# Patient Record
Sex: MALE | Race: WHITE | NOT HISPANIC OR LATINO | Employment: OTHER | URBAN - METROPOLITAN AREA
[De-identification: names, ages, dates, MRNs, and addresses within clinical notes are randomized per-mention and may not be internally consistent; named-entity substitution may affect disease eponyms.]

---

## 2017-01-13 DIAGNOSIS — E78.5 HYPERLIPIDEMIA LDL GOAL <100: Primary | ICD-10-CM

## 2017-01-13 RX ORDER — PRAVASTATIN SODIUM 20 MG
20 TABLET ORAL DAILY
Qty: 90 TABLET | Refills: 0 | Status: SHIPPED | OUTPATIENT
Start: 2017-01-13 | End: 2017-03-24

## 2017-01-13 NOTE — TELEPHONE ENCOUNTER
pravastatin     Last Written Prescription Date: 11/03/16  Last Fill Quantity: 90, # refills: 0  Last Office Visit with G, P or [facility] prescribing provider: 03/07/16   Next 5 appointments (look out 90 days)     Mar 13, 2017  9:30 AM   PHYSICAL with Martin Odonnell MD   [facility] ([facility])    [address] [place] [id number] [phone]                   CHOL      141   3/2/2016  HDL       44   3/2/2016  LDL       80   3/2/2016  TRIG       86   3/2/2016  CHOLHDLRATIO      3.2   1/20/2015

## 2017-02-06 ENCOUNTER — ANTICOAGULATION THERAPY VISIT (OUTPATIENT)
Dept: ANTICOAGULATION | Facility: CLINIC | Age: 72
End: 2017-02-06
Payer: MEDICARE

## 2017-02-06 DIAGNOSIS — I48.20 CHRONIC ATRIAL FIBRILLATION (H): ICD-10-CM

## 2017-02-06 DIAGNOSIS — Z79.01 LONG-TERM (CURRENT) USE OF ANTICOAGULANTS: Primary | ICD-10-CM

## 2017-02-06 LAB — INR POINT OF CARE: 2.6 (ref 0.86–1.14)

## 2017-02-06 PROCEDURE — 85610 PROTHROMBIN TIME: CPT | Mod: QW

## 2017-02-06 PROCEDURE — 99207 ZZC NO CHARGE NURSE ONLY: CPT

## 2017-02-06 PROCEDURE — 36416 COLLJ CAPILLARY BLOOD SPEC: CPT

## 2017-02-06 NOTE — PROGRESS NOTES
ANTICOAGULATION FOLLOW-UP CLINIC VISIT    Patient Name:  Alex Stanley  Date:  2/6/2017  Contact Type:  Face to Face    SUBJECTIVE:     Patient Findings     Positives No Problem Findings           OBJECTIVE    INR PROTIME   Date Value Ref Range Status   02/06/2017 2.6* 0.86 - 1.14 Final       ASSESSMENT / PLAN  INR assessment THER    Recheck INR In: 6 WEEKS    INR Location Clinic      Anticoagulation Summary as of 2/6/2017     INR goal 2.0-3.0   Selected INR 2.6 (2/6/2017)   Maintenance plan 2.5 mg (5 mg x 0.5) on Fri; 5 mg (5 mg x 1) all other days   Full instructions 2.5 mg on Fri; 5 mg all other days   Weekly total 32.5 mg   No change documented Mabel Garza, RN   Plan last modified Mabel Garza RN (12/21/2015)   Next INR check 3/20/2017   Target end date Indefinite    Indications   Long-term (current) use of anticoagulants [Z79.01] [Z79.01]  Chronic atrial fibrillation/Flutter [I48.2]         Anticoagulation Episode Summary     INR check location     Preferred lab     Send INR reminders to [facility]    Comments             See the Encounter Report to view Anticoagulation Flowsheet and Dosing Calendar (Go to Encounters tab in chart review, and find the Anticoagulation Therapy Visit)        Mabel Garza RN

## 2017-02-15 DIAGNOSIS — I48.20 CHRONIC ATRIAL FIBRILLATION (H): ICD-10-CM

## 2017-02-15 RX ORDER — WARFARIN SODIUM 5 MG/1
TABLET ORAL
Qty: 90 TABLET | Refills: 0 | Status: SHIPPED | OUTPATIENT
Start: 2017-02-15 | End: 2017-05-01

## 2017-02-15 NOTE — TELEPHONE ENCOUNTER
warfarin (COUMADIN) 5 MG tablet    Last Written Prescription Date: 11/15/2016  Last Fill Qty: 90, # refills: 0  Last Office Visit with [facility], [facility] or [facility] prescribing provider: 03/22/2016  Next 5 appointments (look out 90 days)     Mar 13, 2017  9:30 AM CDT   PHYSICAL with Martin Odonnell MD   [facility] ([facility])    [address] [place] [id number] [phone]                   Date and Result of Last PT/INR:   Lab Results   Component Value Date    INR 2.6 02/06/2017    INR 2.5 12/27/2016    INR 1.18 06/02/2011    INR 1.13 06/02/2011

## 2017-03-13 ENCOUNTER — ANTICOAGULATION THERAPY VISIT (OUTPATIENT)
Dept: ANTICOAGULATION | Facility: CLINIC | Age: 72
End: 2017-03-13
Payer: MEDICARE

## 2017-03-13 DIAGNOSIS — Z79.01 LONG-TERM (CURRENT) USE OF ANTICOAGULANTS: ICD-10-CM

## 2017-03-13 DIAGNOSIS — I48.20 CHRONIC ATRIAL FIBRILLATION (H): ICD-10-CM

## 2017-03-13 LAB — INR POINT OF CARE: 3 (ref 0.86–1.14)

## 2017-03-13 PROCEDURE — 36416 COLLJ CAPILLARY BLOOD SPEC: CPT

## 2017-03-13 PROCEDURE — 99207 ZZC NO CHARGE NURSE ONLY: CPT

## 2017-03-13 PROCEDURE — 85610 PROTHROMBIN TIME: CPT | Mod: QW

## 2017-03-13 NOTE — MR AVS SNAPSHOT
Alex Stanley   3/13/2017 9:00 AM   Anticoagulation Therapy Visit    Description:  71 year old male   Provider:   ANTICOAGULATION CLINIC   Department:   Anti Coagulation           INR as of 3/13/2017     Today's INR 3.0      Anticoagulation Summary as of 3/13/2017     INR goal 2.0-3.0   Today's INR 3.0   Full instructions 2.5 mg on Fri; 5 mg all other days   Next INR check 4/24/2017    Indications   Long-term (current) use of anticoagulants [Z79.01] [Z79.01]  Chronic atrial fibrillation/Flutter [I48.2]         Your next Anticoagulation Clinic appointment(s)     May 01, 2017  9:00 AM CDT   Anticoagulation Visit with  ANTICOAGULATION CLINIC   [facility]    [address] [place] [id number] [phone]              Contact Numbers     [facility]  Please call  [phone] to cancel and/or reschedule your appointment   Please call  [phone] with any problems or questions regarding your therapy.        March 2017 Details    Sun Mon Tue Wed Thu Fri Sat        1               2               3               4                 5               6               7               8               9               10               11                 12               13      5 mg   See details      14      5 mg         15      5 mg         16      5 mg         17      2.5 mg         18      5 mg           19      5 mg         20      5 mg         21      5 mg         22      5 mg         23      5 mg         24      2.5 mg         25      5 mg           26      5 mg         27      5 mg         28      5 mg         29      5 mg         30      5 mg         31      2.5 mg           Date Details   03/13 This INR check               How to take your warfarin dose     To take:  2.5 mg Take 0.5 of a 5 mg tablet.    To take:  5 mg Take 1 of the 5 mg tablets.           April 2017 Details    Sun Mon Tue Wed Thu Fri Sat           1      5 mg            2      5 mg         3      5 mg         4      5 mg         5      5 mg         6      5 mg         7      2.5 mg         8      5 mg           9      5 mg         10      5 mg         11      5 mg         12      5 mg         13      5 mg         14      2.5 mg         15      5 mg           16      5 mg         17      5 mg         18      5 mg         19      5 mg         20      5 mg         21      2.5 mg         22      5 mg           23      5 mg         24            25               26               27               28               29                 30                      Date Details   No additional details    Date of next INR:  4/24/2017         How to take your warfarin dose     To take:  2.5 mg Take 0.5 of a 5 mg tablet.    To take:  5 mg Take 1 of the 5 mg tablets.

## 2017-03-13 NOTE — PROGRESS NOTES
ANTICOAGULATION FOLLOW-UP CLINIC VISIT    Patient Name:  Alex Stanley  Date:  3/13/2017  Contact Type:  Face to Face    SUBJECTIVE:     Patient Findings     Positives No Problem Findings           OBJECTIVE    INR Protime   Date Value Ref Range Status   03/13/2017 3.0 (A) 0.86 - 1.14 Final       ASSESSMENT / PLAN  INR assessment THER    Recheck INR In: 6 WEEKS    INR Location Clinic      Anticoagulation Summary as of 3/13/2017     INR goal 2.0-3.0   Today's INR 3.0   Maintenance plan 2.5 mg (5 mg x 0.5) on Fri; 5 mg (5 mg x 1) all other days   Full instructions 2.5 mg on Fri; 5 mg all other days   Weekly total 32.5 mg   No change documented Mabel Garza RN   Plan last modified Mabel Garza RN (12/21/2015)   Next INR check 4/24/2017   Target end date Indefinite    Indications   Long-term (current) use of anticoagulants [Z79.01] [Z79.01]  Chronic atrial fibrillation/Flutter [I48.2]         Anticoagulation Episode Summary     INR check location     Preferred lab     Send INR reminders to [facility]    Comments             See the Encounter Report to view Anticoagulation Flowsheet and Dosing Calendar (Go to Encounters tab in chart review, and find the Anticoagulation Therapy Visit)    Dosage adjustment made based on physician directed care plan.    Mabel Garza RN

## 2017-03-24 DIAGNOSIS — E78.5 HYPERLIPIDEMIA LDL GOAL <100: ICD-10-CM

## 2017-03-27 RX ORDER — PRAVASTATIN SODIUM 20 MG
TABLET ORAL
Qty: 90 TABLET | Refills: 0 | Status: SHIPPED | OUTPATIENT
Start: 2017-03-27 | End: 2017-05-01

## 2017-03-27 NOTE — TELEPHONE ENCOUNTER
pravastatin (PRAVACHOL) 20 MG tablet     Last Written Prescription Date: 1/13/2017   Last Fill Quantity: 90, # refills: 0  Last Office Visit with [facility], [facility] or [facility] prescribing provider: 11/23/2016  Next 5 appointments (look out 90 days)     May 01, 2017  9:30 AM CDT   PHYSICAL with Martin Odonnell MD   [facility]    [address] [place] [id number] [phone]                   Lab Results   Component Value Date    CHOL 141 03/02/2016     Lab Results   Component Value Date    HDL 44 03/02/2016     Lab Results   Component Value Date    LDL 80 03/02/2016     Lab Results   Component Value Date    TRIG 86 03/02/2016     Lab Results   Component Value Date    CHOLHDLRATIO 3.2 01/20/2015

## 2017-04-26 DIAGNOSIS — N18.30 CKD (CHRONIC KIDNEY DISEASE) STAGE 3, GFR 30-59 ML/MIN (H): ICD-10-CM

## 2017-04-26 DIAGNOSIS — I10 ESSENTIAL HYPERTENSION: ICD-10-CM

## 2017-04-26 DIAGNOSIS — E78.5 HYPERLIPIDEMIA LDL GOAL <100: ICD-10-CM

## 2017-04-26 LAB
ANION GAP SERPL CALCULATED.3IONS-SCNC: 9 MMOL/L (ref 3–14)
BUN SERPL-MCNC: 12 MG/DL (ref 7–30)
CALCIUM SERPL-MCNC: 8.9 MG/DL (ref 8.5–10.1)
CHLORIDE SERPL-SCNC: 107 MMOL/L (ref 94–109)
CHOLEST SERPL-MCNC: 128 MG/DL
CO2 SERPL-SCNC: 26 MMOL/L (ref 20–32)
CREAT SERPL-MCNC: 1.25 MG/DL (ref 0.66–1.25)
CREAT UR-MCNC: 52 MG/DL
GFR SERPL CREATININE-BSD FRML MDRD: 57 ML/MIN/1.7M2
GLUCOSE SERPL-MCNC: 91 MG/DL (ref 70–99)
HDLC SERPL-MCNC: 39 MG/DL
HGB BLD-MCNC: 14.8 G/DL (ref 13.3–17.7)
LDLC SERPL CALC-MCNC: 73 MG/DL
MICROALBUMIN UR-MCNC: 11 MG/L
MICROALBUMIN/CREAT UR: 21.19 MG/G CR (ref 0–17)
NONHDLC SERPL-MCNC: 89 MG/DL
POTASSIUM SERPL-SCNC: 4.1 MMOL/L (ref 3.4–5.3)
SODIUM SERPL-SCNC: 142 MMOL/L (ref 133–144)
TRIGL SERPL-MCNC: 80 MG/DL

## 2017-04-26 PROCEDURE — 80061 LIPID PANEL: CPT | Performed by: INTERNAL MEDICINE

## 2017-04-26 PROCEDURE — 85018 HEMOGLOBIN: CPT | Performed by: INTERNAL MEDICINE

## 2017-04-26 PROCEDURE — 80048 BASIC METABOLIC PNL TOTAL CA: CPT | Performed by: INTERNAL MEDICINE

## 2017-04-26 PROCEDURE — 82043 UR ALBUMIN QUANTITATIVE: CPT | Performed by: INTERNAL MEDICINE

## 2017-04-26 PROCEDURE — 36415 COLL VENOUS BLD VENIPUNCTURE: CPT | Performed by: INTERNAL MEDICINE

## 2017-05-01 ENCOUNTER — OFFICE VISIT (OUTPATIENT)
Dept: INTERNAL MEDICINE | Facility: CLINIC | Age: 72
End: 2017-05-01
Payer: MEDICARE

## 2017-05-01 ENCOUNTER — ANTICOAGULATION THERAPY VISIT (OUTPATIENT)
Dept: ANTICOAGULATION | Facility: CLINIC | Age: 72
End: 2017-05-01
Payer: MEDICARE

## 2017-05-01 VITALS
SYSTOLIC BLOOD PRESSURE: 104 MMHG | HEIGHT: 68 IN | OXYGEN SATURATION: 97 % | TEMPERATURE: 98.4 F | HEART RATE: 75 BPM | WEIGHT: 174.9 LBS | DIASTOLIC BLOOD PRESSURE: 70 MMHG | BODY MASS INDEX: 26.51 KG/M2

## 2017-05-01 DIAGNOSIS — F17.200 CURRENT SMOKER: ICD-10-CM

## 2017-05-01 DIAGNOSIS — I48.20 CHRONIC ATRIAL FIBRILLATION (H): ICD-10-CM

## 2017-05-01 DIAGNOSIS — E78.5 HYPERLIPIDEMIA LDL GOAL <100: ICD-10-CM

## 2017-05-01 DIAGNOSIS — I10 ESSENTIAL HYPERTENSION: ICD-10-CM

## 2017-05-01 DIAGNOSIS — Z87.891 HISTORY OF TOBACCO USE: ICD-10-CM

## 2017-05-01 DIAGNOSIS — Z11.59 NEED FOR HEPATITIS C SCREENING TEST: ICD-10-CM

## 2017-05-01 DIAGNOSIS — F10.21 ALCOHOL DEPENDENCE IN REMISSION (H): ICD-10-CM

## 2017-05-01 DIAGNOSIS — Z72.0 TOBACCO ABUSE: ICD-10-CM

## 2017-05-01 DIAGNOSIS — Z00.00 MEDICARE ANNUAL WELLNESS VISIT, SUBSEQUENT: Primary | ICD-10-CM

## 2017-05-01 DIAGNOSIS — I87.2 VENOUS INSUFFICIENCY: ICD-10-CM

## 2017-05-01 DIAGNOSIS — N18.30 CKD (CHRONIC KIDNEY DISEASE) STAGE 3, GFR 30-59 ML/MIN (H): ICD-10-CM

## 2017-05-01 LAB — INR POINT OF CARE: 3.4 (ref 0.86–1.14)

## 2017-05-01 PROCEDURE — G0439 PPPS, SUBSEQ VISIT: HCPCS | Performed by: INTERNAL MEDICINE

## 2017-05-01 PROCEDURE — 85610 PROTHROMBIN TIME: CPT | Mod: QW

## 2017-05-01 PROCEDURE — G0296 VISIT TO DETERM LDCT ELIG: HCPCS | Performed by: INTERNAL MEDICINE

## 2017-05-01 PROCEDURE — 36416 COLLJ CAPILLARY BLOOD SPEC: CPT

## 2017-05-01 PROCEDURE — 99213 OFFICE O/P EST LOW 20 MIN: CPT | Mod: 25 | Performed by: INTERNAL MEDICINE

## 2017-05-01 RX ORDER — PRAVASTATIN SODIUM 20 MG
20 TABLET ORAL DAILY
Qty: 90 TABLET | Status: SHIPPED | OUTPATIENT
Start: 2017-05-01 | End: 2018-05-07

## 2017-05-01 RX ORDER — AMLODIPINE BESYLATE 10 MG/1
10 TABLET ORAL DAILY
Qty: 90 TABLET | Status: SHIPPED | OUTPATIENT
Start: 2017-05-01 | End: 2017-07-07

## 2017-05-01 RX ORDER — HYDROCHLOROTHIAZIDE 25 MG/1
12.5 TABLET ORAL EVERY OTHER DAY
Qty: 45 TABLET | Refills: 2 | Status: CANCELLED | OUTPATIENT
Start: 2017-05-01

## 2017-05-01 RX ORDER — WARFARIN SODIUM 5 MG/1
TABLET ORAL
Qty: 90 TABLET | Status: SHIPPED | OUTPATIENT
Start: 2017-05-01 | End: 2017-08-01

## 2017-05-01 RX ORDER — VARENICLINE TARTRATE 1 MG/1
1 TABLET, FILM COATED ORAL 2 TIMES DAILY
Qty: 60 TABLET | Refills: 2 | Status: SHIPPED | OUTPATIENT
Start: 2017-05-08 | End: 2017-06-01

## 2017-05-01 NOTE — PROGRESS NOTES
ANTICOAGULATION FOLLOW-UP CLINIC VISIT    Patient Name:  Alex Stanley  Date:  5/1/2017  Contact Type:  Face to Face    SUBJECTIVE:     Patient Findings     Positives Extra doses (pt has already taken his 5mg today, dose will be decreased Tues due to elevated INR today.), No Problem Findings           OBJECTIVE    INR Protime   Date Value Ref Range Status   05/01/2017 3.4 (A) 0.86 - 1.14 Final       ASSESSMENT / PLAN  INR assessment SUPRA    Recheck INR In: 6 WEEKS    INR Location Clinic      Anticoagulation Summary as of 5/1/2017     INR goal 2.0-3.0   Today's INR 3.4!   Maintenance plan 2.5 mg (5 mg x 0.5) on Mon, Fri; 5 mg (5 mg x 1) all other days   Full instructions 5/1: 5 mg; 5/2: 2.5 mg; Otherwise 2.5 mg on Mon, Fri; 5 mg all other days   Weekly total 30 mg   Plan last modified Vangie Agarwal RN (5/1/2017)   Next INR check 6/12/2017   Target end date Indefinite    Indications   Long-term (current) use of anticoagulants [Z79.01] [Z79.01]  Chronic atrial fibrillation/Flutter [I48.2]         Anticoagulation Episode Summary     INR check location     Preferred lab     Send INR reminders to  [facility]    Comments             See the Encounter Report to view Anticoagulation Flowsheet and Dosing Calendar (Go to Encounters tab in chart review, and find the Anticoagulation Therapy Visit)        Vangie Agarwal RN

## 2017-05-01 NOTE — MR AVS SNAPSHOT
Alex Stanley   5/1/2017 9:00 AM   Anticoagulation Therapy Visit    Description:  71 year old male   Provider:   ANTICOAGULATION CLINIC   Department:   Anti Coagulation           INR as of 5/1/2017     Today's INR 3.4!      Anticoagulation Summary as of 5/1/2017     INR goal 2.0-3.0   Today's INR 3.4!   Full instructions 5/1: 5 mg; 5/2: 2.5 mg; Otherwise 2.5 mg on Mon, Fri; 5 mg all other days   Next INR check 6/12/2017    Indications   Long-term (current) use of anticoagulants [Z79.01] [Z79.01]  Chronic atrial fibrillation/Flutter [I48.2]         Your next Anticoagulation Clinic appointment(s)     Jun 12, 2017  9:00 AM CDT   Anticoagulation Visit with  ANTICOAGULATION CLINIC   [facility] ([facility])    [address] [address] [phone]              Contact Numbers     [facility]  Please call  [phone] to cancel and/or reschedule your appointment   Please call  [phone] with any problems or questions regarding your therapy.        May 2017 Details    Sun Mon Tue Wed Thu Fri Sat      1      5 mg   See details      2      2.5 mg         3      5 mg         4      5 mg         5      2.5 mg         6      5 mg           7      5 mg         8      2.5 mg         9      5 mg         10      5 mg         11      5 mg         12      2.5 mg         13      5 mg           14      5 mg         15      2.5 mg         16      5 mg         17      5 mg         18      5 mg         19      2.5 mg         20      5 mg           21      5 mg         22      2.5 mg         23      5 mg         24      5 mg         25      5 mg         26      2.5 mg         27      5 mg           28      5 mg         29      2.5 mg         30      5 mg         31      5 mg             Date Details   05/01 This INR check               How to take your warfarin dose     To take:  2.5 mg Take 0.5 of a 5 mg tablet.    To take:  5 mg Take 1 of the 5  mg tablets.           June 2017 Details    Sun Mon Tue Wed Thu Fri Sat         1      5 mg         2      2.5 mg         3      5 mg           4      5 mg         5      2.5 mg         6      5 mg         7      5 mg         8      5 mg         9      2.5 mg         10      5 mg           11      5 mg         12            13               14               15               16               17                 18               19               20               21               22               23               24                 25               26               27               28               29               30                 Date Details   No additional details    Date of next INR:  6/12/2017         How to take your warfarin dose     To take:  2.5 mg Take 0.5 of a 5 mg tablet.    To take:  5 mg Take 1 of the 5 mg tablets.

## 2017-05-01 NOTE — MR AVS SNAPSHOT
After Visit Summary   5/1/2017    Alex Stanley    MRN: [id number]           Patient Information     Date Of Birth          1945        Visit Information        Provider Department      5/1/2017 9:30 AM Martin Odonnell MD [facility]        Today's Diagnoses     Medicare annual wellness visit, subsequent    -  1    Hyperlipidemia LDL goal <100        Chronic atrial fibrillation/Flutter        Alcohol dependence in remission (H)        Essential hypertension        Tobacco abuse        CKD (chronic kidney disease) stage 3, GFR 30-59 ml/min        Venous insufficiency        Need for hepatitis C screening test        Current smoker        History of tobacco use          Care Instructions      Preventive Health Recommendations:       Male Ages 65 and over    Yearly exam:             See your health care provider every year in order to  o   Review health changes.   o   Discuss preventive care.    o   Review your medicines if your doctor has prescribed any.    Talk with your health care provider about whether you should have a test to screen for prostate cancer (PSA).    Every 3 years, have a diabetes test (fasting glucose). If you are at risk for diabetes, you should have this test more often.    Every 5 years, have a cholesterol test. Have this test more often if you are at risk for high cholesterol or heart disease.     Every 10 years, have a colonoscopy. Or, have a yearly FIT test (stool test). These exams will check for colon cancer.    Talk to with your health care provider about screening for Abdominal Aortic Aneurysm if you have a family history of AAA or have a history of smoking.  Shots:     Get a flu shot each year.     Get a tetanus shot every 10 years.     Talk to your doctor about your pneumonia vaccines. There are now two you should receive - Pneumovax (PPSV 23) and Prevnar (PCV 13).    Talk to your doctor about a shingles vaccine.     Talk to your doctor  "about the hepatitis B vaccine.  Nutrition:     Eat at least 5 servings of fruits and vegetables each day.     Eat whole-grain bread, whole-wheat pasta and brown rice instead of white grains and rice.     Talk to your doctor about Calcium and Vitamin D.   Lifestyle    Exercise for at least 150 minutes a week (30 minutes a day, 5 days a week). This will help you control your weight and prevent disease.     Limit alcohol to one drink per day.     No smoking.     Wear sunscreen to prevent skin cancer.     See your dentist every six months for an exam and cleaning.     See your eye doctor every 1 to 2 years to screen for conditions such as glaucoma, macular degeneration and cataracts.    PLAN:  1.  Hold hydrochlorothiazide, for now.   Watch for increase of ankle swelling, increase of blood pressure   2.  Increase frequency of blood pressure checks, report to MD in a few weeks  3.  Prescription for Chantix    4.  Consider completing advance directive  5.  Low dose lung CT        Varenicline Tartrate (Chantix):  Chantix tablets Days                Dosage  0.5 mg                     1 through 3 0.5 mg once daily  0.5 mg                      4 through 7 0.5 mg twice daily  1.0 mg                     Day 8 to end  1 mg twice daily    Renal dosing:  For severe renal impairment, start at a dose of 0.5 mg daily and then titrate as needed to a maximum dose of 0.5 mg twice daily.  For patients with end-stage renal disease (ESRD) undergoing hemodialysis, a maximum dose of 0.5 mg daily may be administered if tolerated well.    How to take:  You will start taking Chantix one week before your quit date while you are still smoking. At first you will use the Chantix starter pack. Once you are finished with that, you will be on a \"maintenance dose\" that will probably stay the same for the rest of your treatment.  After a week of slowly increasing your dose, you will take Chantix twice a day. Take each dose after eating and with a full " glass of water. Taking Chantix with food and water decreases nausea. That is also why you begin on a lower dose and slowly increase it. Once you begin the twice a day dosage, your 2 doses should be at least 8-10 hours apart and at least 4-5 hours before bedtime.      Duration of therapy: 12 weeks.  An additional course of 12 weeks of treatment is recommended if the first 12 weeks were successful to improve long term abstinence.    Adverse reactions:  The most common adverse reaction associated with varenicline treatment is nausea. Other common adverse effects include sleep disturbance, constipation, flatulence and vomiting.    There have been warnings from the FDA to be on the lookout for erratic behavior, suicidal ideation, or suicidal behavior.   There is one case report of a death, though it appears alcohol consumption may have played a part in that case.    Please report any behavior or mood changes as well as being aware of drowsiness.   Be cautious when operating motor vehicles or dangerous machinery until the medication's effect on you is clear.      Lung Cancer Screening   Frequently Asked Questions  If you are at high-risk for lung cancer, getting screened with low-dose computed tomography (LDCT) every year can help save your life. This handout offers answers to some of the most common questions about lung cancer screening. If you have other questions, please call [phone] ([phone]).     What is it?  Lung cancer screening uses special X-ray technology to create an image of your lung tissue. The exam is quick and easy and takes less than 10 seconds. We don t give you any medicine or use any needles. You can eat before and after the exam. You don t need to change your clothes as long as the clothing on your chest doesn t contain metal. But, you do need to be able to hold your breath for at least 6 seconds during the exam.    What is the goal of lung cancer screening?  The goal of lung cancer  screening is to save lives. Many times, lung cancer is not found until a person starts having physical symptoms. Lung cancer screening can help detect lung cancer in the earliest stages when it may be easier to treat.    Who should be screened for lung cancer?  We suggest lung cancer screening for anyone who is at high-risk for lung cancer. You are in the high-risk group if you:      are between the ages of 55 and 79, and    have smoked at least 1 pack of cigarettes a day for 30 or more years, and    still smoke or have quit within the past 15 years.    However, if you have a new cough or shortness of breath, you should talk to your doctor before being screened.    Some national lung health advocacy groups also recommend screening for people ages 50 to 79 who have smoked an average of 1 pack of cigarettes a day for 20 years. They must also have at least 1 other risk factor for lung cancer, not including exposure to secondhand smoke. Other risk factors are having had cancer in the past, emphysema, pulmonary fibrosis, COPD, a family history of lung cancer, or exposure to certain materials such as arsenic, asbestos, beryllium, cadmium, chromium, diesel fumes, nickel, radon or silica. Your care team can help you know if you have one of these risk factors.     Why does it matter if I have symptoms?  Certain symptoms can be a sign that you have a condition in your lungs that should be checked and treated by your doctor. These symptoms include fever, chest pain, a new or changing cough, shortness of breath that you have never felt before, coughing up blood or unexplained weight loss. Having any of these symptoms can greatly affect the results of lung cancer screening.       Should all smokers get an LDCT lung cancer screening exam?  It depends. Lung cancer screening is for a very specific group of men and women who have a history of heavy smoking over a long period of time (see  Who should be screened for lung cancer   above).  I am in the high-risk group, but have been diagnosed with cancer in the past. Is LDCT lung cancer screening right for me?  In some cases, you should not have LDCT lung screening, such as when your doctor is already following your cancer with CT scan studies. Your doctor will help you decide if LDCT lung screening is right for you.  Do I need to have a screening exam every year?  Yes. If you are in the high-risk group described earlier, you should get an LDCT lung cancer screening exam every year until you are 79, or are no longer willing or able to undergo screening and possible procedures to diagnose and treat lung cancer.  How effective is LDCT at preventing death from lung cancer?  Studies have shown that LDCT lung cancer screening can lower the risk of death from lung cancer by 20 percent in people who are at high-risk.  What are the risks?  There are some risks and limitations of LDCT lung cancer screening. We want to make sure you understand the risks and benefits, so please let us know if you have any questions. Your doctor may want to talk with you more about these risks.    Radiation exposure: As with any exam that uses radiation, there is a very small increased risk of cancer. The amount of radiation in LDCT is small--about the same amount a person would get from a mammogram. Your doctor orders the exam when he or she feels the potential benefits outweigh the risks.    False negatives: No test is perfect, including LDCT. It is possible that you may have a medical condition, including lung cancer, that is not found during your exam. This is called a false negative result.    False positives and more testing: LDCT very often finds something in the lung that could be cancer, but in fact is not. This is called a false positive result. False positive tests often cause anxiety. To make sure these findings are not cancer, you may need to have more tests. These tests will be done only if you give us  permission. Sometimes patients need a treatment that can have side effects, such as a biopsy. For more information on false positives, see  What can I expect from the results?     Findings not related to lung cancer: Your LDCT exam also takes pictures of areas of your body next to your lungs. In a very small number of cases, the CT scan will show an abnormal finding in one of these areas, such as your kidneys, adrenal glands, liver or thyroid. This finding may not be serious, but you may need more tests. Your doctor can help you decide what other tests you may need, if any.  What can I expect from the results?  About 1 out of 4 LDCT exams will find something that may need more tests. Most of the time, these findings are lung nodules. Lung nodules are very small collections of tissue in the lung. These nodules are very common, and the vast majority--more than 97 percent--are not cancer (benign). Most are normal lymph nodes or small areas of scarring from past infections.  But, if a small lung nodule is found to be cancer, the cancer can be cured more than 90 percent of the time. To know if the nodule is cancer, we may need to get more images before your next yearly screening exam. If the nodule has suspicious features (for example, it is large, has an odd shape or grows over time), we will refer you to a specialist for further testing.  Will my doctor also get the results?  Yes. Your doctor will get a copy of your results.  Is it okay to keep smoking now that there s a cancer screening exam?  No. Tobacco is one of the strongest cancer-causing agents. It causes not only lung cancer, but other cancers and cardiovascular (heart) diseases as well. The damage caused by smoking builds over time. This means that the longer you smoke, the higher your risk of disease. While it is never too late to quit, the sooner you quit, the better.  Where can I find help to quit smoking?  The best way to prevent lung cancer is to stop  smoking. If you have already quit smoking, congratulations and keep it up! For help on quitting smoking, please call [employer] at 7-558-929-TVNG ([phone]) or the American [employer] Society at 1-684.598.1241 to find local resources near you.  One-on-one health coaching:  If you d prefer to work individually with a health care provider on tobacco cessation, we offer:      Medication Therapy Management:  Our specially trained pharmacists work closely with you and your doctor to help you quit smoking.  Call [phone] or [phone] (toll free).     Can Do: Health coaching offered by [facility].  [email or web address]            Follow-ups after your visit        Your next 10 appointments already scheduled     Jun 12, 2017  9:00 AM CDT   Anticoagulation Visit with [facility]   [facility] ([facility]    [address] [place] [id number] [phone]              Future tests that were ordered for you today     Open Future Orders        Priority Expected Expires Ordered    Hepatitis C Screen Reflex to HCV RNA Quant and Genotype Routine 3/1/2018 5/1/2018 5/1/2017    EKG 12-lead complete w/read - Clinics Routine 3/1/2018 5/1/2018 5/1/2017    Lipid panel reflex to direct LDL Routine 3/1/2018 5/1/2018 5/1/2017    Basic metabolic panel Routine 3/1/2018 5/1/2018 5/1/2017    CT Chest Lung Cancer Scrn Low Dose wo Routine  5/1/2018 5/1/2017            Who to contact     If you have questions or need follow up information about today's clinic visit or your schedule please contact [facility] directly at [phone].  Normal or non-critical lab and imaging results will be communicated to you by MyChart, letter or phone within 4 business days after the clinic has received the results. If you do not hear from us within 7 days, please contact the clinic through MyChart or phone. If you have a critical or abnormal  "lab result, we will notify you by phone as soon as possible.  Submit refill requests through [employer] or call your pharmacy and they will forward the refill request to us. Please allow 3 business days for your refill to be completed.          Additional Information About Your Visit        General Specifichart Information     [employer] lets you send messages to your doctor, view your test results, renew your prescriptions, schedule appointments and more. To sign up, go to [email or web address] . Click on \"Log in\" on the left side of the screen, which will take you to the Welcome page. Then click on \"Sign up Now\" on the right side of the page.     You will be asked to enter the access code listed below, as well as some personal information. Please follow the directions to create your username and password.     Your access code is: [email or web address]  Expires: 2017 10:53 AM     Your access code will  in 90 days. If you need help or a new code, please call your [facility] clinic or [phone].        Care EveryWhere ID     This is your Care EveryWhere ID. This could be used by other organizations to access your [facility] medical records  [id number]        Your Vitals Were     Pulse Temperature Height Pulse Oximetry BMI (Body Mass Index)       75 98.4  F (36.9  C) (Oral) 5' 8\" (1.727 m) 97% 26.59 kg/m2        Blood Pressure from Last 3 Encounters:   17 104/70   16 115/82   16 126/81    Weight from Last 3 Encounters:   17 174 lb 14.4 oz (79.3 kg)   16 175 lb 8 oz (79.6 kg)   01/26/15 169 lb 1.6 oz (76.7 kg)              We Performed the Following     Okay for Smoking Cessation Study (PLUTO) to Contact Patient     Prof fee: Shared Decisionmaking for Lung Cancer Screening          Today's Medication Changes          These changes are accurate as of: 17 10:53 AM.  If you have any questions, ask your nurse or doctor.               Start taking these medicines.        Dose/Directions    * varenicline " 0.5 MG X 11 & 1 MG X 42 tablet   Commonly known as:  CHANTIX STARTING MONTH WENDY   Used for:  Tobacco abuse   Started by:  Martin Odonnell MD        Take 0.5 mg tab daily for 3 days, then 0.5 mg tab twice daily for 4 days, then 1 mg twice daily.   Quantity:  53 tablet   Refills:  0       * varenicline 1 MG tablet   Commonly known as:  CHANTIX CONTINUING MONTH WENDY   Used for:  Tobacco abuse   Started by:  Martin Odonnell MD        Dose:  1 mg   Start taking on:  5/8/2017   Take 1 tablet (1 mg) by mouth 2 times daily   Quantity:  60 tablet   Refills:  2       * Notice:  This list has 2 medication(s) that are the same as other medications prescribed for you. Read the directions carefully, and ask your doctor or other care provider to review them with you.      These medicines have changed or have updated prescriptions.        Dose/Directions    pravastatin 20 MG tablet   Commonly known as:  PRAVACHOL   This may have changed:  See the new instructions.   Used for:  Hyperlipidemia LDL goal <100   Changed by:  Martin Odonnell MD        Dose:  20 mg   Take 1 tablet (20 mg) by mouth daily   Quantity:  90 tablet   Refills:  prn            Where to get your medicines      These medications were sent to [facility] - [address] [address]     Phone:  [phone]     amLODIPine 10 MG tablet    pravastatin 20 MG tablet    varenicline 1 MG tablet    warfarin 5 MG tablet         These medications were sent to [address] [address]     Phone:  [phone]     varenicline 0.5 MG X 11 & 1 MG X 42 tablet                Primary Care Provider Office Phone # Fax #    Martin Odonnell [phone]       [address] [id number]        Thank you!     Thank you for choosing [facility]  for your  care. Our goal is always to provide you with excellent care. Hearing back from our patients is one way we can continue to improve our services. Please take a few minutes to complete the written survey that you may receive in the mail after your visit with us. Thank you!             Your Updated Medication List - Protect others around you: Learn how to safely use, store and throw away your medicines at www.disposemymeds.org.          This list is accurate as of: 5/1/17 10:53 AM.  Always use your most recent med list.                   Brand Name Dispense Instructions for use    amLODIPine 10 MG tablet    NORVASC    90 tablet    Take 1 tablet (10 mg) by mouth daily       ASPIRIN NOT PRESCRIBED    INTENTIONAL    0 each    Antiplatelet medication not prescribed intentionally due to history  of gastrointestinal bleeding, and patient on warfarin.       hydrochlorothiazide 25 MG tablet    HYDRODIURIL    45 tablet    Take 0.5 tablets (12.5 mg) by mouth every other day       pravastatin 20 MG tablet    PRAVACHOL    90 tablet    Take 1 tablet (20 mg) by mouth daily       * varenicline 0.5 MG X 11 & 1 MG X 42 tablet    CHANTIX STARTING MONTH WENDY    53 tablet    Take 0.5 mg tab daily for 3 days, then 0.5 mg tab twice daily for 4 days, then 1 mg twice daily.       * varenicline 1 MG tablet   Start taking on:  5/8/2017    CHANTIX CONTINUING MONTH WENDY    60 tablet    Take 1 tablet (1 mg) by mouth 2 times daily       warfarin 5 MG tablet    COUMADIN    90 tablet    Take 2.5mg fri and 5mg all other days as advised by the anticoagulation clinic       * Notice:  This list has 2 medication(s) that are the same as other medications prescribed for you. Read the directions carefully, and ask your doctor or other care provider to review them with you.

## 2017-05-01 NOTE — PROGRESS NOTES
SUBJECTIVE:                                                            Alex Stanley is a 71 year old male who presents for Preventive Visit.    Are you in the first 12 months of your Medicare Part B coverage?  No    Healthy Habits:    Do you get at least three servings of calcium containing foods daily (dairy, green leafy vegetables, etc.)? No    Amount of exercise or daily activities, outside of work: 3-4 day(s) per week    Problems taking medications regularly No    Medication side effects: No    Have you had an eye exam in the past two years? yes    Do you see a dentist twice per year? yes    Do you have sleep apnea, excessive snoring or daytime drowsiness?no    COGNITIVE SCREEN  1) Repeat 3 items (Banana, Sunrise, Chair)    2) Clock draw: NORMAL  3) 3 item recall: Recalls 2 objects   Results: NORMAL clock, 1-2 items recalled: COGNITIVE IMPAIRMENT LESS LIKELY    Mini-CogTM Copyright S Ni. Licensed by the author for use in [facility]; reprinted with permission (isac@[facility]). All rights reserved.      Additional issues to address:  1.  Ready to try stopping smoking.   Tried gum in past.   Interested in trying Chantix.   Breathing is good.  2.  Follow-up permanent atrial fib/flutter.   No symptoms, on anticoagulation.  3.  Follow-up HTN.  Patient is checking outpatient blood pressures occasionally, at home.  Results are 120/80 range.  He reports no side effects from BP medications.   4.  Follow-up of chronic hyperlipidemia, on Pravastatin 20 mg.  Diet rating: good.  He reports no side effects of medications, including no significant myalgias or other side effects.     Reviewed and updated as needed this visit by clinical staff         Reviewed and updated as needed this visit by Provider        Social History   Substance Use Topics     Smoking status: Current Every Day Smoker     Packs/day: 1.00     Years: 35.00     Types: Cigarettes     Smokeless tobacco: Never Used      Comment: ppd since  age 16 - now 1/2-1 ppd     Alcohol use No      Comment: last drank at [facility] 8/2010.       The patient does not drink >3 drinks per day nor >7 drinks per week.    Today's PHQ-2 Score:   PHQ-2 ( 1999 [employer]) 3/7/2016 1/26/2015   Q1: Little interest or pleasure in doing things 0 0   Q2: Feeling down, depressed or hopeless 0 0   PHQ-2 Score 0 0       Do you feel safe in your environment - Yes    Do you have a Health Care Directive?: No: Advance care planning was reviewed with patient; patient declined at this time.    Current providers sharing in care for this patient include:   Patient Care Team:  Martin Odonnell MD as PCP - General      Hearing impairment: Yes, some loss but not enough to need aid, has tinnitus    Ability to successfully perform activities of daily living: Yes, no assistance needed     Fall risk:  Fallen 2 or more times in the past year?: No  Any fall with injury in the past year?: No    Home safety:  none identified      The following health maintenance items are reviewed in Epic and correct as of today:  Health Maintenance   Topic Date Due     HEPATITIS C SCREENING  08/28/1963     ADVANCE DIRECTIVE PLANNING Q5 YRS (NO INBASKET)  06/22/2016     MEDICARE ANNUAL WELLNESS VISIT  03/07/2017     FALL RISK ASSESSMENT  03/07/2017     OP ANNUAL INR REFERRAL  08/29/2017     BMP Q1 YR (NO INBASKET)  04/26/2018     HEMOGLOBIN Q1 YR (NO INBASKET)  04/26/2018     LIPID MONITORING Q1 YEAR( NO INBASKET)  04/26/2018     MICROALBUMIN Q1 YEAR( NO INBASKET)  04/26/2018     COLONOSCOPY Q10 YR INBASKET MESSAGE  12/05/2019     TETANUS IMMUNIZATION (SYSTEM ASSIGNED)  12/14/2019     PNEUMOCOCCAL  Completed     AORTIC ANEURYSM SCREENING (SYSTEM ASSIGNED)  Completed          ROS:  Rare ankle edema, less than previous  no added salt diet, has been trying to eat better, lose some weight  Constitutional, HEENT, cardiovascular, pulmonary, gi and gu systems are negative, except as otherwise noted.  Nocturia x  "0-1    OBJECTIVE:                                                            /70  Pulse 75  Temp 98.4  F (36.9  C) (Oral)  Ht 5' 8\" (1.727 m)  Wt 174 lb 14.4 oz (79.3 kg)  SpO2 97%  BMI 26.59 kg/m2 Estimated body mass index is 26.68 kg/(m^2) as calculated from the following:    Height as of 3/7/16: 5' 8\" (1.727 m).    Weight as of 3/7/16: 175 lb 8 oz (79.6 kg).  EXAM:   GENERAL: healthy, alert and no distress  NECK: no adenopathy, no asymmetry, masses, or scars and thyroid normal to palpation  RESP: lungs clear to auscultation - no rales, rhonchi or wheezes  CV:  normal heart tones, no murmur, click or rub, no peripheral edema and peripheral pulses strong.   No carotid bruits.     ABDOMEN: soft, nontender, no hepatosplenomegaly, no masses and bowel sounds normal  MS: no gross musculoskeletal defects noted, no edema    EKG: missed, will check in the future     ASSESSMENT / PLAN:                                                              ASSESSMENT:   1.  Annual Wellness Visit  2.  HTN, controlled, blood pressure lower today than needed  3.  Hyperlipidemia, controlled.  History of stroke.  4.  Permanent atrial fib, stable  5.  History of alcohol, remains off entirely  6.  Tobacco, ready to stop smoking   - appropriate for CT screening  7. Borderline renal insufficiency    PLAN:  1.  Hold hydrochlorothiazide, for now.   Watch for increase of ankle swelling, increase of blood pressure   2.  Increase frequency of blood pressure checks, report to MD in a few weeks  3.  Prescription for Chantix    4.  Consider completing advance directive  5.  Low dose screening lung CT    End of Life Planning:  Patient currently has an advanced directive: No.  I have verified the patient's ablity to prepare an advanced directive/make health care decisions.  Literature was provided to assist patient in preparing an advanced directive.    COUNSELING:  Reviewed preventive health counseling, as reflected in patient " "instructions       Regular exercise      Estimated body mass index is 26.68 kg/(m^2) as calculated from the following:    Height as of 3/7/16: 5' 8\" (1.727 m).    Weight as of 3/7/16: 175 lb 8 oz (79.6 kg).     reports that he has been smoking Cigarettes.  He has a 35.00 pack-year smoking history. He has never used smokeless tobacco.  Tobacco Cessation Action Plan: Pharmacotherapies : Chantix    Appropriate preventive services were discussed with this patient, including applicable screening as appropriate for cardiovascular disease, diabetes, osteopenia/osteoporosis, and glaucoma.  As appropriate for age/gender, discussed screening for colorectal cancer, prostate cancer, breast cancer, and cervical cancer. Checklist reviewing preventive services available has been given to the patient.    Reviewed patients plan of care and provided an AVS. The Basic Care Plan (routine screening as documented in Health Maintenance) for Alex meets the Care Plan requirement. This Care Plan has been established and reviewed with the Patient.    Counseling Resources:  ATP IV Guidelines  Pooled Cohorts Equation Calculator  Breast Cancer Risk Calculator  FRAX Risk Assessment  ICSI Preventive Guidelines  Dietary Guidelines for Americans, 2010  [employer]'s MyPlate  ASA Prophylaxis  Lung CA Screening    Martin Odonnell MD  [facility]  Lung Cancer Screening Shared Decision Making Visit     Alex Stanley is eligible for lung cancer screening on the basis of the information provided in my signed lung cancer screening order.     I have discussed with patient the risks and benefits of screening for lung cancer with low-dose CT.     The risks include:   radiation exposure    false positives     over-diagnosis    The benefit of early detection of lung cancer is contingent upon adherence to annual screening or more frequent follow up if indicated.     Furthermore, reaping the benefits of screening requires Alex Stanley to be " willing and physically able to undergo diagnostic procedures, if indicated. Although no specific guide is available for determining severity of comorbidities, it is reasonable to withhold screening in patients who have greater mortality risk from other diseases.     We did discuss that the only way to prevent lung cancer is to not smoke. Smoking cessation assistance was offered.    I did offer risk estimation using a calculator such as this one:    ShouldIScreen

## 2017-05-01 NOTE — PATIENT INSTRUCTIONS
Preventive Health Recommendations:       Male Ages 65 and over    Yearly exam:             See your health care provider every year in order to  o   Review health changes.   o   Discuss preventive care.    o   Review your medicines if your doctor has prescribed any.    Talk with your health care provider about whether you should have a test to screen for prostate cancer (PSA).    Every 3 years, have a diabetes test (fasting glucose). If you are at risk for diabetes, you should have this test more often.    Every 5 years, have a cholesterol test. Have this test more often if you are at risk for high cholesterol or heart disease.     Every 10 years, have a colonoscopy. Or, have a yearly FIT test (stool test). These exams will check for colon cancer.    Talk to with your health care provider about screening for Abdominal Aortic Aneurysm if you have a family history of AAA or have a history of smoking.  Shots:     Get a flu shot each year.     Get a tetanus shot every 10 years.     Talk to your doctor about your pneumonia vaccines. There are now two you should receive - Pneumovax (PPSV 23) and Prevnar (PCV 13).    Talk to your doctor about a shingles vaccine.     Talk to your doctor about the hepatitis B vaccine.  Nutrition:     Eat at least 5 servings of fruits and vegetables each day.     Eat whole-grain bread, whole-wheat pasta and brown rice instead of white grains and rice.     Talk to your doctor about Calcium and Vitamin D.   Lifestyle    Exercise for at least 150 minutes a week (30 minutes a day, 5 days a week). This will help you control your weight and prevent disease.     Limit alcohol to one drink per day.     No smoking.     Wear sunscreen to prevent skin cancer.     See your dentist every six months for an exam and cleaning.     See your eye doctor every 1 to 2 years to screen for conditions such as glaucoma, macular degeneration and cataracts.    PLAN:  1.  Hold hydrochlorothiazide, for now.   Watch for  "increase of ankle swelling, increase of blood pressure   2.  Increase frequency of blood pressure checks, report to MD in a few weeks  3.  Prescription for Chantix    4.  Consider completing advance directive  5.  Low dose lung CT        Varenicline Tartrate (Chantix):  Chantix tablets Days                Dosage  0.5 mg                     1 through 3 0.5 mg once daily  0.5 mg                      4 through 7 0.5 mg twice daily  1.0 mg                     Day 8 to end  1 mg twice daily    Renal dosing:  For severe renal impairment, start at a dose of 0.5 mg daily and then titrate as needed to a maximum dose of 0.5 mg twice daily.  For patients with end-stage renal disease (ESRD) undergoing hemodialysis, a maximum dose of 0.5 mg daily may be administered if tolerated well.    How to take:  You will start taking Chantix one week before your quit date while you are still smoking. At first you will use the Chantix starter pack. Once you are finished with that, you will be on a \"maintenance dose\" that will probably stay the same for the rest of your treatment.  After a week of slowly increasing your dose, you will take Chantix twice a day. Take each dose after eating and with a full glass of water. Taking Chantix with food and water decreases nausea. That is also why you begin on a lower dose and slowly increase it. Once you begin the twice a day dosage, your 2 doses should be at least 8-10 hours apart and at least 4-5 hours before bedtime.      Duration of therapy: 12 weeks.  An additional course of 12 weeks of treatment is recommended if the first 12 weeks were successful to improve long term abstinence.    Adverse reactions:  The most common adverse reaction associated with varenicline treatment is nausea. Other common adverse effects include sleep disturbance, constipation, flatulence and vomiting.    There have been warnings from the FDA to be on the lookout for erratic behavior, suicidal ideation, or suicidal " behavior.   There is one case report of a death, though it appears alcohol consumption may have played a part in that case.    Please report any behavior or mood changes as well as being aware of drowsiness.   Be cautious when operating motor vehicles or dangerous machinery until the medication's effect on you is clear.      Lung Cancer Screening   Frequently Asked Questions  If you are at high-risk for lung cancer, getting screened with low-dose computed tomography (LDCT) every year can help save your life. This handout offers answers to some of the most common questions about lung cancer screening. If you have other questions, please call [phone] ([phone]).     What is it?  Lung cancer screening uses special X-ray technology to create an image of your lung tissue. The exam is quick and easy and takes less than 10 seconds. We don t give you any medicine or use any needles. You can eat before and after the exam. You don t need to change your clothes as long as the clothing on your chest doesn t contain metal. But, you do need to be able to hold your breath for at least 6 seconds during the exam.    What is the goal of lung cancer screening?  The goal of lung cancer screening is to save lives. Many times, lung cancer is not found until a person starts having physical symptoms. Lung cancer screening can help detect lung cancer in the earliest stages when it may be easier to treat.    Who should be screened for lung cancer?  We suggest lung cancer screening for anyone who is at high-risk for lung cancer. You are in the high-risk group if you:      are between the ages of 55 and 79, and    have smoked at least 1 pack of cigarettes a day for 30 or more years, and    still smoke or have quit within the past 15 years.    However, if you have a new cough or shortness of breath, you should talk to your doctor before being screened.    Some national lung health advocacy groups also recommend screening for  people ages 50 to 79 who have smoked an average of 1 pack of cigarettes a day for 20 years. They must also have at least 1 other risk factor for lung cancer, not including exposure to secondhand smoke. Other risk factors are having had cancer in the past, emphysema, pulmonary fibrosis, COPD, a family history of lung cancer, or exposure to certain materials such as arsenic, asbestos, beryllium, cadmium, chromium, diesel fumes, nickel, radon or silica. Your care team can help you know if you have one of these risk factors.     Why does it matter if I have symptoms?  Certain symptoms can be a sign that you have a condition in your lungs that should be checked and treated by your doctor. These symptoms include fever, chest pain, a new or changing cough, shortness of breath that you have never felt before, coughing up blood or unexplained weight loss. Having any of these symptoms can greatly affect the results of lung cancer screening.       Should all smokers get an LDCT lung cancer screening exam?  It depends. Lung cancer screening is for a very specific group of men and women who have a history of heavy smoking over a long period of time (see  Who should be screened for lung cancer  above).  I am in the high-risk group, but have been diagnosed with cancer in the past. Is LDCT lung cancer screening right for me?  In some cases, you should not have LDCT lung screening, such as when your doctor is already following your cancer with CT scan studies. Your doctor will help you decide if LDCT lung screening is right for you.  Do I need to have a screening exam every year?  Yes. If you are in the high-risk group described earlier, you should get an LDCT lung cancer screening exam every year until you are 79, or are no longer willing or able to undergo screening and possible procedures to diagnose and treat lung cancer.  How effective is LDCT at preventing death from lung cancer?  Studies have shown that LDCT lung cancer  screening can lower the risk of death from lung cancer by 20 percent in people who are at high-risk.  What are the risks?  There are some risks and limitations of LDCT lung cancer screening. We want to make sure you understand the risks and benefits, so please let us know if you have any questions. Your doctor may want to talk with you more about these risks.    Radiation exposure: As with any exam that uses radiation, there is a very small increased risk of cancer. The amount of radiation in LDCT is small about the same amount a person would get from a mammogram. Your doctor orders the exam when he or she feels the potential benefits outweigh the risks.    False negatives: No test is perfect, including LDCT. It is possible that you may have a medical condition, including lung cancer, that is not found during your exam. This is called a false negative result.    False positives and more testing: LDCT very often finds something in the lung that could be cancer, but in fact is not. This is called a false positive result. False positive tests often cause anxiety. To make sure these findings are not cancer, you may need to have more tests. These tests will be done only if you give us permission. Sometimes patients need a treatment that can have side effects, such as a biopsy. For more information on false positives, see  What can I expect from the results?     Findings not related to lung cancer: Your LDCT exam also takes pictures of areas of your body next to your lungs. In a very small number of cases, the CT scan will show an abnormal finding in one of these areas, such as your kidneys, adrenal glands, liver or thyroid. This finding may not be serious, but you may need more tests. Your doctor can help you decide what other tests you may need, if any.  What can I expect from the results?  About 1 out of 4 LDCT exams will find something that may need more tests. Most of the time, these findings are lung nodules. Lung  nodules are very small collections of tissue in the lung. These nodules are very common, and the vast majority more than 97 percent are not cancer (benign). Most are normal lymph nodes or small areas of scarring from past infections.  But, if a small lung nodule is found to be cancer, the cancer can be cured more than 90 percent of the time. To know if the nodule is cancer, we may need to get more images before your next yearly screening exam. If the nodule has suspicious features (for example, it is large, has an odd shape or grows over time), we will refer you to a specialist for further testing.  Will my doctor also get the results?  Yes. Your doctor will get a copy of your results.  Is it okay to keep smoking now that there s a cancer screening exam?  No. Tobacco is one of the strongest cancer-causing agents. It causes not only lung cancer, but other cancers and cardiovascular (heart) diseases as well. The damage caused by smoking builds over time. This means that the longer you smoke, the higher your risk of disease. While it is never too late to quit, the sooner you quit, the better.  Where can I find help to quit smoking?  The best way to prevent lung cancer is to stop smoking. If you have already quit smoking, congratulations and keep it up! For help on quitting smoking, please call [employer] at [phone] ([phone]) or the American Cancer Society at 1-938.528.6864 to find local resources near you.  One-on-one health coaching:  If you d prefer to work individually with a health care provider on tobacco cessation, we offer:      Medication Therapy Management:  Our specially trained pharmacists work closely with you and your doctor to help you quit smoking.  Call [phone] or [phone] (toll free).     Can Do: Health coaching offered by [facility].  [email or web address]

## 2017-05-01 NOTE — NURSING NOTE
"Chief Complaint   Patient presents with     Physical       Initial /70  Pulse 75  Temp 98.4  F (36.9  C) (Oral)  Ht 5' 8\" (1.727 m)  Wt 174 lb 14.4 oz (79.3 kg)  SpO2 97%  BMI 26.59 kg/m2 Estimated body mass index is 26.59 kg/(m^2) as calculated from the following:    Height as of this encounter: 5' 8\" (1.727 m).    Weight as of this encounter: 174 lb 14.4 oz (79.3 kg).  Medication Reconciliation: complete   Violetta March MA   "

## 2017-05-31 ENCOUNTER — TELEPHONE (OUTPATIENT)
Dept: INTERNAL MEDICINE | Facility: CLINIC | Age: 72
End: 2017-05-31

## 2017-05-31 DIAGNOSIS — Z72.0 TOBACCO ABUSE: ICD-10-CM

## 2017-05-31 NOTE — TELEPHONE ENCOUNTER
Please complete form, then [facility] to [facility].   And call pt when finished. (pt# [phone]).

## 2017-05-31 NOTE — TELEPHONE ENCOUNTER
Patient brought in paper prescription for dr cho to [facility] for dr[facility] cho to fax to [facility] please call pt with any questions phone [phone]

## 2017-06-01 RX ORDER — VARENICLINE TARTRATE 1 MG/1
1 TABLET, FILM COATED ORAL 2 TIMES DAILY
Qty: 60 TABLET | Refills: 2 | Status: SHIPPED | OUTPATIENT
Start: 2017-06-01 | End: 2018-06-14

## 2017-06-01 NOTE — TELEPHONE ENCOUNTER
"SPOKE WITH [facility] THEY STATED THAT THEY VOIDED OUT THE LAST RX THAT WAS SENT IN ON 05/08/2017 BECAUSE THEY DID NOT \"GET DOCTORS APPROVAL\" WILL RESEND ORDER AND THEN VERIFY WITH [facility] TO CONFIRM THEY RECEIVED IT.      varenicline (CHANTIX CONTINUING MONTH PAK) 1 MG tablet      Last Written Prescription Date: 05/08/2017  Last Fill Quantity: 60,  # refills: 2   Last Office Visit with [facility], [facility] or [facility] prescribing provider: 05/01/2017                                               "

## 2017-06-01 NOTE — TELEPHONE ENCOUNTER
We don't typically fax any presciptions, but rather send them electronically.   Medication request needs to be entered into Epic.

## 2017-06-12 ENCOUNTER — ANTICOAGULATION THERAPY VISIT (OUTPATIENT)
Dept: ANTICOAGULATION | Facility: CLINIC | Age: 72
End: 2017-06-12
Payer: MEDICARE

## 2017-06-12 DIAGNOSIS — Z79.01 LONG-TERM (CURRENT) USE OF ANTICOAGULANTS: ICD-10-CM

## 2017-06-12 DIAGNOSIS — I48.20 CHRONIC ATRIAL FIBRILLATION (H): ICD-10-CM

## 2017-06-12 LAB — INR POINT OF CARE: 3 (ref 0.86–1.14)

## 2017-06-12 PROCEDURE — 99207 ZZC NO CHARGE NURSE ONLY: CPT

## 2017-06-12 PROCEDURE — 85610 PROTHROMBIN TIME: CPT | Mod: QW

## 2017-06-12 PROCEDURE — 36416 COLLJ CAPILLARY BLOOD SPEC: CPT

## 2017-06-12 NOTE — PROGRESS NOTES
ANTICOAGULATION FOLLOW-UP CLINIC VISIT    Patient Name:  Alxe Stanley  Date:  6/12/2017  Contact Type:  Face to Face    SUBJECTIVE:     Patient Findings     Positives No Problem Findings           OBJECTIVE    INR Protime   Date Value Ref Range Status   06/12/2017 3.0 (A) 0.86 - 1.14 Final       ASSESSMENT / PLAN  No question data found.  Anticoagulation Summary as of 6/12/2017     INR goal 2.0-3.0   Today's INR 3.0   Maintenance plan 2.5 mg (5 mg x 0.5) on Mon, Fri; 5 mg (5 mg x 1) all other days   Full instructions 2.5 mg on Mon, Fri; 5 mg all other days   Weekly total 30 mg   No change documented Mabel Garza RN   Plan last modified Vangie Agarwal RN (5/1/2017)   Next INR check 7/24/2017   Target end date Indefinite    Indications   Long-term (current) use of anticoagulants [Z79.01] [Z79.01]  Chronic atrial fibrillation/Flutter [I48.2]         Anticoagulation Episode Summary     INR check location     Preferred lab     Send INR reminders to [facility]    Comments             See the Encounter Report to view Anticoagulation Flowsheet and Dosing Calendar (Go to Encounters tab in chart review, and find the Anticoagulation Therapy Visit)    Dosage adjustment made based on physician directed care plan.    Mabel Garza RN

## 2017-06-12 NOTE — MR AVS SNAPSHOT
Alex Stanley   6/12/2017 9:00 AM   Anticoagulation Therapy Visit    Description:  71 year old male   Provider:   ANTICOAGULATION CLINIC   Department:   Anti Coagulation           INR as of 6/12/2017     Today's INR 3.0      Anticoagulation Summary as of 6/12/2017     INR goal 2.0-3.0   Today's INR 3.0   Full instructions 2.5 mg on Mon, Fri; 5 mg all other days   Next INR check 7/24/2017    Indications   Long-term (current) use of anticoagulants [Z79.01] [Z79.01]  Chronic atrial fibrillation/Flutter [I48.2]         Your next Anticoagulation Clinic appointment(s)     Jul 17, 2017  8:30 AM CDT   Anticoagulation Visit with  ANTICOAGULATION CLINIC   [facility]    [address] [place] [id number] [phone]              Contact Numbers     [facility]  Please call  [phone] to cancel and/or reschedule your appointment   Please call  [phone] with any problems or questions regarding your therapy.        June 2017 Details    Sun Mon Tue Wed Thu Fri Sat         1               2               3                 4               5               6               7               8               9               10                 11               12      2.5 mg   See details      13      5 mg         14      5 mg         15      5 mg         16      2.5 mg         17      5 mg           18      5 mg         19      2.5 mg         20      5 mg         21      5 mg         22      5 mg         23      2.5 mg         24      5 mg           25      5 mg         26      2.5 mg         27      5 mg         28      5 mg         29      5 mg         30      2.5 mg           Date Details   06/12 This INR check               How to take your warfarin dose     To take:  2.5 mg Take 0.5 of a 5 mg tablet.    To take:  5 mg Take 1 of the 5 mg tablets.           July 2017 Details    Sun Mon Tue Wed Thu Fri Sat           1      5 mg            2      5 mg         3      2.5 mg         4      5 mg         5      5 mg         6      5 mg         7      2.5 mg         8      5 mg           9      5 mg         10      2.5 mg         11      5 mg         12      5 mg         13      5 mg         14      2.5 mg         15      5 mg           16      5 mg         17      2.5 mg         18      5 mg         19      5 mg         20      5 mg         21      2.5 mg         22      5 mg           23      5 mg         24            25               26               27               28               29                 30               31                     Date Details   No additional details    Date of next INR:  7/24/2017         How to take your warfarin dose     To take:  2.5 mg Take 0.5 of a 5 mg tablet.    To take:  5 mg Take 1 of the 5 mg tablets.

## 2017-06-15 DIAGNOSIS — I87.2 VENOUS INSUFFICIENCY: ICD-10-CM

## 2017-06-15 RX ORDER — HYDROCHLOROTHIAZIDE 25 MG/1
TABLET ORAL
Qty: 45 TABLET | Refills: 3 | Status: SHIPPED | OUTPATIENT
Start: 2017-06-15 | End: 2018-05-07

## 2017-06-15 NOTE — TELEPHONE ENCOUNTER
Routing refill request to provider for review/approval because:  Last office visit note on 5/1/17 stated:  Hold hydrochlorothiazide, for now.   Watch for increase of ankle swelling, increase of blood pressure           HCTZ      Last Written Prescription Date: 8/24/16  Last Fill Quantity: 45, # refills: 2  Last Office Visit with [facility], [facility] or [facility] prescribing provider: 5/1/17       Potassium   Date Value Ref Range Status   04/26/2017 4.1 3.4 - 5.3 mmol/L Final     Creatinine   Date Value Ref Range Status   04/26/2017 1.25 0.66 - 1.25 mg/dL Final     BP Readings from Last 3 Encounters:   05/01/17 104/70   11/23/16 115/82   03/22/16 126/81

## 2017-06-16 ENCOUNTER — OFFICE VISIT (OUTPATIENT)
Dept: DERMATOLOGY | Facility: CLINIC | Age: 72
End: 2017-06-16
Payer: MEDICARE

## 2017-06-16 VITALS — SYSTOLIC BLOOD PRESSURE: 128 MMHG | HEART RATE: 76 BPM | OXYGEN SATURATION: 99 % | DIASTOLIC BLOOD PRESSURE: 84 MMHG

## 2017-06-16 DIAGNOSIS — D22.9 NEVUS: ICD-10-CM

## 2017-06-16 DIAGNOSIS — D48.5 NEOPLASM OF UNCERTAIN BEHAVIOR OF SKIN: Primary | ICD-10-CM

## 2017-06-16 DIAGNOSIS — L82.1 SEBORRHEIC KERATOSIS: ICD-10-CM

## 2017-06-16 DIAGNOSIS — D18.00 ANGIOMA: ICD-10-CM

## 2017-06-16 DIAGNOSIS — L57.0 AK (ACTINIC KERATOSIS): ICD-10-CM

## 2017-06-16 DIAGNOSIS — L81.4 LENTIGO: ICD-10-CM

## 2017-06-16 DIAGNOSIS — L82.0 INFLAMED SEBORRHEIC KERATOSIS: ICD-10-CM

## 2017-06-16 PROCEDURE — 99214 OFFICE O/P EST MOD 30 MIN: CPT | Mod: 25 | Performed by: PHYSICIAN ASSISTANT

## 2017-06-16 PROCEDURE — 17110 DESTRUCTION B9 LES UP TO 14: CPT | Performed by: PHYSICIAN ASSISTANT

## 2017-06-16 PROCEDURE — 17000 DESTRUCT PREMALG LESION: CPT | Mod: 59 | Performed by: PHYSICIAN ASSISTANT

## 2017-06-16 PROCEDURE — 00000159 ZZHCL STATISTIC H-SEND OUTS PREP: Performed by: PHYSICIAN ASSISTANT

## 2017-06-16 PROCEDURE — 88305 TISSUE EXAM BY PATHOLOGIST: CPT | Performed by: PHYSICIAN ASSISTANT

## 2017-06-16 PROCEDURE — 11100 HC DESTRUCT PREMALIGNANT LESION, FIRST: CPT | Mod: 59 | Performed by: PHYSICIAN ASSISTANT

## 2017-06-16 PROCEDURE — 88305 TISSUE EXAM BY PATHOLOGIST: CPT | Mod: 26 | Performed by: PHYSICIAN ASSISTANT

## 2017-06-16 NOTE — PROGRESS NOTES
HPI:   Alex Stanley is a 71 year old male who presents for Full skin cancer screening.  chief complaint  Last Skin Exam: 03/22/16      1st Baseline: no  Personal HX of Skin Cancer: no   Personal HX of Malignant Melanoma: no   Family HX of Skin Cancer / Malignant Melanoma: no  Personal HX of Atypical Moles:   no  Risk factors: sun damage - does not wear sunscreen   New / Changing lesions:  Social History: is going to [place] and [place] to visit his wife's family. Daughter is moving to [place] [facility] with his 2 grandchildren.   On review of systems, there are no further skin complaints, patient is feeling otherwise well.  See patient intake sheet.  ROS of the following were done and are negative: Constitutional, Eyes, Ears, Nose,   Mouth, Throat, Cardiovascular, Respiratory, GI, Genitourinary, Musculoskeletal,   Psychiatric, Endocrine, Allergic/Immunologic.    PHYSICAL EXAM:   Weight:  BP: 128 /84  Skin exam performed as follows: Type 2 skin. Mood appropriate  Alert and Oriented X 3. Well developed, well nourished in no distress.  General appearance: Normal  Head including face: Normal  Eyes: conjunctiva and lids: Normal  Mouth: Lips, teeth, gums: Normal  Neck: Normal  Chest-breast/axillae: Normal  Back: Normal  Spleen and liver: Normal  Cardiovascular: Exam of peripheral vascular system by observation for swelling, varicosities, edema: Normal  Genitalia: groin, buttocks: Normal  Extremities: digits/nails (clubbing): Normal  Eccrine and Apocrine glands: Normal  Right upper extremity: Normal  Left upper extremity: Normal  Right lower extremity: Normal  Left lower extremity: Normal  Skin: Scalp and body hair: See below    Pt deferred exam of breasts, groin, buttocks: No    Other physical findings:  1. Multiple pigmented macules on extremities and trunk  2. Multiple pigmented macules on face, trunk and extremities  3. Multiple vascular papules on trunk, arms and legs  4. Multiple scattered keratotic plaques  5. 16  mm irregular brown patch on left frontal scalp  6. Gritty papule on left upper brow x 1  7. Inflamed keratotic papule on right helix x 1, left tragus x 1       Except as noted above, no other signs of skin cancer or melanoma.     ASSESSMENT/PLAN:   Benign Full skin cancer screening today. . Patient with history of none  Advised on monthly self exams and 1 year  Patient Education: Appropriate brochures given.    Multiple benign appearing nevi on arms, legs and trunk. Discussed ABCDEs of melanoma and sunscreen.   Multiple lentigos on arms, legs and trunk. Advised benign, no treatment needed.  Multiple scattered angiomas. Advised benign, no treatment needed.   Seborrheic keratosis on arms, legs and trunk. Advised benign, no treatment needed.  Lentigo r/o lentigo maligna on left frontal scalp. Shave bx in typical fashion .  Area cleaned with betadyne and anesthetized with 1% lidocaine with epi .  Dermablade used to remove the lesion and sent to pathology. Bleeding was cauterized. Pt tolerated procedure well.  Actinic keratosis on left upper brow x 1. As precancerous, cryosurgery performed. Advised on blistering and post-op care. Advised if not resolved in 1-2 months to return for evaluation  Inflamed seborrheic keratosis on right helix x 1, left tragus x 1. As physically tender cryosurgery performed. Advised on post op care.   Diffuse photodamage of skin - advised. Discussed sun protection at length.         Follow-up: yearly FSE/PRN sooner    1.) Patient was asked about new and changing moles. YES  2.) Patient received a complete physical skin examination: YES  3.) Patient was counseled to perform a monthly self skin examination: YES  Scribed By: Micaela Cowart, MS, PA-C

## 2017-06-16 NOTE — PATIENT INSTRUCTIONS
Wound Care Instructions     FOR SUPERFICIAL WOUNDS     [facility] [phone]                 AFTER 24 HOURS YOU SHOULD REMOVE THE BANDAGE AND BEGIN DAILY DRESSING CHANGES AS FOLLOWS:     1) Remove Dressing.     2) Clean and dry the area with tap water using a Q-tip or sterile gauze pad.     3) Apply Vaseline, Aquaphor, Polysporin ointment or Bacitracin ointment over entire wound.  Do NOT use Neosporin ointment.     4) Cover the wound with a band-aid, or a sterile non-stick gauze pad and micropore paper tape      REPEAT THESE INSTRUCTIONS AT LEAST ONCE A DAY UNTIL THE WOUND HAS COMPLETELY HEALED.    It is an old wives tale that a wound heals better when it is exposed to air and allowed to dry out. The wound will heal faster with a better cosmetic result if it is kept moist with ointment and covered with a bandage.    **Do not let the wound dry out.**      Supplies Needed:      *Cotton tipped applicators (Q-tips)    *Polysporin Ointment or Bacitracin Ointment (NOT NEOSPORIN)    *Band-aids or non-stick gauze pads and micropore paper tape.      PATIENT INFORMATION:    During the healing process you will notice a number of changes. All wounds develop a small halo of redness surrounding the wound.  This means healing is occurring. Severe itching with extensive redness usually indicates sensitivity to the ointment or bandage tape used to dress the wound.  You should call our office if this develops.      Swelling  and/or discoloration around your surgical site is common, particularly when performed around the eye.    All wounds normally drain.  The larger the wound the more drainage there will be.  After 7-10 days, you will notice the wound beginning to shrink and new skin will begin to grow.  The wound is healed when you can see skin has formed over the entire area.  A healed wound has a healthy, shiny look to the surface and is red to dark pink in color to normalize.  Wounds may take approximately 4-6  weeks to heal.  Larger wounds may take 6-8 weeks.  After the wound is healed you may discontinue dressing changes.    You may experience a sensation of tightness as your wound heals. This is normal and will gradually subside.    Your healed wound may be sensitive to temperature changes. This sensitivity improves with time, but if you re having a lot of discomfort, try to avoid temperature extremes.    Patients frequently experience itching after their wound appears to have healed because of the continue healing under the skin.  Plain Vaseline will help relieve the itching.        POSSIBLE COMPLICATIONS    BLEEDIN. Leave the bandage in place.  2. Use tightly rolled up gauze or a cloth to apply direct pressure over the bandage for 30  minutes.  3. Reapply pressure for an additional 30 minutes if necessary  4. Use additional gauze and tape to maintain pressure once the bleeding has stopped.

## 2017-06-16 NOTE — NURSING NOTE
"Chief Complaint   Patient presents with     Derm Problem       Initial /84  Pulse 76  SpO2 99% Estimated body mass index is 26.59 kg/(m^2) as calculated from the following:    Height as of 5/1/17: 1.727 m (5' 8\").    Weight as of 5/1/17: 79.3 kg (174 lb 14.4 oz).  Medication Reconciliation: complete    "

## 2017-06-16 NOTE — MR AVS SNAPSHOT
After Visit Summary   6/16/2017    Alex Stanley    MRN: [id number]           Patient Information     Date Of Birth          1945        Visit Information        Provider Department      6/16/2017 11:15 AM Micaela Cowart PA-C [facility]        Today's Diagnoses     Neoplasm of uncertain behavior of skin    -  1      Care Instructions      Wound Care Instructions     FOR SUPERFICIAL WOUNDS     [facility] [phone]                 AFTER 24 HOURS YOU SHOULD REMOVE THE BANDAGE AND BEGIN DAILY DRESSING CHANGES AS FOLLOWS:     1) Remove Dressing.     2) Clean and dry the area with tap water using a Q-tip or sterile gauze pad.     3) Apply Vaseline, Aquaphor, Polysporin ointment or Bacitracin ointment over entire wound.  Do NOT use Neosporin ointment.     4) Cover the wound with a band-aid, or a sterile non-stick gauze pad and micropore paper tape      REPEAT THESE INSTRUCTIONS AT LEAST ONCE A DAY UNTIL THE WOUND HAS COMPLETELY HEALED.    It is an old wives tale that a wound heals better when it is exposed to air and allowed to dry out. The wound will heal faster with a better cosmetic result if it is kept moist with ointment and covered with a bandage.    **Do not let the wound dry out.**      Supplies Needed:      *Cotton tipped applicators (Q-tips)    *Polysporin Ointment or Bacitracin Ointment (NOT NEOSPORIN)    *Band-aids or non-stick gauze pads and micropore paper tape.      PATIENT INFORMATION:    During the healing process you will notice a number of changes. All wounds develop a small halo of redness surrounding the wound.  This means healing is occurring. Severe itching with extensive redness usually indicates sensitivity to the ointment or bandage tape used to dress the wound.  You should call our office if this develops.      Swelling  and/or discoloration around your surgical site is common, particularly when performed around the eye.    All  wounds normally drain.  The larger the wound the more drainage there will be.  After 7-10 days, you will notice the wound beginning to shrink and new skin will begin to grow.  The wound is healed when you can see skin has formed over the entire area.  A healed wound has a healthy, shiny look to the surface and is red to dark pink in color to normalize.  Wounds may take approximately 4-6 weeks to heal.  Larger wounds may take 6-8 weeks.  After the wound is healed you may discontinue dressing changes.    You may experience a sensation of tightness as your wound heals. This is normal and will gradually subside.    Your healed wound may be sensitive to temperature changes. This sensitivity improves with time, but if you re having a lot of discomfort, try to avoid temperature extremes.    Patients frequently experience itching after their wound appears to have healed because of the continue healing under the skin.  Plain Vaseline will help relieve the itching.        POSSIBLE COMPLICATIONS    BLEEDIN. Leave the bandage in place.  2. Use tightly rolled up gauze or a cloth to apply direct pressure over the bandage for 30  minutes.  3. Reapply pressure for an additional 30 minutes if necessary  4. Use additional gauze and tape to maintain pressure once the bleeding has stopped.            Follow-ups after your visit        Your next 10 appointments already scheduled     2017  8:30 AM CDT   Anticoagulation Visit with [facility] ANT[facility]INIC   [facility] ([facility])    [address] [place] [id number] [phone]              Who to contact     If you have questions or need follow up information about today's clinic visit or your schedule please contact [facility] directly at [phone].  Normal or non-critical lab and imaging results will be communicated to you by MyChart, letter or phone within 4 business days  "after the clinic has received the results. If you do not hear from us within 7 days, please contact the clinic through [employer] or phone. If you have a critical or abnormal lab result, we will notify you by phone as soon as possible.  Submit refill requests through [employer] or call your pharmacy and they will forward the refill request to us. Please allow 3 business days for your refill to be completed.          Additional Information About Your Visit        [employer] Information     [employer] lets you send messages to your doctor, view your test results, renew your prescriptions, schedule appointments and more. To sign up, go to [email or web address] . Click on \"Log in\" on the left side of the screen, which will take you to the Welcome page. Then click on \"Sign up Now\" on the right side of the page.     You will be asked to enter the access code listed below, as well as some personal information. Please follow the directions to create your username and password.     Your access code is: [email or web address]  Expires: 2017 10:53 AM     Your access code will  in 90 days. If you need help or a new code, please call your [facility] clinic or [phone].        Care EveryWhere ID     This is your Care EveryWhere ID. This could be used by other organizations to access your [facility] medical records  [id number]        Your Vitals Were     Pulse Pulse Oximetry                76 99%           Blood Pressure from Last 3 Encounters:   17 128/84   17 104/70   16 115/82    Weight from Last 3 Encounters:   17 79.3 kg (174 lb 14.4 oz)   16 79.6 kg (175 lb 8 oz)   01/26/15 76.7 kg (169 lb 1.6 oz)              We Performed the Following     BIOPSY SKIN/SUBQ/MUC MEM, SINGLE LESION     Surgical pathology exam        Primary Care Provider Office Phone # Fax #    Martin Odonnell [phone]       [facility] [address] [facility] [address]        Thank you!     Thank " you for choosing [facility]  for your care. Our goal is always to provide you with excellent care. Hearing back from our patients is one way we can continue to improve our services. Please take a few minutes to complete the written survey that you may receive in the mail after your visit with us. Thank you!             Your Updated Medication List - Protect others around you: Learn how to safely use, store and throw away your medicines at www.disposemymeds.org.          This list is accurate as of: 6/16/17 11:48 AM.  Always use your most recent med list.                   Brand Name Dispense Instructions for use    amLODIPine 10 MG tablet    NORVASC    90 tablet    Take 1 tablet (10 mg) by mouth daily       ASPIRIN NOT PRESCRIBED    INTENTIONAL    0 each    Antiplatelet medication not prescribed intentionally due to history  of gastrointestinal bleeding, and patient on warfarin.       hydrochlorothiazide 25 MG tablet    HYDRODIURIL    45 tablet    TAKE ONE-HALF (1/2) TABLET EVERY OTHER DAY       pravastatin 20 MG tablet    PRAVACHOL    90 tablet    Take 1 tablet (20 mg) by mouth daily       * varenicline 0.5 MG X 11 & 1 MG X 42 tablet    CHANTIX STARTING MONTH WENDY    53 tablet    Take 0.5 mg tab daily for 3 days, then 0.5 mg tab twice daily for 4 days, then 1 mg twice daily.       * varenicline 1 MG tablet    CHANTIX CONTINUING MONTH WENDY    60 tablet    Take 1 tablet (1 mg) by mouth 2 times daily       warfarin 5 MG tablet    COUMADIN    90 tablet    Take 2.5mg fri and 5mg all other days as advised by the anticoagulation clinic       * Notice:  This list has 2 medication(s) that are the same as other medications prescribed for you. Read the directions carefully, and ask your doctor or other care provider to review them with you.

## 2017-06-19 LAB — COPATH REPORT: NORMAL

## 2017-06-20 ENCOUNTER — HOSPITAL PATHOLOGY (OUTPATIENT)
Dept: OTHER | Facility: CLINIC | Age: 72
End: 2017-06-20

## 2017-06-25 LAB — COPATH REPORT: NORMAL

## 2017-07-07 DIAGNOSIS — I10 ESSENTIAL HYPERTENSION: ICD-10-CM

## 2017-07-07 DIAGNOSIS — E78.5 HYPERLIPIDEMIA LDL GOAL <100: ICD-10-CM

## 2017-07-07 RX ORDER — PRAVASTATIN SODIUM 20 MG
TABLET ORAL
Qty: 90 TABLET | Refills: 2 | Status: SHIPPED | OUTPATIENT
Start: 2017-07-07 | End: 2018-05-07

## 2017-07-07 RX ORDER — AMLODIPINE BESYLATE 10 MG/1
TABLET ORAL
Qty: 90 TABLET | Refills: 2 | Status: SHIPPED | OUTPATIENT
Start: 2017-07-07 | End: 2018-04-27

## 2017-07-07 NOTE — TELEPHONE ENCOUNTER
pravastatin (PRAVACHOL) 20 MG tablet   Last Written Prescription Date: 05/01/2017  Last Fill Quantity: 90, # refills: prn  Last Office Visit with [facility] or [facility] prescribing provider: 05/01/2017       Lab Results   Component Value Date    CHOL 128 04/26/2017     Lab Results   Component Value Date    HDL 39 04/26/2017     Lab Results   Component Value Date    LDL 73 04/26/2017     Lab Results   Component Value Date    TRIG 80 04/26/2017     Lab Results   Component Value Date    CHOLHDLRATIO 3.2 01/20/2015      amLODIPine (NORVASC) 10 MG tablet        Last Written Prescription Date: 05/01/2017  Last Fill Quantity: 90, # refills: prn  Last Office Visit with [facility] or [facility] prescribing provider: 05/01/2017       Potassium   Date Value Ref Range Status   04/26/2017 4.1 3.4 - 5.3 mmol/L Final     Creatinine   Date Value Ref Range Status   04/26/2017 1.25 0.66 - 1.25 mg/dL Final     BP Readings from Last 3 Encounters:   06/16/17 128/84   05/01/17 104/70   11/23/16 115/82

## 2017-07-17 ENCOUNTER — ANTICOAGULATION THERAPY VISIT (OUTPATIENT)
Dept: ANTICOAGULATION | Facility: CLINIC | Age: 72
End: 2017-07-17
Payer: MEDICARE

## 2017-07-17 DIAGNOSIS — I48.20 CHRONIC ATRIAL FIBRILLATION (H): ICD-10-CM

## 2017-07-17 DIAGNOSIS — Z79.01 LONG-TERM (CURRENT) USE OF ANTICOAGULANTS: Primary | ICD-10-CM

## 2017-07-17 LAB — INR PPP: 2.56 (ref 0.86–1.14)

## 2017-07-17 PROCEDURE — 99207 ZZC NO CHARGE NURSE ONLY: CPT

## 2017-07-17 PROCEDURE — 85610 PROTHROMBIN TIME: CPT | Performed by: INTERNAL MEDICINE

## 2017-07-17 PROCEDURE — 36415 COLL VENOUS BLD VENIPUNCTURE: CPT | Performed by: INTERNAL MEDICINE

## 2017-07-17 NOTE — MR AVS SNAPSHOT
Alex Stanley   7/17/2017 8:30 AM   Anticoagulation Therapy Visit    Description:  71 year old male   Provider:   ANTICOAGULATION CLINIC   Department:   Anti Coagulation           INR as of 7/17/2017     Today's INR No new INR was available at the time of this encounter.      Anticoagulation Summary as of 7/17/2017     INR goal 2.0-3.0   Today's INR No new INR was available at the time of this encounter.   Full instructions 2.5 mg on Mon, Fri; 5 mg all other days   Next INR check 8/28/2017    Indications   Long-term (current) use of anticoagulants [Z79.01] [Z79.01]  Chronic atrial fibrillation/Flutter [I48.2]         Your next Anticoagulation Clinic appointment(s)     Aug 28, 2017  8:30 AM CDT   Anticoagulation Visit with  ANTICOAGULATION CLINIC   [facility]    [address] [address] [phone]              Contact Numbers     [facility]  Please call  [phone] to cancel and/or reschedule your appointment   Please call  [phone] with any problems or questions regarding your therapy.        July 2017 Details    Sun Mon Tue Wed Thu Fri Sat           1                 2               3               4               5               6               7               8                 9               10               11               12               13               14               15                 16               17      2.5 mg   See details      18      5 mg         19      5 mg         20      5 mg         21      2.5 mg         22      5 mg           23      5 mg         24      2.5 mg         25      5 mg         26      5 mg         27      5 mg         28      2.5 mg         29      5 mg           30      5 mg         31      2.5 mg               Date Details   07/17 This INR check               How to take your warfarin dose     To take:  2.5 mg Take 0.5 of a 5 mg tablet.    To take:  5 mg Take 1  of the 5 mg tablets.           August 2017 Details    Sun Mon Tue Wed Thu Fri Sat       1      5 mg         2      5 mg         3      5 mg         4      2.5 mg         5      5 mg           6      5 mg         7      2.5 mg         8      5 mg         9      5 mg         10      5 mg         11      2.5 mg         12      5 mg           13      5 mg         14      2.5 mg         15      5 mg         16      5 mg         17      5 mg         18      2.5 mg         19      5 mg           20      5 mg         21      2.5 mg         22      5 mg         23      5 mg         24      5 mg         25      2.5 mg         26      5 mg           27      5 mg         28            29               30               31                  Date Details   No additional details    Date of next INR:  8/28/2017         How to take your warfarin dose     To take:  2.5 mg Take 0.5 of a 5 mg tablet.    To take:  5 mg Take 1 of the 5 mg tablets.

## 2017-07-18 NOTE — PROGRESS NOTES
ANTICOAGULATION FOLLOW-UP CLINIC VISIT    Patient Name:  Alex Stanley  Date:  7/17/2017  Contact Type:  Telephone/ dosing called to pt home     SUBJECTIVE:     Patient Findings     Positives No Problem Findings           OBJECTIVE    INR   Date Value Ref Range Status   07/17/2017 2.56 (H) 0.86 - 1.14 Final       ASSESSMENT / PLAN  No question data found.  Anticoagulation Summary as of 7/17/2017     INR goal 2.0-3.0   Today's INR No new INR was available at the time of this encounter.   Maintenance plan 2.5 mg (5 mg x 0.5) on Mon, Fri; 5 mg (5 mg x 1) all other days   Full instructions 2.5 mg on Mon, Fri; 5 mg all other days   Weekly total 30 mg   No change documented Mabel Garza RN   Plan last modified Vangie Agarwal RN (5/1/2017)   Next INR check 8/29/2017   Target end date Indefinite    Indications   Long-term (current) use of anticoagulants [Z79.01] [Z79.01]  Chronic atrial fibrillation/Flutter [I48.2]         Anticoagulation Episode Summary     INR check location     Preferred lab     Send INR reminders to [facility]    Comments             See the Encounter Report to view Anticoagulation Flowsheet and Dosing Calendar (Go to Encounters tab in chart review, and find the Anticoagulation Therapy Visit)    Dosage adjustment made based on physician directed care plan.    Mabel Garza RN

## 2017-08-28 ENCOUNTER — ANTICOAGULATION THERAPY VISIT (OUTPATIENT)
Dept: ANTICOAGULATION | Facility: CLINIC | Age: 72
End: 2017-08-28
Payer: MEDICARE

## 2017-08-28 DIAGNOSIS — Z79.01 LONG-TERM (CURRENT) USE OF ANTICOAGULANTS: ICD-10-CM

## 2017-08-28 DIAGNOSIS — I48.20 CHRONIC ATRIAL FIBRILLATION (H): ICD-10-CM

## 2017-08-28 LAB — INR POINT OF CARE: 2.6 (ref 0.86–1.14)

## 2017-08-28 PROCEDURE — 99207 ZZC NO CHARGE NURSE ONLY: CPT

## 2017-08-28 PROCEDURE — 85610 PROTHROMBIN TIME: CPT | Mod: QW

## 2017-08-28 PROCEDURE — 36416 COLLJ CAPILLARY BLOOD SPEC: CPT

## 2017-08-28 NOTE — MR AVS SNAPSHOT
Alex Stanley   8/28/2017 8:30 AM   Anticoagulation Therapy Visit    Description:  72 year old male   Provider:   ANTICOAGULATION CLINIC   Department:   Anti Coagulation           INR as of 8/28/2017     Today's INR 2.6      Anticoagulation Summary as of 8/28/2017     INR goal 2.0-3.0   Today's INR 2.6   Full instructions 2.5 mg on Mon, Fri; 5 mg all other days   Next INR check 10/9/2017    Indications   Long-term (current) use of anticoagulants [Z79.01] [Z79.01]  Chronic atrial fibrillation/Flutter [I48.2]         Your next Anticoagulation Clinic appointment(s)     Oct 09, 2017  9:15 AM CDT   Anticoagulation Visit with  ANTICOAGULATION CLINIC   [facility]    [address] [place] [id number] [phone]              Contact Numbers     [facility]  Please call  [phone] to cancel and/or reschedule your appointment   Please call  [phone] with any problems or questions regarding your therapy.        August 2017 Details    Sun Mon Tue Wed Thu Fri Sat       1               2               3               4               5                 6               7               8               9               10               11               12                 13               14               15               16               17               18               19                 20               21               22               23               24               25               26                 27               28      2.5 mg   See details      29      5 mg         30      5 mg         31      5 mg            Date Details   08/28 This INR check               How to take your warfarin dose     To take:  2.5 mg Take 0.5 of a 5 mg tablet.    To take:  5 mg Take 1 of the 5 mg tablets.           September 2017 Details    Sun Mon Tue Wed Thu Fri Sat          1      2.5 mg         2      5 mg           3      5 mg         4       2.5 mg         5      5 mg         6      5 mg         7      5 mg         8      2.5 mg         9      5 mg           10      5 mg         11      2.5 mg         12      5 mg         13      5 mg         14      5 mg         15      2.5 mg         16      5 mg           17      5 mg         18      2.5 mg         19      5 mg         20      5 mg         21      5 mg         22      2.5 mg         23      5 mg           24      5 mg         25      2.5 mg         26      5 mg         27      5 mg         28      5 mg         29      2.5 mg         30      5 mg          Date Details   No additional details            How to take your warfarin dose     To take:  2.5 mg Take 0.5 of a 5 mg tablet.    To take:  5 mg Take 1 of the 5 mg tablets.           October 2017 Details    Sun Mon Tue Wed Thu Fri Sat     1      5 mg         2      2.5 mg         3      5 mg         4      5 mg         5      5 mg         6      2.5 mg         7      5 mg           8      5 mg         9            10               11               12               13               14                 15               16               17               18               19               20               21                 22               23               24               25               26               27               28                 29               30               31                    Date Details   No additional details    Date of next INR:  10/9/2017         How to take your warfarin dose     To take:  2.5 mg Take 0.5 of a 5 mg tablet.    To take:  5 mg Take 1 of the 5 mg tablets.

## 2017-08-28 NOTE — PROGRESS NOTES
ANTICOAGULATION FOLLOW-UP CLINIC VISIT    Patient Name:  Alex Stanley  Date:  8/28/2017  Contact Type:  Face to Face    SUBJECTIVE:     Patient Findings     Positives No Problem Findings           OBJECTIVE    INR Protime   Date Value Ref Range Status   08/28/2017 2.6 (A) 0.86 - 1.14 Final       ASSESSMENT / PLAN  No question data found.  Anticoagulation Summary as of 8/28/2017     INR goal 2.0-3.0   Today's INR 2.6   Maintenance plan 2.5 mg (5 mg x 0.5) on Mon, Fri; 5 mg (5 mg x 1) all other days   Full instructions 2.5 mg on Mon, Fri; 5 mg all other days   Weekly total 30 mg   Plan last modified Vangie Agarwal RN (5/1/2017)   Next INR check 10/9/2017   Target end date Indefinite    Indications   Long-term (current) use of anticoagulants [Z79.01] [Z79.01]  Chronic atrial fibrillation/Flutter [I48.2]         Anticoagulation Episode Summary     INR check location     Preferred lab     Send INR reminders to [facility]    Comments             See the Encounter Report to view Anticoagulation Flowsheet and Dosing Calendar (Go to Encounters tab in chart review, and find the Anticoagulation Therapy Visit)    Dosage adjustment made based on physician directed care plan.    Mabel Garza RN

## 2017-10-04 ENCOUNTER — ALLIED HEALTH/NURSE VISIT (OUTPATIENT)
Dept: NURSING | Facility: CLINIC | Age: 72
End: 2017-10-04
Payer: MEDICARE

## 2017-10-04 DIAGNOSIS — Z23 NEED FOR PROPHYLACTIC VACCINATION AND INOCULATION AGAINST INFLUENZA: Primary | ICD-10-CM

## 2017-10-04 PROCEDURE — G0008 ADMIN INFLUENZA VIRUS VAC: HCPCS

## 2017-10-04 PROCEDURE — 90662 IIV NO PRSV INCREASED AG IM: CPT

## 2017-10-04 NOTE — MR AVS SNAPSHOT
"              After Visit Summary   10/4/2017    Alex Stanley    MRN: [id number]           Patient Information     Date Of Birth          1945        Visit Information        Provider Department      10/4/2017 11:30 AM [facility]        Today's Diagnoses     Need for prophylactic vaccination and inoculation against influenza    -  1       Follow-ups after your visit        Your next 10 appointments already scheduled     Oct 04, 2017 11:30 AM CDT   Nurse Only with [facility] 3   [facility] ([facility]    [address] [place] [id number] [phone]            Oct 09, 2017  9:15 AM CDT   Anticoagulation Visit with [facility] ANT[facility]AGULATION CLINIC   [facility] ([facility])    [address] [place] [id number] [phone]              Who to contact     If you have questions or need follow up information about today's clinic visit or your schedule please contact [facility] directly at [phone].  Normal or non-critical lab and imaging results will be communicated to you by [employer], letter or phone within 4 business days after the clinic has received the results. If you do not hear from us within 7 days, please contact the clinic through [employer] or phone. If you have a critical or abnormal lab result, we will notify you by phone as soon as possible.  Submit refill requests through [employer] or call your pharmacy and they will forward the refill request to us. Please allow 3 business days for your refill to be completed.          Additional Information About Your Visit        MyChart Information     [employer] lets you send messages to your doctor, view your test results, renew your prescriptions, schedule appointments and more. To sign up, go to [email or web address] . Click on \"Log in\" on the left side of " "the screen, which will take you to the Welcome page. Then click on \"Sign up Now\" on the right side of the page.     You will be asked to enter the access code listed below, as well as some personal information. Please follow the directions to create your username and password.     Your access code is: [email or web address]  Expires: 2018 11:00 AM     Your access code will  in 90 days. If you need help or a new code, please call your [place] clinic or [phone].        Care EveryWhere ID     This is your Care EveryWhere ID. This could be used by other organizations to access your [place] medical records  [id number]         Blood Pressure from Last 3 Encounters:   17 128/84   17 104/70   16 115/82    Weight from Last 3 Encounters:   17 174 lb 14.4 oz (79.3 kg)   16 175 lb 8 oz (79.6 kg)   01/26/15 169 lb 1.6 oz (76.7 kg)              We Performed the Following     ADMIN INFLUENZA (For MEDICARE Patients ONLY) []     FLU VACCINE, INCREASED ANTIGEN, PRESV FREE, AGE 65+ [26011]        Primary Care Provider Office Phone # Fax #    Martin Lucien Odonnell [phone]       [address] [place] [phone]        Equal Access to Services     SARAH CORONADO : Hadii aad ku hadasho Soomaali, waaxda luqadaha, qaybta kaalmada adeegyada, bam agrawal . So [facility] [phone].    ATENCIÓN: Si habla español, tiene a fry disposición servicios gratuitos de asistencia lingüística. Llame al [phone].    We comply with applicable federal civil rights laws and [place] laws. We do not discriminate on the basis of race, color, national origin, age, disability, sex, sexual orientation, or gender identity.            Thank you!     Thank you for choosing [facility]  for your care. Our goal is always to provide you with excellent care. Hearing back from our patients is one way we can continue to improve our services. Please take a " few minutes to complete the written survey that you may receive in the mail after your visit with us. Thank you!             Your Updated Medication List - Protect others around you: Learn how to safely use, store and throw away your medicines at www.disposemymeds.org.          This list is accurate as of: 10/4/17 11:00 AM.  Always use your most recent med list.                   Brand Name Dispense Instructions for use Diagnosis    amLODIPine 10 MG tablet    NORVASC    90 tablet    TAKE 1 TABLET DAILY    Essential hypertension       ASPIRIN NOT PRESCRIBED    INTENTIONAL    0 each    Antiplatelet medication not prescribed intentionally due to history  of gastrointestinal bleeding, and patient on warfarin.    Cerebrovascular accident (stroke) (H)       hydrochlorothiazide 25 MG tablet    HYDRODIURIL    45 tablet    TAKE ONE-HALF (1/2) TABLET EVERY OTHER DAY    Venous insufficiency       * pravastatin 20 MG tablet    PRAVACHOL    90 tablet    Take 1 tablet (20 mg) by mouth daily    Hyperlipidemia LDL goal <100       * pravastatin 20 MG tablet    PRAVACHOL    90 tablet    TAKE 1 TABLET DAILY (NEED FASTING LABS FOR CHOLESTEROL)    Hyperlipidemia LDL goal <100       * varenicline 0.5 MG X 11 & 1 MG X 42 tablet    CHANTIX STARTING MONTH WENDY    53 tablet    Take 0.5 mg tab daily for 3 days, then 0.5 mg tab twice daily for 4 days, then 1 mg twice daily.    Tobacco abuse       * varenicline 1 MG tablet    CHANTIX CONTINUING MONTH WENDY    60 tablet    Take 1 tablet (1 mg) by mouth 2 times daily    Tobacco abuse       warfarin 5 MG tablet    COUMADIN    90 tablet    TAKE ONE-HALF (1/2) TABLET (2.5 MG) ON FRIDAY AND 1 TABLET (5 MG) ALL OTHER DAYS AS ADVISED BY THE ANTICOAGULATION CLINIC    Chronic atrial fibrillation (H)       * Notice:  This list has 4 medication(s) that are the same as other medications prescribed for you. Read the directions carefully, and ask your doctor or other care provider to review them with you.

## 2017-10-04 NOTE — PROGRESS NOTES
Injectable Influenza Immunization Documentation    1.  Is the person to be vaccinated sick today?   No    2. Does the person to be vaccinated have an allergy to a component   of the vaccine?   No    3. Has the person to be vaccinated ever had a serious reaction   to influenza vaccine in the past?   No    4. Has the person to be vaccinated ever had Guillain-Barré syndrome?   No    Form completed by Jerry RUFFIN

## 2017-10-09 ENCOUNTER — ANTICOAGULATION THERAPY VISIT (OUTPATIENT)
Dept: ANTICOAGULATION | Facility: CLINIC | Age: 72
End: 2017-10-09
Payer: MEDICARE

## 2017-10-09 DIAGNOSIS — I48.20 CHRONIC ATRIAL FIBRILLATION (H): ICD-10-CM

## 2017-10-09 DIAGNOSIS — Z79.01 LONG-TERM (CURRENT) USE OF ANTICOAGULANTS: ICD-10-CM

## 2017-10-09 LAB — INR POINT OF CARE: 2.4 (ref 0.86–1.14)

## 2017-10-09 PROCEDURE — 85610 PROTHROMBIN TIME: CPT | Mod: QW

## 2017-10-09 PROCEDURE — 36416 COLLJ CAPILLARY BLOOD SPEC: CPT

## 2017-10-09 PROCEDURE — 99207 ZZC NO CHARGE NURSE ONLY: CPT

## 2017-10-09 NOTE — MR AVS SNAPSHOT
Alex Satnley   10/9/2017 9:15 AM   Anticoagulation Therapy Visit    Description:  72 year old male   Provider:   ANTICOAGULATION CLINIC   Department:   Anti Coagulation           INR as of 10/9/2017     Today's INR 2.4      Anticoagulation Summary as of 10/9/2017     INR goal 2.0-3.0   Today's INR 2.4   Full instructions 2.5 mg on Mon, Fri; 5 mg all other days   Next INR check 11/20/2017    Indications   Long-term (current) use of anticoagulants [Z79.01] [Z79.01]  Chronic atrial fibrillation/Flutter [I48.2]         Your next Anticoagulation Clinic appointment(s)     Nov 20, 2017  9:15 AM CST   Anticoagulation Visit with  ANTICOAGULATION CLINIC   [facility] ([facility])    [address] [place] [id number] [phone]              Contact Numbers     [facility]  Please call  [phone] to cancel and/or reschedule your appointment   Please call  [phone] with any problems or questions regarding your therapy.        October 2017 Details    Sun Mon Tue Wed Thu Fri Sat     1               2               3               4               5               6               7                 8               9      2.5 mg   See details      10      5 mg         11      5 mg         12      5 mg         13      2.5 mg         14      5 mg           15      5 mg         16      2.5 mg         17      5 mg         18      5 mg         19      5 mg         20      2.5 mg         21      5 mg           22      5 mg         23      2.5 mg         24      5 mg         25      5 mg         26      5 mg         27      2.5 mg         28      5 mg           29      5 mg         30      2.5 mg         31      5 mg              Date Details   10/09 This INR check               How to take your warfarin dose     To take:  2.5 mg Take 0.5 of a 5 mg tablet.    To take:  5 mg Take 1 of the 5 mg tablets.           November 2017 Details    Sun Mon Tue Wed  Thu Fri Sat        1      5 mg         2      5 mg         3      2.5 mg         4      5 mg           5      5 mg         6      2.5 mg         7      5 mg         8      5 mg         9      5 mg         10      2.5 mg         11      5 mg           12      5 mg         13      2.5 mg         14      5 mg         15      5 mg         16      5 mg         17      2.5 mg         18      5 mg           19      5 mg         20            21               22               23               24               25                 26               27               28               29               30                  Date Details   No additional details    Date of next INR:  11/20/2017         How to take your warfarin dose     To take:  2.5 mg Take 0.5 of a 5 mg tablet.    To take:  5 mg Take 1 of the 5 mg tablets.

## 2017-10-09 NOTE — PROGRESS NOTES
ANTICOAGULATION FOLLOW-UP CLINIC VISIT    Patient Name:  Alex Stanley  Date:  10/9/2017  Contact Type:  Face to Face    SUBJECTIVE:     Patient Findings     Positives No Problem Findings           OBJECTIVE    INR Protime   Date Value Ref Range Status   10/09/2017 2.4 (A) 0.86 - 1.14 Final       ASSESSMENT / PLAN  No question data found.  Anticoagulation Summary as of 10/9/2017     INR goal 2.0-3.0   Today's INR 2.4   Maintenance plan 2.5 mg (5 mg x 0.5) on Mon, Fri; 5 mg (5 mg x 1) all other days   Full instructions 2.5 mg on Mon, Fri; 5 mg all other days   Weekly total 30 mg   No change documented Mabel Garza RN   Plan last modified Vangie Agarwal RN (5/1/2017)   Next INR check 11/20/2017   Target end date Indefinite    Indications   Long-term (current) use of anticoagulants [Z79.01] [Z79.01]  Chronic atrial fibrillation/Flutter [I48.2]         Anticoagulation Episode Summary     INR check location     Preferred lab     Send INR reminders to [facility]    Comments             See the Encounter Report to view Anticoagulation Flowsheet and Dosing Calendar (Go to Encounters tab in chart review, and find the Anticoagulation Therapy Visit)    Dosage adjustment made based on physician directed care plan.    Mabel Garza RN

## 2017-10-30 DIAGNOSIS — I48.20 CHRONIC ATRIAL FIBRILLATION (H): ICD-10-CM

## 2017-10-31 RX ORDER — WARFARIN SODIUM 5 MG/1
TABLET ORAL
Qty: 90 TABLET | Refills: 0 | Status: SHIPPED | OUTPATIENT
Start: 2017-10-31 | End: 2018-01-30

## 2017-11-20 ENCOUNTER — ANTICOAGULATION THERAPY VISIT (OUTPATIENT)
Dept: ANTICOAGULATION | Facility: CLINIC | Age: 72
End: 2017-11-20
Payer: MEDICARE

## 2017-11-20 DIAGNOSIS — I48.20 CHRONIC ATRIAL FIBRILLATION (H): ICD-10-CM

## 2017-11-20 DIAGNOSIS — Z79.01 LONG-TERM (CURRENT) USE OF ANTICOAGULANTS: ICD-10-CM

## 2017-11-20 LAB — INR POINT OF CARE: 2.1 (ref 0.86–1.14)

## 2017-11-20 PROCEDURE — 36416 COLLJ CAPILLARY BLOOD SPEC: CPT

## 2017-11-20 PROCEDURE — 85610 PROTHROMBIN TIME: CPT | Mod: QW

## 2017-11-20 PROCEDURE — 99207 ZZC NO CHARGE NURSE ONLY: CPT

## 2017-11-20 NOTE — MR AVS SNAPSHOT
Alex Stanley   11/20/2017 9:15 AM   Anticoagulation Therapy Visit    Description:  72 year old male   Provider:   ANTICOAGULATION CLINIC   Department:   Anti Coagulation           INR as of 11/20/2017     Today's INR 2.1      Anticoagulation Summary as of 11/20/2017     INR goal 2.0-3.0   Today's INR 2.1   Full instructions 2.5 mg on Mon, Fri; 5 mg all other days   Next INR check 1/1/2018    Indications   Long-term (current) use of anticoagulants [Z79.01] [Z79.01]  Chronic atrial fibrillation/Flutter [I48.2]         Your next Anticoagulation Clinic appointment(s)     Jan 08, 2018  9:15 AM CST   Anticoagulation Visit with  ANTICOAGULATION CLINIC   [facility]    [address] [place] [phone]              Contact Numbers     [facility]  Please call  [phone] to cancel and/or reschedule your appointment   Please call  [phone] with any problems or questions regarding your therapy.        November 2017 Details    Sun Mon Tue Wed Thu Fri Sat        1               2               3               4                 5               6               7               8               9               10               11                 12               13               14               15               16               17               18                 19               20      2.5 mg   See details      21      5 mg         22      5 mg         23      5 mg         24      2.5 mg         25      5 mg           26      5 mg         27      2.5 mg         28      5 mg         29      5 mg         30      5 mg            Date Details   11/20 This INR check               How to take your warfarin dose     To take:  2.5 mg Take 0.5 of a 5 mg tablet.    To take:  5 mg Take 1 of the 5 mg tablets.           December 2017 Details    Sun Mon Tue Wed Thu Fri Sat          1      2.5 mg         2      5 mg           3       5 mg         4      2.5 mg         5      5 mg         6      5 mg         7      5 mg         8      2.5 mg         9      5 mg           10      5 mg         11      2.5 mg         12      5 mg         13      5 mg         14      5 mg         15      2.5 mg         16      5 mg           17      5 mg         18      2.5 mg         19      5 mg         20      5 mg         21      5 mg         22      2.5 mg         23      5 mg           24      5 mg         25      2.5 mg         26      5 mg         27      5 mg         28      5 mg         29      2.5 mg         30      5 mg           31      5 mg                Date Details   No additional details            How to take your warfarin dose     To take:  2.5 mg Take 0.5 of a 5 mg tablet.    To take:  5 mg Take 1 of the 5 mg tablets.           January 2018 Details    Sun Mon Tue Wed Thu Fri Sat      1            2               3               4               5               6                 7               8               9               10               11               12               13                 14               15               16               17               18               19               20                 21               22               23               24               25               26               27                 28               29               30               31                   Date Details   No additional details    Date of next INR:  1/1/2018         How to take your warfarin dose     To take:  2.5 mg Take 0.5 of a 5 mg tablet.

## 2017-11-20 NOTE — PROGRESS NOTES
ANTICOAGULATION FOLLOW-UP CLINIC VISIT    Patient Name:  Alex Stanley  Date:  11/20/2017  Contact Type:  Face to Face    SUBJECTIVE:     Patient Findings     Positives No Problem Findings           OBJECTIVE    INR Protime   Date Value Ref Range Status   11/20/2017 2.1 (A) 0.86 - 1.14 Final       ASSESSMENT / PLAN  No question data found.  Anticoagulation Summary as of 11/20/2017     INR goal 2.0-3.0   Today's INR 2.1   Maintenance plan 2.5 mg (5 mg x 0.5) on Mon, Fri; 5 mg (5 mg x 1) all other days   Full instructions 2.5 mg on Mon, Fri; 5 mg all other days   Weekly total 30 mg   Plan last modified Vangie Agarwal RN (5/1/2017)   Next INR check 1/1/2018   Target end date Indefinite    Indications   Long-term (current) use of anticoagulants [Z79.01] [Z79.01]  Chronic atrial fibrillation/Flutter [I48.2]         Anticoagulation Episode Summary     INR check location     Preferred lab     Send INR reminders to [facility]    Comments             See the Encounter Report to view Anticoagulation Flowsheet and Dosing Calendar (Go to Encounters tab in chart review, and find the Anticoagulation Therapy Visit)        Mabel Garza, RN

## 2018-01-08 ENCOUNTER — ANTICOAGULATION THERAPY VISIT (OUTPATIENT)
Dept: ANTICOAGULATION | Facility: CLINIC | Age: 73
End: 2018-01-08
Payer: MEDICARE

## 2018-01-08 DIAGNOSIS — Z79.01 LONG-TERM (CURRENT) USE OF ANTICOAGULANTS: ICD-10-CM

## 2018-01-08 DIAGNOSIS — I48.20 CHRONIC ATRIAL FIBRILLATION (H): ICD-10-CM

## 2018-01-08 LAB — INR POINT OF CARE: 2.7 (ref 0.86–1.14)

## 2018-01-08 PROCEDURE — 36416 COLLJ CAPILLARY BLOOD SPEC: CPT

## 2018-01-08 PROCEDURE — 85610 PROTHROMBIN TIME: CPT | Mod: QW

## 2018-01-08 PROCEDURE — 99207 ZZC NO CHARGE NURSE ONLY: CPT

## 2018-01-08 NOTE — PROGRESS NOTES
ANTICOAGULATION FOLLOW-UP CLINIC VISIT    Patient Name:  Alex Stanley  Date:  1/8/2018  Contact Type:  Face to Face    SUBJECTIVE:        OBJECTIVE    INR Protime   Date Value Ref Range Status   01/08/2018 2.7 (A) 0.86 - 1.14 Final       ASSESSMENT / PLAN  No question data found.  Anticoagulation Summary as of 1/8/2018     INR goal 2.0-3.0   Today's INR 2.7   Maintenance plan 2.5 mg (5 mg x 0.5) on Mon, Fri; 5 mg (5 mg x 1) all other days   Full instructions 2.5 mg on Mon, Fri; 5 mg all other days   Weekly total 30 mg   No change documented Mabel Garza RN   Plan last modified Vangie Agarwal RN (5/1/2017)   Next INR check 2/19/2018   Target end date Indefinite    Indications   Long-term (current) use of anticoagulants [Z79.01] [Z79.01]  Chronic atrial fibrillation/Flutter [I48.2]         Anticoagulation Episode Summary     INR check location     Preferred lab     Send INR reminders to [facility]    Comments             See the Encounter Report to view Anticoagulation Flowsheet and Dosing Calendar (Go to Encounters tab in chart review, and find the Anticoagulation Therapy Visit)        Mabel Garza, RAHUL

## 2018-01-08 NOTE — MR AVS SNAPSHOT
Alex Stanley   1/8/2018 9:15 AM   Anticoagulation Therapy Visit    Description:  72 year old male   Provider:   ANTICOAGULATION CLINIC   Department:   Anti Coagulation           INR as of 1/8/2018     Today's INR 2.7      Anticoagulation Summary as of 1/8/2018     INR goal 2.0-3.0   Today's INR 2.7   Full instructions 2.5 mg on Mon, Fri; 5 mg all other days   Next INR check 2/19/2018    Indications   Long-term (current) use of anticoagulants [Z79.01] [Z79.01]  Chronic atrial fibrillation/Flutter [I48.2]         Your next Anticoagulation Clinic appointment(s)     Feb 19, 2018  9:30 AM CST   Anticoagulation Visit with  ANTICOAGULATION CLINIC   [facility]    [address] [place] [phone]              Contact Numbers     [facility]  Please call  [phone] to cancel and/or reschedule your appointment   Please call  [phone] with any problems or questions regarding your therapy.        January 2018 Details    Sun Mon Tue Wed Thu Fri Sat      1               2               3               4               5               6                 7               8      2.5 mg   See details      9      5 mg         10      5 mg         11      5 mg         12      2.5 mg         13      5 mg           14      5 mg         15      2.5 mg         16      5 mg         17      5 mg         18      5 mg         19      2.5 mg         20      5 mg           21      5 mg         22      2.5 mg         23      5 mg         24      5 mg         25      5 mg         26      2.5 mg         27      5 mg           28      5 mg         29      2.5 mg         30      5 mg         31      5 mg             Date Details   01/08 This INR check               How to take your warfarin dose     To take:  2.5 mg Take 0.5 of a 5 mg tablet.    To take:  5 mg Take 1 of the 5 mg tablets.           February 2018 Details    Sun Mon Tue Wed  Thu Fri Sat         1      5 mg         2      2.5 mg         3      5 mg           4      5 mg         5      2.5 mg         6      5 mg         7      5 mg         8      5 mg         9      2.5 mg         10      5 mg           11      5 mg         12      2.5 mg         13      5 mg         14      5 mg         15      5 mg         16      2.5 mg         17      5 mg           18      5 mg         19            20               21               22               23               24                 25               26               27               28                   Date Details   No additional details    Date of next INR:  2/19/2018         How to take your warfarin dose     To take:  2.5 mg Take 0.5 of a 5 mg tablet.    To take:  5 mg Take 1 of the 5 mg tablets.

## 2018-01-10 ENCOUNTER — HOSPITAL ENCOUNTER (OUTPATIENT)
Dept: CT IMAGING | Facility: CLINIC | Age: 73
Discharge: HOME | End: 2018-01-10
Attending: INTERNAL MEDICINE | Admitting: INTERNAL MEDICINE
Payer: MEDICARE

## 2018-01-10 DIAGNOSIS — Z87.891 HISTORY OF TOBACCO USE: ICD-10-CM

## 2018-01-10 PROCEDURE — G0297 LDCT FOR LUNG CA SCREEN: HCPCS

## 2018-01-12 NOTE — PROGRESS NOTES
Please notify patient regarding results - benign changes.   Would suggest annual screening to continue.

## 2018-01-30 DIAGNOSIS — I48.20 CHRONIC ATRIAL FIBRILLATION (H): ICD-10-CM

## 2018-01-30 RX ORDER — WARFARIN SODIUM 5 MG/1
TABLET ORAL
Qty: 90 TABLET | Refills: 0 | Status: SHIPPED | OUTPATIENT
Start: 2018-01-30 | End: 2018-04-29

## 2018-02-19 ENCOUNTER — ANTICOAGULATION THERAPY VISIT (OUTPATIENT)
Dept: ANTICOAGULATION | Facility: CLINIC | Age: 73
End: 2018-02-19
Payer: MEDICARE

## 2018-02-19 LAB — INR POINT OF CARE: 2 (ref 0.86–1.14)

## 2018-02-19 PROCEDURE — 85610 PROTHROMBIN TIME: CPT | Mod: QW

## 2018-02-19 PROCEDURE — 36416 COLLJ CAPILLARY BLOOD SPEC: CPT

## 2018-02-19 NOTE — PROGRESS NOTES
ANTICOAGULATION FOLLOW-UP CLINIC VISIT    Patient Name:  Alex Stanley  Date:  2/19/2018  Contact Type:  Face to Face    SUBJECTIVE:     Patient Findings     Positives No Problem Findings           OBJECTIVE    INR Protime   Date Value Ref Range Status   02/19/2018 2.0 (A) 0.86 - 1.14 Final       ASSESSMENT / PLAN  INR assessment THER    Recheck INR In: 6 WEEKS    INR Location Clinic      Anticoagulation Summary as of 2/19/2018     INR goal 2.0-3.0   Today's INR 2.0   Maintenance plan 2.5 mg (5 mg x 0.5) on Mon, Fri; 5 mg (5 mg x 1) all other days   Full instructions 2.5 mg on Mon, Fri; 5 mg all other days   Weekly total 30 mg   No change documented Vangie Agarwal RN   Plan last modified Vangie Agarwal RN (5/1/2017)   Next INR check 4/2/2018   Target end date Indefinite    Indications   Long-term (current) use of anticoagulants [Z79.01] [Z79.01]  Chronic atrial fibrillation/Flutter [I48.2]         Anticoagulation Episode Summary     INR check location     Preferred lab     Send INR reminders to [facility]    Comments             See the Encounter Report to view Anticoagulation Flowsheet and Dosing Calendar (Go to Encounters tab in chart review, and find the Anticoagulation Therapy Visit)    Dosage adjustment made based on physician directed care plan.    Vangie Agarwal RN

## 2018-02-19 NOTE — MR AVS SNAPSHOT
Alex Stanley   2/19/2018 9:30 AM   Anticoagulation Therapy Visit    Description:  72 year old male   Provider:   ANTICOAGULATION CLINIC   Department:   Anti Coagulation           INR as of 2/19/2018     Today's INR 2.0      Anticoagulation Summary as of 2/19/2018     INR goal 2.0-3.0   Today's INR 2.0   Full instructions 2.5 mg on Mon, Fri; 5 mg all other days   Next INR check 4/2/2018    Indications   Long-term (current) use of anticoagulants [Z79.01] [Z79.01]  Chronic atrial fibrillation/Flutter [I48.2]         Your next Anticoagulation Clinic appointment(s)     Apr 02, 2018  9:15 AM CDT   Anticoagulation Visit with  ANTICOAGULATION CLINIC   [facility] ([facility])    [address] [place] [address] [phone]              Contact Numbers     [facility]  Please call  [phone] to cancel and/or reschedule your appointment   Please call  [phone] with any problems or questions regarding your therapy.        February 2018 Details    Sun Mon Tue Wed Thu Fri Sat         1               2               3                 4               5               6               7               8               9               10                 11               12               13               14               15               16               17                 18               19      2.5 mg   See details      20      5 mg         21      5 mg         22      5 mg         23      2.5 mg         24      5 mg           25      5 mg         26      2.5 mg         27      5 mg         28      5 mg             Date Details   02/19 This INR check               How to take your warfarin dose     To take:  2.5 mg Take 0.5 of a 5 mg tablet.    To take:  5 mg Take 1 of the 5 mg tablets.           March 2018 Details    Sun Mon Tue Wed Thu Fri Sat         1      5 mg         2      2.5 mg         3      5 mg           4      5 mg         5       2.5 mg         6      5 mg         7      5 mg         8      5 mg         9      2.5 mg         10      5 mg           11      5 mg         12      2.5 mg         13      5 mg         14      5 mg         15      5 mg         16      2.5 mg         17      5 mg           18      5 mg         19      2.5 mg         20      5 mg         21      5 mg         22      5 mg         23      2.5 mg         24      5 mg           25      5 mg         26      2.5 mg         27      5 mg         28      5 mg         29      5 mg         30      2.5 mg         31      5 mg          Date Details   No additional details            How to take your warfarin dose     To take:  2.5 mg Take 0.5 of a 5 mg tablet.    To take:  5 mg Take 1 of the 5 mg tablets.           April 2018 Details    Sun Mon Tue Wed Thu Fri Sat     1      5 mg         2            3               4               5               6               7                 8               9               10               11               12               13               14                 15               16               17               18               19               20               21                 22               23               24               25               26               27               28                 29               30                     Date Details   No additional details    Date of next INR:  4/2/2018         How to take your warfarin dose     To take:  2.5 mg Take 0.5 of a 5 mg tablet.    To take:  5 mg Take 1 of the 5 mg tablets.

## 2018-04-02 ENCOUNTER — ANTICOAGULATION THERAPY VISIT (OUTPATIENT)
Dept: ANTICOAGULATION | Facility: CLINIC | Age: 73
End: 2018-04-02
Payer: MEDICARE

## 2018-04-02 DIAGNOSIS — Z79.01 LONG-TERM (CURRENT) USE OF ANTICOAGULANTS: ICD-10-CM

## 2018-04-02 DIAGNOSIS — I48.20 CHRONIC ATRIAL FIBRILLATION (H): ICD-10-CM

## 2018-04-02 LAB — INR POINT OF CARE: 3 (ref 0.86–1.14)

## 2018-04-02 PROCEDURE — 85610 PROTHROMBIN TIME: CPT | Mod: QW

## 2018-04-02 PROCEDURE — 36416 COLLJ CAPILLARY BLOOD SPEC: CPT

## 2018-04-02 PROCEDURE — 99207 ZZC NO CHARGE NURSE ONLY: CPT

## 2018-04-02 NOTE — MR AVS SNAPSHOT
Alex Stanley   4/2/2018 9:15 AM   Anticoagulation Therapy Visit    Description:  72 year old male   Provider:   ANTICOAGULATION CLINIC   Department:   Anti Coagulation           INR as of 4/2/2018     Today's INR 3.0      Anticoagulation Summary as of 4/2/2018     INR goal 2.0-3.0   Today's INR 3.0   Full instructions 2.5 mg on Mon, Fri; 5 mg all other days   Next INR check 5/14/2018    Indications   Long-term (current) use of anticoagulants [Z79.01] [Z79.01]  Chronic atrial fibrillation/Flutter [I48.2]         Your next Anticoagulation Clinic appointment(s)     May 14, 2018  9:15 AM CDT   Anticoagulation Visit with  ANTICOAGULATION CLINIC   [facility]    [address] [place] [address] [phone]              Contact Numbers     [facility]  Please call  [phone] to cancel and/or reschedule your appointment   Please call  [phone] with any problems or questions regarding your therapy.        April 2018 Details    Sun Mon Tue Wed Thu Fri Sat     1               2      2.5 mg   See details      3      5 mg         4      5 mg         5      5 mg         6      2.5 mg         7      5 mg           8      5 mg         9      2.5 mg         10      5 mg         11      5 mg         12      5 mg         13      2.5 mg         14      5 mg           15      5 mg         16      2.5 mg         17      5 mg         18      5 mg         19      5 mg         20      2.5 mg         21      5 mg           22      5 mg         23      2.5 mg         24      5 mg         25      5 mg         26      5 mg         27      2.5 mg         28      5 mg           29      5 mg         30      2.5 mg               Date Details   04/02 This INR check               How to take your warfarin dose     To take:  2.5 mg Take 0.5 of a 5 mg tablet.    To take:  5 mg Take 1 of the 5 mg tablets.           May 2018 Details    Sun Mon Tue Wed  Thu Fri Sat       1      5 mg         2      5 mg         3      5 mg         4      2.5 mg         5      5 mg           6      5 mg         7      2.5 mg         8      5 mg         9      5 mg         10      5 mg         11      2.5 mg         12      5 mg           13      5 mg         14            15               16               17               18               19                 20               21               22               23               24               25               26                 27               28               29               30               31                  Date Details   No additional details    Date of next INR:  5/14/2018         How to take your warfarin dose     To take:  2.5 mg Take 0.5 of a 5 mg tablet.    To take:  5 mg Take 1 of the 5 mg tablets.

## 2018-04-02 NOTE — PROGRESS NOTES
ANTICOAGULATION FOLLOW-UP CLINIC VISIT    Patient Name:  Alex Stanley  Date:  4/2/2018  Contact Type:  Face to Face    SUBJECTIVE:     Patient Findings     Positives No Problem Findings           OBJECTIVE    INR Protime   Date Value Ref Range Status   04/02/2018 3.0 (A) 0.86 - 1.14 Final       ASSESSMENT / PLAN  INR assessment THER    Recheck INR In: 6 WEEKS    INR Location Clinic      Anticoagulation Summary as of 4/2/2018     INR goal 2.0-3.0   Today's INR 3.0   Maintenance plan 2.5 mg (5 mg x 0.5) on Mon, Fri; 5 mg (5 mg x 1) all other days   Full instructions 2.5 mg on Mon, Fri; 5 mg all other days   Weekly total 30 mg   No change documented Mabel Garza RN   Plan last modified Vangie Agarwal RN (5/1/2017)   Next INR check 5/14/2018   Target end date Indefinite    Indications   Long-term (current) use of anticoagulants [Z79.01] [Z79.01]  Chronic atrial fibrillation/Flutter [I48.2]         Anticoagulation Episode Summary     INR check location     Preferred lab     Send INR reminders to [facility]    Comments             See the Encounter Report to view Anticoagulation Flowsheet and Dosing Calendar (Go to Encounters tab in chart review, and find the Anticoagulation Therapy Visit)        Mabel Garza RN

## 2018-04-27 DIAGNOSIS — I10 ESSENTIAL HYPERTENSION: ICD-10-CM

## 2018-04-27 DIAGNOSIS — E78.5 HYPERLIPIDEMIA LDL GOAL <100: Primary | ICD-10-CM

## 2018-04-27 NOTE — TELEPHONE ENCOUNTER
"Requested Prescriptions   Pending Prescriptions Disp Refills     pravastatin (PRAVACHOL) 20 MG tablet [Pharmacy Med Name: PRAVASTATIN TABS 20MG]  Last Written Prescription Date:  7/7/17  Last Fill Quantity: 90 TABLET,  # refills: 2   Last office visit: 5/1/17 with prescribing provider:  ISABELA Henderson Office Visit:   Next 5 appointments (look out 90 days)     May 07, 2018  9:30 AM CDT   PHYSICAL with Martin Odonnell MD   [facility] ([facility]    [address] [id number] [phone]                  90 tablet 2     Sig: TAKE 1 TABLET DAILY (NEED FASTING LABS FOR CHOLESTEROL)    Statins Protocol Failed    4/27/2018  9:49 AM       Failed - LDL on file in past 12 months    Recent Labs   Lab Test  04/26/17   0903   LDL  73            Passed - No abnormal creatine kinase in past 12 months    No lab results found.            Passed - Recent (12 mo) or future (30 days) visit within the authorizing provider's specialty    Patient had office visit in the last 12 months or has a visit in the next 30 days with authorizing provider or within the authorizing provider's specialty.  See \"Patient Info\" tab in inbasket, or \"Choose Columns\" in Meds & Orders section of the refill encounter.           Passed - Patient is age 18 or older        amLODIPine (NORVASC) 10 MG tablet [Pharmacy Med Name: AMLODIPINE BESYLATE TABS 10MG]  Last Written Prescription Date:  7/7/17  Last Fill Quantity: 90 TABLET,  # refills: 2   Last office visit: 5/1/17 with prescribing provider:  ISABELA Henderson Office Visit:   Next 5 appointments (look out 90 days)     May 07, 2018  9:30 AM CDT   PHYSICAL with Martin Odonnell MD   [facility]    [address] [id number] [phone]                  90 tablet 2     Sig: TAKE 1 TABLET DAILY    Calcium Channel Blockers Protocol  Failed    4/27/2018  " "9:49 AM       Failed - Normal serum creatinine on file in past 12 months    Recent Labs   Lab Test  04/26/17   0903   CR  1.25            Passed - Blood pressure under 140/90 in past 12 months    BP Readings from Last 3 Encounters:   06/16/17 128/84   05/01/17 104/70   11/23/16 115/82                Passed - Recent (12 mo) or future (30 days) visit within the authorizing provider's specialty    Patient had office visit in the last 12 months or has a visit in the next 30 days with authorizing provider or within the authorizing provider's specialty.  See \"Patient Info\" tab in inbasket, or \"Choose Columns\" in Meds & Orders section of the refill encounter.           Passed - Patient is age 18 or older          "

## 2018-04-29 DIAGNOSIS — I48.20 CHRONIC ATRIAL FIBRILLATION (H): ICD-10-CM

## 2018-04-29 NOTE — TELEPHONE ENCOUNTER
"Requested Prescriptions   Pending Prescriptions Disp Refills     warfarin (COUMADIN) 5 MG tablet [Pharmacy Med Name: WARFARIN TABS 5MG]  Last Written Prescription Date:  1/30/18  Last Fill Quantity: 90 TABLET,  # refills: 0   Last office visit: 5/1/17 with prescribing provider:  ISABELA   Future Office Visit:   Next 5 appointments (look out 90 days)     May 07, 2018  9:30 AM CDT   PHYSICAL with Martin Odonnell MD   [facility]    [address] [address] [phone]                  90 tablet 0     Sig: TAKE 1 TABLET DAILY EXCEPT ONE-HALF (1/2) TABLET ON MONDAY AND FRIDAY AS DIRECTED BY THE ANTICOAGULATION CLINIC    Vitamin K Antagonists Failed    4/29/2018  4:26 AM       Failed - INR is within goal in the past 6 weeks    Confirm INR is within goal in the past 6 weeks.     Recent Labs   Lab Test 04/02/18   INR  3.0*                      Passed - Recent (12 mo) or future (30 days) visit within the authorizing provider's specialty    Patient had office visit in the last 12 months or has a visit in the next 30 days with authorizing provider or within the authorizing provider's specialty.  See \"Patient Info\" tab in inbasket, or \"Choose Columns\" in Meds & Orders section of the refill encounter.           Passed - Patient is 18 years of age or older          "

## 2018-04-30 RX ORDER — PRAVASTATIN SODIUM 20 MG
TABLET ORAL
Qty: 30 TABLET | Refills: 0 | Status: SHIPPED | OUTPATIENT
Start: 2018-04-30 | End: 2018-04-30

## 2018-04-30 RX ORDER — AMLODIPINE BESYLATE 10 MG/1
TABLET ORAL
Qty: 30 TABLET | Refills: 0 | Status: SHIPPED | OUTPATIENT
Start: 2018-04-30 | End: 2018-05-07

## 2018-04-30 RX ORDER — WARFARIN SODIUM 5 MG/1
TABLET ORAL
Qty: 90 TABLET | Refills: 0 | Status: SHIPPED | OUTPATIENT
Start: 2018-04-30 | End: 2018-06-14

## 2018-04-30 RX ORDER — PRAVASTATIN SODIUM 20 MG
TABLET ORAL
Qty: 30 TABLET | Refills: 0 | Status: SHIPPED | OUTPATIENT
Start: 2018-04-30 | End: 2018-05-07

## 2018-04-30 NOTE — TELEPHONE ENCOUNTER
Medication is being filled for 1 time refill only due to:  Patient needs labs and office visit .   Scheduled

## 2018-05-02 DIAGNOSIS — I10 ESSENTIAL HYPERTENSION: ICD-10-CM

## 2018-05-02 DIAGNOSIS — E78.5 HYPERLIPIDEMIA LDL GOAL <100: ICD-10-CM

## 2018-05-02 DIAGNOSIS — Z11.59 NEED FOR HEPATITIS C SCREENING TEST: ICD-10-CM

## 2018-05-02 LAB
ANION GAP SERPL CALCULATED.3IONS-SCNC: 6 MMOL/L (ref 3–14)
BUN SERPL-MCNC: 18 MG/DL (ref 7–30)
CALCIUM SERPL-MCNC: 8.9 MG/DL (ref 8.5–10.1)
CHLORIDE SERPL-SCNC: 109 MMOL/L (ref 94–109)
CHOLEST SERPL-MCNC: 136 MG/DL
CO2 SERPL-SCNC: 28 MMOL/L (ref 20–32)
CREAT SERPL-MCNC: 1.37 MG/DL (ref 0.66–1.25)
GFR SERPL CREATININE-BSD FRML MDRD: 51 ML/MIN/1.7M2
GLUCOSE SERPL-MCNC: 97 MG/DL (ref 70–99)
HDLC SERPL-MCNC: 37 MG/DL
LDLC SERPL CALC-MCNC: 81 MG/DL
NONHDLC SERPL-MCNC: 99 MG/DL
POTASSIUM SERPL-SCNC: 4.3 MMOL/L (ref 3.4–5.3)
SODIUM SERPL-SCNC: 143 MMOL/L (ref 133–144)
TRIGL SERPL-MCNC: 91 MG/DL

## 2018-05-02 PROCEDURE — G0472 HEP C SCREEN HIGH RISK/OTHER: HCPCS | Performed by: INTERNAL MEDICINE

## 2018-05-02 PROCEDURE — 80048 BASIC METABOLIC PNL TOTAL CA: CPT | Performed by: INTERNAL MEDICINE

## 2018-05-02 PROCEDURE — 36415 COLL VENOUS BLD VENIPUNCTURE: CPT | Performed by: INTERNAL MEDICINE

## 2018-05-02 PROCEDURE — 80061 LIPID PANEL: CPT | Performed by: INTERNAL MEDICINE

## 2018-05-03 LAB — HCV AB SERPL QL IA: NONREACTIVE

## 2018-05-07 DIAGNOSIS — I10 ESSENTIAL HYPERTENSION: ICD-10-CM

## 2018-05-07 DIAGNOSIS — E78.5 HYPERLIPIDEMIA LDL GOAL <100: ICD-10-CM

## 2018-05-07 RX ORDER — PRAVASTATIN SODIUM 20 MG
20 TABLET ORAL DAILY
Qty: 90 TABLET | Status: SHIPPED | OUTPATIENT
Start: 2018-05-07 | End: 2018-06-14

## 2018-05-07 RX ORDER — AMLODIPINE BESYLATE 10 MG/1
10 TABLET ORAL DAILY
Qty: 90 TABLET | Refills: 3 | Status: SHIPPED | OUTPATIENT
Start: 2018-05-07 | End: 2018-06-14

## 2018-05-07 NOTE — PROGRESS NOTES
Patient had to reschedule appointment.   Labs done.    Creat up, advise holding hydrochlorothiazide and recheck at office visit.  Follow low salt diet, and use support hose if needed.

## 2018-05-07 NOTE — PROGRESS NOTES
Please contact patient and advise creationine up.       Advise holding hydrochlorothiazide and recheck creat at office visit.  Follow low salt diet, and use support hose if needed.

## 2018-05-14 ENCOUNTER — ANTICOAGULATION THERAPY VISIT (OUTPATIENT)
Dept: ANTICOAGULATION | Facility: CLINIC | Age: 73
End: 2018-05-14
Payer: MEDICARE

## 2018-05-14 DIAGNOSIS — I48.20 CHRONIC ATRIAL FIBRILLATION (H): ICD-10-CM

## 2018-05-14 DIAGNOSIS — Z79.01 LONG-TERM (CURRENT) USE OF ANTICOAGULANTS: ICD-10-CM

## 2018-05-14 LAB — INR POINT OF CARE: 2.6 (ref 0.86–1.14)

## 2018-05-14 PROCEDURE — 85610 PROTHROMBIN TIME: CPT | Mod: QW

## 2018-05-14 PROCEDURE — 99207 ZZC NO CHARGE NURSE ONLY: CPT

## 2018-05-14 PROCEDURE — 36416 COLLJ CAPILLARY BLOOD SPEC: CPT

## 2018-05-14 NOTE — MR AVS SNAPSHOT
Alex Stanley   5/14/2018 9:15 AM   Anticoagulation Therapy Visit    Description:  72 year old male   Provider:   ANTICOAGULATION CLINIC   Department:   Anti Coagulation           INR as of 5/14/2018     Today's INR 2.6      Anticoagulation Summary as of 5/14/2018     INR goal 2.0-3.0   Today's INR 2.6   Full instructions 2.5 mg on Mon, Fri; 5 mg all other days   Next INR check 6/25/2018    Indications   Long-term (current) use of anticoagulants [Z79.01] [Z79.01]  Chronic atrial fibrillation/Flutter [I48.2]         Your next Anticoagulation Clinic appointment(s)     Jun 25, 2018  9:00 AM CDT   Anticoagulation Visit with  ANTICOAGULATION CLINIC   [facility] ([facility])    [address] [place] [phone] [phone]              Contact Numbers     [facility]  Please call  [phone] to cancel and/or reschedule your appointment   Please call  [phone] with any problems or questions regarding your therapy.        May 2018 Details    Sun Mon Tue Wed Thu Fri Sat       1               2               3               4               5                 6               7               8               9               10               11               12                 13               14      2.5 mg   See details      15      5 mg         16      5 mg         17      5 mg         18      2.5 mg         19      5 mg           20      5 mg         21      2.5 mg         22      5 mg         23      5 mg         24      5 mg         25      2.5 mg         26      5 mg           27      5 mg         28      2.5 mg         29      5 mg         30      5 mg         31      5 mg            Date Details   05/14 This INR check               How to take your warfarin dose     To take:  2.5 mg Take 0.5 of a 5 mg tablet.    To take:  5 mg Take 1 of the 5 mg tablets.           June 2018 Details    Sun Mon Tue Wed Thu Fri Sat          1      2.5 mg          2      5 mg           3      5 mg         4      2.5 mg         5      5 mg         6      5 mg         7      5 mg         8      2.5 mg         9      5 mg           10      5 mg         11      2.5 mg         12      5 mg         13      5 mg         14      5 mg         15      2.5 mg         16      5 mg           17      5 mg         18      2.5 mg         19      5 mg         20      5 mg         21      5 mg         22      2.5 mg         23      5 mg           24      5 mg         25            26               27               28               29               30                Date Details   No additional details    Date of next INR:  6/25/2018         How to take your warfarin dose     To take:  2.5 mg Take 0.5 of a 5 mg tablet.    To take:  5 mg Take 1 of the 5 mg tablets.

## 2018-06-10 DIAGNOSIS — I10 ESSENTIAL HYPERTENSION: Primary | ICD-10-CM

## 2018-06-11 RX ORDER — HYDROCHLOROTHIAZIDE 25 MG/1
TABLET ORAL
Qty: 45 TABLET | Refills: 3 | Status: SHIPPED | OUTPATIENT
Start: 2018-06-11 | End: 2018-06-14

## 2018-06-11 NOTE — TELEPHONE ENCOUNTER
"Requested Prescriptions   Pending Prescriptions Disp Refills     hydrochlorothiazide (HYDRODIURIL) 25 MG tablet [Pharmacy Med Name: HYDROCHLOROTHIAZIDE TABS 25MG] 45 tablet 3     Sig: TAKE ONE-HALF (1/2) TABLET EVERY OTHER DAY    Diuretics (Including Combos) Protocol Failed    6/10/2018  5:34 AM       Failed - Normal serum creatinine on file in past 12 months    Recent Labs   Lab Test  05/02/18   0915   CR  1.37*             Passed - Blood pressure under 140/90 in past 12 months    BP Readings from Last 3 Encounters:   06/16/17 128/84   05/01/17 104/70   11/23/16 115/82                Passed - Recent (12 mo) or future (30 days) visit within the authorizing provider's specialty    Patient had office visit in the last 12 months or has a visit in the next 30 days with authorizing provider or within the authorizing provider's specialty.  See \"Patient Info\" tab in inbasket, or \"Choose Columns\" in Meds & Orders section of the refill encounter.           Passed - Patient is age 18 or older       Passed - Normal serum potassium on file in past 12 months    Recent Labs   Lab Test  05/02/18   0915   POTASSIUM  4.3                   Passed - Normal serum sodium on file in past 12 months    Recent Labs   Lab Test  05/02/18   0915   NA  143      Routing refill request to provider for review/approval because:  Labs out of range: 5/2/18   CR 1.37            "

## 2018-06-14 ENCOUNTER — OFFICE VISIT (OUTPATIENT)
Dept: INTERNAL MEDICINE | Facility: CLINIC | Age: 73
End: 2018-06-14
Payer: MEDICARE

## 2018-06-14 VITALS
OXYGEN SATURATION: 96 % | WEIGHT: 185.3 LBS | SYSTOLIC BLOOD PRESSURE: 124 MMHG | BODY MASS INDEX: 29.08 KG/M2 | DIASTOLIC BLOOD PRESSURE: 76 MMHG | TEMPERATURE: 98.5 F | HEART RATE: 85 BPM | HEIGHT: 67 IN

## 2018-06-14 DIAGNOSIS — E78.5 HYPERLIPIDEMIA LDL GOAL <100: ICD-10-CM

## 2018-06-14 DIAGNOSIS — D50.9 IRON DEFICIENCY ANEMIA, UNSPECIFIED IRON DEFICIENCY ANEMIA TYPE: ICD-10-CM

## 2018-06-14 DIAGNOSIS — N18.30 CKD (CHRONIC KIDNEY DISEASE) STAGE 3, GFR 30-59 ML/MIN (H): ICD-10-CM

## 2018-06-14 DIAGNOSIS — Z72.0 TOBACCO ABUSE: ICD-10-CM

## 2018-06-14 DIAGNOSIS — I48.20 CHRONIC ATRIAL FIBRILLATION (H): ICD-10-CM

## 2018-06-14 DIAGNOSIS — F10.21 ALCOHOL DEPENDENCE IN REMISSION (H): ICD-10-CM

## 2018-06-14 DIAGNOSIS — I87.2 VENOUS INSUFFICIENCY: ICD-10-CM

## 2018-06-14 DIAGNOSIS — Z00.00 MEDICARE ANNUAL WELLNESS VISIT, SUBSEQUENT: Primary | ICD-10-CM

## 2018-06-14 DIAGNOSIS — I10 ESSENTIAL HYPERTENSION: ICD-10-CM

## 2018-06-14 LAB — HGB BLD-MCNC: 14.1 G/DL (ref 13.3–17.7)

## 2018-06-14 PROCEDURE — 82565 ASSAY OF CREATININE: CPT | Performed by: INTERNAL MEDICINE

## 2018-06-14 PROCEDURE — 36415 COLL VENOUS BLD VENIPUNCTURE: CPT | Performed by: INTERNAL MEDICINE

## 2018-06-14 PROCEDURE — 82043 UR ALBUMIN QUANTITATIVE: CPT | Performed by: INTERNAL MEDICINE

## 2018-06-14 PROCEDURE — 85018 HEMOGLOBIN: CPT | Performed by: INTERNAL MEDICINE

## 2018-06-14 PROCEDURE — G0439 PPPS, SUBSEQ VISIT: HCPCS | Performed by: INTERNAL MEDICINE

## 2018-06-14 RX ORDER — WARFARIN SODIUM 5 MG/1
5 TABLET ORAL DAILY
Qty: 90 TABLET | Refills: 3 | Status: SHIPPED | OUTPATIENT
Start: 2018-06-14 | End: 2018-08-10

## 2018-06-14 RX ORDER — PRAVASTATIN SODIUM 20 MG
20 TABLET ORAL DAILY
Qty: 90 TABLET | Status: SHIPPED | OUTPATIENT
Start: 2018-06-14 | End: 2019-06-14

## 2018-06-14 RX ORDER — AMLODIPINE BESYLATE 10 MG/1
10 TABLET ORAL DAILY
Qty: 90 TABLET | Refills: 3 | Status: SHIPPED | OUTPATIENT
Start: 2018-06-14 | End: 2019-06-14

## 2018-06-14 ASSESSMENT — ACTIVITIES OF DAILY LIVING (ADL): CURRENT_FUNCTION: NO ASSISTANCE NEEDED

## 2018-06-14 NOTE — PROGRESS NOTES
SUBJECTIVE:   Alex Stanley is a 72 year old male who presents for Preventive Visit.    Are you in the first 12 months of your Medicare coverage?  No    Physical   Annual:     Getting at least 3 servings of Calcium per day::  Yes    Bi-annual eye exam::  Yes    Dental care twice a year::  Yes    Sleep apnea or symptoms of sleep apnea::  None    Diet::  Regular (no restrictions)    Frequency of exercise::  2-3 days/week    Duration of exercise::  15-30 minutes    Taking medications regularly::  Yes    Medication side effects::  None    Additional concerns today::  No    Ability to successfully perform activities of daily living: no assistance needed  Home Safety:  No safety concerns identified  Hearing Impairment: no hearing concerns        Fall risk:  Fallen 2 or more times in the past year?: No  Any fall with injury in the past year?: No    COGNITIVE SCREEN  1) Repeat 3 items (Banana, Sunrise, Chair)    2) Clock draw: NORMAL  3) 3 item recall: Recalls 3 objects  Results: 3 items recalled: COGNITIVE IMPAIRMENT LESS LIKELY    Mini-CogTM Copyright MERC[employer] Dan. Licensed by the author for use in [facility]; reprinted with permission (soob@[facility]). All rights reserved.        Reviewed and updated as needed this visit by clinical staff  Tobacco  Allergies  Meds         Reviewed and updated as needed this visit by Provider        Social History   Substance Use Topics     Smoking status: Current Every Day Smoker     Packs/day: 1.00     Years: 35.00     Types: Cigarettes     Smokeless tobacco: Never Used      Comment: ppd since age 16 - now 1/2-1 ppd     Alcohol use No      Comment: last drank at [facility] 8/2010.       No flowsheet data found.      Additional issues to address:  1.  Follow-up HTN.  Patient is checking outpatient blood pressures rarely, at home.  Results are stable.  He reports no side effects from BP medications.   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  No  "support hose.    2.  Follow-up of chronic hyperlipidemia, on Pravastatin 20 mg.  Diet rating: stable.  He reports no side effects of medications, including no significant myalgias or other side effects.        Today's PHQ-2 Score:   PHQ-2 ( 1999 Pfizer) 6/14/2018   Q1: Little interest or pleasure in doing things 0   Q2: Feeling down, depressed or hopeless 0   PHQ-2 Score 0       Do you feel safe in your environment - Yes    Do you have a Health Care Directive?: No: Advance care planning reviewed with patient; information given to patient to review.    Current providers sharing in care for this patient include:   Patient Care Team:  Martin Odonnell MD as PCP - General    The following health maintenance items are reviewed in Epic and correct as of today:  Health Maintenance   Topic Date Due     ADVANCE DIRECTIVE PLANNING Q5 YRS  06/22/2016     OP ANNUAL INR REFERRAL  08/29/2017     HEMOGLOBIN Q1 YR  04/26/2018     MICROALBUMIN Q1 YEAR  04/26/2018     MEDICARE ANNUAL WELLNESS VISIT  05/01/2018     FALL RISK ASSESSMENT  05/01/2018     LUNG CANCER SCREENING ANNUAL  01/10/2019     BMP Q1 YR  05/02/2019     LIPID MONITORING Q1 YEAR  05/02/2019     COLONOSCOPY Q10 YR  12/05/2019     TETANUS IMMUNIZATION (SYSTEM ASSIGNED)  12/14/2019     PNEUMOCOCCAL  Completed     AORTIC ANEURYSM SCREENING (SYSTEM ASSIGNED)  Completed     HEPATITIS C SCREENING  Completed           Review of Systems  Constitutional, HEENT, cardiovascular, pulmonary, gi and gu systems are negative, except as otherwise noted.    OBJECTIVE:   /76 (Cuff Size: Adult Regular)  Pulse 85  Temp 98.5  F (36.9  C) (Oral)  Ht 5' 7\" (1.702 m)  Wt 185 lb 4.8 oz (84.1 kg)  SpO2 96%  BMI 29.02 kg/m2 Estimated body mass index is 29.02 kg/(m^2) as calculated from the following:    Height as of this encounter: 5' 7\" (1.702 m).    Weight as of this encounter: 185 lb 4.8 oz (84.1 kg).  Physical Exam  GENERAL: healthy, alert and no distress  NECK: no adenopathy, " "no asymmetry, masses, or scars and thyroid normal to palpation  RESP: lungs clear to auscultation - no rales, rhonchi or wheezes  CV: regular rate and rhythm, normal S1 S2, no S3 or S4, no murmur, click or rub, no peripheral edema and peripheral pulses strong  ABDOMEN: soft, nontender, no hepatosplenomegaly, no masses and bowel sounds normal  MS: no gross musculoskeletal defects noted, no edema    ASSESSMENT / PLAN:     ASSESSMENT:   1.  Annual Wellness Visit  2.  HTN,controlled   3.  Hyperlipidemia, controlled   4.  Rise in creat, suspect from diuretics.      5.  Tobacco, decreased use.   Considering cessation.      PLAN:  1.  Stop hydrochlorothiazide   2.  Recheck kidney function today --> creat a bit worse, will recheck in a month   3.  Very low salt diet, prescription for support stockings to combat edema    4.  Consider Quitplan to help with stopping smoking  5.  Screening chest CT January 2019  6.  Consider Shingrix vaccine series, check with pharmacy      End of Life Planning:  Patient currently has an advanced directive: No.  I have verified the patient's ablity to prepare an advanced directive/make health care decisions.  Literature was provided to assist patient in preparing an advanced directive.    COUNSELING:  Reviewed preventive health counseling, as reflected in patient instructions       Regular exercise      Estimated body mass index is 29.02 kg/(m^2) as calculated from the following:    Height as of this encounter: 5' 7\" (1.702 m).    Weight as of this encounter: 185 lb 4.8 oz (84.1 kg).  Weight management plan: Discussed healthy diet and exercise guidelines and patient will follow up in 12 months in clinic to re-evaluate.   reports that he has been smoking Cigarettes.  He has a 35.00 pack-year smoking history. He has never used smokeless tobacco.  Tobacco Cessation Action Plan: Information offered: Patient not interested at this time  Pharmacotherapies : to be considered    Appropriate preventive " services were discussed with this patient, including applicable screening as appropriate for cardiovascular disease, diabetes, osteopenia/osteoporosis, and glaucoma.  As appropriate for age/gender, discussed screening for colorectal cancer, prostate cancer, breast cancer, and cervical cancer. Checklist reviewing preventive services available has been given to the patient.    Reviewed patients plan of care and provided an AVS. The Basic Care Plan (routine screening as documented in Health Maintenance) for Alex meets the Care Plan requirement. This Care Plan has been established and reviewed with the Patient.    Counseling Resources:  ATP IV Guidelines  Pooled Cohorts Equation Calculator  Breast Cancer Risk Calculator  FRAX Risk Assessment  ICSI Preventive Guidelines  Dietary Guidelines for Americans, 2010  USDA's MyPlate  ASA Prophylaxis  Lung CA Screening    Martin Odonnell MD  [facility]

## 2018-06-14 NOTE — PATIENT INSTRUCTIONS
Preventive Health Recommendations:       Male Ages 65 and over    Yearly exam:             See your health care provider every year in order to  o   Review health changes.   o   Discuss preventive care.    o   Review your medicines if your doctor has prescribed any.    Talk with your health care provider about whether you should have a test to screen for prostate cancer (PSA).    Every 3 years, have a diabetes test (fasting glucose). If you are at risk for diabetes, you should have this test more often.    Every 5 years, have a cholesterol test. Have this test more often if you are at risk for high cholesterol or heart disease.     Every 10 years, have a colonoscopy. Or, have a yearly FIT test (stool test). These exams will check for colon cancer.    Talk to with your health care provider about screening for Abdominal Aortic Aneurysm if you have a family history of AAA or have a history of smoking.  Shots:     Get a flu shot each year.     Get a tetanus shot every 10 years.     Talk to your doctor about your pneumonia vaccines. There are now two you should receive - Pneumovax (PPSV 23) and Prevnar (PCV 13).    Talk to your doctor about a shingles vaccine.     Talk to your doctor about the hepatitis B vaccine.  Nutrition:     Eat at least 5 servings of fruits and vegetables each day.     Eat whole-grain bread, whole-wheat pasta and brown rice instead of white grains and rice.     Talk to your doctor about Calcium and Vitamin D.   Lifestyle    Exercise for at least 150 minutes a week (30 minutes a day, 5 days a week). This will help you control your weight and prevent disease.     Limit alcohol to one drink per day.     No smoking.     Wear sunscreen to prevent skin cancer.     See your dentist every six months for an exam and cleaning.     See your eye doctor every 1 to 2 years to screen for conditions such as glaucoma, macular degeneration and cataracts.    PLAN:  1.  Stop hydrochlorothiazide   2.  Recheck kidney  function today   3.  Very low salt diet, prescription for support stockings to combat edema    4.  Consider Quitplan to help with stopping smoking  5.  Screening chest CT January 2019  6.  Consider Shingrix vaccine series, check with pharmacy

## 2018-06-15 LAB
CREAT SERPL-MCNC: 1.46 MG/DL (ref 0.66–1.25)
CREAT UR-MCNC: 140 MG/DL
GFR SERPL CREATININE-BSD FRML MDRD: 48 ML/MIN/1.7M2
MICROALBUMIN UR-MCNC: 42 MG/L
MICROALBUMIN/CREAT UR: 29.86 MG/G CR (ref 0–17)

## 2018-06-20 NOTE — PROGRESS NOTES
Please contact patient and advise that kidney function was abnormal, and make sure that he has stopped the hydrochlorothiazide.   We are now treating his edema without medication, see note.    Also, suggest recheck of creat in July

## 2018-06-25 ENCOUNTER — ANTICOAGULATION THERAPY VISIT (OUTPATIENT)
Dept: ANTICOAGULATION | Facility: CLINIC | Age: 73
End: 2018-06-25
Payer: MEDICARE

## 2018-06-25 DIAGNOSIS — I48.20 CHRONIC ATRIAL FIBRILLATION (H): ICD-10-CM

## 2018-06-25 DIAGNOSIS — Z79.01 LONG-TERM (CURRENT) USE OF ANTICOAGULANTS: ICD-10-CM

## 2018-06-25 LAB — INR POINT OF CARE: 2.5 (ref 0.86–1.14)

## 2018-06-25 PROCEDURE — 85610 PROTHROMBIN TIME: CPT | Mod: QW

## 2018-06-25 PROCEDURE — 99207 ZZC NO CHARGE NURSE ONLY: CPT

## 2018-06-25 PROCEDURE — 36416 COLLJ CAPILLARY BLOOD SPEC: CPT

## 2018-06-25 NOTE — MR AVS SNAPSHOT
Alex Stanley   6/25/2018 9:00 AM   Anticoagulation Therapy Visit    Description:  72 year old male   Provider:   ANTICOAGULATION CLINIC   Department:   Anti Coagulation           INR as of 6/25/2018     Today's INR 2.5      Anticoagulation Summary as of 6/25/2018     INR goal 2.0-3.0   Today's INR 2.5   Full warfarin instructions 2.5 mg on Mon, Fri; 5 mg all other days   Next INR check 8/6/2018    Indications   Long-term (current) use of anticoagulants [Z79.01] [Z79.01]  Chronic atrial fibrillation/Flutter [I48.2]         Your next Anticoagulation Clinic appointment(s)     Jul 30, 2018  8:45 AM CDT   [facility]  [facility]    [address] [place] [id number] [phone]              Contact Numbers     [facility]  Please call  [phone] to cancel and/or reschedule your appointment   Please call  [phone] with any problems or questions regarding your therapy.        June 2018 Details    Sun Mon Tue Wed Thu Fri Sat          1               2                 3               4               5               6               7               8               9                 10               11               12               13               14               15               16                 17               18               19               20               21               22               23                 24               25      2.5 mg   See details      26      5 mg         27      5 mg         28      5 mg         29      2.5 mg         30      5 mg          Date Details   06/25 This INR check               How to take your warfarin dose     To take:  2.5 mg Take 0.5 of a 5 mg tablet.    To take:  5 mg Take 1 of the 5 mg tablets.           July 2018 Details    Sun Mon Tue Wed Thu Fri Sat     1      5 mg         2      2.5 mg         3      5 mg         4      5 mg          5      5 mg         6      2.5 mg         7      5 mg           8      5 mg         9      2.5 mg         10      5 mg         11      5 mg         12      5 mg         13      2.5 mg         14      5 mg           15      5 mg         16      2.5 mg         17      5 mg         18      5 mg         19      5 mg         20      2.5 mg         21      5 mg           22      5 mg         23      2.5 mg         24      5 mg         25      5 mg         26      5 mg         27      2.5 mg         28      5 mg           29      5 mg         30      2.5 mg         31      5 mg              Date Details   No additional details            How to take your warfarin dose     To take:  2.5 mg Take 0.5 of a 5 mg tablet.    To take:  5 mg Take 1 of the 5 mg tablets.           August 2018 Details    Sun Mon Tue Wed Thu Fri Sat        1      5 mg         2      5 mg         3      2.5 mg         4      5 mg           5      5 mg         6            7               8               9               10               11                 12               13               14               15               16               17               18                 19               20               21               22               23               24               25                 26               27               28               29               30               31                 Date Details   No additional details    Date of next INR:  8/6/2018         How to take your warfarin dose     To take:  2.5 mg Take 0.5 of a 5 mg tablet.    To take:  5 mg Take 1 of the 5 mg tablets.

## 2018-06-25 NOTE — PROGRESS NOTES
ANTICOAGULATION FOLLOW-UP CLINIC VISIT    Patient Name:  Alex Stanley  Date:  6/25/2018  Contact Type:  Face to Face    SUBJECTIVE:     Patient Findings     Positives No Problem Findings           OBJECTIVE    INR Protime   Date Value Ref Range Status   06/25/2018 2.5 (A) 0.86 - 1.14 Final       ASSESSMENT / PLAN  No question data found.  Anticoagulation Summary as of 6/25/2018     INR goal 2.0-3.0   Today's INR 2.5   Warfarin maintenance plan 2.5 mg (5 mg x 0.5) on Mon, Fri; 5 mg (5 mg x 1) all other days   Full warfarin instructions 2.5 mg on Mon, Fri; 5 mg all other days   Weekly warfarin total 30 mg   Plan last modified Vangie Agarwal RN (5/1/2017)   Next INR check 8/6/2018   Target end date Indefinite    Indications   Long-term (current) use of anticoagulants [Z79.01] [Z79.01]  Chronic atrial fibrillation/Flutter [I48.2]         Anticoagulation Episode Summary     INR check location     Preferred lab     Send INR reminders to [facility]    Comments             See the Encounter Report to view Anticoagulation Flowsheet and Dosing Calendar (Go to Encounters tab in chart review, and find the Anticoagulation Therapy Visit)        Mabel Garza RN

## 2018-07-30 ENCOUNTER — ANTICOAGULATION THERAPY VISIT (OUTPATIENT)
Dept: ANTICOAGULATION | Facility: CLINIC | Age: 73
End: 2018-07-30
Payer: MEDICARE

## 2018-07-30 LAB — INR POINT OF CARE: 2.7 (ref 0.86–1.14)

## 2018-07-30 PROCEDURE — 85610 PROTHROMBIN TIME: CPT | Mod: QW

## 2018-07-30 PROCEDURE — 36416 COLLJ CAPILLARY BLOOD SPEC: CPT

## 2018-07-30 PROCEDURE — 99207 ZZC NO CHARGE NURSE ONLY: CPT

## 2018-07-30 NOTE — MR AVS SNAPSHOT
Alex Stanley   7/30/2018 8:45 AM   Anticoagulation Therapy Visit    Description:  72 year old male   Provider:   ANTICOAGULATION CLINIC   Department:   Anti Coagulation           INR as of 7/30/2018     Today's INR 2.7      Anticoagulation Summary as of 7/30/2018     INR goal 2.0-3.0   Today's INR 2.7   Full warfarin instructions 2.5 mg on Mon, Fri; 5 mg all other days   Next INR check 9/10/2018    Indications   Long-term (current) use of anticoagulants [Z79.01] [Z79.01]  Chronic atrial fibrillation/Flutter [I48.2]         Your next Anticoagulation Clinic appointment(s)     Sep 10, 2018  9:30 AM CDT   Anticoagulation Visit with  ANTICOAGULATION CLINIC   [facility]    [address] [place] [id number] [phone]              Contact Numbers     [facility]  Please call  [phone] to cancel and/or reschedule your appointment   Please call  [phone] with any problems or questions regarding your therapy.        July 2018 Details    Sun Mon Tue Wed Thu Fri Sat     1               2               3               4               5               6               7                 8               9               10               11               12               13               14                 15               16               17               18               19               20               21                 22               23               24               25               26               27               28                 29               30      2.5 mg   See details      31      5 mg              Date Details   07/30 This INR check               How to take your warfarin dose     To take:  2.5 mg Take 0.5 of a 5 mg tablet.    To take:  5 mg Take 1 of the 5 mg tablets.           August 2018 Details    Sun Mon Tue Wed Thu Fri Sat        1      5 mg         2      5 mg         3      2.5 mg         4      5 mg            5      5 mg         6      2.5 mg         7      5 mg         8      5 mg         9      5 mg         10      2.5 mg         11      5 mg           12      5 mg         13      2.5 mg         14      5 mg         15      5 mg         16      5 mg         17      2.5 mg         18      5 mg           19      5 mg         20      2.5 mg         21      5 mg         22      5 mg         23      5 mg         24      2.5 mg         25      5 mg           26      5 mg         27      2.5 mg         28      5 mg         29      5 mg         30      5 mg         31      2.5 mg           Date Details   No additional details            How to take your warfarin dose     To take:  2.5 mg Take 0.5 of a 5 mg tablet.    To take:  5 mg Take 1 of the 5 mg tablets.           September 2018 Details    Sun Mon Tue Wed Thu Fri Sat           1      5 mg           2      5 mg         3      2.5 mg         4      5 mg         5      5 mg         6      5 mg         7      2.5 mg         8      5 mg           9      5 mg         10            11               12               13               14               15                 16               17               18               19               20               21               22                 23               24               25               26               27               28               29                 30                      Date Details   No additional details    Date of next INR:  9/10/2018         How to take your warfarin dose     To take:  2.5 mg Take 0.5 of a 5 mg tablet.    To take:  5 mg Take 1 of the 5 mg tablets.

## 2018-07-30 NOTE — PROGRESS NOTES
ANTICOAGULATION FOLLOW-UP CLINIC VISIT    Patient Name:  Alex Stanley  Date:  7/30/2018  Contact Type:  Face to Face    SUBJECTIVE:     Patient Findings     Positives No Problem Findings           OBJECTIVE    INR Protime   Date Value Ref Range Status   07/30/2018 2.7 (A) 0.86 - 1.14 Final       ASSESSMENT / PLAN  INR assessment THER    Recheck INR In: 4 WEEKS    INR Location Clinic      Anticoagulation Summary as of 7/30/2018     INR goal 2.0-3.0   Today's INR 2.7   Warfarin maintenance plan 2.5 mg (5 mg x 0.5) on Mon, Fri; 5 mg (5 mg x 1) all other days   Full warfarin instructions 2.5 mg on Mon, Fri; 5 mg all other days   Weekly warfarin total 30 mg   No change documented Vangie Agarwal RN   Plan last modified Vangie Agarwal RN (5/1/2017)   Next INR check 8/27/2018   Target end date Indefinite    Indications   Long-term (current) use of anticoagulants [Z79.01] [Z79.01]  Chronic atrial fibrillation/Flutter [I48.2]         Anticoagulation Episode Summary     INR check location     Preferred lab     Send INR reminders to [facility]    Comments             See the Encounter Report to view Anticoagulation Flowsheet and Dosing Calendar (Go to Encounters tab in chart review, and find the Anticoagulation Therapy Visit)    Dosage adjustment made based on physician directed care plan.    Vangie Agarwal RN

## 2018-07-31 DIAGNOSIS — N18.30 CKD (CHRONIC KIDNEY DISEASE) STAGE 3, GFR 30-59 ML/MIN (H): ICD-10-CM

## 2018-07-31 LAB
CREAT SERPL-MCNC: 1.26 MG/DL (ref 0.66–1.25)
GFR SERPL CREATININE-BSD FRML MDRD: 56 ML/MIN/1.7M2

## 2018-07-31 PROCEDURE — 36415 COLL VENOUS BLD VENIPUNCTURE: CPT | Performed by: INTERNAL MEDICINE

## 2018-07-31 PROCEDURE — 82565 ASSAY OF CREATININE: CPT | Performed by: INTERNAL MEDICINE

## 2018-08-01 NOTE — PROGRESS NOTES
Please contact patient and advise kidney function is improved.   Ask how his blood pressure is running.  If high, then will need follow-up office visit.

## 2018-08-10 DIAGNOSIS — I48.20 CHRONIC ATRIAL FIBRILLATION (H): ICD-10-CM

## 2018-08-10 RX ORDER — WARFARIN SODIUM 5 MG/1
TABLET ORAL
Qty: 90 TABLET | Refills: 0 | Status: SHIPPED | OUTPATIENT
Start: 2018-08-10 | End: 2019-02-12

## 2018-08-10 NOTE — TELEPHONE ENCOUNTER
"Requested Prescriptions   Pending Prescriptions Disp Refills     warfarin (COUMADIN) 5 MG tablet [Pharmacy Med Name: WARFARIN TABS 5MG] 90 tablet 0    Last Written Prescription Date:  6/14/2018  Last Fill Quantity: 90,  # refills: 3   Last office visit: 6/14/2018 with prescribing provider:  6/14/2018   Future Office Visit:   Next 5 appointments (look out 90 days)     Aug 24, 2018  9:15 AM CDT   Return Visit with Micaela Cowart PA-C   [facility]    [address] [place] [id number] [phone]                  Sig: TAKE 1 TABLET DAILY EXCEPT ONE-HALF (1/2) TABLET ON MONDAY AND FRIDAY AS DIRECTED BY THE ANTICOAGULATION CLINIC    Vitamin K Antagonists Failed    8/10/2018 11:08 AM       Failed - INR is within goal in the past 6 weeks    Confirm INR is within goal in the past 6 weeks.     Recent Labs   Lab Test 07/30/18   INR  2.7*                      Passed - Recent (12 mo) or future (30 days) visit within the authorizing provider's specialty    Patient had office visit in the last 12 months or has a visit in the next 30 days with authorizing provider or within the authorizing provider's specialty.  See \"Patient Info\" tab in inbasket, or \"Choose Columns\" in Meds & Orders section of the refill encounter.           Passed - Patient is 18 years of age or older          "

## 2018-08-24 ENCOUNTER — OFFICE VISIT (OUTPATIENT)
Dept: DERMATOLOGY | Facility: CLINIC | Age: 73
End: 2018-08-24
Payer: MEDICARE

## 2018-08-24 VITALS — SYSTOLIC BLOOD PRESSURE: 122 MMHG | DIASTOLIC BLOOD PRESSURE: 86 MMHG | HEART RATE: 69 BPM | OXYGEN SATURATION: 99 %

## 2018-08-24 DIAGNOSIS — L82.0 INFLAMED SEBORRHEIC KERATOSIS: Primary | ICD-10-CM

## 2018-08-24 PROCEDURE — 17110 DESTRUCTION B9 LES UP TO 14: CPT | Performed by: PHYSICIAN ASSISTANT

## 2018-08-24 NOTE — MR AVS SNAPSHOT
After Visit Summary   8/24/2018    Alex Stanley    MRN: [id number]           Patient Information     Date Of Birth          1945        Visit Information        Provider Department      8/24/2018 9:15 AM Micaela Cowart PA-C [facility]        Today's Diagnoses     Inflamed seborrheic keratosis    -  1       Follow-ups after your visit        Your next 10 appointments already scheduled     Sep 10, 2018  9:30 AM CDT   Anticoagulation Visit with  ANTICOAGULATION CLINIC   [facility]    [address] [id number] [phone]              Who to contact     If you have questions or need follow up information about today's clinic visit or your schedule please contact [facility] directly at [phone].  Normal or non-critical lab and imaging results will be communicated to you by MyChart, letter or phone within 4 business days after the clinic has received the results. If you do not hear from us within 7 days, please contact the clinic through MyChart or phone. If you have a critical or abnormal lab result, we will notify you by phone as soon as possible.  Submit refill requests through [employer] or call your pharmacy and they will forward the refill request to us. Please allow 3 business days for your refill to be completed.          Additional Information About Your Visit        Care EveryWhere ID     This is your Care EveryWhere ID. This could be used by other organizations to access your [place] medical records  [id number]        Your Vitals Were     Pulse Pulse Oximetry                69 99%           Blood Pressure from Last 3 Encounters:   08/24/18 122/86   06/14/18 124/76   06/16/17 128/84    Weight from Last 3 Encounters:   06/14/18 84.1 kg (185 lb 4.8 oz)   05/01/17 79.3 kg (174 lb 14.4 oz)   03/07/16 79.6 kg (175 lb 8 oz)              We Performed  the Following     DESTRUCT BENIGN LESION, UP TO 14        Primary Care Provider Office Phone # Fax #    Martin Odonnell [phone]       [address] [facility] [address]        Equal Access to Services     KIAMICKIE CLIFF : Hadii aad ku hadasho Soomaali, waaxda luqadaha, qaybta kaalmada adeegyada, waxay idiin hayaan adenik buitrago laDelfintimothy . So [facility] [phone].    ATENCIÓN: Si habla español, tiene a fry disposición servicios gratuitos de asistencia lingüística. Llame al [phone].    We comply with applicable federal civil rights laws and [place] laws. We do not discriminate on the basis of race, color, national origin, age, disability, sex, sexual orientation, or gender identity.            Thank you!     Thank you for choosing [facility][1]  for your care. Our goal is always to provide you with excellent care. Hearing back from our patients is one way we can continue to improve our services. Please take a few minutes to complete the written survey that you may receive in the mail after your visit with us. Thank you!             Your Updated Medication List - Protect others around you: Learn how to safely use, store and throw away your medicines at www.disposemymeds.org.          This list is accurate as of 8/24/18  1:10 PM.  Always use your most recent med list.                   Brand Name Dispense Instructions for use Diagnosis    amLODIPine 10 MG tablet    NORVASC    90 tablet    Take 1 tablet (10 mg) by mouth daily    Essential hypertension       ASPIRIN NOT PRESCRIBED    INTENTIONAL    0 each    Antiplatelet medication not prescribed intentionally due to history  of gastrointestinal bleeding, and patient on warfarin.    Cerebrovascular accident (stroke) (H)       order for DME     1 Device    Equipment being ordered:  Knee high support hose, compression 20-30 mm    Venous insufficiency       pravastatin 20 MG tablet    PRAVACHOL    90 tablet    Take 1 tablet  (20 mg) by mouth daily    Hyperlipidemia LDL goal <100       warfarin 5 MG tablet    COUMADIN    90 tablet    TAKE 1 TABLET DAILY EXCEPT ONE-HALF (1/2) TABLET ON MONDAY AND FRIDAY AS DIRECTED BY THE ANTICOAGULATION CLINIC    Chronic atrial fibrillation (H)

## 2018-08-24 NOTE — LETTER
8/24/2018         RE: Alex Stanley  [address]  [facility] [address]        Dear Colleague,    Thank you for referring your patient, Alex Stanley, to the [facility]. Please see a copy of my visit note below.    HPI:   Alex Stanley is a 72 year old male who presents for evaluation of spot on right eye and forehead  chief complaint  Location: right eye and forehead   Condition present for:  Winter 2017.   Previous treatments include: none    Social: had family visit from [place] this summer    Review Of Systems  Eyes: negative  Ears/Nose/Throat: negative  Respiratory: No shortness of breath, dyspnea on exertion, cough, or hemoptysis  Cardiovascular: negative  Gastrointestinal: negative  Genitourinary: negative  Musculoskeletal: negative  Neurologic: negative  Psychiatric: negative    This document serves as a record of the services and decisions personally performed and made by Micaela Cowart, MS, PA-C. It was created on her behalf by Kacie Palacios, a trained medical scribe. The creation of this document is based on the provider's statements to the medical scribe.  Kacie Palacios 9:24 AM August 24, 2018    PHYSICAL EXAM:    /86  Pulse 69  SpO2 99%  Skin exam performed as follows: Type 2 skin. Mood appropriate  Alert and Oriented X 3. Well developed, well nourished in no distress.  General appearance: Normal  Head including face: Normal  Eyes: conjunctiva and lids: Normal  Mouth: Lips, teeth, gums: Normal  Neck: Normal  Chest-breast/axillae: Normal  Back: Normal  Spleen and liver: Normal  Cardiovascular: Exam of peripheral vascular system by observation for swelling, varicosities, edema: Normal  Genitalia: groin, buttocks: Normal  Extremities: digits/nails (clubbing): Normal  Eccrine and Apocrine glands: Normal  Right upper extremity: Normal  Left upper extremity: Normal  Right lower extremity: Normal  Left lower extremity: Normal  Skin:  Scalp and body hair: See below    1. Inflamed keratotic plaque located on forehead x 1 and right eye x 1     ASSESSMENT/PLAN:     1. Inflamed seborrheic keratosis on forehead x 1 and right eye x 1. As physically tender cryosurgery performed. Advised on post op care.       Follow-up: PRN  CC:   Scribed By: Kacie Palacios, Medical Scribe    The information in this document, created by the medical scribe for me, accurately reflects the services I personally performed and the decisions made by me. I have reviewed and approved this document for accuracy prior to leaving the patient care area.  August 24, 2018 9:29 AM    Micaela Cowart MS, PADAVIDA      Again, thank you for allowing me to participate in the care of your patient.        Sincerely,        Micaela Cowart PA-C

## 2018-08-24 NOTE — PROGRESS NOTES
HPI:   Alex Stanley is a 72 year old male who presents for evaluation of spot on right eye and forehead  chief complaint  Location: right eye and forehead   Condition present for:  Winter 2017.   Previous treatments include: none    Social: had family visit from [place] this summer    Review Of Systems  Eyes: negative  Ears/Nose/Throat: negative  Respiratory: No shortness of breath, dyspnea on exertion, cough, or hemoptysis  Cardiovascular: negative  Gastrointestinal: negative  Genitourinary: negative  Musculoskeletal: negative  Neurologic: negative  Psychiatric: negative    This document serves as a record of the services and decisions personally performed and made by Micaela Cowart, MS, PA-C. It was created on her behalf by Kacie Palacios, a trained medical scribe. The creation of this document is based on the provider's statements to the medical scribe.  Kacie Palacios 9:24 AM August 24, 2018    PHYSICAL EXAM:    /86  Pulse 69  SpO2 99%  Skin exam performed as follows: Type 2 skin. Mood appropriate  Alert and Oriented X 3. Well developed, well nourished in no distress.  General appearance: Normal  Head including face: Normal  Eyes: conjunctiva and lids: Normal  Mouth: Lips, teeth, gums: Normal  Neck: Normal  Chest-breast/axillae: Normal  Back: Normal  Spleen and liver: Normal  Cardiovascular: Exam of peripheral vascular system by observation for swelling, varicosities, edema: Normal  Genitalia: groin, buttocks: Normal  Extremities: digits/nails (clubbing): Normal  Eccrine and Apocrine glands: Normal  Right upper extremity: Normal  Left upper extremity: Normal  Right lower extremity: Normal  Left lower extremity: Normal  Skin: Scalp and body hair: See below    1. Inflamed keratotic plaque located on forehead x 1 and right eye x 1     ASSESSMENT/PLAN:     1. Inflamed seborrheic keratosis on forehead x 1 and right eye x 1. As physically tender cryosurgery performed. Advised on post op  care.       Follow-up: PRN  CC:   Scribed By: Kacie Palacios, Medical Scribe    The information in this document, created by the medical scribe for me, accurately reflects the services I personally performed and the decisions made by me. I have reviewed and approved this document for accuracy prior to leaving the patient care area.  August 24, 2018 9:29 AM    Micaela Cowart MS, PA-C

## 2018-09-10 ENCOUNTER — ANTICOAGULATION THERAPY VISIT (OUTPATIENT)
Dept: ANTICOAGULATION | Facility: CLINIC | Age: 73
End: 2018-09-10
Payer: MEDICARE

## 2018-09-10 DIAGNOSIS — Z79.01 LONG-TERM (CURRENT) USE OF ANTICOAGULANTS: ICD-10-CM

## 2018-09-10 DIAGNOSIS — I48.20 CHRONIC ATRIAL FIBRILLATION (H): ICD-10-CM

## 2018-09-10 LAB — INR POINT OF CARE: 3 (ref 0.86–1.14)

## 2018-09-10 PROCEDURE — 36416 COLLJ CAPILLARY BLOOD SPEC: CPT

## 2018-09-10 PROCEDURE — 99207 ZZC NO CHARGE NURSE ONLY: CPT

## 2018-09-10 PROCEDURE — 85610 PROTHROMBIN TIME: CPT | Mod: QW

## 2018-09-10 NOTE — MR AVS SNAPSHOT
Alex Stanley   9/10/2018 9:30 AM   Anticoagulation Therapy Visit    Description:  73 year old male   Provider:   ANTICOAGULATION CLINIC   Department:   Anti Coagulation           INR as of 9/10/2018     Today's INR 3.0      Anticoagulation Summary as of 9/10/2018     INR goal 2.0-3.0   Today's INR 3.0   Full warfarin instructions 2.5 mg on Mon, Fri; 5 mg all other days   Next INR check 10/22/2018    Indications   Long-term (current) use of anticoagulants [Z79.01] [Z79.01]  Chronic atrial fibrillation/Flutter [I48.2]         Your next Anticoagulation Clinic appointment(s)     Oct 22, 2018  9:15 AM CDT   Anticoagulation Visit with  ANTICOAGULATION CLINIC   [facility]    [address] [place] [id number] [phone]              Contact Numbers     [facility]  Please call  [phone] to cancel and/or reschedule your appointment   Please call  [phone] with any problems or questions regarding your therapy.        September 2018 Details    Sun Mon Tue Wed Thu Fri Sat           1                 2               3               4               5               6               7               8                 9               10      2.5 mg   See details      11      5 mg         12      5 mg         13      5 mg         14      2.5 mg         15      5 mg           16      5 mg         17      2.5 mg         18      5 mg         19      5 mg         20      5 mg         21      2.5 mg         22      5 mg           23      5 mg         24      2.5 mg         25      5 mg         26      5 mg         27      5 mg         28      2.5 mg         29      5 mg           30      5 mg                Date Details   09/10 This INR check               How to take your warfarin dose     To take:  2.5 mg Take 0.5 of a 5 mg tablet.    To take:  5 mg Take 1 of the 5 mg tablets.           October 2018 Details    Sun Mon Tue Wed Thu Fri  Sat      1      2.5 mg         2      5 mg         3      5 mg         4      5 mg         5      2.5 mg         6      5 mg           7      5 mg         8      2.5 mg         9      5 mg         10      5 mg         11      5 mg         12      2.5 mg         13      5 mg           14      5 mg         15      2.5 mg         16      5 mg         17      5 mg         18      5 mg         19      2.5 mg         20      5 mg           21      5 mg         22            23               24               25               26               27                 28               29               30               31                   Date Details   No additional details    Date of next INR:  10/22/2018         How to take your warfarin dose     To take:  2.5 mg Take 0.5 of a 5 mg tablet.    To take:  5 mg Take 1 of the 5 mg tablets.

## 2018-09-10 NOTE — PROGRESS NOTES
ANTICOAGULATION FOLLOW-UP CLINIC VISIT    Patient Name:  Alex Stanley  Date:  9/10/2018  Contact Type:  Face to Face    SUBJECTIVE:     Patient Findings     Positives No Problem Findings           OBJECTIVE    INR Protime   Date Value Ref Range Status   09/10/2018 3.0 (A) 0.86 - 1.14 Final       ASSESSMENT / PLAN  No question data found.  Anticoagulation Summary as of 9/10/2018     INR goal 2.0-3.0   Today's INR 3.0   Warfarin maintenance plan 2.5 mg (5 mg x 0.5) on Mon, Fri; 5 mg (5 mg x 1) all other days   Full warfarin instructions 2.5 mg on Mon, Fri; 5 mg all other days   Weekly warfarin total 30 mg   Plan last modified Vangie Agarwal RN (5/1/2017)   Next INR check 10/22/2018   Target end date Indefinite    Indications   Long-term (current) use of anticoagulants [Z79.01] [Z79.01]  Chronic atrial fibrillation/Flutter [I48.2]         Anticoagulation Episode Summary     INR check location     Preferred lab     Send INR reminders to [facility]    Comments             See the Encounter Report to view Anticoagulation Flowsheet and Dosing Calendar (Go to Encounters tab in chart review, and find the Anticoagulation Therapy Visit)        Mabel Garza RN

## 2018-10-22 ENCOUNTER — ANTICOAGULATION THERAPY VISIT (OUTPATIENT)
Dept: ANTICOAGULATION | Facility: CLINIC | Age: 73
End: 2018-10-22
Payer: MEDICARE

## 2018-10-22 DIAGNOSIS — I48.20 CHRONIC ATRIAL FIBRILLATION (H): ICD-10-CM

## 2018-10-22 LAB — INR POINT OF CARE: 3 (ref 0.86–1.14)

## 2018-10-22 PROCEDURE — 36416 COLLJ CAPILLARY BLOOD SPEC: CPT

## 2018-10-22 PROCEDURE — 85610 PROTHROMBIN TIME: CPT | Mod: QW

## 2018-10-22 PROCEDURE — 99207 ZZC NO CHARGE NURSE ONLY: CPT

## 2018-10-22 NOTE — MR AVS SNAPSHOT
Alex Stanley   10/22/2018 9:15 AM   Anticoagulation Therapy Visit    Description:  73 year old male   Provider:   ANTICOAGULATION CLINIC   Department:   Anti Coagulation           INR as of 10/22/2018     Today's INR 3.0      Anticoagulation Summary as of 10/22/2018     INR goal 2.0-3.0   Today's INR 3.0   Full warfarin instructions 2.5 mg on Mon, Fri; 5 mg all other days   Next INR check 12/3/2018    Indications   Long-term (current) use of anticoagulants [Z79.01] [Z79.01]  Chronic atrial fibrillation/Flutter [I48.2]         Your next Anticoagulation Clinic appointment(s)     Dec 03, 2018  9:30 AM CST   Anticoagulation Visit with  ANTICOAGULATION CLINIC   [facility]    [address] [place] [id number] [phone]              Contact Numbers     [facility]  Please call  [phone] to cancel and/or reschedule your appointment   Please call  [phone] with any problems or questions regarding your therapy.        October 2018 Details    Sun Mon Tue Wed Thu Fri Sat      1               2               3               4               5               6                 7               8               9               10               11               12               13                 14               15               16               17               18               19               20                 21               22      2.5 mg   See details      23      5 mg         24      5 mg         25      5 mg         26      2.5 mg         27      5 mg           28      5 mg         29      2.5 mg         30      5 mg         31      5 mg             Date Details   10/22 This INR check               How to take your warfarin dose     To take:  2.5 mg Take 0.5 of a 5 mg tablet.    To take:  5 mg Take 1 of the 5 mg tablets.           November 2018 Details    Sun Mon Tue Wed Thu Fri Sat         1      5 mg         2      2.5  mg         3      5 mg           4      5 mg         5      2.5 mg         6      5 mg         7      5 mg         8      5 mg         9      2.5 mg         10      5 mg           11      5 mg         12      2.5 mg         13      5 mg         14      5 mg         15      5 mg         16      2.5 mg         17      5 mg           18      5 mg         19      2.5 mg         20      5 mg         21      5 mg         22      5 mg         23      2.5 mg         24      5 mg           25      5 mg         26      2.5 mg         27      5 mg         28      5 mg         29      5 mg         30      2.5 mg           Date Details   No additional details            How to take your warfarin dose     To take:  2.5 mg Take 0.5 of a 5 mg tablet.    To take:  5 mg Take 1 of the 5 mg tablets.           December 2018 Details    Sun Mon Tue Wed Thu Fri Sat           1      5 mg           2      5 mg         3            4               5               6               7               8                 9               10               11               12               13               14               15                 16               17               18               19               20               21               22                 23               24               25               26               27               28               29                 30               31                     Date Details   No additional details    Date of next INR:  12/3/2018         How to take your warfarin dose     To take:  2.5 mg Take 0.5 of a 5 mg tablet.    To take:  5 mg Take 1 of the 5 mg tablets.

## 2018-10-22 NOTE — PROGRESS NOTES
ANTICOAGULATION FOLLOW-UP CLINIC VISIT    Patient Name:  Alex Stanley  Date:  10/22/2018  Contact Type:  Face to Face    SUBJECTIVE:     Patient Findings     Positives No Problem Findings           OBJECTIVE    INR Protime   Date Value Ref Range Status   10/22/2018 3.0 (A) 0.86 - 1.14 Final       ASSESSMENT / PLAN  No question data found.  Anticoagulation Summary as of 10/22/2018     INR goal 2.0-3.0   Today's INR 3.0   Warfarin maintenance plan 2.5 mg (5 mg x 0.5) on Mon, Fri; 5 mg (5 mg x 1) all other days   Full warfarin instructions 2.5 mg on Mon, Fri; 5 mg all other days   Weekly warfarin total 30 mg   No change documented Mabel Garza RN   Plan last modified Vangie Agarwal RN (5/1/2017)   Next INR check 12/3/2018   Target end date Indefinite    Indications   Long-term (current) use of anticoagulants [Z79.01] [Z79.01]  Chronic atrial fibrillation/Flutter [I48.2]         Anticoagulation Episode Summary     INR check location     Preferred lab     Send INR reminders to [facility]    Comments             See the Encounter Report to view Anticoagulation Flowsheet and Dosing Calendar (Go to Encounters tab in chart review, and find the Anticoagulation Therapy Visit)        Mabel Garza RN

## 2018-12-03 ENCOUNTER — ANTICOAGULATION THERAPY VISIT (OUTPATIENT)
Dept: ANTICOAGULATION | Facility: CLINIC | Age: 73
End: 2018-12-03
Payer: MEDICARE

## 2018-12-03 DIAGNOSIS — I48.20 CHRONIC ATRIAL FIBRILLATION (H): ICD-10-CM

## 2018-12-03 LAB — INR POINT OF CARE: 3.2 (ref 0.86–1.14)

## 2018-12-03 PROCEDURE — 99207 ZZC NO CHARGE NURSE ONLY: CPT

## 2018-12-03 PROCEDURE — 85610 PROTHROMBIN TIME: CPT | Mod: QW

## 2018-12-03 PROCEDURE — 36416 COLLJ CAPILLARY BLOOD SPEC: CPT

## 2018-12-03 NOTE — MR AVS SNAPSHOT
Alex Stanley   12/3/2018 9:30 AM   Anticoagulation Therapy Visit    Description:  73 year old male   Provider:   ANTICOAGULATION CLINIC   Department:   Anti Coagulation           INR as of 12/3/2018     Today's INR 3.2!      Anticoagulation Summary as of 12/3/2018     INR goal 2.0-3.0   Today's INR 3.2!   Full warfarin instructions 2.5 mg on Mon, Wed, Fri; 5 mg all other days   Next INR check 1/14/2019    Indications   Long-term (current) use of anticoagulants [Z79.01] [Z79.01]  Chronic atrial fibrillation/Flutter [I48.2]         Your next Anticoagulation Clinic appointment(s)     Dec 03, 2018  9:30 AM CST   Anticoagulation Visit with  ANTICOAGULATION CLINIC   [facility]    [address] [place] [id number] [phone]            Jan 14, 2019  9:15 AM CST   Anticoagulation Visit with  ANTICOAGULATION CLINIC   [facility] ([facility]    [address] [place] [id number] [phone]              Contact Numbers     [facility]  Please call  [phone] to cancel and/or reschedule your appointment   Please call  [phone] with any problems or questions regarding your therapy.        December 2018 Details    Sun Mon Tue Wed Thu Fri Sat           1                 2               3      2.5 mg   See details      4      5 mg         5      2.5 mg         6      5 mg         7      2.5 mg         8      5 mg           9      5 mg         10      2.5 mg         11      5 mg         12      2.5 mg         13      5 mg         14      2.5 mg         15      5 mg           16      5 mg         17      2.5 mg         18      5 mg         19      2.5 mg         20      5 mg         21      2.5 mg         22      5 mg           23      5 mg         24      2.5 mg         25      5 mg         26      2.5 mg         27      5 mg         28      2.5 mg         29       5 mg           30      5 mg         31      2.5 mg               Date Details   12/03 This INR check               How to take your warfarin dose     To take:  2.5 mg Take 0.5 of a 5 mg tablet.    To take:  5 mg Take 1 of the 5 mg tablets.           January 2019 Details    Sun Mon Tue Wed Thu Fri Sat       1      5 mg         2      2.5 mg         3      5 mg         4      2.5 mg         5      5 mg           6      5 mg         7      2.5 mg         8      5 mg         9      2.5 mg         10      5 mg         11      2.5 mg         12      5 mg           13      5 mg         14            15               16               17               18               19                 20               21               22               23               24               25               26                 27               28               29               30               31                  Date Details   No additional details    Date of next INR:  1/14/2019         How to take your warfarin dose     To take:  2.5 mg Take 0.5 of a 5 mg tablet.    To take:  5 mg Take 1 of the 5 mg tablets.

## 2018-12-03 NOTE — PROGRESS NOTES
ANTICOAGULATION FOLLOW-UP CLINIC VISIT    Patient Name:  Alex Stanley  Date:  12/3/2018  Contact Type:  Face to Face    SUBJECTIVE:     Patient Findings     Positives No Problem Findings           OBJECTIVE    INR Protime   Date Value Ref Range Status   12/03/2018 3.2 (A) 0.86 - 1.14 Final       ASSESSMENT / PLAN  No question data found.  Anticoagulation Summary as of 12/3/2018     INR goal 2.0-3.0   Today's INR 3.2!   Warfarin maintenance plan 2.5 mg (5 mg x 0.5) on Mon, Wed, Fri; 5 mg (5 mg x 1) all other days   Full warfarin instructions 2.5 mg on Mon, Wed, Fri; 5 mg all other days   Weekly warfarin total 27.5 mg   Plan last modified Mabel Garza RN (12/3/2018)   Next INR check 1/14/2019   Target end date Indefinite    Indications   Long-term (current) use of anticoagulants [Z79.01] [Z79.01]  Chronic atrial fibrillation/Flutter [I48.2]         Anticoagulation Episode Summary     INR check location     Preferred lab     Send INR reminders to [facility]    Comments             See the Encounter Report to view Anticoagulation Flowsheet and Dosing Calendar (Go to Encounters tab in chart review, and find the Anticoagulation Therapy Visit)        Mabel Garza RN

## 2019-01-14 ENCOUNTER — ANTICOAGULATION THERAPY VISIT (OUTPATIENT)
Dept: ANTICOAGULATION | Facility: CLINIC | Age: 74
End: 2019-01-14
Payer: MEDICARE

## 2019-01-14 DIAGNOSIS — I48.20 CHRONIC ATRIAL FIBRILLATION (H): ICD-10-CM

## 2019-01-14 LAB — INR POINT OF CARE: 1.8 (ref 0.86–1.14)

## 2019-01-14 PROCEDURE — 99207 ZZC NO CHARGE NURSE ONLY: CPT

## 2019-01-14 PROCEDURE — 36416 COLLJ CAPILLARY BLOOD SPEC: CPT

## 2019-01-14 PROCEDURE — 85610 PROTHROMBIN TIME: CPT | Mod: QW

## 2019-01-14 NOTE — PROGRESS NOTES
ANTICOAGULATION FOLLOW-UP CLINIC VISIT    Patient Name:  Alex Stanley  Date:  2019  Contact Type:  Face to Face    SUBJECTIVE:     Patient Findings     Positives:   No Problem Findings           OBJECTIVE    INR Protime   Date Value Ref Range Status   2019 1.8 (A) 0.86 - 1.14 Final       ASSESSMENT / PLAN  No question data found.  Anticoagulation Summary  As of 2019    INR goal:   2.0-3.0   TTR:   84.0 % (3 y)   INR used for dosin.8! (2019)   Warfarin maintenance plan:   2.5 mg (5 mg x 0.5) every Mon, Fri; 5 mg (5 mg x 1) all other days   Full warfarin instructions:   2.5 mg every Mon, Fri; 5 mg all other days   Weekly warfarin total:   30 mg   Plan last modified:   Mabel Garza RN (2019)   Next INR check:   2019   Target end date:   Indefinite    Indications    Long-term (current) use of anticoagulants [Z79.01] [Z79.01]  Chronic atrial fibrillation/Flutter [I48.2]             Anticoagulation Episode Summary     INR check location:       Preferred lab:       Send INR reminders to:   [facility]    Comments:               See the Encounter Report to view Anticoagulation Flowsheet and Dosing Calendar (Go to Encounters tab in chart review, and find the Anticoagulation Therapy Visit)        Mabel Garza RN

## 2019-02-12 DIAGNOSIS — I48.20 CHRONIC ATRIAL FIBRILLATION (H): ICD-10-CM

## 2019-02-12 RX ORDER — WARFARIN SODIUM 5 MG/1
TABLET ORAL
Qty: 90 TABLET | Refills: 0 | Status: SHIPPED | OUTPATIENT
Start: 2019-02-12 | End: 2019-05-20

## 2019-02-12 NOTE — TELEPHONE ENCOUNTER
"Requested Prescriptions   Pending Prescriptions Disp Refills     warfarin (COUMADIN) 5 MG tablet [Pharmacy Med Name: WARFARIN TABS 5MG] 90 tablet 0    Last Written Prescription Date:  8/10/2018  Last Fill Quantity: 90,  # refills: 0   Last office visit: 6/14/2018 with prescribing provider:  6/14/2018   Future Office Visit:     Sig: TAKE 1 TABLET DAILY EXCEPT ONE-HALF (1/2) TABLET ON MONDAY AND FRIDAY AS DIRECTED BY THE ANTICOAGULATION CLINIC    Vitamin K Antagonists Failed - 2/12/2019 11:26 AM       Failed - INR is within goal in the past 6 weeks    Confirm INR is within goal in the past 6 weeks.     Recent Labs   Lab Test 01/14/19   INR 1.8*                      Passed - Recent (12 mo) or future (30 days) visit within the authorizing provider's specialty    Patient had office visit in the last 12 months or has a visit in the next 30 days with authorizing provider or within the authorizing provider's specialty.  See \"Patient Info\" tab in inbasket, or \"Choose Columns\" in Meds & Orders section of the refill encounter.             Passed - Medication is active on med list       Passed - Patient is 18 years of age or older          "

## 2019-02-25 ENCOUNTER — ANTICOAGULATION THERAPY VISIT (OUTPATIENT)
Dept: ANTICOAGULATION | Facility: CLINIC | Age: 74
End: 2019-02-25
Payer: MEDICARE

## 2019-02-25 DIAGNOSIS — I48.20 CHRONIC ATRIAL FIBRILLATION (H): ICD-10-CM

## 2019-02-25 LAB — INR POINT OF CARE: 2.1 (ref 0.86–1.14)

## 2019-02-25 PROCEDURE — 36416 COLLJ CAPILLARY BLOOD SPEC: CPT

## 2019-02-25 PROCEDURE — 85610 PROTHROMBIN TIME: CPT | Mod: QW

## 2019-02-25 PROCEDURE — 99207 ZZC NO CHARGE NURSE ONLY: CPT

## 2019-02-25 NOTE — PROGRESS NOTES
ANTICOAGULATION FOLLOW-UP CLINIC VISIT    Patient Name:  Alex Stanley  Date:  2019  Contact Type:  Face to Face    SUBJECTIVE:     Patient Findings     Positives:   No Problem Findings           OBJECTIVE    INR Protime   Date Value Ref Range Status   2019 2.1 (A) 0.86 - 1.14 Final       ASSESSMENT / PLAN  No question data found.  Anticoagulation Summary  As of 2019    INR goal:   2.0-3.0   TTR:   82.2 % (3.2 y)   INR used for dosin.1 (2019)   Warfarin maintenance plan:   2.5 mg (5 mg x 0.5) every Mon, Fri; 5 mg (5 mg x 1) all other days   Full warfarin instructions:   2.5 mg every Mon, Fri; 5 mg all other days   Weekly warfarin total:   30 mg   No change documented:   Mabel Garza RN   Plan last modified:   Mabel Garza RN (2019)   Next INR check:   2019   Target end date:   Indefinite    Indications    Long-term (current) use of anticoagulants [Z79.01] [Z79.01]  Chronic atrial fibrillation/Flutter [I48.2]             Anticoagulation Episode Summary     INR check location:       Preferred lab:       Send INR reminders to:   [facility]    Comments:               See the Encounter Report to view Anticoagulation Flowsheet and Dosing Calendar (Go to Encounters tab in chart review, and find the Anticoagulation Therapy Visit)        Mabel Garza RN

## 2019-04-08 ENCOUNTER — ANTICOAGULATION THERAPY VISIT (OUTPATIENT)
Dept: ANTICOAGULATION | Facility: CLINIC | Age: 74
End: 2019-04-08
Payer: MEDICARE

## 2019-04-08 DIAGNOSIS — I48.20 CHRONIC ATRIAL FIBRILLATION (H): ICD-10-CM

## 2019-04-08 LAB — INR POINT OF CARE: 2.4 (ref 0.86–1.14)

## 2019-04-08 PROCEDURE — 99207 ZZC NO CHARGE NURSE ONLY: CPT

## 2019-04-08 PROCEDURE — 85610 PROTHROMBIN TIME: CPT | Mod: QW

## 2019-04-08 PROCEDURE — 36416 COLLJ CAPILLARY BLOOD SPEC: CPT

## 2019-04-08 NOTE — PROGRESS NOTES
ANTICOAGULATION FOLLOW-UP CLINIC VISIT    Patient Name:  Alex Stanley  Date:  2019  Contact Type:  Face to Face    SUBJECTIVE:     Patient Findings     Comments:   The patient was assessed for diet, medication, and activity level changes, missed or extra doses, bruising or bleeding, with no problem findings.             OBJECTIVE    INR Protime   Date Value Ref Range Status   2019 2.4 (A) 0.86 - 1.14 Final       ASSESSMENT / PLAN  No question data found.  Anticoagulation Summary  As of 2019    INR goal:   2.0-3.0   TTR:   82.8 % (3.3 y)   INR used for dosin.4 (2019)   Warfarin maintenance plan:   2.5 mg (5 mg x 0.5) every Mon, Fri; 5 mg (5 mg x 1) all other days   Full warfarin instructions:   2.5 mg every Mon, Fri; 5 mg all other days   Weekly warfarin total:   30 mg   Plan last modified:   Mabel Garza RN (2019)   Next INR check:   2019   Target end date:   Indefinite    Indications    Long-term (current) use of anticoagulants [Z79.01] [Z79.01]  Chronic atrial fibrillation/Flutter [I48.2]             Anticoagulation Episode Summary     INR check location:       Preferred lab:       Send INR reminders to:   [facility]    Comments:               See the Encounter Report to view Anticoagulation Flowsheet and Dosing Calendar (Go to Encounters tab in chart review, and find the Anticoagulation Therapy Visit)        Mabel Garza RN

## 2019-04-12 ENCOUNTER — DOCUMENTATION ONLY (OUTPATIENT)
Dept: OTHER | Facility: CLINIC | Age: 74
End: 2019-04-12

## 2019-05-20 ENCOUNTER — ANTICOAGULATION THERAPY VISIT (OUTPATIENT)
Dept: ANTICOAGULATION | Facility: CLINIC | Age: 74
End: 2019-05-20
Payer: MEDICARE

## 2019-05-20 DIAGNOSIS — I48.20 CHRONIC ATRIAL FIBRILLATION (H): ICD-10-CM

## 2019-05-20 LAB — INR POINT OF CARE: 2.7 (ref 0.86–1.14)

## 2019-05-20 PROCEDURE — 85610 PROTHROMBIN TIME: CPT | Mod: QW

## 2019-05-20 PROCEDURE — 36416 COLLJ CAPILLARY BLOOD SPEC: CPT

## 2019-05-20 PROCEDURE — 99207 ZZC NO CHARGE NURSE ONLY: CPT

## 2019-05-20 RX ORDER — WARFARIN SODIUM 5 MG/1
TABLET ORAL
Qty: 10 TABLET | Refills: 0 | Status: SHIPPED | OUTPATIENT
Start: 2019-05-20 | End: 2019-05-20

## 2019-05-20 RX ORDER — WARFARIN SODIUM 5 MG/1
TABLET ORAL
Qty: 90 TABLET | Refills: 0 | Status: SHIPPED | OUTPATIENT
Start: 2019-05-20 | End: 2019-06-14

## 2019-05-20 NOTE — PROGRESS NOTES
ANTICOAGULATION FOLLOW-UP CLINIC VISIT    Patient Name:  Alex Stanley  Date:  2019  Contact Type:  Face to Face    SUBJECTIVE:  Patient Findings         Clinical Outcomes     Negatives:   Major bleeding event, Thromboembolic event, Anticoagulation-related hospital admission, Anticoagulation-related ED visit, Anticoagulation-related fatality           OBJECTIVE    INR Protime   Date Value Ref Range Status   2019 2.7 (A) 0.86 - 1.14 Final       ASSESSMENT / PLAN  INR assessment THER    Recheck INR In: 6 WEEKS    INR Location Clinic      Anticoagulation Summary  As of 2019    INR goal:   2.0-3.0   TTR:   83.4 % (3.4 y)   INR used for dosin.7 (2019)   Warfarin maintenance plan:   2.5 mg (5 mg x 0.5) every Mon, Fri; 5 mg (5 mg x 1) all other days   Full warfarin instructions:   2.5 mg every Mon, Fri; 5 mg all other days   Weekly warfarin total:   30 mg   No change documented:   Lexie Pryor RN   Plan last modified:   Mabel Garza RN (2019)   Next INR check:   2019   Target end date:   Indefinite    Indications    Long-term (current) use of anticoagulants [Z79.01] [Z79.01]  Chronic atrial fibrillation/Flutter [I48.2]             Anticoagulation Episode Summary     INR check location:       Preferred lab:       Send INR reminders to:   [facility]    Comments:               See the Encounter Report to view Anticoagulation Flowsheet and Dosing Calendar (Go to Encounters tab in chart review, and find the Anticoagulation Therapy Visit)        Lexie Pryor RN

## 2019-06-14 ENCOUNTER — OFFICE VISIT (OUTPATIENT)
Dept: INTERNAL MEDICINE | Facility: CLINIC | Age: 74
End: 2019-06-14
Payer: MEDICARE

## 2019-06-14 VITALS
RESPIRATION RATE: 12 BRPM | BODY MASS INDEX: 26.29 KG/M2 | HEIGHT: 67 IN | WEIGHT: 167.5 LBS | TEMPERATURE: 97.6 F | DIASTOLIC BLOOD PRESSURE: 64 MMHG | OXYGEN SATURATION: 97 % | SYSTOLIC BLOOD PRESSURE: 108 MMHG | HEART RATE: 68 BPM

## 2019-06-14 DIAGNOSIS — K70.0 FATTY LIVER, ALCOHOLIC: ICD-10-CM

## 2019-06-14 DIAGNOSIS — F10.21 ALCOHOL DEPENDENCE IN REMISSION (H): ICD-10-CM

## 2019-06-14 DIAGNOSIS — N18.30 CKD (CHRONIC KIDNEY DISEASE) STAGE 3, GFR 30-59 ML/MIN (H): ICD-10-CM

## 2019-06-14 DIAGNOSIS — I10 ESSENTIAL HYPERTENSION: ICD-10-CM

## 2019-06-14 DIAGNOSIS — Z87.891 PERSONAL HISTORY OF TOBACCO USE: ICD-10-CM

## 2019-06-14 DIAGNOSIS — I48.20 CHRONIC ATRIAL FIBRILLATION (H): ICD-10-CM

## 2019-06-14 DIAGNOSIS — Z00.00 MEDICARE ANNUAL WELLNESS VISIT, SUBSEQUENT: Primary | ICD-10-CM

## 2019-06-14 DIAGNOSIS — E78.5 HYPERLIPIDEMIA LDL GOAL <100: ICD-10-CM

## 2019-06-14 LAB
ALT SERPL W P-5'-P-CCNC: 24 U/L (ref 0–70)
ANION GAP SERPL CALCULATED.3IONS-SCNC: 7 MMOL/L (ref 3–14)
BUN SERPL-MCNC: 16 MG/DL (ref 7–30)
CALCIUM SERPL-MCNC: 8.8 MG/DL (ref 8.5–10.1)
CHLORIDE SERPL-SCNC: 109 MMOL/L (ref 94–109)
CHOLEST SERPL-MCNC: 117 MG/DL
CO2 SERPL-SCNC: 24 MMOL/L (ref 20–32)
CREAT SERPL-MCNC: 1.27 MG/DL (ref 0.66–1.25)
CREAT UR-MCNC: 114 MG/DL
GFR SERPL CREATININE-BSD FRML MDRD: 56 ML/MIN/{1.73_M2}
GLUCOSE SERPL-MCNC: 86 MG/DL (ref 70–99)
HDLC SERPL-MCNC: 38 MG/DL
LDLC SERPL CALC-MCNC: 63 MG/DL
MICROALBUMIN UR-MCNC: 21 MG/L
MICROALBUMIN/CREAT UR: 18.33 MG/G CR (ref 0–17)
NONHDLC SERPL-MCNC: 79 MG/DL
POTASSIUM SERPL-SCNC: 4.8 MMOL/L (ref 3.4–5.3)
SODIUM SERPL-SCNC: 140 MMOL/L (ref 133–144)
TRIGL SERPL-MCNC: 79 MG/DL

## 2019-06-14 PROCEDURE — 84460 ALANINE AMINO (ALT) (SGPT): CPT | Performed by: INTERNAL MEDICINE

## 2019-06-14 PROCEDURE — 82043 UR ALBUMIN QUANTITATIVE: CPT | Performed by: INTERNAL MEDICINE

## 2019-06-14 PROCEDURE — 99214 OFFICE O/P EST MOD 30 MIN: CPT | Mod: 25 | Performed by: INTERNAL MEDICINE

## 2019-06-14 PROCEDURE — 80048 BASIC METABOLIC PNL TOTAL CA: CPT | Performed by: INTERNAL MEDICINE

## 2019-06-14 PROCEDURE — 80061 LIPID PANEL: CPT | Performed by: INTERNAL MEDICINE

## 2019-06-14 PROCEDURE — 36415 COLL VENOUS BLD VENIPUNCTURE: CPT | Performed by: INTERNAL MEDICINE

## 2019-06-14 PROCEDURE — G0439 PPPS, SUBSEQ VISIT: HCPCS | Performed by: INTERNAL MEDICINE

## 2019-06-14 PROCEDURE — G0296 VISIT TO DETERM LDCT ELIG: HCPCS | Performed by: INTERNAL MEDICINE

## 2019-06-14 RX ORDER — WARFARIN SODIUM 5 MG/1
TABLET ORAL
Qty: 90 TABLET | Refills: 3 | Status: SHIPPED | OUTPATIENT
Start: 2019-06-14 | End: 2020-09-15

## 2019-06-14 RX ORDER — PRAVASTATIN SODIUM 20 MG
20 TABLET ORAL DAILY
Qty: 90 TABLET | Refills: 3 | Status: SHIPPED | OUTPATIENT
Start: 2019-06-14 | End: 2019-08-17

## 2019-06-14 RX ORDER — AMLODIPINE BESYLATE 10 MG/1
10 TABLET ORAL DAILY
Qty: 90 TABLET | Refills: 3 | Status: SHIPPED | OUTPATIENT
Start: 2019-06-14 | End: 2019-08-17

## 2019-06-14 ASSESSMENT — MIFFLIN-ST. JEOR: SCORE: 1465.47

## 2019-06-14 NOTE — PROGRESS NOTES
SUBJECTIVE:   Alex Stanley is a 73 year old male who presents for Preventive Visit.    Are you in the first 12 months of your Medicare coverage?  No    HPI  Do you feel safe in your environment? Yes    Do you have a Health Care Directive? Yes: Advance Directive has been received and scanned.    Fall risk  Fallen 2 or more times in the past year?: No  Any fall with injury in the past year?: No    Cognitive Screening   1) Repeat 3 items (Leader, Season, Table)    2) Clock draw: NORMAL  3) 3 item recall: Recalls 3 objects  Results: 3 items recalled: COGNITIVE IMPAIRMENT LESS LIKELY    Mini-CogTM Copyright S Ni. Licensed by the author for use in [facility]; reprinted with permission ([email or web address]). All rights reserved.      Do you have sleep apnea, excessive snoring or daytime drowsiness?: no    Reviewed and updated as needed this visit by clinical staff  Tobacco  Allergies  Soc Hx        Reviewed and updated as needed this visit by Provider        Social History     Tobacco Use     Smoking status: Current Every Day Smoker     Packs/day: 1.00     Years: 57.00     Pack years: 57.00     Types: Cigarettes     Smokeless tobacco: Never Used     Tobacco comment: ppd since age 16 - now 1/2-1 ppd   Substance Use Topics     Alcohol use: No     Comment: last drank at [facility] 8/2010.     If you drink alcohol do you typically have >3 drinks per day or >7 drinks per week? No    Cutting back on cigarettes, down to 1/2 ppd.  Not ready to quit at this time.  No alcohol     Alcohol Use 5/1/2017   Prescreen: >3 drinks/day or >7 drinks/week? The patient does not drink >3 drinks per day nor >7 drinks per week.   No flowsheet data found.    Additional issues to address:  1.  Follow-up HTN.  Patient is checking outpatient blood pressures, at home.  Results are 110-120/70-80.  He reports no side effects from BP medications.   2.  Follow-up of chronic hyperlipidemia, on Pravastatin 20 mg.  Diet rating: good.  He reports  "no side effects of medications, including no significant myalgias or other side effects.    3.  Follow-up CKD, patient avoids NSAIDs.      Current providers sharing in care for this patient include:   Patient Care Team:  Martin Odonnell MD as PCP - General  Martin Odonnell MD as Assigned PCP    The following health maintenance items are reviewed in Epic and correct as of today:  Health Maintenance   Topic Date Due     ZOSTER IMMUNIZATION (2 of 3) 01/18/2017     OP ANNUAL INR REFERRAL  08/29/2017     PHQ-2  01/01/2019     LUNG CANCER SCREENING ANNUAL  01/10/2019     BMP  05/02/2019     LIPID  05/02/2019     MEDICARE ANNUAL WELLNESS VISIT  06/14/2019     MICROALBUMIN  06/14/2019     COLONOSCOPY  12/05/2019     DTAP/TDAP/TD IMMUNIZATION (2 - Td) 12/14/2019     ADVANCED DIRECTIVE PLANNING  04/12/2024     HEPATITIS C SCREENING  Completed     AORTIC ANEURYSM SCREENING (SYSTEM ASSIGNED)  Completed     IPV IMMUNIZATION  Aged Out     MENINGITIS IMMUNIZATION  Aged Out       Review of Systems  Constitutional, HEENT, cardiovascular, pulmonary, gi and gu systems are negative, except as otherwise noted.  Nocturia x 1    OBJECTIVE:   /64   Pulse 68   Temp 97.6  F (36.4  C) (Oral)   Resp 12   Ht 1.689 m (5' 6.5\")   Wt 76 kg (167 lb 8 oz)   SpO2 97%   BMI 26.63 kg/m   Estimated body mass index is 29.02 kg/m  as calculated from the following:    Height as of 6/14/18: 1.702 m (5' 7\").    Weight as of 6/14/18: 84.1 kg (185 lb 4.8 oz).  Physical Exam  GENERAL: healthy, alert and no distress  NECK: no adenopathy, no asymmetry, masses, or scars and thyroid normal to palpation  RESP: lungs clear to auscultation - no rales, rhonchi or wheezes  CV: regular rate and rhythm, normal S1 S2, no S3 or S4, no murmur, click or rub, no peripheral edema and peripheral pulses strong  MS: no gross musculoskeletal defects noted, no edema      ASSESSMENT / PLAN:     ASSESSMENT:   1.  Annual Wellness Visit  2.  Chronic atrial fib, on " "anticoagulation   3.  Follow-up CKD3  4.  HTN, controlled   5.  Hyperlipidemia, lab pending  6.  Alcohol dependence, in remission      PLAN:  1.  Check labs today --> stable   2.  CT lung cancer screening  3.  Medication refills      End of Life Planning:  Patient currently has an advanced directive: Yes.  Practitioner is supportive of decision.    COUNSELING:  Reviewed preventive health counseling, as reflected in patient instructions       Regular exercise    Estimated body mass index is 29.02 kg/m  as calculated from the following:    Height as of 6/14/18: 1.702 m (5' 7\").    Weight as of 6/14/18: 84.1 kg (185 lb 4.8 oz).         reports that he has been smoking cigarettes.  He has a 57.00 pack-year smoking history. He has never used smokeless tobacco.  Tobacco Cessation Action Plan: Information offered: Patient not interested at this time    Appropriate preventive services were discussed with this patient, including applicable screening as appropriate for cardiovascular disease, diabetes, osteopenia/osteoporosis, and glaucoma.  As appropriate for age/gender, discussed screening for colorectal cancer, prostate cancer, breast cancer, and cervical cancer. Checklist reviewing preventive services available has been given to the patient.    Reviewed patients plan of care and provided an AVS. The Basic Care Plan (routine screening as documented in Health Maintenance) for Alex meets the Care Plan requirement. This Care Plan has been established and reviewed with the Patient.    Counseling Resources:  ATP IV Guidelines  Pooled Cohorts Equation Calculator  Breast Cancer Risk Calculator  FRAX Risk Assessment  ICSI Preventive Guidelines  Dietary Guidelines for Americans, 2010  USDA's MyPlate  ASA Prophylaxis  Lung CA Screening    Martin Odonnell MD  [facility]        Identified Health Risks:  Lung Cancer Screening Shared Decision Making Visit     Alex Stanley is eligible for lung cancer screening " on the basis of the information provided in my signed lung cancer screening order.     I have discussed with patient the risks and benefits of screening for lung cancer with low-dose CT.     The risks include:  radiation exposure: one low dose chest CT has as much ionizing radiation as about 15 chest x-rays or 6 months of background radiation living in [place]    false positives: 96% of positive findings/nodules are NOT cancer, but some might still require additional diagnostic evaluation, including biopsy  over-diagnosis: some slow growing cancers that might never have been clinically significant will be detected and treated unnecessarily     The benefit of early detection of lung cancer is contingent upon adherence to annual screening or more frequent follow up if indicated.     Furthermore, reaping the benefits of screening requires Alex Stanley to be willing and physically able to undergo diagnostic procedures, if indicated. Although no specific guide is available for determining severity of comorbidities, it is reasonable to withhold screening in patients who have greater mortality risk from other diseases.     We did discuss that the only way to prevent lung cancer is to not smoke. Smoking cessation assistance was offered.    I did not offer risk estimation using a calculator such as this one:    ShouldIScreen

## 2019-06-14 NOTE — LETTER
June 18, 2019      Alex Stanley  [address] [facility] [id number]        Dear ,    Here are your test results.      Resulted Orders   BASIC METABOLIC PANEL   Result Value Ref Range    Sodium 140 133 - 144 mmol/L    Potassium 4.8 3.4 - 5.3 mmol/L    Chloride 109 94 - 109 mmol/L    Carbon Dioxide 24 20 - 32 mmol/L    Anion Gap 7 3 - 14 mmol/L    Glucose 86 70 - 99 mg/dL    Urea Nitrogen 16 7 - 30 mg/dL    Creatinine 1.27 (H) 0.66 - 1.25 mg/dL    GFR Estimate 56 (L) >60 mL/min/[1.73_m2]      Comment:      Non  GFR Calc  Starting 12/18/2018, serum creatinine based estimated GFR (eGFR) will be   calculated using the Chronic Kidney Disease Epidemiology Collaboration   (CKD-EPI) equation.      GFR Estimate If Black 65 >60 mL/min/[1.73_m2]      Comment:       GFR Calc  Starting 12/18/2018, serum creatinine based estimated GFR (eGFR) will be   calculated using the Chronic Kidney Disease Epidemiology Collaboration   (CKD-EPI) equation.      Calcium 8.8 8.5 - 10.1 mg/dL   Lipid panel reflex to direct LDL Fasting   Result Value Ref Range    Cholesterol 117 <200 mg/dL    Triglycerides 79 <150 mg/dL    HDL Cholesterol 38 (L) >39 mg/dL    LDL Cholesterol Calculated 63 <100 mg/dL      Comment:      Desirable:       <100 mg/dl    Non HDL Cholesterol 79 <130 mg/dL   Albumin Random Urine Quantitative with Creat Ratio   Result Value Ref Range    Creatinine Urine 114 mg/dL    Albumin Urine mg/L 21 mg/L    Albumin Urine mg/g Cr 18.33 (H) 0 - 17 mg/g Cr   ALT   Result Value Ref Range    ALT 24 0 - 70 U/L       Your lipids are stable, as is your urine protein.   Your kidney function is abnormal, but stable.  The rest of your labs all look good.    If you have any questions or concerns, please call the clinic at the number listed above.       Sincerely,    Martin Odonnell MD

## 2019-06-14 NOTE — PATIENT INSTRUCTIONS

## 2019-06-24 ENCOUNTER — ANTICOAGULATION THERAPY VISIT (OUTPATIENT)
Dept: ANTICOAGULATION | Facility: CLINIC | Age: 74
End: 2019-06-24
Payer: MEDICARE

## 2019-06-24 DIAGNOSIS — Z79.01 LONG TERM CURRENT USE OF ANTICOAGULANT THERAPY: ICD-10-CM

## 2019-06-24 DIAGNOSIS — I48.20 CHRONIC ATRIAL FIBRILLATION (H): ICD-10-CM

## 2019-06-24 LAB — INR POINT OF CARE: 3.1 (ref 0.86–1.14)

## 2019-06-24 PROCEDURE — 36416 COLLJ CAPILLARY BLOOD SPEC: CPT

## 2019-06-24 PROCEDURE — 85610 PROTHROMBIN TIME: CPT | Mod: QW

## 2019-06-24 PROCEDURE — 99207 ZZC NO CHARGE NURSE ONLY: CPT

## 2019-06-24 NOTE — PROGRESS NOTES
ANTICOAGULATION FOLLOW-UP CLINIC VISIT    Patient Name:  Alex Stanley  Date:  6/24/2019  Contact Type:  Face to Face    SUBJECTIVE:  Patient Findings     Comments:   The patient was assessed for diet, medication, and activity level changes, missed or extra doses, bruising or bleeding, with no problem findings.          Clinical Outcomes     Comments:   The patient was assessed for diet, medication, and activity level changes, missed or extra doses, bruising or bleeding, with no problem findings.             OBJECTIVE    INR Protime   Date Value Ref Range Status   06/24/2019 3.1 (A) 0.86 - 1.14 Final       ASSESSMENT / PLAN  INR assessment SUPRA    Recheck INR In: 6 WEEKS    INR Location Clinic      Anticoagulation Summary  As of 6/24/2019    INR goal:   2.0-3.0   TTR:   83.2 % (3.5 y)   INR used for dosing:   3.1! (6/24/2019)   Warfarin maintenance plan:   2.5 mg (5 mg x 0.5) every Mon, Fri; 5 mg (5 mg x 1) all other days   Full warfarin instructions:   2.5 mg every Mon, Fri; 5 mg all other days   Weekly warfarin total:   30 mg   Plan last modified:   Mabel Garza RN (1/14/2019)   Next INR check:   8/5/2019   Target end date:   Indefinite    Indications    Long-term (current) use of anticoagulants [Z79.01] [Z79.01]  Chronic atrial fibrillation/Flutter [I48.2]             Anticoagulation Episode Summary     INR check location:       Preferred lab:       Send INR reminders to:   [facility]    Comments:               See the Encounter Report to view Anticoagulation Flowsheet and Dosing Calendar (Go to Encounters tab in chart review, and find the Anticoagulation Therapy Visit)        Tara Cavazos RN

## 2019-07-09 ENCOUNTER — HOSPITAL ENCOUNTER (OUTPATIENT)
Dept: CT IMAGING | Facility: CLINIC | Age: 74
Discharge: HOME | End: 2019-07-09
Attending: INTERNAL MEDICINE | Admitting: INTERNAL MEDICINE
Payer: MEDICARE

## 2019-07-09 DIAGNOSIS — Z87.891 PERSONAL HISTORY OF TOBACCO USE: ICD-10-CM

## 2019-07-09 PROCEDURE — G0297 LDCT FOR LUNG CA SCREEN: HCPCS

## 2019-08-05 ENCOUNTER — ANTICOAGULATION THERAPY VISIT (OUTPATIENT)
Dept: ANTICOAGULATION | Facility: CLINIC | Age: 74
End: 2019-08-05
Payer: MEDICARE

## 2019-08-05 DIAGNOSIS — I48.20 CHRONIC ATRIAL FIBRILLATION (H): ICD-10-CM

## 2019-08-05 DIAGNOSIS — Z79.01 LONG TERM CURRENT USE OF ANTICOAGULANT THERAPY: ICD-10-CM

## 2019-08-05 LAB — INR POINT OF CARE: 2.9 (ref 0.86–1.14)

## 2019-08-05 PROCEDURE — 85610 PROTHROMBIN TIME: CPT | Mod: QW

## 2019-08-05 PROCEDURE — 36416 COLLJ CAPILLARY BLOOD SPEC: CPT

## 2019-08-05 PROCEDURE — 99207 ZZC NO CHARGE NURSE ONLY: CPT

## 2019-08-05 NOTE — PROGRESS NOTES
ANTICOAGULATION FOLLOW-UP CLINIC VISIT    Patient Name:  Alex Stanley  Date:  2019  Contact Type:  Face to Face    SUBJECTIVE:  Patient Findings     Comments:   The patient was assessed for diet, medication, and activity level changes, missed or extra doses, bruising or bleeding, with no problem findings.          Clinical Outcomes     Comments:   The patient was assessed for diet, medication, and activity level changes, missed or extra doses, bruising or bleeding, with no problem findings.             OBJECTIVE    INR Protime   Date Value Ref Range Status   2019 2.9 (A) 0.86 - 1.14 Final       ASSESSMENT / PLAN  INR assessment THER    Recheck INR In: 6 WEEKS    INR Location Clinic      Anticoagulation Summary  As of 2019    INR goal:   2.0-3.0   TTR:   82.1 % (3.6 y)   INR used for dosin.9 (2019)   Warfarin maintenance plan:   2.5 mg (5 mg x 0.5) every Mon, Fri; 5 mg (5 mg x 1) all other days   Full warfarin instructions:   2.5 mg every Mon, Fri; 5 mg all other days   Weekly warfarin total:   30 mg   Plan last modified:   Mabel Garza RN (2019)   Next INR check:   2019   Target end date:   Indefinite    Indications    Long-term (current) use of anticoagulants [Z79.01] [Z79.01]  Chronic atrial fibrillation/Flutter [I48.2]             Anticoagulation Episode Summary     INR check location:       Preferred lab:       Send INR reminders to:   [facility]    Comments:               See the Encounter Report to view Anticoagulation Flowsheet and Dosing Calendar (Go to Encounters tab in chart review, and find the Anticoagulation Therapy Visit)        Tara Cavazos RN

## 2019-08-17 DIAGNOSIS — I10 ESSENTIAL HYPERTENSION: ICD-10-CM

## 2019-08-17 DIAGNOSIS — I48.20 CHRONIC ATRIAL FIBRILLATION (H): ICD-10-CM

## 2019-08-17 DIAGNOSIS — E78.5 HYPERLIPIDEMIA LDL GOAL <100: ICD-10-CM

## 2019-08-18 NOTE — TELEPHONE ENCOUNTER
"Requested Prescriptions   Pending Prescriptions Disp Refills     pravastatin (PRAVACHOL) 20 MG tablet [Pharmacy Med Name: PRAVASTATIN TABS 20MG] 90 tablet 4     Sig: TAKE 1 TABLET DAILY   Last Written Prescription Date:  6/14/2019  Last Fill Quantity: 90,  # refills: 3   Last Office Visit: 6/14/2019   Future Office Visit:         Statins Protocol Passed - 8/17/2019 12:37 PM        Passed - LDL on file in past 12 months     Recent Labs   Lab Test 06/14/19  0940   LDL 63             Passed - No abnormal creatine kinase in past 12 months     No lab results found.             Passed - Recent (12 mo) or future (30 days) visit within the authorizing provider's specialty     Patient had office visit in the last 12 months or has a visit in the next 30 days with authorizing provider or within the authorizing provider's specialty.  See \"Patient Info\" tab in inbasket, or \"Choose Columns\" in Meds & Orders section of the refill encounter.              Passed - Medication is active on med list        Passed - Patient is age 18 or older        warfarin (COUMADIN) 5 MG tablet [Pharmacy Med Name: WARFARIN TABS 5MG] 90 tablet 4     Sig: TAKE 1 TABLET DAILY, EXCEPT ONE-HALF (1/2) TABLET ON MONDAY AND FRIDAY AS DIRECTED BY THE ANTICOAGULATION CLINIC   Last Written Prescription Date:  6/14/2019  Last Fill Quantity: 90,  # refills: 3   Last Office Visit: 6/14/2019   Future Office Visit:         Vitamin K Antagonists Failed - 8/17/2019 12:37 PM        Failed - INR is within goal in the past 6 weeks     Confirm INR is within goal in the past 6 weeks.     Recent Labs   Lab Test 08/05/19   INR 2.9*                       Passed - Recent (12 mo) or future (30 days) visit within the authorizing provider's specialty     Patient had office visit in the last 12 months or has a visit in the next 30 days with authorizing provider or within the authorizing provider's specialty.  See \"Patient Info\" tab in inbasket, or \"Choose Columns\" in Meds & Orders " "section of the refill encounter.              Passed - Medication is active on med list        Passed - Patient is 18 years of age or older        amLODIPine (NORVASC) 10 MG tablet [Pharmacy Med Name: AMLODIPINE BESYLATE TABS 10MG] 90 tablet 4     Sig: TAKE 1 TABLET DAILY   Last Written Prescription Date:  6/14/2019  Last Fill Quantity: 90,  # refills: 3   Last Office Visit: 6/14/2019   Future Office Visit:         Calcium Channel Blockers Protocol  Failed - 8/17/2019 12:37 PM        Failed - Normal serum creatinine on file in past 12 months     Recent Labs   Lab Test 06/14/19  0940  05/31/11 2059   CR 1.27*   < >  --    CREAT  --   --  1.9*    < > = values in this interval not displayed.             Passed - Blood pressure under 140/90 in past 12 months     BP Readings from Last 3 Encounters:   06/14/19 108/64   08/24/18 122/86   06/14/18 124/76                 Passed - Recent (12 mo) or future (30 days) visit within the authorizing provider's specialty     Patient had office visit in the last 12 months or has a visit in the next 30 days with authorizing provider or within the authorizing provider's specialty.  See \"Patient Info\" tab in inbasket, or \"Choose Columns\" in Meds & Orders section of the refill encounter.              Passed - Medication is active on med list        Passed - Patient is age 18 or older          "

## 2019-08-20 RX ORDER — PRAVASTATIN SODIUM 20 MG
TABLET ORAL
Qty: 90 TABLET | Refills: 2 | Status: SHIPPED | OUTPATIENT
Start: 2019-08-20 | End: 2020-08-10

## 2019-08-20 RX ORDER — WARFARIN SODIUM 5 MG/1
TABLET ORAL
Qty: 90 TABLET | Refills: 2 | Status: SHIPPED | OUTPATIENT
Start: 2019-08-20 | End: 2020-06-03

## 2019-08-20 RX ORDER — AMLODIPINE BESYLATE 10 MG/1
TABLET ORAL
Qty: 90 TABLET | Refills: 2 | Status: SHIPPED | OUTPATIENT
Start: 2019-08-20 | End: 2020-06-03

## 2019-09-16 ENCOUNTER — ANTICOAGULATION THERAPY VISIT (OUTPATIENT)
Dept: ANTICOAGULATION | Facility: CLINIC | Age: 74
End: 2019-09-16
Payer: MEDICARE

## 2019-09-16 DIAGNOSIS — I48.20 CHRONIC ATRIAL FIBRILLATION (H): ICD-10-CM

## 2019-09-16 DIAGNOSIS — Z79.01 LONG TERM CURRENT USE OF ANTICOAGULANT THERAPY: ICD-10-CM

## 2019-09-16 LAB — INR POINT OF CARE: 2.3 (ref 0.86–1.14)

## 2019-09-16 PROCEDURE — 36416 COLLJ CAPILLARY BLOOD SPEC: CPT

## 2019-09-16 PROCEDURE — 99207 ZZC NO CHARGE NURSE ONLY: CPT

## 2019-09-16 PROCEDURE — 85610 PROTHROMBIN TIME: CPT | Mod: QW

## 2019-09-16 NOTE — PROGRESS NOTES
ANTICOAGULATION FOLLOW-UP CLINIC VISIT    Patient Name:  Alex Stanley  Date:  2019  Contact Type:  Face to Face    SUBJECTIVE:  Patient Findings     Comments:   The patient was assessed for diet, medication, and activity level changes, missed or extra doses, bruising or bleeding, with no problem findings.          Clinical Outcomes     Negatives:   Major bleeding event, Thromboembolic event, Anticoagulation-related hospital admission, Anticoagulation-related ED visit, Anticoagulation-related fatality    Comments:   The patient was assessed for diet, medication, and activity level changes, missed or extra doses, bruising or bleeding, with no problem findings.             OBJECTIVE    INR Protime   Date Value Ref Range Status   2019 2.3 (A) 0.86 - 1.14 Final       ASSESSMENT / PLAN  No question data found.  Anticoagulation Summary  As of 2019    INR goal:   2.0-3.0   TTR:   82.7 % (3.7 y)   INR used for dosin.3 (2019)   Warfarin maintenance plan:   2.5 mg (5 mg x 0.5) every Mon, Fri; 5 mg (5 mg x 1) all other days   Full warfarin instructions:   2.5 mg every Mon, Fri; 5 mg all other days   Weekly warfarin total:   30 mg   No change documented:   Mabel Garza RN   Plan last modified:   Mabel Garza RN (2019)   Next INR check:   10/28/2019   Target end date:   Indefinite    Indications    Long-term (current) use of anticoagulants [Z79.01] [Z79.01]  Chronic atrial fibrillation/Flutter [I48.2]             Anticoagulation Episode Summary     INR check location:       Preferred lab:       Send INR reminders to:   [facility]    Comments:               See the Encounter Report to view Anticoagulation Flowsheet and Dosing Calendar (Go to Encounters tab in chart review, and find the Anticoagulation Therapy Visit)        Mabel Garza RN

## 2019-10-29 ENCOUNTER — ANTICOAGULATION THERAPY VISIT (OUTPATIENT)
Dept: ANTICOAGULATION | Facility: CLINIC | Age: 74
End: 2019-10-29
Payer: MEDICARE

## 2019-10-29 DIAGNOSIS — I48.20 CHRONIC ATRIAL FIBRILLATION (H): Primary | ICD-10-CM

## 2019-10-29 LAB — INR POINT OF CARE: 2.4 (ref 0.86–1.14)

## 2019-10-29 PROCEDURE — 36416 COLLJ CAPILLARY BLOOD SPEC: CPT

## 2019-10-29 PROCEDURE — 85610 PROTHROMBIN TIME: CPT | Mod: QW

## 2019-10-29 NOTE — PROGRESS NOTES
ANTICOAGULATION FOLLOW-UP CLINIC VISIT    Patient Name:  Alex Stanley  Date:  10/29/2019  Contact Type:  Face to Face    SUBJECTIVE:         OBJECTIVE    INR Protime   Date Value Ref Range Status   10/29/2019 2.4 (A) 0.86 - 1.14 Final       ASSESSMENT / PLAN  INR assessment THER    Recheck INR In: 6 WEEKS    INR Location Clinic      Anticoagulation Summary  As of 10/29/2019    INR goal:   2.0-3.0   TTR:   83.2 % (3.8 y)   INR used for dosin.4 (10/29/2019)   Warfarin maintenance plan:   2.5 mg (5 mg x 0.5) every Mon, Fri; 5 mg (5 mg x 1) all other days   Full warfarin instructions:   2.5 mg every Mon, Fri; 5 mg all other days   Weekly warfarin total:   30 mg   No change documented:   Vangie Agarwal RN   Plan last modified:   Mabel Garza RN (2019)   Next INR check:   12/10/2019   Target end date:   Indefinite    Indications    Long-term (current) use of anticoagulants [Z79.01] [Z79.01]  Chronic atrial fibrillation/Flutter [I48.20]             Anticoagulation Episode Summary     INR check location:       Preferred lab:       Send INR reminders to:   [facility]    Comments:               See the Encounter Report to view Anticoagulation Flowsheet and Dosing Calendar (Go to Encounters tab in chart review, and find the Anticoagulation Therapy Visit)    Dosage adjustment made based on physician directed care plan.    Vangie Agarwal RN

## 2019-12-06 ENCOUNTER — OFFICE VISIT (OUTPATIENT)
Dept: URGENT CARE | Facility: URGENT CARE | Age: 74
End: 2019-12-06
Payer: MEDICARE

## 2019-12-06 VITALS
OXYGEN SATURATION: 97 % | HEART RATE: 72 BPM | SYSTOLIC BLOOD PRESSURE: 115 MMHG | BODY MASS INDEX: 26.55 KG/M2 | TEMPERATURE: 97.4 F | RESPIRATION RATE: 16 BRPM | WEIGHT: 167 LBS | DIASTOLIC BLOOD PRESSURE: 65 MMHG

## 2019-12-06 DIAGNOSIS — H66.002 NON-RECURRENT ACUTE SUPPURATIVE OTITIS MEDIA OF LEFT EAR WITHOUT SPONTANEOUS RUPTURE OF TYMPANIC MEMBRANE: Primary | ICD-10-CM

## 2019-12-06 PROCEDURE — 99203 OFFICE O/P NEW LOW 30 MIN: CPT | Performed by: NURSE PRACTITIONER

## 2019-12-06 NOTE — PROGRESS NOTES
SUBJECTIVE:   Alex Stanley is a 74 year old male presenting with a chief complaint of ear pain left.  Started about a week ago and has been occurring intermittently with intermittent hearing loss and slight dizziness.  No fevers, chills, cough, or SOB.  Feels slight sinus congestion.  And slight pain above ear intermittently.   Onset of symptoms was 1 week(s) ago.  Course of illness is waxing and waning.    Severity moderate  Current and Associated symptoms: ear pain left  Treatment measures tried include None tried.  Predisposing factors include None.    Past Medical History:   Diagnosis Date     Alcoholism (H)     admission 11/09     Atrial fibrillation, paroxysmal 11/07, 6/09, 11/09     Diverticulosis of colon 6/2/2011     Erosive gastritis 11/09     Hypertension, with LVH      Iron deficiency anemia 11/09     Tobacco abuse      Current Outpatient Medications   Medication Sig Dispense Refill     amLODIPine (NORVASC) 10 MG tablet TAKE 1 TABLET DAILY 90 tablet 2     ASPIRIN NOT PRESCRIBED, INTENTIONAL, Antiplatelet medication not prescribed intentionally due to history  of gastrointestinal bleeding, and patient on warfarin. 0 each 0     order for DME Equipment being ordered:  Knee high support hose, compression 20-30 mm 1 Device 1     pravastatin (PRAVACHOL) 20 MG tablet TAKE 1 TABLET DAILY 90 tablet 2     warfarin (COUMADIN) 5 MG tablet TAKE 1 TABLET DAILY, EXCEPT ONE-HALF (1/2) TABLET ON MONDAY AND FRIDAY AS DIRECTED BY THE ANTICOAGULATION CLINIC 90 tablet 2     warfarin (COUMADIN) 5 MG tablet TAKE 1 TABLET DAILY EXCEPT ONE-HALF (1/2) TABLET ON MONDAY AND FRIDAY AS DIRECTED BY THE ANTICOAGULATION CLINIC 90 tablet 3     Social History     Tobacco Use     Smoking status: Current Every Day Smoker     Packs/day: 1.00     Years: 57.00     Pack years: 57.00     Types: Cigarettes     Smokeless tobacco: Never Used     Tobacco comment: ppd since age 16 - now 1/2-1 ppd   Substance Use Topics     Alcohol use: No      Comment: last drank at [facility] 8/2010.       ROS:  Review of systems negative except as stated above.    OBJECTIVE:  /65   Pulse 72   Temp 97.4  F (36.3  C) (Oral)   Resp 16   Wt 75.8 kg (167 lb)   SpO2 97%   BMI 26.55 kg/m    GENERAL APPEARANCE: healthy, alert and no distress  EYES: EOMI,  PERRL, conjunctiva clear  HENT: TM erythematous left and TM congested/bulging left, no external drainage or tenderness  NECK: supple, nontender, no lymphadenopathy  RESP: lungs clear to auscultation - no rales, rhonchi or wheezes  CV: irregular rhythm, normal S1 S2, no murmur noted  NEURO: Normal strength and tone, sensory exam grossly normal,  normal speech and mentation  SKIN: no suspicious lesions or rashes    ASSESSMENT:  Acute left otitis media    PLAN:  Tylenol, Ibuprofen, Fluids, Rest and Rx otitis media  Augmentin X 7 days   Return if worsening or no improvement.   See orders in Epic    LAST Armijo, CNP

## 2019-12-06 NOTE — PATIENT INSTRUCTIONS
Patient Education     Middle Ear Infection (Otitis Media) in Adults  What is a middle ear infection?  A middle ear infection occurs behind the eardrum. It is most often caused by a virus or bacteria. Most kids have at least one middle ear infection by the time they are 3 years old. But adults can also get them.  What causes middle ear infections?  Inflammation in the middle ear most often starts after you ve had a sore throat, cold, or other upper respiratory problem. The infection spreads to the middle ear and causes fluid buildup behind the eardrum.   What are the symptoms of a middle ear infection?  These are the most common symptoms of middle ear infections in adults:    Ear pain    Feeling of fullness in the hear    Fluid draining from the ear    Fever    Hearing loss  These symptoms may look like other conditions or health problems. Always talk with your healthcare provider for a diagnosis.  How is a middle ear infection diagnosed?  Your healthcare provider will review your health history and do a physical exam. He or she will check the outer ear and the eardrum using an otoscope. The otoscope is a lighted tool that lets the healthcare provider see inside the ear. A pneumatic otoscope blows a puff of air into the ear to test eardrum movement. When there is fluid or infection in the middle ear, movement is decreased.  Your provider may also do a tympanometry. This is a test that directs air and sound to the middle ear.  If you have ear infections often, your healthcare provider may suggest having a hearing test.  How is a middle ear infection treated?  Treatment will depend on your symptoms, age, and general health. It will also depend on how severe the condition is.  Treatment may include:    Antibiotics    Pain relievers    Placing small tubes in the eardrum for chronic ear infections   What are possible complications of a middle ear infection?  Untreated ear infections can lead to:    Infection in other  parts of the head    Lasting (permanent) hearing loss    Speech and language problems  Can middle ear infections be prevented?  Cold and allergy medicines don't seem to prevent ear infections. And currently there is no vaccine that can prevent the disease. But check with your healthcare provider and make sure your vaccines are up-to-date. Living in a home where cigarettes are smoked can increase the chances of ear infections.  Key points about middle ear infections    Middle ear infections can affect both children and adults.    Pain and fever can be the most common symptoms.    Without treatment, permanent hearing loss may happen.    Take antibiotics as prescribed and finish all of the prescription. This can help prevent antibiotic-resistant infections or incomplete treatment with the infection returning.    Next steps  Tips to help you get the most from a visit to your healthcare provider:    Know the reason for your visit and what you want to happen.    Before your visit, write down questions you want answered.    Bring someone with you to help you ask questions and remember what your provider tells you.    At the visit, write down the name of a new diagnosis, and any new medicines, treatments, or tests. Also write down any new instructions your provider gives you.    Know why a new medicine or treatment is prescribed, and how it will help you. Also know what the side effects are.    Ask if your condition can be treated in other ways.    Know why a test or procedure is recommended and what the results could mean.    Know what to expect if you do not take the medicine or have the test or procedure.    If you have a follow-up appointment, write down the date, time, and purpose for that visit.    Know how you can contact your provider if you have questions.      [phone] [employer]. [address] [place] [address], [place] [address]. All rights reserved. This information is not intended as a substitute for  professional medical care. Always follow your healthcare professional's instructions.

## 2019-12-09 ENCOUNTER — ANTICOAGULATION THERAPY VISIT (OUTPATIENT)
Dept: ANTICOAGULATION | Facility: CLINIC | Age: 74
End: 2019-12-09
Payer: MEDICARE

## 2019-12-09 ENCOUNTER — TELEPHONE (OUTPATIENT)
Dept: INTERNAL MEDICINE | Facility: CLINIC | Age: 74
End: 2019-12-09

## 2019-12-09 DIAGNOSIS — Z79.01 LONG TERM CURRENT USE OF ANTICOAGULANT THERAPY: ICD-10-CM

## 2019-12-09 DIAGNOSIS — I48.20 CHRONIC ATRIAL FIBRILLATION (H): ICD-10-CM

## 2019-12-09 DIAGNOSIS — I48.20 CHRONIC ATRIAL FIBRILLATION (H): Primary | ICD-10-CM

## 2019-12-09 LAB — INR POINT OF CARE: 2.7 (ref 0.86–1.14)

## 2019-12-09 PROCEDURE — 85610 PROTHROMBIN TIME: CPT | Mod: QW

## 2019-12-09 PROCEDURE — 99207 ZZC NO CHARGE NURSE ONLY: CPT

## 2019-12-09 PROCEDURE — 36416 COLLJ CAPILLARY BLOOD SPEC: CPT

## 2019-12-09 NOTE — PROGRESS NOTES
ANTICOAGULATION FOLLOW-UP CLINIC VISIT    Patient Name:  Alex Stanley  Date:  2019  Contact Type:  Face to Face    SUBJECTIVE:  Patient Findings     Comments:   Ear infection on augmentin        Clinical Outcomes     Comments:   Ear infection on augmentin           OBJECTIVE    INR Protime   Date Value Ref Range Status   2019 2.7 (A) 0.86 - 1.14 Final       ASSESSMENT / PLAN  INR assessment THER    Recheck INR In: 6 WEEKS    INR Location Clinic      Anticoagulation Summary  As of 2019    INR goal:   2.0-3.0   TTR:   82.5 % (1 y)   INR used for dosin.7 (2019)   Warfarin maintenance plan:   2.5 mg (5 mg x 0.5) every Mon, Fri; 5 mg (5 mg x 1) all other days   Full warfarin instructions:   2.5 mg every Mon, Fri; 5 mg all other days   Weekly warfarin total:   30 mg   Plan last modified:   Mabel Garza RN (2019)   Next INR check:   2020   Target end date:   Indefinite    Indications    Long-term (current) use of anticoagulants [Z79.01] [Z79.01]  Chronic atrial fibrillation/Flutter [I48.20]             Anticoagulation Episode Summary     INR check location:       Preferred lab:       Send INR reminders to:   [facility]    Comments:               See the Encounter Report to view Anticoagulation Flowsheet and Dosing Calendar (Go to Encounters tab in chart review, and find the Anticoagulation Therapy Visit)        Tara Cavazos RN

## 2019-12-09 NOTE — TELEPHONE ENCOUNTER
ANTICOAGULATION MANAGEMENT    Alex Stanley due for review and annual renewal of referral to anticoagulation monitoring.      Warfarin indication(s) Atrial Fibrillation   INR goal 2.0-3.0   Current duration of therapy Indefinite/long term therapy   Date of last office visit 06/14/2019       Please advise if Alex Stanley's anticoagulation management referral can be renewed for next year.     Please confirm renewal approval in new note within this telephone encounter and route back. No further action is necessary at this time (referral orders,etc).     Tara Cavazos RN

## 2019-12-16 ENCOUNTER — OFFICE VISIT (OUTPATIENT)
Dept: INTERNAL MEDICINE | Facility: CLINIC | Age: 74
End: 2019-12-16
Payer: MEDICARE

## 2019-12-16 VITALS
OXYGEN SATURATION: 97 % | TEMPERATURE: 97.4 F | RESPIRATION RATE: 16 BRPM | SYSTOLIC BLOOD PRESSURE: 110 MMHG | HEART RATE: 72 BPM | BODY MASS INDEX: 26.23 KG/M2 | DIASTOLIC BLOOD PRESSURE: 70 MMHG | WEIGHT: 165 LBS

## 2019-12-16 DIAGNOSIS — I48.20 CHRONIC ATRIAL FIBRILLATION (H): ICD-10-CM

## 2019-12-16 DIAGNOSIS — Z79.01 LONG TERM CURRENT USE OF ANTICOAGULANT THERAPY: ICD-10-CM

## 2019-12-16 DIAGNOSIS — H66.93 OTITIS MEDIA TREATED WITH ANTIBIOTICS IN THE PAST 60 DAYS, BILATERAL: Primary | ICD-10-CM

## 2019-12-16 PROCEDURE — 99214 OFFICE O/P EST MOD 30 MIN: CPT | Performed by: PHYSICIAN ASSISTANT

## 2019-12-16 RX ORDER — CEFDINIR 300 MG/1
300 CAPSULE ORAL 2 TIMES DAILY
Qty: 14 CAPSULE | Refills: 0 | Status: SHIPPED | OUTPATIENT
Start: 2019-12-16 | End: 2020-09-15

## 2019-12-16 NOTE — PROGRESS NOTES
Subjective     Alex Stanley is a 74 year old male who presents to clinic today for the following health issues:    HPI   ED/UC Followup:    Facility:  [facility]  Date of visit: 12/6/19  Reason for visit: Ear pain and sinus  Current Status: States things have been on and off. Cannot hear very well out of left ear. Feels like there is a fullness in the ear. Pain has resolved. Has finished course of antibiotic rx'd by UC   Patient having a hard time hearing from the left ear too. Some mild ear discomfort yet. Denies a lot of pain in the ear.  Some dizziness noted yet.  No N/V with this  No change with position.  Recently treated with UC as above.       -------------------------------------    BP Readings from Last 3 Encounters:   12/16/19 110/70   12/06/19 115/65   06/14/19 108/64    Wt Readings from Last 3 Encounters:   12/16/19 74.8 kg (165 lb)   12/06/19 75.8 kg (167 lb)   06/14/19 76 kg (167 lb 8 oz)                    -------------------------------------  Reviewed and updated as needed this visit by Provider  Allergies  Meds         Review of Systems   ROS COMP: Constitutional, HEENT, cardiovascular, pulmonary, gi and gu systems are negative, except as otherwise noted.      Objective    /70 (BP Location: Left arm, Patient Position: Chair, Cuff Size: Adult Regular)   Pulse 72   Temp 97.4  F (36.3  C) (Oral)   Resp 16   Wt 74.8 kg (165 lb)   SpO2 97%   BMI 26.23 kg/m    Body mass index is 26.23 kg/m .  Physical Exam   GENERAL: healthy, alert and no distress  HENT: normal cephalic/atraumatic, right ear: normal: no effusions, no erythema, normal landmarks, left ear: erythematous, bulging membrane and small piece of wax in ear canal removed manually today , oropharynx clear and oral mucous membranes moist  NECK: no adenopathy, no asymmetry, masses, or scars and thyroid normal to palpation  RESP: lungs clear to auscultation - no rales, rhonchi or wheezes  CV: regular rates and rhythm and normal S1 S2,  "no S3 or S4  MS: no gross musculoskeletal defects noted, no edema  SKIN: no suspicious lesions or rashes    Diagnostic Test Results:  none         Assessment & Plan     1. Otitis media treated with antibiotics in the past 60 days, bilateral    - cefdinir (OMNICEF) 300 MG capsule; Take 1 capsule (300 mg) by mouth 2 times daily  Dispense: 14 capsule; Refill: 0    2. Chronic atrial fibrillation/Flutter      3. Long-term (current) use of anticoagulants [Z79.01]  Notify [facility] of addition antibiotics will need sooner INR check        BMI:   Estimated body mass index is 26.23 kg/m  as calculated from the following:    Height as of 6/14/19: 1.689 m (5' 6.5\").    Weight as of this encounter: 74.8 kg (165 lb).           Reviewed treatment   flonase for remaining congestion.       Return in about 4 weeks (around 1/13/2020) for recheck if not improving, regular primary provider.    Alycia Caldwell PA-C  [facility]      "

## 2020-01-20 ENCOUNTER — ANTICOAGULATION THERAPY VISIT (OUTPATIENT)
Dept: ANTICOAGULATION | Facility: CLINIC | Age: 75
End: 2020-01-20
Payer: MEDICARE

## 2020-01-20 DIAGNOSIS — Z79.01 LONG TERM CURRENT USE OF ANTICOAGULANT THERAPY: ICD-10-CM

## 2020-01-20 DIAGNOSIS — I48.20 CHRONIC ATRIAL FIBRILLATION (H): ICD-10-CM

## 2020-01-20 LAB — INR POINT OF CARE: 3 (ref 0.86–1.14)

## 2020-01-20 PROCEDURE — 85610 PROTHROMBIN TIME: CPT | Mod: QW

## 2020-01-20 PROCEDURE — 36416 COLLJ CAPILLARY BLOOD SPEC: CPT

## 2020-01-20 PROCEDURE — 99207 ZZC NO CHARGE NURSE ONLY: CPT

## 2020-01-20 NOTE — PROGRESS NOTES
ANTICOAGULATION FOLLOW-UP CLINIC VISIT    Patient Name:  Alex Stanley  Date:  1/20/2020  Contact Type:  Face to Face    SUBJECTIVE:  Patient Findings     Comments:   The patient was assessed for diet, medication, and activity level changes, missed or extra doses, bruising or bleeding, with no problem findings.          Clinical Outcomes     Negatives:   Major bleeding event, Thromboembolic event, Anticoagulation-related hospital admission, Anticoagulation-related ED visit, Anticoagulation-related fatality    Comments:   The patient was assessed for diet, medication, and activity level changes, missed or extra doses, bruising or bleeding, with no problem findings.             OBJECTIVE    INR Protime   Date Value Ref Range Status   01/20/2020 3.0 (A) 0.86 - 1.14 Final       ASSESSMENT / PLAN  No question data found.  Anticoagulation Summary  As of 1/20/2020    INR goal:   2.0-3.0   TTR:   85.8 % (1 y)   INR used for dosing:   3.0 (1/20/2020)   Warfarin maintenance plan:   2.5 mg (5 mg x 0.5) every Mon, Fri; 5 mg (5 mg x 1) all other days   Full warfarin instructions:   2.5 mg every Mon, Fri; 5 mg all other days   Weekly warfarin total:   30 mg   No change documented:   Mabel Garza RN   Plan last modified:   Mabel Garza RN (1/14/2019)   Next INR check:   3/2/2020   Target end date:   Indefinite    Indications    Long-term (current) use of anticoagulants [Z79.01] [Z79.01]  Chronic atrial fibrillation/Flutter [I48.20]             Anticoagulation Episode Summary     INR check location:       Preferred lab:       Send INR reminders to:   [facility]    Comments:   INR referral approved 12/9/2019 per Dr Odonnell. Telephone message 12/9/2019            See the Encounter Report to view Anticoagulation Flowsheet and Dosing Calendar (Go to Encounters tab in chart review, and find the Anticoagulation Therapy Visit)    Dosage adjustment made based on physician directed care plan.     Pt denies any  changes in diet,health or activity. Alex is aware if signs of clotting (pain, tenderness, swelling, color change in leg or arm, SOB) and bleeding occur (blood in stool, urine, large bruising, bleeding gums, nosebleeds) to have INR check sooner. If sx severe report to ER or concerned for stroke call 911. If general questions or concerns arise, call clinic.         Mabel Garza RN

## 2020-03-02 ENCOUNTER — OFFICE VISIT (OUTPATIENT)
Dept: PODIATRY | Facility: CLINIC | Age: 75
End: 2020-03-02
Payer: MEDICARE

## 2020-03-02 ENCOUNTER — ANTICOAGULATION THERAPY VISIT (OUTPATIENT)
Dept: ANTICOAGULATION | Facility: CLINIC | Age: 75
End: 2020-03-02
Payer: MEDICARE

## 2020-03-02 VITALS
BODY MASS INDEX: 25.88 KG/M2 | WEIGHT: 164.9 LBS | HEIGHT: 67 IN | SYSTOLIC BLOOD PRESSURE: 112 MMHG | DIASTOLIC BLOOD PRESSURE: 68 MMHG

## 2020-03-02 DIAGNOSIS — L84 CALLUS OF FOOT: ICD-10-CM

## 2020-03-02 DIAGNOSIS — I48.20 CHRONIC ATRIAL FIBRILLATION (H): ICD-10-CM

## 2020-03-02 DIAGNOSIS — Z79.01 LONG TERM CURRENT USE OF ANTICOAGULANT THERAPY: ICD-10-CM

## 2020-03-02 DIAGNOSIS — M79.671 RIGHT FOOT PAIN: Primary | ICD-10-CM

## 2020-03-02 DIAGNOSIS — L90.9 FAT PAD ATROPHY OF FOOT: ICD-10-CM

## 2020-03-02 LAB — INR POINT OF CARE: 2 (ref 0.86–1.14)

## 2020-03-02 PROCEDURE — 85610 PROTHROMBIN TIME: CPT | Mod: QW

## 2020-03-02 PROCEDURE — 99203 OFFICE O/P NEW LOW 30 MIN: CPT | Performed by: PODIATRIST

## 2020-03-02 PROCEDURE — 99207 ZZC NO CHARGE NURSE ONLY: CPT

## 2020-03-02 PROCEDURE — 36416 COLLJ CAPILLARY BLOOD SPEC: CPT

## 2020-03-02 ASSESSMENT — MIFFLIN-ST. JEOR: SCORE: 1456.61

## 2020-03-02 NOTE — LETTER
3/2/2020         RE: Alex Stanley  [address]  [facility] [id number]        Dear Colleague,    Thank you for referring your patient, Alex Stanley, to the [facility]. Please see a copy of my visit note below.      ASSESSMENT/PLAN:    Encounter Diagnoses   Name Primary?     Right foot pain Yes     Callus of foot      Fat pad atrophy of foot      The patient was told that trimming the calluses might be an out of pocket expense.  I did not bill for today.  He was asked to check with the insurance, should he want it pared down again in the future.    The hyperkeratotic lesion was pared down with a #15 scalpel.  He reported immediate relief upon weightbearing.    I explained how the atrophy of the fat pad might be contributing to the callus.    Alex Stanley is referred to [facility] for prescription orthoses.      Recommendations:  Pumice stone to file the lesion down  Stiffer soled shoes to offload the forefoot during gait.  Cushioned shoe inserts with/ without a hole cut below the lesion  Follow up for periodic pairing of the lesions      Body mass index is 25.83 kg/m .    Weight management plan: Patient was referred to their PCP to discuss a diet and exercise plan.      Jah Naik DPM, FACFAS, MS    [facility]      ____________________________________________________________________    HPI:         Chief Complaint: recurring, painful callus, right foot  Onset of problem: months  Pain/ discomfort is described as:  Deep ache  Ratin/10   Frequency:  daily    The pain is made worse with walking  Previous treatment: He reports a history of painful calluses treated by bone surgery.  The calluses resolved.    *  Patient Active Problem List   Diagnosis     Essential hypertension     Chronic atrial fibrillation/Flutter     Tobacco abuse     Iron deficiency anemia     Alcohol dependence in remission (H)      Erosive gastritis     Hyperlipidemia LDL goal <100     Cerebrovascular accident (stroke) (H)     Fatty liver, alcoholic     Diverticulosis of colon     CKD (chronic kidney disease) stage 3, GFR 30-59 ml/min (H)     Long-term (current) use of anticoagulants [Z79.01]   *  *  Past Surgical History:   Procedure Laterality Date     C NONSPECIFIC PROCEDURE  2002    Rt foot reconstructive     C NONSPECIFIC PROCEDURE  10/12    R hand procedure.  Dr. Deluna     SURGICAL HISTORY OF -   4/11    R Dupuytren's contracture release   Dr. Scruggs   *  *  Current Outpatient Medications   Medication Sig Dispense Refill     amLODIPine (NORVASC) 10 MG tablet TAKE 1 TABLET DAILY 90 tablet 2     pravastatin (PRAVACHOL) 20 MG tablet TAKE 1 TABLET DAILY 90 tablet 2     warfarin (COUMADIN) 5 MG tablet TAKE 1 TABLET DAILY, EXCEPT ONE-HALF (1/2) TABLET ON MONDAY AND FRIDAY AS DIRECTED BY THE ANTICOAGULATION CLINIC 90 tablet 2     warfarin (COUMADIN) 5 MG tablet TAKE 1 TABLET DAILY EXCEPT ONE-HALF (1/2) TABLET ON MONDAY AND FRIDAY AS DIRECTED BY THE ANTICOAGULATION CLINIC 90 tablet 3     ASPIRIN NOT PRESCRIBED, INTENTIONAL, Antiplatelet medication not prescribed intentionally due to history  of gastrointestinal bleeding, and patient on warfarin. (Patient not taking: Reported on 3/2/2020) 0 each 0     cefdinir (OMNICEF) 300 MG capsule Take 1 capsule (300 mg) by mouth 2 times daily (Patient not taking: Reported on 3/2/2020) 14 capsule 0     order for DME Equipment being ordered:  Knee high support hose, compression 20-30 mm (Patient not taking: Reported on 3/2/2020) 1 Device 1       ROS:     A 10-point review of systems was performed and is positive for that noted above in the HPI and as seen below.  All other areas are negative.     Numbness in feet?  no   Calf pain with walking? no  Recent foot/ankle injury? no  Weight change over past 12 months? no  Self perception as overweight? no  Recent flu-like symptoms? no  Joint pain other than feet ?  no    Social History: Employment:  ;  Exercise/Physical activity:  no;  Tobacco use:  yes  Social History     Socioeconomic History     Marital status:      Spouse name: Not on file     Number of children: Not on file     Years of education: Not on file     Highest education level: Not on file   Occupational History     Not on file   Social Needs     Financial resource strain: Not on file     Food insecurity:     Worry: Not on file     Inability: Not on file     Transportation needs:     Medical: Not on file     Non-medical: Not on file   Tobacco Use     Smoking status: Current Every Day Smoker     Packs/day: 1.00     Years: 57.00     Pack years: 57.00     Types: Cigarettes     Smokeless tobacco: Never Used     Tobacco comment: ppd since age 16 - now 1/2-1 ppd   Substance and Sexual Activity     Alcohol use: No     Comment: last drank at [facility] 8/2010.     Drug use: No     Sexual activity: Yes     Partners: Female   Lifestyle     Physical activity:     Days per week: Not on file     Minutes per session: Not on file     Stress: Not on file   Relationships     Social connections:     Talks on phone: Not on file     Gets together: Not on file     Attends Tenriism service: Not on file     Active member of club or organization: Not on file     Attends meetings of clubs or organizations: Not on file     Relationship status: Not on file     Intimate partner violence:     Fear of current or ex partner: Not on file     Emotionally abused: Not on file     Physically abused: Not on file     Forced sexual activity: Not on file   Other Topics Concern     Parent/sibling w/ CABG, MI or angioplasty before 65F 55M? Not Asked   Social History Narrative     Not on file       Family history:  Family History   Problem Relation Age of Onset     Cancer Mother         small cell lung with mets to brain     Diabetes Maternal Grandmother      Respiratory Father         emphysema       Rheumatoid arthritis:  no  Foot  "Problems: no  Diabetes: no      EXAM:    Vitals: /68   Ht 1.702 m (5' 7\")   Wt 74.8 kg (164 lb 14.4 oz)   BMI 25.83 kg/m     BMI: Body mass index is 25.83 kg/m .  Height: 5' 7\"    Constitutional/ general:  Pt is in no apparent distress, appears well-nourished.  Cooperative with history and physical exam.     Vascular:  Pedal pulses are palpable bilaterally for both the DP and PT arteries. CFT < 3 sec.  No edema.  Pedal hair growth noted.     Neuro:  Alert and oriented x 3. Coordinated gait.  Light touch sensation is intact to the L4, L5, S1 distributions. No obvious deficits.  No evidence of neurological-based weakness, spasticity, or contracture in the lower extremities.     Derm: Normal texture and turgor.  No erythema, ecchymosis, or cyanosis.  No open lesions.  Painful nucleated callus sub-right fifth metatarsal head.  Post paring, no ulceration.  Plantar fat pad feels very thin.    Musculoskeletal:    Lower extremity muscle strength is normal.  Patient is ambulatory without an assistive device or brace .  No gross deformities.  Pain on palpation:   Sub right 5th metatarsal head.         Again, thank you for allowing me to participate in the care of your patient.        Sincerely,        Jah Naik DPM    "

## 2020-03-02 NOTE — PROGRESS NOTES
ANTICOAGULATION FOLLOW-UP CLINIC VISIT    Patient Name:  Alex Stanley  Date:  3/2/2020  Contact Type:Face to Face    SUBJECTIVE:  Patient Findings     Comments:   The patient was assessed for diet, medication, and activity level changes, missed or extra doses, bruising or bleeding, with no problem findings.          Clinical Outcomes     Comments:   The patient was assessed for diet, medication, and activity level changes, missed or extra doses, bruising or bleeding, with no problem findings.             OBJECTIVE    INR Protime   Date Value Ref Range Status   2020 2.0 (A) 0.86 - 1.14 Final       ASSESSMENT / PLAN  INR assessment THER    Recheck INR In: 6 WEEKS    INR Location Clinic      Anticoagulation Summary  As of 3/2/2020    INR goal:   2.0-3.0   TTR:   91.8 % (1 y)   INR used for dosin.0 (3/2/2020)   Warfarin maintenance plan:   2.5 mg (5 mg x 0.5) every Mon, Fri; 5 mg (5 mg x 1) all other days   Full warfarin instructions:   2.5 mg every Mon, Fri; 5 mg all other days   Weekly warfarin total:   30 mg   Plan last modified:   Mabel Garza RN (2019)   Next INR check:   2020   Target end date:   Indefinite    Indications    Long-term (current) use of anticoagulants [Z79.01] [Z79.01]  Chronic atrial fibrillation/Flutter [I48.20]             Anticoagulation Episode Summary     INR check location:       Preferred lab:       Send INR reminders to:   [facility]    Comments:   INR referral approved 2019 per Dr dOonnell. Telephone message 2019      Anticoagulation Care Providers     Provider Role Specialty Phone number    Martin Odonnell MD Referring Internal Medicine [phone]            See the Encounter Report to view Anticoagulation Flowsheet and Dosing Calendar (Go to Encounters tab in chart review, and find the Anticoagulation Therapy Visit)        Tara Cavazos, RN

## 2020-03-02 NOTE — PATIENT INSTRUCTIONS
Thank you for choosing [facility]iat[facility] / Foot & Ankle Surgery!    DR. ALMONTE'S CLINIC LOCATIONS     MONDAY - [place] WEDNESDAY (AM ONLY) - [place]   [address] [place] [address] [address] [address]   [phone] / [phone] / [phone]       THURSDAY - [place] SCHEDULE SURGERY: [phone]   [address] APPOINTMENTS: [phone]   [place] [address] BILLING QUESTIONS: [phone] / [phone] RADIOLOGY: [phone]     [place] [facility] LOCATIONS  [facility]  [address]  [place] [address]  Phone: [phone]  Fax: [phone] [facility]  [address]  [place] [address]  Phone: [phone]   Fax: [phone]   [facility]  [address]  [place] [address]  Phone: [phone]  Fax: [phone] [facility]  [address]  [place] [address]  Phone: [phone]   Fax: [phone]   [facility]   [address]  [place] [address]  Phone: [phone]  Fax: [phone] * Please call any location listed to make an appointment for a casting/fitting. Your referral was sent to their central office and they will all have the order on file.     WEARING YOUR CUSTOM FOOT ORTHOTICS   Most insurance plans cover one pair of orthotics per year. You must check with your   insurance plan to see what your payment responsibility will be. Please call your   insurance company by calling the number on the back of your insurance card.   Orthotic's are non-refundable and non-returnable.   Orthotics are made of various designs. Some orthotics are covered with material that extends beyond your toes. If your orthotic is of this design, you will likely need to trim the toe end to get a proper fit. The insole from your shoe can be used as a template.  Simply overlay the shoe insert on top of the custom orthotic. Align the heel end while tracing the length of the insert onto the custom orthotic. Use a large scissor to trim the toe end until you get a proper fit in the shoe.   The orthotic needs to be pushed as far back in the shoe as possible. The heel portion should not ride forward so as not to irritate your heel.   Orthotics are designed to work with socks. Excessive perspiration will shorten the life span of the orthotics. Remove the orthotic from the shoe frequently for proper drying.   The break-in period lasts for weeks. People new to orthotics will likely experience new aches and pains. The orthotic is forcing your foot into a new position. Arch, foot and leg muscle aches and fatigue are common during these weeks. Minor discomfort can be considered normal break in phenomenon. Start wearing your orthotic around your home your first day. Limited activity for one to two hours is recommended. You can increase one or two additional hours each day provided the aches and pains are subsiding. The degree of discomfort, fatigue and problems will dictate the speed of break in. You may require multiple weeks to work up to full time use.   Do not continue wearing your orthotics if they are creating problems such as blisters or sores. Do not hesitate to call the clinic to speak with a nurse regarding orthotic   break in, fit, trimming, etc. You may also need to see the doctor if the orthotics are   simply not working out. Adjustments are sometimes made to improve orthotic   function.     Orthotics will only work in certain styles and types of shoes. Orthotics rarely work in dress shoes. Slip-ons, clogs, sandals and heels are particularly troublesome. Specially designed orthotics may be necessary for these types of shoes. Your custom orthotic was designed for activities that require appropriate walking or running shoes. Lace up athletic shoes, walking shoes or work boots  should work appropriately. You may need a wider or longer shoe. Shoes with a removable  or insert work best. In general, you want to remove an insert from the shoe before placing the orthotic into the shoe. Shoes without a removable liner may not work as well.     When purchasing new shoes, bring your orthotics along to get a proper fit. Shop at stores that are familiar with orthotics.   Frequent washing of the orthotic may shorten the life span of the top cover. The top cover can be replaced but will generally last one to five years depending on use and foot perspiration.         CALLUS / CORNS / IPKs  When there is excessive friction or pressure on the skin, the body responds by making the skin thicker to protect the deeper structures from becoming exposed. While this works well to protect the deeper structures, the thickened skin can increase pressure and pain.    CALLUS: Flat, diffuse thickening are simple calluses and they are usually caused by friction. Often these are the result of rubbing on a shoe or going barefoot.    CORNS: Calluses with a central core between the toes are called corns. These result from prominent joints on adjacent toes rubbing together. Theses are a symptom of bone malalignment and will always recur unless the underlying bones are addressed surgically.    IPKs: Calluses with a central core on the ball of the foot are usually IPKs (intractable plantar keratosis). These are caused by excessive pressure from the metatarsals, the bones that make up the ball of the foot. Often one of these bones is too long or too prominent.  Again, these will always recur unless the underlying bone issue is addressed. There is no cure for these. They will either go away by themselves, recur, or more could develop.    ROUTINE MAINTENANCE  1. File them down with a pumice stone or callus file a couple times a week.   2. An electric callus removing device. Amope Pedi Perfect Electronic Pedicure Foot  File and Callus Remover can be a good option.   3. Lotion can be applied to soften the callus. A urea based cream such as Kersal or Vanicream or thicker cream with shea butter are good options.  4. Toe spacers or toe covers can be used for corns, gel pads can be used for other lesions on the bottom of the foot.   If there is a surgical pathology noted, such as a prominent bone, often this needs to be addressed surgically to minimize recurrence. However, sometimes the lesion simply migrates to another spot after surgery, so it is not a guaranteed cure.     There was also discussion of the cost structure of callus care if they were to come back and have it treated in the clinic. Explained that if insurance does not cover it, they would be billed. This charge could range from $100 - $160.  They were also provided information on places to get the callus treatment.      [facility] LOCATIONS  [address]  [phone]   [address]  [phone] [place]  [address] [place] [address]  [phone]   [place]  [address]  [phone]   [place]  [address]  [phone] [place] [address]  [phone]   [place]  [address]  [phone]   [place]  [address]  [phone] [place]  [address]  [phone]

## 2020-03-02 NOTE — PROGRESS NOTES
ASSESSMENT/PLAN:    Encounter Diagnoses   Name Primary?     Right foot pain Yes     Callus of foot      Fat pad atrophy of foot      The patient was told that trimming the calluses might be an out of pocket expense.  I did not bill for today.  He was asked to check with the insurance, should he want it pared down again in the future.    The hyperkeratotic lesion was pared down with a #15 scalpel.  He reported immediate relief upon weightbearing.    I explained how the atrophy of the fat pad might be contributing to the callus.    Alex LUND Darrylkb is referred to [facility] for prescription orthoses.      Recommendations:  Pumice stone to file the lesion down  Stiffer soled shoes to offload the forefoot during gait.  Cushioned shoe inserts with/ without a hole cut below the lesion  Follow up for periodic pairing of the lesions      Body mass index is 25.83 kg/m .    Weight management plan: Patient was referred to their PCP to discuss a diet and exercise plan.      Jah Naik DPM, FACFAS, MS    [facility]      ____________________________________________________________________    HPI:         Chief Complaint: recurring, painful callus, right foot  Onset of problem: months  Pain/ discomfort is described as:  Deep ache  Ratin/10   Frequency:  daily    The pain is made worse with walking  Previous treatment: He reports a history of painful calluses treated by bone surgery.  The calluses resolved.    *  Patient Active Problem List   Diagnosis     Essential hypertension     Chronic atrial fibrillation/Flutter     Tobacco abuse     Iron deficiency anemia     Alcohol dependence in remission (H)     Erosive gastritis     Hyperlipidemia LDL goal <100     Cerebrovascular accident (stroke) (H)     Fatty liver, alcoholic     Diverticulosis of colon     CKD (chronic kidney disease) stage 3, GFR 30-59 ml/min (H)     Long-term (current) use of anticoagulants  [Z79.01]   *  *  Past Surgical History:   Procedure Laterality Date     C NONSPECIFIC PROCEDURE  2002    Rt foot reconstructive     C NONSPECIFIC PROCEDURE  10/12    R hand procedure.  Dr. Deluna     SURGICAL HISTORY OF -   4/11    R Dupuytren's contracture release   Dr. Scruggs   *  *  Current Outpatient Medications   Medication Sig Dispense Refill     amLODIPine (NORVASC) 10 MG tablet TAKE 1 TABLET DAILY 90 tablet 2     pravastatin (PRAVACHOL) 20 MG tablet TAKE 1 TABLET DAILY 90 tablet 2     warfarin (COUMADIN) 5 MG tablet TAKE 1 TABLET DAILY, EXCEPT ONE-HALF (1/2) TABLET ON MONDAY AND FRIDAY AS DIRECTED BY THE ANTICOAGULATION CLINIC 90 tablet 2     warfarin (COUMADIN) 5 MG tablet TAKE 1 TABLET DAILY EXCEPT ONE-HALF (1/2) TABLET ON MONDAY AND FRIDAY AS DIRECTED BY THE ANTICOAGULATION CLINIC 90 tablet 3     ASPIRIN NOT PRESCRIBED, INTENTIONAL, Antiplatelet medication not prescribed intentionally due to history  of gastrointestinal bleeding, and patient on warfarin. (Patient not taking: Reported on 3/2/2020) 0 each 0     cefdinir (OMNICEF) 300 MG capsule Take 1 capsule (300 mg) by mouth 2 times daily (Patient not taking: Reported on 3/2/2020) 14 capsule 0     order for DME Equipment being ordered:  Knee high support hose, compression 20-30 mm (Patient not taking: Reported on 3/2/2020) 1 Device 1       ROS:     A 10-point review of systems was performed and is positive for that noted above in the HPI and as seen below.  All other areas are negative.     Numbness in feet?  no   Calf pain with walking? no  Recent foot/ankle injury? no  Weight change over past 12 months? no  Self perception as overweight? no  Recent flu-like symptoms? no  Joint pain other than feet ? no    Social History: Employment:  ;  Exercise/Physical activity:  no;  Tobacco use:  yes  Social History     Socioeconomic History     Marital status:      Spouse name: Not on file     Number of children: Not on file     Years of education:  "Not on file     Highest education level: Not on file   Occupational History     Not on file   Social Needs     Financial resource strain: Not on file     Food insecurity:     Worry: Not on file     Inability: Not on file     Transportation needs:     Medical: Not on file     Non-medical: Not on file   Tobacco Use     Smoking status: Current Every Day Smoker     Packs/day: 1.00     Years: 57.00     Pack years: 57.00     Types: Cigarettes     Smokeless tobacco: Never Used     Tobacco comment: ppd since age 16 - now 1/2-1 ppd   Substance and Sexual Activity     Alcohol use: No     Comment: last drank at [facility] 8/2010.     Drug use: No     Sexual activity: Yes     Partners: Female   Lifestyle     Physical activity:     Days per week: Not on file     Minutes per session: Not on file     Stress: Not on file   Relationships     Social connections:     Talks on phone: Not on file     Gets together: Not on file     Attends Taoist service: Not on file     Active member of club or organization: Not on file     Attends meetings of clubs or organizations: Not on file     Relationship status: Not on file     Intimate partner violence:     Fear of current or ex partner: Not on file     Emotionally abused: Not on file     Physically abused: Not on file     Forced sexual activity: Not on file   Other Topics Concern     Parent/sibling w/ CABG, MI or angioplasty before 65F 55M? Not Asked   Social History Narrative     Not on file       Family history:  Family History   Problem Relation Age of Onset     Cancer Mother         small cell lung with mets to brain     Diabetes Maternal Grandmother      Respiratory Father         emphysema       Rheumatoid arthritis:  no  Foot Problems: no  Diabetes: no      EXAM:    Vitals: /68   Ht 1.702 m (5' 7\")   Wt 74.8 kg (164 lb 14.4 oz)   BMI 25.83 kg/m    BMI: Body mass index is 25.83 kg/m .  Height: 5' 7\"    Constitutional/ general:  Pt is in no apparent distress, appears " well-nourished.  Cooperative with history and physical exam.     Vascular:  Pedal pulses are palpable bilaterally for both the DP and PT arteries. CFT < 3 sec.  No edema.  Pedal hair growth noted.     Neuro:  Alert and oriented x 3. Coordinated gait.  Light touch sensation is intact to the L4, L5, S1 distributions. No obvious deficits.  No evidence of neurological-based weakness, spasticity, or contracture in the lower extremities.     Derm: Normal texture and turgor.  No erythema, ecchymosis, or cyanosis.  No open lesions.  Painful nucleated callus sub-right fifth metatarsal head.  Post paring, no ulceration.  Plantar fat pad feels very thin.    Musculoskeletal:    Lower extremity muscle strength is normal.  Patient is ambulatory without an assistive device or brace .  No gross deformities.  Pain on palpation:   Sub right 5th metatarsal head.

## 2020-03-23 DIAGNOSIS — I48.20 CHRONIC ATRIAL FIBRILLATION (H): Primary | ICD-10-CM

## 2020-04-13 ENCOUNTER — ANTICOAGULATION THERAPY VISIT (OUTPATIENT)
Dept: ANTICOAGULATION | Facility: CLINIC | Age: 75
End: 2020-04-13

## 2020-04-13 DIAGNOSIS — I48.20 CHRONIC ATRIAL FIBRILLATION (H): ICD-10-CM

## 2020-04-13 DIAGNOSIS — Z79.01 LONG TERM CURRENT USE OF ANTICOAGULANT THERAPY: ICD-10-CM

## 2020-04-13 LAB
CAPILLARY BLOOD COLLECTION: NORMAL
INR PPP: 2.4 (ref 0.86–1.14)

## 2020-04-13 PROCEDURE — 85610 PROTHROMBIN TIME: CPT | Performed by: INTERNAL MEDICINE

## 2020-04-13 PROCEDURE — 36416 COLLJ CAPILLARY BLOOD SPEC: CPT | Performed by: INTERNAL MEDICINE

## 2020-04-13 NOTE — PROGRESS NOTES
Pt denies any changes in diet,health or activity. Alex is aware if signs of clotting (pain, tenderness, swelling, color change in leg or arm, SOB) and bleeding occur (blood in stool, urine, large bruising, bleeding gums, nosebleeds) to have INR check sooner. If sx severe report to ER or concerned for stroke call 911. If general questions or concerns arise, call clinic.    Mabel Garza RN  [facility]

## 2020-05-27 DIAGNOSIS — I48.20 CHRONIC ATRIAL FIBRILLATION (H): ICD-10-CM

## 2020-05-27 DIAGNOSIS — I10 ESSENTIAL HYPERTENSION: ICD-10-CM

## 2020-06-01 ENCOUNTER — ANTICOAGULATION THERAPY VISIT (OUTPATIENT)
Dept: ANTICOAGULATION | Facility: CLINIC | Age: 75
End: 2020-06-01

## 2020-06-01 DIAGNOSIS — Z79.01 LONG TERM CURRENT USE OF ANTICOAGULANT THERAPY: ICD-10-CM

## 2020-06-01 DIAGNOSIS — I48.20 CHRONIC ATRIAL FIBRILLATION (H): ICD-10-CM

## 2020-06-01 LAB
CAPILLARY BLOOD COLLECTION: NORMAL
INR PPP: 2.5 (ref 0.86–1.14)

## 2020-06-01 PROCEDURE — 36416 COLLJ CAPILLARY BLOOD SPEC: CPT | Performed by: INTERNAL MEDICINE

## 2020-06-01 PROCEDURE — 85610 PROTHROMBIN TIME: CPT | Performed by: INTERNAL MEDICINE

## 2020-06-01 NOTE — PROGRESS NOTES
Pt denies any changes in diet,health or activity. Patient to take 2.5 MG M/F and 5 MG AOD and recheck INR in 6 weeks. Tejas is aware if signs of clotting (pain, tenderness, swelling, color change in leg or arm, SOB) and bleeding occur (blood in stool, urine, large bruising, bleeding gums, nosebleeds) to have INR check sooner. If sx severe report to ER or concerned for stroke call 911. If general questions or concerns arise, call clinic.  Mabel Garza RN  [facility]

## 2020-06-03 RX ORDER — AMLODIPINE BESYLATE 10 MG/1
TABLET ORAL
Qty: 90 TABLET | Refills: 3 | Status: SHIPPED | OUTPATIENT
Start: 2020-06-03 | End: 2021-05-12

## 2020-06-03 RX ORDER — WARFARIN SODIUM 5 MG/1
TABLET ORAL
Qty: 90 TABLET | Refills: 3 | Status: SHIPPED | OUTPATIENT
Start: 2020-06-03 | End: 2021-05-12

## 2020-07-13 ENCOUNTER — ANTICOAGULATION THERAPY VISIT (OUTPATIENT)
Dept: NURSING | Facility: CLINIC | Age: 75
End: 2020-07-13
Payer: MEDICARE

## 2020-07-13 DIAGNOSIS — I48.20 CHRONIC ATRIAL FIBRILLATION (H): ICD-10-CM

## 2020-07-13 LAB
CAPILLARY BLOOD COLLECTION: NORMAL
INR PPP: 3 (ref 0.86–1.14)

## 2020-07-13 PROCEDURE — 36416 COLLJ CAPILLARY BLOOD SPEC: CPT | Performed by: INTERNAL MEDICINE

## 2020-07-13 PROCEDURE — 99207 ZZC NO CHARGE NURSE ONLY: CPT

## 2020-07-13 PROCEDURE — 85610 PROTHROMBIN TIME: CPT | Performed by: INTERNAL MEDICINE

## 2020-07-13 NOTE — PROGRESS NOTES
ANTICOAGULATION FOLLOW-UP CLINIC VISIT    Patient Name:  Alex Stanley  Date:  7/13/2020  Contact Type:  Telephone/ Patient    SUBJECTIVE:  Patient Findings     Comments:   The patient was assessed for diet, medication, and activity level changes, missed or extra doses, bruising or bleeding, with no problem findings.          Clinical Outcomes     Negatives:   Major bleeding event, Thromboembolic event, Anticoagulation-related hospital admission, Anticoagulation-related ED visit, Anticoagulation-related fatality    Comments:   The patient was assessed for diet, medication, and activity level changes, missed or extra doses, bruising or bleeding, with no problem findings.             OBJECTIVE    Recent labs: (last 7 days)     07/13/20  1552   INR 3.00*       ASSESSMENT / PLAN  INR assessment THER    Recheck INR In: 5 WEEKS    INR Location Outside lab      Anticoagulation Summary  As of 7/13/2020    INR goal:   2.0-3.0   TTR:   99.7 % (1 y)   INR used for dosing:   3.00 (7/13/2020)   Warfarin maintenance plan:   2.5 mg (5 mg x 0.5) every Mon, Fri; 5 mg (5 mg x 1) all other days   Full warfarin instructions:   2.5 mg every Mon, Fri; 5 mg all other days   Weekly warfarin total:   30 mg   No change documented:   Willow Schilling RN   Plan last modified:   Mabel Garza RN (1/14/2019)   Next INR check:   8/21/2020   Priority:   Maintenance   Target end date:   Indefinite    Indications    Long-term (current) use of anticoagulants [Z79.01] [Z79.01]  Chronic atrial fibrillation/Flutter [I48.20]             Anticoagulation Episode Summary     INR check location:       Preferred lab:       Send INR reminders to:   [facility]    Comments:   INR referral approved 12/9/2019 per Dr Odonnell. Telephone message 12/9/2019      Anticoagulation Care Providers     Provider Role Specialty Phone number    Martin Odonnell MD Referring Internal Medicine [phone]            See the Encounter Report to view  Anticoagulation Flowsheet and Dosing Calendar (Go to Encounters tab in chart review, and find the Anticoagulation Therapy Visit)    Pt INR is 3.0 today. Pt advised to continue taking 2.5 mg on Mondays, Fridays and 5 mg all the other days. Recheck INR in 5 weeks on 8/21/20 at 10:30 am. Pt has the central INR number to call if questions or concerns arise. Tejas aware if signs of clotting (pain, tenderness, swelling, color change in leg or arm, SOB) and bleeding occur (blood in stool, urine, large bruising, bleeding gums, nosebleeds) to have INR check sooner. If sx severe report to ER or concerned for stroke call 911. If general questions or concerns arise, call clinic.         Willow Schilling RN

## 2020-08-10 DIAGNOSIS — E78.5 HYPERLIPIDEMIA LDL GOAL <100: ICD-10-CM

## 2020-08-10 DIAGNOSIS — N18.30 CKD (CHRONIC KIDNEY DISEASE) STAGE 3, GFR 30-59 ML/MIN (H): Primary | ICD-10-CM

## 2020-08-10 RX ORDER — PRAVASTATIN SODIUM 20 MG
TABLET ORAL
Qty: 90 TABLET | Refills: 3 | Status: SHIPPED | OUTPATIENT
Start: 2020-08-10 | End: 2021-06-23

## 2020-08-10 NOTE — TELEPHONE ENCOUNTER
Pravastatin    Routing refill request to provider for review/approval because:  Labs not current:  Lipids    Shavonne BULLOCKN, RN, PHN

## 2020-08-21 ENCOUNTER — ANTICOAGULATION THERAPY VISIT (OUTPATIENT)
Dept: NURSING | Facility: CLINIC | Age: 75
End: 2020-08-21

## 2020-08-21 DIAGNOSIS — I48.20 CHRONIC ATRIAL FIBRILLATION (H): ICD-10-CM

## 2020-08-21 DIAGNOSIS — Z79.01 LONG TERM CURRENT USE OF ANTICOAGULANT THERAPY: ICD-10-CM

## 2020-08-21 LAB
CAPILLARY BLOOD COLLECTION: NORMAL
INR PPP: 2.6 (ref 0.86–1.14)

## 2020-08-21 PROCEDURE — 85610 PROTHROMBIN TIME: CPT | Performed by: INTERNAL MEDICINE

## 2020-08-21 PROCEDURE — 36416 COLLJ CAPILLARY BLOOD SPEC: CPT | Performed by: INTERNAL MEDICINE

## 2020-08-21 NOTE — PROGRESS NOTES
ANTICOAGULATION MANAGEMENT     Patient Name:  Alex Stanley  Date:  2020    ASSESSMENT /SUBJECTIVE:    Today's INR result of 2.6 is therapeutic. Goal INR of 2.0-3.0      Warfarin dose taken: Warfarin taken as previously instructed    Diet: No new diet changes affecting INR    Medication changes/ interactions: No new medications/supplements affecting INR    Previous INR: Therapeutic     S/S of bleeding or thromboembolism: No    New injury or illness: No    Upcoming surgery, procedure or cardioversion: No    Additional findings: None      PLAN:    Spoke with Alex regarding INR result and instructed:     Warfarin Dosing Instructions: Continue your current warfarin dose 2.5 mg MF; 5 mg AOD    Instructed patient to follow up no later than: 6 weeks  Lab visit scheduled    Education provided: None required      Alex verbalizes understanding and agrees to warfarin dosing plan.    Instructed to call the Anticoagulation Clinic for any changes, questions or concerns. (#[phone])        Cecelia Saravia RN      OBJECTIVE:  Recent labs: (last 7 days)     20  1029   INR 2.60*         INR assessment THER    Recheck INR In: 6 WEEKS    INR Location Clinic      Anticoagulation Summary  As of 2020    INR goal:   2.0-3.0   TTR:   100.0 % (1 y)   INR used for dosin.60 (2020)   Warfarin maintenance plan:   2.5 mg (5 mg x 0.5) every Mon, Fri; 5 mg (5 mg x 1) all other days   Full warfarin instructions:   2.5 mg every Mon, Fri; 5 mg all other days   Weekly warfarin total:   30 mg   Plan last modified:   Dianne Duncan RN (2020)   Next INR check:   10/2/2020   Priority:   Maintenance   Target end date:   Indefinite    Indications    Long-term (current) use of anticoagulants [Z79.01] [Z79.01]  Chronic atrial fibrillation/Flutter [I48.20]             Anticoagulation Episode Summary     INR check location:       Preferred lab:       Send INR reminders to:   [facility]     Comments:   INR referral approved 12/9/2019 per Dr Odonnell. Telephone message 12/9/2019      Anticoagulation Care Providers     Provider Role Specialty Phone number    Martin Odonnell MD Referring Internal Medicine [phone]

## 2020-09-15 ENCOUNTER — OFFICE VISIT (OUTPATIENT)
Dept: INTERNAL MEDICINE | Facility: CLINIC | Age: 75
End: 2020-09-15
Payer: MEDICARE

## 2020-09-15 VITALS
OXYGEN SATURATION: 99 % | HEART RATE: 80 BPM | RESPIRATION RATE: 16 BRPM | SYSTOLIC BLOOD PRESSURE: 100 MMHG | HEIGHT: 67 IN | DIASTOLIC BLOOD PRESSURE: 60 MMHG | BODY MASS INDEX: 26.21 KG/M2 | TEMPERATURE: 97.1 F | WEIGHT: 167 LBS

## 2020-09-15 DIAGNOSIS — I48.20 CHRONIC ATRIAL FIBRILLATION (H): ICD-10-CM

## 2020-09-15 DIAGNOSIS — H35.30 MACULAR DEGENERATION (SENILE) OF RETINA: ICD-10-CM

## 2020-09-15 DIAGNOSIS — Z23 VACCINE FOR TETANUS TOXOID: ICD-10-CM

## 2020-09-15 DIAGNOSIS — Z87.891 PERSONAL HISTORY OF TOBACCO USE: ICD-10-CM

## 2020-09-15 DIAGNOSIS — Z12.11 COLON CANCER SCREENING: ICD-10-CM

## 2020-09-15 DIAGNOSIS — E78.5 HYPERLIPIDEMIA LDL GOAL <100: ICD-10-CM

## 2020-09-15 DIAGNOSIS — Z72.0 TOBACCO ABUSE: ICD-10-CM

## 2020-09-15 DIAGNOSIS — I10 ESSENTIAL HYPERTENSION: ICD-10-CM

## 2020-09-15 DIAGNOSIS — N18.30 CKD (CHRONIC KIDNEY DISEASE) STAGE 3, GFR 30-59 ML/MIN (H): ICD-10-CM

## 2020-09-15 DIAGNOSIS — Z00.00 ENCOUNTER FOR MEDICARE ANNUAL WELLNESS EXAM: Primary | ICD-10-CM

## 2020-09-15 DIAGNOSIS — F10.21 ALCOHOL DEPENDENCE IN REMISSION (H): ICD-10-CM

## 2020-09-15 PROCEDURE — G0439 PPPS, SUBSEQ VISIT: HCPCS | Performed by: INTERNAL MEDICINE

## 2020-09-15 PROCEDURE — 99214 OFFICE O/P EST MOD 30 MIN: CPT | Mod: 25 | Performed by: INTERNAL MEDICINE

## 2020-09-15 PROCEDURE — 90471 IMMUNIZATION ADMIN: CPT | Performed by: INTERNAL MEDICINE

## 2020-09-15 PROCEDURE — 90714 TD VACC NO PRESV 7 YRS+ IM: CPT | Performed by: INTERNAL MEDICINE

## 2020-09-15 PROCEDURE — G0296 VISIT TO DETERM LDCT ELIG: HCPCS | Performed by: INTERNAL MEDICINE

## 2020-09-15 RX ORDER — VARENICLINE TARTRATE 1 MG/1
1 TABLET, FILM COATED ORAL 2 TIMES DAILY
Qty: 56 TABLET | Refills: 2 | Status: SHIPPED | OUTPATIENT
Start: 2020-09-15 | End: 2021-06-23

## 2020-09-15 RX ORDER — ANTIOX #8/OM3/DHA/EPA/LUT/ZEAX 250-2.5 MG
1 CAPSULE ORAL 2 TIMES DAILY
COMMUNITY
Start: 2020-09-15

## 2020-09-15 ASSESSMENT — MIFFLIN-ST. JEOR: SCORE: 1461.14

## 2020-09-15 NOTE — NURSING NOTE
Prior to immunization administration, verified patients identity using patient s name and date of birth. Please see Immunization Activity for additional information.     Screening Questionnaire for Adult Immunization    Are you sick today?   No   Do you have allergies to medications, food, a vaccine component or latex?   No   Have you ever had a serious reaction after receiving a vaccination?   No   Do you have a long-term health problem with heart, lung, kidney, or metabolic disease (e.g., diabetes), asthma, a blood disorder, no spleen, complement component deficiency, a cochlear implant, or a spinal fluid leak?  Are you on long-term aspirin therapy?   No   Do you have cancer, leukemia, HIV/AIDS, or any other immune system problem?   No   Do you have a parent, brother, or sister with an immune system problem?   No   In the past 3 months, have you taken medications that affect  your immune system, such as prednisone, other steroids, or anticancer drugs; drugs for the treatment of rheumatoid arthritis, Crohn s disease, or psoriasis; or have you had radiation treatments?   No   Have you had a seizure, or a brain or other nervous system problem?   No   During the past year, have you received a transfusion of blood or blood    products, or been given immune (gamma) globulin or antiviral drug?   No   For women: Are you pregnant or is there a chance you could become       pregnant during the next month?   No   Have you received any vaccinations in the past 4 weeks?   No     Immunization questionnaire answers were all negative.        Per orders of Dr. Odonnell, injection of TD given by Mabel Mahajan. Patient instructed to remain in clinic for 15 minutes afterwards, and to report any adverse reaction to me immediately.       Screening performed by Mabel Mahajan on 9/15/2020 at 4:42 PM.

## 2020-09-15 NOTE — PROGRESS NOTES
"flavio  SUBJECTIVE:   Alex Stanley is a 75 year old male who presents for Preventive Visit.    Are you in the first 12 months of your Medicare Part B coverage?  No    Physical Health:    In general, how would you rate your overall physical health? good    Outside of work, how many days during the week do you exercise? 2-3 days/week    Outside of work, approximately how many minutes a day do you exercise?15-30 minutes    If you drink alcohol do you typically have >3 drinks per day or >7 drinks per week? No    Do you usually eat at least 4 servings of fruit and vegetables a day, include whole grains & fiber and avoid regularly eating high fat or \"junk\" foods? Yes    Do you have any problems taking medications regularly?  No    Do you have any side effects from medications? none    Needs assistance for the following daily activities: no assistance needed    Which of the following safety concerns are present in your home?  none identified     Hearing impairment: No    In the past 6 months, have you been bothered by leaking of urine? no    Mental Health:    In general, how would you rate your overall mental or emotional health? excellent  PHQ-2 Score:      Do you feel safe in your environment? Yes    Have you ever done Advance Care Planning? (For example, a Health Directive, POLST, or a discussion with a medical provider or your loved ones about your wishes): Yes, advance care planning is on file.    Additional concerns to address?  No    Fall risk:  Fallen 2 or more times in the past year?: No  Any fall with injury in the past year?: No    Cognitive Screenin) Repeat 3 items (Leader, Season, Table)    2) Clock draw: NORMAL  3) 3 item recall: Recalls 2 objects   Results: NORMAL clock, 1-2 items recalled: COGNITIVE IMPAIRMENT LESS LIKELY    Mini-CogTM Copyright S Ni. Licensed by the author for use in [facility]; reprinted with permission (isac@.[facility]). All rights reserved.      Do you have sleep " apnea, excessive snoring or daytime drowsiness?: no        Reviewed and updated as needed this visit by clinical staff         Reviewed and updated as needed this visit by Provider        Social History     Tobacco Use     Smoking status: Current Every Day Smoker     Packs/day: 1.00     Years: 57.00     Pack years: 57.00     Types: Cigarettes     Smokeless tobacco: Never Used     Tobacco comment: ppd since age 16 - now 1/2-1 ppd   Substance Use Topics     Alcohol use: No     Comment: last drank at [facility] 8/2010.                           Current providers sharing in care for this patient include:   Patient Care Team:  Martin Odonnell MD as PCP - General (Internal Medicine)  Martin Odonnell MD as Assigned PCP    The following health maintenance items are reviewed in Epic and correct as of today:  Health Maintenance   Topic Date Due     ZOSTER IMMUNIZATION (2 of 3) 01/18/2017     COLORECTAL CANCER SCREENING  12/05/2019     DTAP/TDAP/TD IMMUNIZATION (2 - Td) 12/14/2019     PHQ-2  01/01/2020     MEDICARE ANNUAL WELLNESS VISIT  06/14/2020     BMP  06/14/2020     LIPID  06/14/2020     MICROALBUMIN  06/14/2020     FALL RISK ASSESSMENT  06/14/2020     LUNG CANCER SCREENING ANNUAL  07/09/2020     ADVANCE CARE PLANNING  04/12/2024     HEPATITIS C SCREENING  Completed     PNEUMOCOCCAL IMMUNIZATION 65+ LOW/MEDIUM RISK  Completed     AORTIC ANEURYSM SCREENING (SYSTEM ASSIGNED)  Completed     IPV IMMUNIZATION  Aged Out     MENINGITIS IMMUNIZATION  Aged Out     HEPATITIS B IMMUNIZATION  Aged Out     INFLUENZA VACCINE  Discontinued     Additional issues to address:  1.  Had wavy line and dark spot in vision this spring, found to have AMD.  Getting injections.  2.  Follow-up HTN.  Patient is checking outpatient blood pressures, at home.  Results are 118-120.  He reports no side effects from BP medications.      ROS:  Constitutional, HEENT, cardiovascular, pulmonary, gi and gu systems are negative, except as otherwise  "noted.  Bruises on arms easily    OBJECTIVE:   /60 (BP Location: Left arm, Patient Position: Chair, Cuff Size: Adult Regular)   Pulse 80   Temp 97.1  F (36.2  C) (Temporal)   Resp 16   Ht 1.702 m (5' 7\")   Wt 75.8 kg (167 lb)   SpO2 99%   BMI 26.16 kg/m   Estimated body mass index is 25.83 kg/m  as calculated from the following:    Height as of 3/2/20: 1.702 m (5' 7\").    Weight as of 3/2/20: 74.8 kg (164 lb 14.4 oz).  EXAM:   GENERAL: healthy, alert and no distress  NECK: no adenopathy, no asymmetry, masses, or scars and thyroid normal to palpation  RESP: lungs clear to auscultation - no rales, rhonchi or wheezes  CV: irregular rate and rhythm, normal S1 S2, no S3 or S4, no murmur, click or rub, no peripheral edema and peripheral pulses strong  ABDOMEN: soft, nontender, no hepatosplenomegaly, no masses and bowel sounds normal  MS: no gross musculoskeletal defects noted, no edema      ASSESSMENT / PLAN:     ASSESSMENT:   1.  Annual Wellness Visit  2.  HTN, controlled   3.  Follow-up hyperlipidemia, CKD, due for lab recheck  4.  Alcohol dependence, in remission   5.  Atrial fib, on anticoagulation, doing well.    6.  Tobacco abuse, discussed options to quit  7.  Higher risk for Coronavirus, due to smoking, HTN, age.   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      PLAN:  1.  Complete CT chest  2.  Consider using [facility]  For Chantix prescription   3.  Tetanus shot today.  4.  Consider getting the Shingrix vaccine series from the pharmacy (second shot 2-6 months later).   5.  Colonoscopy advised in 2020, referral placed      6.  Check labs soon, fasting    COUNSELING:  Reviewed preventive health counseling, as reflected in patient instructions    Estimated body mass index is 25.83 kg/m  as calculated from the following:    Height as of 3/2/20: 1.702 m (5' 7\").    Weight as of 3/2/20: 74.8 kg (164 lb 14.4 oz).        He " reports that he has been smoking cigarettes. He has a 57.00 pack-year smoking history. He has never used smokeless tobacco.  Tobacco Cessation Action Plan:   Pharmacotherapies : Chantix    Appropriate preventive services were discussed with this patient, including applicable screening as appropriate for cardiovascular disease, diabetes, osteopenia/osteoporosis, and glaucoma.  As appropriate for age/gender, discussed screening for colorectal cancer, prostate cancer, breast cancer, and cervical cancer. Checklist reviewing preventive services available has been given to the patient.    Reviewed patients plan of care and provided an AVS. The Basic Care Plan (routine screening as documented in Health Maintenance) for Alex meets the Care Plan requirement. This Care Plan has been established and reviewed with the Patient.    Counseling Resources:  ATP IV Guidelines  Pooled Cohorts Equation Calculator  Breast Cancer Risk Calculator  BRCA-Related Cancer Risk Assessment: FHS-7 Tool  FRAX Risk Assessment  ICSI Preventive Guidelines  Dietary Guidelines for Americans, 2010  [employer]'s MyPlate  ASA Prophylaxis  Lung CA Screening    Martin Odonnell MD  [facility]  Lung Cancer Screening Shared Decision Making Visit     Alex Stanley is eligible for lung cancer screening on the basis of the information provided in my signed lung cancer screening order.     I have discussed with patient the risks and benefits of screening for lung cancer with low-dose CT.     The risks include:  radiation exposure: one low dose chest CT has as much ionizing radiation as about 15 chest x-rays or 6 months of background radiation living in [place]    false positives: 96% of positive findings/nodules are NOT cancer, but some might still require additional diagnostic evaluation, including biopsy  over-diagnosis: some slow growing cancers that might never have been clinically significant will be detected and treated unnecessarily      The benefit of early detection of lung cancer is contingent upon adherence to annual screening or more frequent follow up if indicated.     Furthermore, reaping the benefits of screening requires Alxe Stanley to be willing and physically able to undergo diagnostic procedures, if indicated. Although no specific guide is available for determining severity of comorbidities, it is reasonable to withhold screening in patients who have greater mortality risk from other diseases.     We did discuss that the only way to prevent lung cancer is to not smoke. Smoking cessation counseling was given, duration < 3 minutes.      I did not offer risk estimation using a calculator such as this one:    ShouldIScreen

## 2020-09-15 NOTE — PATIENT INSTRUCTIONS
Patient Education   Personalized Prevention Plan  You are due for the preventive services outlined below.  Your care team is available to assist you in scheduling these services.  If you have already completed any of these items, please share that information with your care team to update in your medical record.  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        PLAN:  1.  Complete CT chest  2.  Consider using [facility]  For Chantix prescription   3.  Tetanus shot today.  4.  Consider getting the Shingrix vaccine series from the pharmacy (second shot 2-6 months later).   5.  Colonoscopy advised in 2020, referral placed      6.  Check labs soon, fasting  Lung Cancer Screening   Frequently Asked Questions  If you are at high-risk for lung cancer, getting screened with low-dose computed tomography (LDCT) every year can help save your life. This handout offers answers to some of the most common questions about lung cancer screening. If you have other questions, please call [phone] ([phone]).     What is it?  Lung cancer screening uses special X-ray technology to create an image of your lung tissue. The exam is quick and easy and takes less than 10 seconds. We don t give you any medicine or use any needles. You can eat before and after the exam. You don t need to change your clothes as long as the clothing on your chest doesn t contain metal. But, you  do need to be able to hold your breath for at least 6 seconds during the exam.    What is the goal of lung cancer screening?  The goal of lung cancer screening is to save lives. Many times, lung cancer is not found until a person starts having physical symptoms. Lung cancer screening can help detect lung cancer in the earliest stages when it may be easier to treat.    Who should be screened for lung cancer?  We suggest lung cancer screening for anyone who is at high-risk for lung cancer. You are in the high-risk group if you:      are between the ages of 55 and 79, and    have smoked at least 1 pack of cigarettes a day for 30 or more years, and    still smoke or have quit within the past 15 years.    However, if you have a new cough or shortness of breath, you should talk to your doctor before being screened.    Some national lung health advocacy groups also recommend screening for people ages 50 to 79 who have smoked an average of 1 pack of cigarettes a day for 20 years. They must also have at least 1 other risk factor for lung cancer, not including exposure to secondhand smoke. Other risk factors are having had cancer in the past, emphysema, pulmonary fibrosis, COPD, a family history of lung cancer, or exposure to certain materials such as arsenic, asbestos, beryllium, cadmium, chromium, diesel fumes, nickel, radon or silica. Your care team can help you know if you have one of these risk factors.     Why does it matter if I have symptoms?  Certain symptoms can be a sign that you have a condition in your lungs that should be checked and treated by your doctor. These symptoms include fever, chest pain, a new or changing cough, shortness of breath that you have never felt before, coughing up blood or unexplained weight loss. Having any of these symptoms can greatly affect the results of lung cancer screening.       Should all smokers get an LDCT lung cancer screening exam?  It depends. Lung cancer screening is for a  very specific group of men and women who have a history of heavy smoking over a long period of time (see  Who should be screened for lung cancer  above).  I am in the high-risk group, but have been diagnosed with cancer in the past. Is LDCT lung cancer screening right for me?  In some cases, you should not have LDCT lung screening, such as when your doctor is already following your cancer with CT scan studies. Your doctor will help you decide if LDCT lung screening is right for you.  Do I need to have a screening exam every year?  Yes. If you are in the high-risk group described earlier, you should get an LDCT lung cancer screening exam every year until you are 79, or are no longer willing or able to undergo screening and possible procedures to diagnose and treat lung cancer.  How effective is LDCT at preventing death from lung cancer?  Studies have shown that LDCT lung cancer screening can lower the risk of death from lung cancer by 20 percent in people who are at high-risk.  What are the risks?  There are some risks and limitations of LDCT lung cancer screening. We want to make sure you understand the risks and benefits, so please let us know if you have any questions. Your doctor may want to talk with you more about these risks.    Radiation exposure: As with any exam that uses radiation, there is a very small increased risk of cancer. The amount of radiation in LDCT is small--about the same amount a person would get from a mammogram. Your doctor orders the exam when he or she feels the potential benefits outweigh the risks.    False negatives: No test is perfect, including LDCT. It is possible that you may have a medical condition, including lung cancer, that is not found during your exam. This is called a false negative result.    False positives and more testing: LDCT very often finds something in the lung that could be cancer, but in fact is not. This is called a false positive result. False positive tests  often cause anxiety. To make sure these findings are not cancer, you may need to have more tests. These tests will be done only if you give us permission. Sometimes patients need a treatment that can have side effects, such as a biopsy. For more information on false positives, see  What can I expect from the results?     Findings not related to lung cancer: Your LDCT exam also takes pictures of areas of your body next to your lungs. In a very small number of cases, the CT scan will show an abnormal finding in one of these areas, such as your kidneys, adrenal glands, liver or thyroid. This finding may not be serious, but you may need more tests. Your doctor can help you decide what other tests you may need, if any.  What can I expect from the results?  About 1 out of 4 LDCT exams will find something that may need more tests. Most of the time, these findings are lung nodules. Lung nodules are very small collections of tissue in the lung. These nodules are very common, and the vast majority--more than 97 percent--are not cancer (benign). Most are normal lymph nodes or small areas of scarring from past infections.  But, if a small lung nodule is found to be cancer, the cancer can be cured more than 90 percent of the time. To know if the nodule is cancer, we may need to get more images before your next yearly screening exam. If the nodule has suspicious features (for example, it is large, has an odd shape or grows over time), we will refer you to a specialist for further testing.  Will my doctor also get the results?  Yes. Your doctor will get a copy of your results.  Is it okay to keep smoking now that there s a cancer screening exam?  No. Tobacco is one of the strongest cancer-causing agents. It causes not only lung cancer, but other cancers and cardiovascular (heart) diseases as well. The damage caused by smoking builds over time. This means that the longer you smoke, the higher your risk of disease. While it is never  too late to quit, the sooner you quit, the better.  Where can I find help to quit smoking?  The best way to prevent lung cancer is to stop smoking. If you have already quit smoking, congratulations and keep it up! For help on quitting smoking, please call [employer] at 6-948-118-TMHN ([phone]) or the American Cancer Society at 1-455.334.9802 to find local resources near you.  One-on-one health coaching:  If you d prefer to work individually with a health care provider on tobacco cessation, we offer:      Medication Therapy Management:  Our specially trained pharmacists work closely with you and your doctor to help you quit smoking.  Call [phone] or [phone] (toll free).     Can Do: Health coaching offered by [facility].  [email or web address]

## 2020-09-21 ENCOUNTER — TELEPHONE (OUTPATIENT)
Dept: INTERNAL MEDICINE | Facility: CLINIC | Age: 75
End: 2020-09-21

## 2020-09-21 NOTE — TELEPHONE ENCOUNTER
Reason for Call:  Other call back    Detailed comments: Alex called to say he is due for his annual lung cancer screening.  He is asking who is he to contact.    Phone Number Patient can be reached at: [phone]    Best Time: any    Can we leave a detailed message on this number? YES    Call taken on 9/21/2020 at 9:32 AM by Larissa Branch

## 2020-09-21 NOTE — TELEPHONE ENCOUNTER
PCP please advise of order request. Patient due for annual CT chest lung cancer screen.    Last completed on 7/9/2019

## 2020-09-30 ENCOUNTER — HOSPITAL ENCOUNTER (OUTPATIENT)
Dept: CT IMAGING | Facility: CLINIC | Age: 75
Discharge: HOME | End: 2020-09-30
Attending: INTERNAL MEDICINE | Admitting: INTERNAL MEDICINE
Payer: MEDICARE

## 2020-09-30 DIAGNOSIS — Z87.891 PERSONAL HISTORY OF TOBACCO USE: ICD-10-CM

## 2020-09-30 PROCEDURE — G0297 LDCT FOR LUNG CA SCREEN: HCPCS

## 2020-10-01 ENCOUNTER — ANTICOAGULATION THERAPY VISIT (OUTPATIENT)
Dept: NURSING | Facility: CLINIC | Age: 75
End: 2020-10-01
Payer: MEDICARE

## 2020-10-01 DIAGNOSIS — N18.30 CKD (CHRONIC KIDNEY DISEASE) STAGE 3, GFR 30-59 ML/MIN (H): ICD-10-CM

## 2020-10-01 DIAGNOSIS — E78.5 HYPERLIPIDEMIA LDL GOAL <100: ICD-10-CM

## 2020-10-01 DIAGNOSIS — I10 ESSENTIAL HYPERTENSION: ICD-10-CM

## 2020-10-01 DIAGNOSIS — I48.20 CHRONIC ATRIAL FIBRILLATION (H): ICD-10-CM

## 2020-10-01 DIAGNOSIS — Z79.01 LONG TERM CURRENT USE OF ANTICOAGULANT THERAPY: ICD-10-CM

## 2020-10-01 LAB
ALBUMIN SERPL-MCNC: 3.8 G/DL (ref 3.4–5)
ALP SERPL-CCNC: 74 U/L (ref 40–150)
ALT SERPL W P-5'-P-CCNC: 24 U/L (ref 0–70)
ANION GAP SERPL CALCULATED.3IONS-SCNC: 4 MMOL/L (ref 3–14)
AST SERPL W P-5'-P-CCNC: 23 U/L (ref 0–45)
BILIRUB SERPL-MCNC: 0.7 MG/DL (ref 0.2–1.3)
BUN SERPL-MCNC: 14 MG/DL (ref 7–30)
CALCIUM SERPL-MCNC: 8.8 MG/DL (ref 8.5–10.1)
CAPILLARY BLOOD COLLECTION: NORMAL
CHLORIDE SERPL-SCNC: 108 MMOL/L (ref 94–109)
CHOLEST SERPL-MCNC: 102 MG/DL
CO2 SERPL-SCNC: 28 MMOL/L (ref 20–32)
CREAT SERPL-MCNC: 1.26 MG/DL (ref 0.66–1.25)
GFR SERPL CREATININE-BSD FRML MDRD: 56 ML/MIN/{1.73_M2}
GLUCOSE SERPL-MCNC: 70 MG/DL (ref 70–99)
HDLC SERPL-MCNC: 46 MG/DL
INR PPP: 2.8 (ref 0.86–1.14)
LDLC SERPL CALC-MCNC: 45 MG/DL
NONHDLC SERPL-MCNC: 56 MG/DL
POTASSIUM SERPL-SCNC: 3.9 MMOL/L (ref 3.4–5.3)
PROT SERPL-MCNC: 7.2 G/DL (ref 6.8–8.8)
SODIUM SERPL-SCNC: 140 MMOL/L (ref 133–144)
TRIGL SERPL-MCNC: 57 MG/DL

## 2020-10-01 PROCEDURE — 82043 UR ALBUMIN QUANTITATIVE: CPT | Performed by: INTERNAL MEDICINE

## 2020-10-01 PROCEDURE — 80053 COMPREHEN METABOLIC PANEL: CPT | Performed by: INTERNAL MEDICINE

## 2020-10-01 PROCEDURE — 36415 COLL VENOUS BLD VENIPUNCTURE: CPT | Performed by: INTERNAL MEDICINE

## 2020-10-01 PROCEDURE — 99207 PR NO CHARGE NURSE ONLY: CPT

## 2020-10-01 PROCEDURE — 85610 PROTHROMBIN TIME: CPT | Performed by: INTERNAL MEDICINE

## 2020-10-01 PROCEDURE — 80061 LIPID PANEL: CPT | Performed by: INTERNAL MEDICINE

## 2020-10-01 NOTE — PROGRESS NOTES
ANTICOAGULATION FOLLOW-UP CLINIC VISIT    Patient Name:  Alex Stanley  Date:  10/1/2020  Contact Type:  Telephone    SUBJECTIVE:  Patient Findings     Comments:  Signs/symptoms of thrombosis - .NOP  The patient was assessed for diet, medication, and activity level changes, missed or extra doses, bruising or bleeding, with no problem findings.          Clinical Outcomes     Comments:  The patient was assessed for diet, medication, and activity level changes, missed or extra doses, bruising or bleeding, with no problem findings.             OBJECTIVE    Recent labs: (last 7 days)     10/01/20  1520   INR 2.80*       ASSESSMENT / PLAN  INR assessment THER    Recheck INR In: 6 WEEKS    INR Location Clinic      Anticoagulation Summary  As of 10/1/2020    INR goal:  2.0-3.0   TTR:  100.0 % (1 y)   INR used for dosin.80 (10/1/2020)   Warfarin maintenance plan:  2.5 mg (5 mg x 0.5) every Mon, Fri; 5 mg (5 mg x 1) all other days   Full warfarin instructions:  2.5 mg every Mon, Fri; 5 mg all other days   Weekly warfarin total:  30 mg   Plan last modified:  Dianne Duncan RN (2020)   Next INR check:  2020   Priority:  Maintenance   Target end date:  Indefinite    Indications    Long-term (current) use of anticoagulants [Z79.01] [Z79.01]  Chronic atrial fibrillation/Flutter [I48.20]             Anticoagulation Episode Summary     INR check location:      Preferred lab:      Send INR reminders to:  [facility]    Comments:  INR referral approved 2019 per Dr Odonnell. Telephone message 2019      Anticoagulation Care Providers     Provider Role Specialty Phone number    Martin Odonnell MD Referring Internal Medicine [phone]            See the Encounter Report to view Anticoagulation Flowsheet and Dosing Calendar (Go to Encounters tab in chart review, and find the Anticoagulation Therapy Visit)        Tara Cavazos RN

## 2020-10-02 LAB
CREAT UR-MCNC: 166 MG/DL
MICROALBUMIN UR-MCNC: 103 MG/L
MICROALBUMIN/CREAT UR: 62.05 MG/G CR (ref 0–17)

## 2020-10-05 DIAGNOSIS — Z11.59 ENCOUNTER FOR SCREENING FOR OTHER VIRAL DISEASES: Primary | ICD-10-CM

## 2020-10-15 ENCOUNTER — TELEPHONE (OUTPATIENT)
Dept: INTERNAL MEDICINE | Facility: CLINIC | Age: 75
End: 2020-10-15

## 2020-10-15 DIAGNOSIS — I48.20 CHRONIC ATRIAL FIBRILLATION (H): ICD-10-CM

## 2020-10-15 DIAGNOSIS — Z79.01 LONG TERM CURRENT USE OF ANTICOAGULANT THERAPY: ICD-10-CM

## 2020-10-15 NOTE — TELEPHONE ENCOUNTER
[facility]-PROCEDURAL ANTICOAGULATION  MANAGEMENT    Assessment     Warfarin interruption plan for colonoscopy on 10/26/2020.    A. 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   Alycia Jack [facility]    Subjective/Objective:      Alex Stanley, a 75 year old male    Reason for Anticoagulation: A. Fib and CVA    Goal INR Range: 2.0-3.0    Patient bridged in past: No    Pertinent History: N/A    Wt Readings from Last 3 Encounters:   09/15/20 75.8 kg (167 lb)   03/02/20 74.8 kg (164 lb 14.4 oz)   12/16/19 74.8 kg (165 lb)        Ideal body weight: 66.1 kg (145 lb 11.6 oz)  Adjusted ideal body weight: 70 kg (154 lb 3.8 oz)     Lab Results   Component Value Date    INR 2.80 (H) 10/01/2020    INR 2.60 (H) 08/21/2020    INR 3.00 (H) 07/13/2020     Lab Results   Component Value Date    HGB 14.1 06/14/2018    HCT 44.3 10/08/2012     10/08/2012     Lab Results   Component Value Date    CR 1.26 (H) 10/01/2020    CR 1.27 (H) 06/14/2019    CR 1.26 (H) 07/31/2018     CrCl cannot be calculated (Patient's most recent lab result is older than the maximum 10 days allowed.).

## 2020-10-15 NOTE — TELEPHONE ENCOUNTER
Please advise or should patient contact gastro? Routing to [facility] as well to be aware

## 2020-10-15 NOTE — TELEPHONE ENCOUNTER
Patient is having a colonoscopy on 10/26/2020 and he would like to know when to stop taking blood thinner.

## 2020-10-16 NOTE — TELEPHONE ENCOUNTER
Spoke to Alex and gave recommendations to HOLD 10/21-10/25. Plan to resume 10/26 at 5 mg then resume maintenance. Recheck in 1 week, 11/2. Appointment scheduled. Patient instructed to follow surgeon's recommendations, if they instruct him to hold on restarting his warfarin he will follow those instructions and notify the ACC.    Ramonita Michele RN on 10/16/2020 at 10:09 AM

## 2020-10-23 DIAGNOSIS — Z11.59 ENCOUNTER FOR SCREENING FOR OTHER VIRAL DISEASES: ICD-10-CM

## 2020-10-23 PROCEDURE — U0003 INFECTIOUS AGENT DETECTION BY NUCLEIC ACID (DNA OR RNA); SEVERE ACUTE RESPIRATORY SYNDROME CORONAVIRUS 2 (SARS-COV-2) (CORONAVIRUS DISEASE [COVID-19]), AMPLIFIED PROBE TECHNIQUE, MAKING USE OF HIGH THROUGHPUT TECHNOLOGIES AS DESCRIBED BY CMS-2020-01-R: HCPCS | Performed by: INTERNAL MEDICINE

## 2020-10-24 LAB
SARS-COV-2 RNA SPEC QL NAA+PROBE: NOT DETECTED
SPECIMEN SOURCE: NORMAL

## 2020-10-26 ENCOUNTER — DOCUMENTATION ONLY (OUTPATIENT)
Dept: INTERNAL MEDICINE | Facility: CLINIC | Age: 75
End: 2020-10-26

## 2020-10-26 ENCOUNTER — HOSPITAL ENCOUNTER (OUTPATIENT)
Facility: CLINIC | Age: 75
Discharge: HOME | End: 2020-10-26
Attending: INTERNAL MEDICINE | Admitting: INTERNAL MEDICINE
Payer: MEDICARE

## 2020-10-26 VITALS
HEIGHT: 67 IN | RESPIRATION RATE: 17 BRPM | SYSTOLIC BLOOD PRESSURE: 118 MMHG | WEIGHT: 160 LBS | DIASTOLIC BLOOD PRESSURE: 82 MMHG | OXYGEN SATURATION: 97 % | HEART RATE: 74 BPM | BODY MASS INDEX: 25.11 KG/M2

## 2020-10-26 LAB — COLONOSCOPY: NORMAL

## 2020-10-26 PROCEDURE — 250N000011 HC RX IP 250 OP 636: Performed by: INTERNAL MEDICINE

## 2020-10-26 PROCEDURE — 45380 COLONOSCOPY AND BIOPSY: CPT | Performed by: INTERNAL MEDICINE

## 2020-10-26 PROCEDURE — 88305 TISSUE EXAM BY PATHOLOGIST: CPT | Mod: TC | Performed by: INTERNAL MEDICINE

## 2020-10-26 PROCEDURE — 88305 TISSUE EXAM BY PATHOLOGIST: CPT | Mod: 26 | Performed by: PATHOLOGY

## 2020-10-26 PROCEDURE — G0500 MOD SEDAT ENDO SERVICE >5YRS: HCPCS | Performed by: INTERNAL MEDICINE

## 2020-10-26 RX ORDER — FENTANYL CITRATE 50 UG/ML
INJECTION, SOLUTION INTRAMUSCULAR; INTRAVENOUS PRN
Status: DISCONTINUED | OUTPATIENT
Start: 2020-10-26 | End: 2020-10-26 | Stop reason: HOSPADM

## 2020-10-26 RX ORDER — ONDANSETRON 2 MG/ML
4 INJECTION INTRAMUSCULAR; INTRAVENOUS EVERY 6 HOURS PRN
Status: DISCONTINUED | OUTPATIENT
Start: 2020-10-26 | End: 2020-10-26 | Stop reason: HOSPADM

## 2020-10-26 RX ORDER — PROCHLORPERAZINE MALEATE 5 MG
5 TABLET ORAL EVERY 6 HOURS PRN
Status: DISCONTINUED | OUTPATIENT
Start: 2020-10-26 | End: 2020-10-26 | Stop reason: HOSPADM

## 2020-10-26 RX ORDER — LIDOCAINE 40 MG/G
CREAM TOPICAL
Status: DISCONTINUED | OUTPATIENT
Start: 2020-10-26 | End: 2020-10-26 | Stop reason: HOSPADM

## 2020-10-26 RX ORDER — FLUMAZENIL 0.1 MG/ML
0.2 INJECTION, SOLUTION INTRAVENOUS
Status: DISCONTINUED | OUTPATIENT
Start: 2020-10-26 | End: 2020-10-26 | Stop reason: HOSPADM

## 2020-10-26 RX ORDER — ONDANSETRON 4 MG/1
4 TABLET, ORALLY DISINTEGRATING ORAL EVERY 6 HOURS PRN
Status: DISCONTINUED | OUTPATIENT
Start: 2020-10-26 | End: 2020-10-26 | Stop reason: HOSPADM

## 2020-10-26 RX ORDER — NALOXONE HYDROCHLORIDE 0.4 MG/ML
.1-.4 INJECTION, SOLUTION INTRAMUSCULAR; INTRAVENOUS; SUBCUTANEOUS
Status: DISCONTINUED | OUTPATIENT
Start: 2020-10-26 | End: 2020-10-26 | Stop reason: HOSPADM

## 2020-10-26 RX ORDER — ONDANSETRON 2 MG/ML
4 INJECTION INTRAMUSCULAR; INTRAVENOUS
Status: DISCONTINUED | OUTPATIENT
Start: 2020-10-26 | End: 2020-10-26 | Stop reason: HOSPADM

## 2020-10-26 ASSESSMENT — MIFFLIN-ST. JEOR: SCORE: 1429.39

## 2020-10-26 NOTE — PROGRESS NOTES
ANTICOAGULATION  MANAGEMENT: Discharge Review    Alex ASHELY Williamjacinta chart reviewed for anticoagulation continuity of care    Outpatient surgery/procedure on 10/26 for EGD.    Discharge disposition: Home    Results:    No results for input(s): INR, [id number], AXA in the last 168 hours.  Anticoagulation inpatient management:     not applicable  held warfarin 4 days prior to procedure    Anticoagulation discharge instructions:     Warfarin dosing: home regimen continued   Bridging: No   INR goal change: No      Medication changes affecting anticoagulation: No    Additional factors affecting anticoagulation: No    Plan     No adjustment to anticoagulation plan needed    Patient not contacted already has lab appointment scheduled for 11/2    No adjustment to Anticoagulation Calendar was required    Mabel Garza RN

## 2020-10-28 LAB — COPATH REPORT: NORMAL

## 2020-11-02 ENCOUNTER — ANTICOAGULATION THERAPY VISIT (OUTPATIENT)
Dept: ANTICOAGULATION | Facility: CLINIC | Age: 75
End: 2020-11-02

## 2020-11-02 ENCOUNTER — TELEPHONE (OUTPATIENT)
Dept: INTERNAL MEDICINE | Facility: CLINIC | Age: 75
End: 2020-11-02

## 2020-11-02 DIAGNOSIS — I48.20 CHRONIC ATRIAL FIBRILLATION (H): ICD-10-CM

## 2020-11-02 DIAGNOSIS — I48.20 CHRONIC ATRIAL FIBRILLATION (H): Primary | ICD-10-CM

## 2020-11-02 DIAGNOSIS — Z79.01 LONG TERM CURRENT USE OF ANTICOAGULANT THERAPY: ICD-10-CM

## 2020-11-02 LAB — INR PPP: 1.6 (ref 0.86–1.14)

## 2020-11-02 PROCEDURE — 36415 COLL VENOUS BLD VENIPUNCTURE: CPT | Performed by: INTERNAL MEDICINE

## 2020-11-02 PROCEDURE — 85610 PROTHROMBIN TIME: CPT | Performed by: INTERNAL MEDICINE

## 2020-11-02 NOTE — PROGRESS NOTES
ANTICOAGULATION MANAGEMENT     Patient Name:  Alex Stanley  Date:  11/2/2020    ASSESSMENT /SUBJECTIVE:    Today's INR result of 1.6 is subtherapeutic. Goal INR of 2.0-3.0      Warfarin dose taken: Missed dose(s) may be affecting INR    Diet: No new diet changes affecting INR    Medication changes/ interactions: No new medications/supplements affecting INR    Previous INR: Therapeutic     S/S of bleeding or thromboembolism: No    New injury or illness: No    Upcoming surgery, procedure or cardioversion: No    Additional findings: patient had an EGD on on 11/26 and was told to hold warfarin until Thursday 11/29.        PLAN:    Telephone call with Alex regarding INR result and instructed:     Warfarin Dosing Instructions: Continue your current warfarin dose 2.5mg MF and 5mg AOD will resume maintenance dose since held for procedure     Instructed patient to follow up no later than: 1 week  Lab visit scheduled    Education provided: Target INR goal and significance of current INR result      Tejas verbalizes understanding and agrees to warfarin dosing plan.    Instructed to call the Anticoagulation Clinic for any changes, questions or concerns. (#[phone])        Mabel Garza RN      OBJECTIVE:  Recent labs: (last 7 days)     11/02/20  1101   INR 1.60*         No question data found.  Anticoagulation Summary  As of 11/2/2020    INR goal:  2.0-3.0   TTR:  97.1 % (1 y)   INR used for dosing:  No new INR was available at the time of this encounter.   Warfarin maintenance plan:  2.5 mg (5 mg x 0.5) every Mon, Fri; 5 mg (5 mg x 1) all other days   Full warfarin instructions:  2.5 mg every Mon, Fri; 5 mg all other days   Weekly warfarin total:  30 mg   No change documented:  Mabel Garza RN   Plan last modified:  Dianne Duncan RN (8/21/2020)   Next INR check:  11/9/2020   Priority:  Maintenance   Target end date:  Indefinite    Indications    Long-term (current) use of anticoagulants [Z79.01]  [Z79.01]  Chronic atrial fibrillation/Flutter [I48.20]             Anticoagulation Episode Summary     INR check location:      Preferred lab:      Send INR reminders to:  [facility]    Comments:  INR referral approved 12/9/2019 per Dr Odonnell. Telephone message 12/9/2019      Anticoagulation Care Providers     Provider Role Specialty Phone number    Martin Odonnell MD Referring Internal Medicine [phone]

## 2020-11-09 ENCOUNTER — ANTICOAGULATION THERAPY VISIT (OUTPATIENT)
Dept: ANTICOAGULATION | Facility: CLINIC | Age: 75
End: 2020-11-09

## 2020-11-09 DIAGNOSIS — I48.20 CHRONIC ATRIAL FIBRILLATION (H): ICD-10-CM

## 2020-11-09 DIAGNOSIS — Z79.01 LONG TERM CURRENT USE OF ANTICOAGULANT THERAPY: ICD-10-CM

## 2020-11-09 LAB — INR PPP: 2.5 (ref 0.86–1.14)

## 2020-11-09 PROCEDURE — 36416 COLLJ CAPILLARY BLOOD SPEC: CPT | Performed by: INTERNAL MEDICINE

## 2020-11-09 PROCEDURE — 85610 PROTHROMBIN TIME: CPT | Performed by: INTERNAL MEDICINE

## 2020-11-09 NOTE — PROGRESS NOTES
ANTICOAGULATION MANAGEMENT     Patient Name:  Alex Stanley  Date:  11/9/2020    ASSESSMENT /SUBJECTIVE:    Today's INR result of 2.5 is therapeutic. Goal INR of 2.0-3.0      Warfarin dose taken: Warfarin taken as instructed    Diet: No new diet changes affecting INR    Medication changes/ interactions: No new medications/supplements affecting INR    Previous INR: Subtherapeutic     S/S of bleeding or thromboembolism: No    New injury or illness: No    Upcoming surgery, procedure or cardioversion: No    Additional findings: None      PLAN:    Telephone call with Alex regarding INR result and instructed:     Warfarin Dosing Instructions: Continue your current warfarin dose 2.5mg MF and 5 mg AOD    Instructed patient to follow up no later than: 6 weeks  Lab visit scheduled    Education provided: None required      Alex verbalizes understanding and agrees to warfarin dosing plan.    Instructed to call the Anticoagulation Clinic for any changes, questions or concerns. (#[phone])        Mabel Garza RN      OBJECTIVE:  Recent labs: (last 7 days)     11/09/20  0926   INR 2.50*         No question data found.  Anticoagulation Summary  As of 11/9/2020    INR goal:  2.0-3.0   TTR:  96.3 % (1 y)   INR used for dosing:  No new INR was available at the time of this encounter.   Warfarin maintenance plan:  2.5 mg (5 mg x 0.5) every Mon, Fri; 5 mg (5 mg x 1) all other days   Full warfarin instructions:  2.5 mg every Mon, Fri; 5 mg all other days   Weekly warfarin total:  30 mg   No change documented:  Mabel Garza RN   Plan last modified:  Dianne Duncan RN (8/21/2020)   Next INR check:  12/21/2020   Priority:  Maintenance   Target end date:  Indefinite    Indications    Long-term (current) use of anticoagulants [Z79.01] [Z79.01]  Chronic atrial fibrillation/Flutter [I48.20]             Anticoagulation Episode Summary     INR check location:      Preferred lab:      Send INR reminders to:  TANYA  [facility]    Comments:  INR referral approved 12/9/2019 per Dr Odonnell. Telephone message 12/9/2019      Anticoagulation Care Providers     Provider Role Specialty Phone number    Martin Odonnell MD Referring Internal Medicine [phone]    Isamar Hernandez MD Referring Internal Medicine [phone]

## 2020-12-21 ENCOUNTER — ANTICOAGULATION THERAPY VISIT (OUTPATIENT)
Dept: ANTICOAGULATION | Facility: CLINIC | Age: 75
End: 2020-12-21

## 2020-12-21 DIAGNOSIS — I48.20 CHRONIC ATRIAL FIBRILLATION (H): ICD-10-CM

## 2020-12-21 DIAGNOSIS — Z79.01 LONG TERM CURRENT USE OF ANTICOAGULANT THERAPY: ICD-10-CM

## 2020-12-21 LAB
CAPILLARY BLOOD COLLECTION: NORMAL
INR PPP: 3 (ref 0.86–1.14)

## 2020-12-21 PROCEDURE — 99207 PR NO CHARGE NURSE ONLY: CPT

## 2020-12-21 PROCEDURE — 85610 PROTHROMBIN TIME: CPT | Performed by: INTERNAL MEDICINE

## 2020-12-21 PROCEDURE — 36416 COLLJ CAPILLARY BLOOD SPEC: CPT | Performed by: INTERNAL MEDICINE

## 2020-12-21 NOTE — PROGRESS NOTES
ANTICOAGULATION FOLLOW-UP CLINIC VISIT    Patient Name:  Alex Stanley  Date:  12/21/2020  Contact Type:  Telephone    SUBJECTIVE:  Patient Findings     Comments:  The patient was assessed for diet, medication, and activity level changes, missed or extra doses, bruising or bleeding, with no problem findings.          Clinical Outcomes     Comments:  The patient was assessed for diet, medication, and activity level changes, missed or extra doses, bruising or bleeding, with no problem findings.             OBJECTIVE    Recent labs: (last 7 days)     12/21/20  0841   INR 3.00*       ASSESSMENT / PLAN  INR assessment THER    Recheck INR In: 6 WEEKS    INR Location Clinic      Anticoagulation Summary  As of 12/21/2020    INR goal:  2.0-3.0   TTR:  96.3 % (1 y)   INR used for dosing:  3.00 (12/21/2020)   Warfarin maintenance plan:  2.5 mg (5 mg x 0.5) every Mon, Fri; 5 mg (5 mg x 1) all other days   Full warfarin instructions:  2.5 mg every Mon, Fri; 5 mg all other days   Weekly warfarin total:  30 mg   Plan last modified:  Dianne Duncan RN (8/21/2020)   Next INR check:     Priority:  Maintenance   Target end date:  Indefinite    Indications    Long-term (current) use of anticoagulants [Z79.01] [Z79.01]  Chronic atrial fibrillation/Flutter [I48.20]             Anticoagulation Episode Summary     INR check location:      Preferred lab:      Send INR reminders to:  [facility]    Comments:  INR referral approved 12/9/2019 per Dr Odonnell. Telephone message 12/9/2019      Anticoagulation Care Providers     Provider Role Specialty Phone number    Martin Odonnell MD Referring Internal Medicine [phone]    Isamar Hernandez MD Referring Internal Medicine [phone]            See the Encounter Report to view Anticoagulation Flowsheet and Dosing Calendar (Go to Encounters tab in chart review, and find the Anticoagulation Therapy Visit)        Tara Cavazos RN

## 2021-02-01 ENCOUNTER — ANTICOAGULATION THERAPY VISIT (OUTPATIENT)
Dept: ANTICOAGULATION | Facility: CLINIC | Age: 76
End: 2021-02-01

## 2021-02-01 DIAGNOSIS — I48.20 CHRONIC ATRIAL FIBRILLATION (H): ICD-10-CM

## 2021-02-01 DIAGNOSIS — Z79.01 LONG TERM CURRENT USE OF ANTICOAGULANT THERAPY: ICD-10-CM

## 2021-02-01 LAB
CAPILLARY BLOOD COLLECTION: NORMAL
INR PPP: 2.9 (ref 0.86–1.14)

## 2021-02-01 PROCEDURE — 36416 COLLJ CAPILLARY BLOOD SPEC: CPT | Performed by: INTERNAL MEDICINE

## 2021-02-01 PROCEDURE — 85610 PROTHROMBIN TIME: CPT | Performed by: INTERNAL MEDICINE

## 2021-02-01 NOTE — PROGRESS NOTES
ANTICOAGULATION MANAGEMENT     Patient Name:  Alex Stanley  Date:  2/1/2021    ASSESSMENT /SUBJECTIVE:    Today's INR result of 2.9 is therapeutic. Goal INR of 2.0-3.0      Warfarin dose taken: Warfarin taken as instructed    Diet: No new diet changes affecting INR    Medication changes/ interactions: No new medications/supplements affecting INR    Previous INR: Therapeutic     S/S of bleeding or thromboembolism: No    New injury or illness: No    Upcoming surgery, procedure or cardioversion: No    Additional findings: None      PLAN:    Telephone call with Alex regarding INR result and instructed:     Warfarin Dosing Instructions: Continue your current warfarin dose 2.5mg MF and 5mg AOD    Instructed patient to follow up no later than: 6 weeks  Lab visit scheduled    Education provided: None required      Tejas verbalizes understanding and agrees to warfarin dosing plan.    Instructed to call the Anticoagulation Clinic for any changes, questions or concerns. (#[phone])        Mabel Garza RN      OBJECTIVE:  Recent labs: (last 7 days)     02/01/21  1028   INR 2.90*         No question data found.  Anticoagulation Summary  As of 2/1/2021    INR goal:  2.0-3.0   TTR:  96.3 % (1 y)   INR used for dosing:  No new INR was available at the time of this encounter.   Warfarin maintenance plan:  2.5 mg (5 mg x 0.5) every Mon, Fri; 5 mg (5 mg x 1) all other days   Full warfarin instructions:  2.5 mg every Mon, Fri; 5 mg all other days   Weekly warfarin total:  30 mg   No change documented:  Mabel Garza RN   Plan last modified:  Dianne Duncan RN (8/21/2020)   Next INR check:  3/15/2021   Priority:  Maintenance   Target end date:  Indefinite    Indications    Long-term (current) use of anticoagulants [Z79.01] [Z79.01]  Chronic atrial fibrillation/Flutter [I48.20]             Anticoagulation Episode Summary     INR check location:      Preferred lab:      Send INR reminders to:  TANYA  [facility]    Comments:  INR referral approved 12/9/2019 per Dr Odonnell. Telephone message 12/9/2019      Anticoagulation Care Providers     Provider Role Specialty Phone number    Martin Odonnell MD Referring Internal Medicine [phone]    Isamar Hernandez MD Referring Internal Medicine [phone]

## 2021-03-15 ENCOUNTER — ANTICOAGULATION THERAPY VISIT (OUTPATIENT)
Dept: LAB | Facility: CLINIC | Age: 76
End: 2021-03-15

## 2021-03-15 DIAGNOSIS — I48.20 CHRONIC ATRIAL FIBRILLATION (H): ICD-10-CM

## 2021-03-15 DIAGNOSIS — Z79.01 LONG TERM CURRENT USE OF ANTICOAGULANT THERAPY: ICD-10-CM

## 2021-03-15 LAB
CAPILLARY BLOOD COLLECTION: NORMAL
INR PPP: 3.9 (ref 0.86–1.14)

## 2021-03-15 PROCEDURE — 85610 PROTHROMBIN TIME: CPT | Performed by: INTERNAL MEDICINE

## 2021-03-15 PROCEDURE — 99207 PR NO CHARGE NURSE ONLY: CPT

## 2021-03-15 PROCEDURE — 36416 COLLJ CAPILLARY BLOOD SPEC: CPT | Performed by: INTERNAL MEDICINE

## 2021-03-15 NOTE — PROGRESS NOTES
ANTICOAGULATION FOLLOW-UP CLINIC VISIT    Patient Name:  Alex Stanley  Date:  3/15/2021  Contact Type:  Telephone    SUBJECTIVE:  Patient Findings     Positives:  Change in diet/appetite (less greens last week)    Comments:  Change in medications -   covoid vaccine 3 weeks ago  Shingles shot  1 week ago  The patient was assessed for diet, medication, and activity level changes, missed or extra doses, bruising or bleeding, with no problem findings.  Shingles shot 1 week ago        Clinical Outcomes     Comments:  The patient was assessed for diet, medication, and activity level changes, missed or extra doses, bruising or bleeding, with no problem findings.  Shingles shot 1 week ago           OBJECTIVE    Recent labs: (last 7 days)     03/15/21  0957   INR 3.90*       ASSESSMENT / PLAN  INR assessment SUPRA    Recheck INR In: 1 WEEK    INR Location Clinic      Anticoagulation Summary  As of 3/15/2021    INR goal:  2.0-3.0   TTR:  85.9 % (1 y)   INR used for dosing:  3.90 (3/15/2021)   Warfarin maintenance plan:  2.5 mg (5 mg x 0.5) every Mon, Fri; 5 mg (5 mg x 1) all other days   Full warfarin instructions:  3/16: 2.5 mg; Otherwise 2.5 mg every Mon, Fri; 5 mg all other days   Weekly warfarin total:  30 mg   Plan last modified:  Dianne Duncan RN (8/21/2020)   Next INR check:  3/22/2021   Priority:  Maintenance   Target end date:  Indefinite    Indications    Long-term (current) use of anticoagulants [Z79.01] [Z79.01]  Chronic atrial fibrillation/Flutter [I48.20]             Anticoagulation Episode Summary     INR check location:      Preferred lab:      Send INR reminders to:  [facility]    Comments:  INR referral approved 12/9/2019 per Dr Odonnell. Telephone message 12/9/2019      Anticoagulation Care Providers     Provider Role Specialty Phone number    Martin Odonnell MD Referring Internal Medicine [phone]    Isamar Hernandez MD Referring Internal Medicine [phone]             See the Encounter Report to view Anticoagulation Flowsheet and Dosing Calendar (Go to Encounters tab in chart review, and find the Anticoagulation Therapy Visit)        Tara Cavazos RN

## 2021-03-22 ENCOUNTER — ANTICOAGULATION THERAPY VISIT (OUTPATIENT)
Dept: INTERNAL MEDICINE | Facility: CLINIC | Age: 76
End: 2021-03-22

## 2021-03-22 DIAGNOSIS — Z79.01 LONG TERM CURRENT USE OF ANTICOAGULANT THERAPY: ICD-10-CM

## 2021-03-22 DIAGNOSIS — Z79.01 LONG TERM CURRENT USE OF ANTICOAGULANT THERAPY: Primary | ICD-10-CM

## 2021-03-22 DIAGNOSIS — I48.20 CHRONIC ATRIAL FIBRILLATION (H): ICD-10-CM

## 2021-03-22 LAB — INR PPP: 1.9 (ref 0.86–1.14)

## 2021-03-22 PROCEDURE — 85610 PROTHROMBIN TIME: CPT | Performed by: INTERNAL MEDICINE

## 2021-03-22 PROCEDURE — 36416 COLLJ CAPILLARY BLOOD SPEC: CPT | Performed by: INTERNAL MEDICINE

## 2021-03-22 PROCEDURE — 99207 PR NO CHARGE NURSE ONLY: CPT | Performed by: INTERNAL MEDICINE

## 2021-03-22 NOTE — PROGRESS NOTES
ANTICOAGULATION FOLLOW-UP CLINIC VISIT    Patient Name:  Alex Stnaley  Date:  3/22/2021  Contact Type:  Telephone    SUBJECTIVE:  Patient Findings     Comments:  The patient was assessed for diet, medication, and activity level changes, missed or extra doses, bruising or bleeding, with no problem findings. Abundance of greens over the weekend          Clinical Outcomes     Comments:  The patient was assessed for diet, medication, and activity level changes, missed or extra doses, bruising or bleeding, with no problem findings. Abundance of greens over the weekend             OBJECTIVE    Recent labs: (last 7 days)     21  1101   INR 1.90*       ASSESSMENT / PLAN  INR assessment SUB    Recheck INR In: 3 WEEKS    INR Location Clinic      Anticoagulation Summary  As of 3/22/2021    INR goal:  2.0-3.0   TTR:  85.0 % (1 y)   INR used for dosin.90 (3/22/2021)   Warfarin maintenance plan:  2.5 mg (5 mg x 0.5) every Mon, Fri; 5 mg (5 mg x 1) all other days   Full warfarin instructions:  2.5 mg every Mon, Fri; 5 mg all other days   Weekly warfarin total:  30 mg   No change documented:  Tara Cavazos RN   Plan last modified:  Dianne Duncan, RN (2020)   Next INR check:  2021   Priority:  Maintenance   Target end date:  Indefinite    Indications    Long-term (current) use of anticoagulants [Z79.01] [Z79.01]  Chronic atrial fibrillation/Flutter [I48.20]             Anticoagulation Episode Summary     INR check location:      Preferred lab:      [place]ers [place]  [facility]    Comments:  INR referral approved 2019 per Dr Odonnell. Telephone message 2019      Anticoagulation Care Providers     Provider Role Specialty Phone number    Martin Odonnell MD Referring Internal Medicine [phone]    Isamar Hernandez MD Referring Internal Medicine [phone]            See the Encounter Report to view Anticoagulation Flowsheet and Dosing Calendar (Go to  Encounters tab in chart review, and find the Anticoagulation Therapy Visit)        Tara Cavazos RN

## 2021-04-05 ENCOUNTER — OFFICE VISIT (OUTPATIENT)
Dept: DERMATOLOGY | Facility: CLINIC | Age: 76
End: 2021-04-05
Payer: MEDICARE

## 2021-04-05 VITALS — DIASTOLIC BLOOD PRESSURE: 74 MMHG | HEART RATE: 84 BPM | OXYGEN SATURATION: 97 % | SYSTOLIC BLOOD PRESSURE: 121 MMHG

## 2021-04-05 DIAGNOSIS — L81.4 LENTIGINES: ICD-10-CM

## 2021-04-05 DIAGNOSIS — D48.5 NEOPLASM OF UNCERTAIN BEHAVIOR OF SKIN: Primary | ICD-10-CM

## 2021-04-05 DIAGNOSIS — D18.01 CHERRY ANGIOMA: ICD-10-CM

## 2021-04-05 DIAGNOSIS — L82.1 SEBORRHEIC KERATOSES: ICD-10-CM

## 2021-04-05 DIAGNOSIS — L82.0 INFLAMED SEBORRHEIC KERATOSIS: ICD-10-CM

## 2021-04-05 DIAGNOSIS — D22.9 MULTIPLE BENIGN NEVI: ICD-10-CM

## 2021-04-05 PROCEDURE — 11103 TANGNTL BX SKIN EA SEP/ADDL: CPT | Mod: 59 | Performed by: PHYSICIAN ASSISTANT

## 2021-04-05 PROCEDURE — 88305 TISSUE EXAM BY PATHOLOGIST: CPT | Performed by: DERMATOLOGY

## 2021-04-05 PROCEDURE — 99214 OFFICE O/P EST MOD 30 MIN: CPT | Mod: 25 | Performed by: PHYSICIAN ASSISTANT

## 2021-04-05 PROCEDURE — 11102 TANGNTL BX SKIN SINGLE LES: CPT | Mod: 59 | Performed by: PHYSICIAN ASSISTANT

## 2021-04-05 PROCEDURE — 17110 DESTRUCTION B9 LES UP TO 14: CPT | Performed by: PHYSICIAN ASSISTANT

## 2021-04-05 NOTE — PROGRESS NOTES
HPI:  Alex Stanley is a 75 year old male patient here today for growth on back .  Patient states this has been present for a while.  Patient reports the following symptoms: scaly. Per pt gone today .  Patient reports the following previous treatments: none.  Patient reports the following modifying factors: none.  Associated symptoms: none.  Patient has no other skin complaints today.  Remainder of the HPI, Meds, PMH, Allergies, FH, and SH was reviewed in chart.    Pertinent Hx:   No personal or family history of skin cancer    Past Medical History:   Diagnosis Date     Alcoholism (H)     admission 11/09     Atrial fibrillation, paroxysmal 11/07, 6/09, 11/09     Diverticulosis of colon 6/2/2011     Erosive gastritis 11/09     Hypertension, with LVH      Iron deficiency anemia 11/09     Tobacco abuse        Past Surgical History:   Procedure Laterality Date     COLONOSCOPY N/A 10/26/2020    Procedure: COLONOSCOPY, WITH POLYPECTOMY AND BIOPSY;  Surgeon: Shon Bond MD;  Location:  GI     SURGICAL HISTORY OF -   4/11    R Dupuytren's contracture release   Dr. Scruggs     [facility] NONSPECIFIC PROCEDURE  2002    Rt foot reconstructive     [facility] NONSPECIFIC PROCEDURE  10/12    R hand procedure.  Dr. Deluna        Family History   Problem Relation Age of Onset     Cancer Mother         small cell lung with mets to brain     Diabetes Maternal Grandmother      Respiratory Father         emphysema       Social History     Socioeconomic History     Marital status:      Spouse name: Not on file     Number of children: Not on file     Years of education: Not on file     Highest education level: Not on file   Occupational History     Not on file   Social Needs     Financial resource strain: Not on file     Food insecurity     Worry: Not on file     Inability: Not on file     Transportation needs     Medical: Not on file     Non-medical: Not on file   Tobacco Use     Smoking status: Current Every Day Smoker      Packs/day: 0.50     Years: 57.00     Pack years: 28.50     Types: Cigarettes     Smokeless tobacco: Never Used     Tobacco comment: ppd since age 16 - now 1/2-1 ppd 04/05/21   Substance and Sexual Activity     Alcohol use: No     Comment: last drank at [facility] 8/2010.     Drug use: No     Sexual activity: Yes     Partners: Female   Lifestyle     Physical activity     Days per week: Not on file     Minutes per session: Not on file     Stress: Not on file   Relationships     Social connections     Talks on phone: Not on file     Gets together: Not on file     Attends Christian service: Not on file     Active member of club or organization: Not on file     Attends meetings of clubs or organizations: Not on file     Relationship status: Not on file     Intimate partner violence     Fear of current or ex partner: Not on file     Emotionally abused: Not on file     Physically abused: Not on file     Forced sexual activity: Not on file   Other Topics Concern     Parent/sibling w/ CABG, MI or angioplasty before 65F 55M? Not Asked   Social History Narrative     Not on file       Outpatient Encounter Medications as of 4/5/2021   Medication Sig Dispense Refill     amLODIPine (NORVASC) 10 MG tablet TAKE 1 TABLET DAILY 90 tablet 3     Multiple Vitamins-Minerals (PRESERVISION AREDS 2) CAPS Take 2 mg by mouth 2 times daily       pravastatin (PRAVACHOL) 20 MG tablet TAKE 1 TABLET DAILY 90 tablet 3     warfarin ANTICOAGULANT (COUMADIN) 5 MG tablet TAKE 1 TABLET DAILY, EXCEPT ONE-HALF (1/2) TABLET ON MONDAY AND FRIDAY AS DIRECTED BY THE ANTICOAGULATION CLINIC 90 tablet 3     ASPIRIN NOT PRESCRIBED, INTENTIONAL, Antiplatelet medication not prescribed intentionally due to history  of gastrointestinal bleeding, and patient on warfarin. (Patient not taking: Reported on 3/2/2020) 0 each 0     varenicline (CHANTIX WENDY) 0.5 MG X 11 & 1 MG X 42 tablet Take 0.5 mg tab daily for 3 days, THEN 0.5 mg tab twice daily for 4 days, THEN 1 mg twice  daily. (Patient not taking: Reported on 4/5/2021) 53 tablet 0     varenicline (CHANTIX) 1 MG tablet Take 1 tablet (1 mg) by mouth 2 times daily (Patient not taking: Reported on 4/5/2021) 56 tablet 2     No facility-administered encounter medications on file as of 4/5/2021.        Review Of Systems:  Skin: spot  Eyes: negative  Ears/Nose/Throat: negative  Respiratory: No shortness of breath, dyspnea on exertion, cough, or hemoptysis  Cardiovascular: negative  Gastrointestinal: negative  Genitourinary: negative  Musculoskeletal: negative  Neurologic: negative  Psychiatric: negative  Hematologic/Lymphatic/Immunologic: negative  Endocrine: negative      Objective:     /74   Pulse 84   SpO2 97%   Eyes: Conjunctivae/lids: Normal   ENT: Lips:  Normal  MSK: Normal  Cardiovascular: Peripheral edema none  Pulm: Breathing Normal  Neuro/Psych: Orientation: A/O x 3. Normal; Mood/Affect: Normal, NAD, WDWN  Pt accompanied by: self  Following areas examined: Scalp, face, eyelids, lips, neck, chest, abdomen, back, and R&L upper and lower extremities. Pt defers exam of buttock, hips, proximal thighs, groin and genitals.   Marin skin type:ii   Findings:  Red smooth well-defined macules on trunk and extremities.  Brown, stuck-on scaly appearing papules on trunk and extremities.  Well circumscribed macules with symmetric color distribution on trunk and extremities.  Tan WD smooth macules on face, neck, trunk, and extremities.  Inflamed brown, stuck-on scaly appearing papules on right superior helix x 2, left tragus x 1, left flank x 1, left clavicle x 3  Brown macule with irregular borders on occipital scalp 0.6cm  Pink and brown pearly plaque on sternum 1.0cm      Assessment and Plan:     1) Cherry angiomas, Seborrheic keratoses, Benign nevi, Lentigines     I discussed the specifics of tumor, prognosis, and genetics of benign lesions.  I explained that treatment of these lesions would be purely cosmetic and not medically  neccessary.  I discussed with patient different removal options including excision, cryotherapy, cautery and /or laser.  Lesion may recur and/or may not completely resolve. May need additional treatment.     2) ISK x 7  Benign etiology and course of lesion.  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  No complications and routine wound care.  May grow back and will get a scar. Based on lesion type may need to completely remove lesion. Patient will be notified in 7-10 days of results. Wound care directions given.    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  No complications and routine wound care.  May grow back and will get a scar. Based on lesion type may need to completely remove lesion. Patient will be notified in 7-10 days of results. Wound care directions given.      Signs and Symptoms of non-melanoma skin cancer and ABCDEs of melanoma reviewed with patient. Patient encouraged to perform monthly self skin exams and educated on how to perform them. UV precautions reviewed with patient. Patient was asked about new or changing moles/lesions on body.   Wear a sunscreen with at least SPF 30 on your face, ears, neck and V of the chest daily. Wear sunscreen on other areas of the body if those areas are exposed to the sun throughout the day. Sunscreens can contain physical and/or chemical blockers. Physical blockers are less likely to clog pores, these include zinc oxide and titanium dioxide. Reapply every two hour and after swimming. Sunscreen examples include Neutrogena, CeraVe, Blue Lizard, Elta MD and many others.    Proper skin care from [facility]:    -Eliminate harsh soaps as they strip the natural oils from the skin, often resulting in dry itchy skin ( i.e. Dial, Zest, [place]  Spring)  -Use mild soaps such as Cetaphil or Dove Sensitive Skin in the shower. You do not need to use soap on arms, legs, and trunk every time you shower unless visibly soiled.   -Avoid hot or cold showers.  -After showering, lightly dry off and apply moisturizing within 2-3 minutes. This will help trap moisture in the skin.   -Aggressive use of a moisturizer at least 1-2 times a day to the entire body (including -Vanicream, Cetaphil, Aquaphor or Cerave) and moisturize hands after every washing.  -We recommend using moisturizers that come in a tub that needs to be scooped out, not a pump. This has more of an oil base. It will hold moisture in your skin much better than a water base moisturizer. The above recommended are non-pore clogging.         It was a pleasure speaking with Alex Stanley today.       Follow up in yearly FBE

## 2021-04-05 NOTE — PATIENT INSTRUCTIONS
Proper skin care from [facility]:    -Eliminate harsh soaps as they strip the natural oils from the skin, often resulting in dry itchy skin ( i.e. Dial, Zest, Zaria Spring)  -Use mild soaps such as Cetaphil or Dove Sensitive Skin in the shower. You do not need to use soap on arms, legs, and trunk every time you shower unless visibly soiled.   -Avoid hot or cold showers.  -After showering, lightly dry off and apply moisturizing within 2-3 minutes. This will help trap moisture in the skin.   -Aggressive use of a moisturizer at least 1-2 times a day to the entire body (including -Vanicream, Cetaphil, Aquaphor or Cerave) and moisturize hands after every washing.  -We recommend using moisturizers that come in a tub that needs to be scooped out, not a pump. This has more of an oil base. It will hold moisture in your skin much better than a water base moisturizer. The above recommended are non-pore clogging.      Wear a sunscreen with at least SPF 30 on your face, ears, neck and V of the chest daily. Wear sunscreen on other areas of the body if those areas are exposed to the sun throughout the day. Sunscreens can contain physical and/or chemical blockers. Physical blockers are less likely to clog pores, these include zinc oxide and titanium dioxide. Reapply every two hour and after swimming. Sunscreen examples include Neutrogena, CeraVe, Blue Lizard, Elta MD and many others.    UV radiation  UVA radiation remains constant throughout the day and throughout the year. It is a longer wavelength than UVB and therefore penetrates deeper into the skin leading to immediate and delayed tanning, photoaging, and skin cancer. 70-80% of UVA and UVB radiation occurs between the hours of 10am-2pm.  UVB radiation  UVB radiation causes the most harmful effects and is more significant during the summer months. However, snow and ice can reflect UVB radiation leading to skin damage during the winter months as well. UVB radiation is  responsible for tanning, burning, inflammation, delayed erythema (pinkness), pigmentation (brown spots), and skin cancer.     I recommend self monthly full body exams and yearly full body exams with a dermatology provider. If you develop a new or changing lesion please follow up for examination. Most skin cancers are pink and scaly or pink and pearly. However, we do see blue/brown/black skin cancers.  Consider the ABCDEs of melanoma when giving yourself your monthly full body exam ( don't forget the groin, buttocks, feet, toes, etc). A-asymmetry, B-borders, C-color, D-diameter, E-elevation or evolving. If you see any of these changes please follow up in clinic. If you cannot see your back I recommend purchasing a hand held mirror to use with a larger wall mirror.                      Wound Care Instructions     FOR SUPERFICIAL WOUNDS     [facility] [phone]    [facility] [phone]          AFTER 24 HOURS YOU SHOULD REMOVE THE BANDAGE AND BEGIN DAILY DRESSING CHANGES AS FOLLOWS:     1) Remove Dressing.     2) Clean and dry the area with tap water using a Q-tip or sterile gauze pad.     3) Apply Vaseline, Aquaphor, Polysporin ointment or Bacitracin ointment over entire wound.  Do NOT use Neosporin ointment.     4) Cover the wound with a band-aid, or a sterile non-stick gauze pad and micropore paper tape      REPEAT THESE INSTRUCTIONS AT LEAST ONCE A DAY UNTIL THE WOUND HAS COMPLETELY HEALED.    It is an old wives tale that a wound heals better when it is exposed to air and allowed to dry out. The wound will heal faster with a better cosmetic result if it is kept moist with ointment and covered with a bandage.    **Do not let the wound dry out.**      Supplies Needed:      *Cotton tipped applicators (Q-tips)    *Polysporin Ointment or Bacitracin Ointment (NOT NEOSPORIN)    *Band-aids or non-stick gauze pads and micropore paper tape.      PATIENT INFORMATION:    During the healing  process you will notice a number of changes. All wounds develop a small halo of redness surrounding the wound.  This means healing is occurring. Severe itching with extensive redness usually indicates sensitivity to the ointment or bandage tape used to dress the wound.  You should call our office if this develops.      Swelling  and/or discoloration around your surgical site is common, particularly when performed around the eye.    All wounds normally drain.  The larger the wound the more drainage there will be.  After 7-10 days, you will notice the wound beginning to shrink and new skin will begin to grow.  The wound is healed when you can see skin has formed over the entire area.  A healed wound has a healthy, shiny look to the surface and is red to dark pink in color to normalize.  Wounds may take approximately 4-6 weeks to heal.  Larger wounds may take 6-8 weeks.  After the wound is healed you may discontinue dressing changes.    You may experience a sensation of tightness as your wound heals. This is normal and will gradually subside.    Your healed wound may be sensitive to temperature changes. This sensitivity improves with time, but if you re having a lot of discomfort, try to avoid temperature extremes.    Patients frequently experience itching after their wound appears to have healed because of the continue healing under the skin.  Plain Vaseline will help relieve the itching.        POSSIBLE COMPLICATIONS    BLEEDIN. Leave the bandage in place.  2. Use tightly rolled up gauze or a cloth to apply direct pressure over the bandage for 30  minutes.  3. Reapply pressure for an additional 30 minutes if necessary  4. Use additional gauze and tape to maintain pressure once the bleeding has stopped.        WOUND CARE INSTRUCTIONS  FOR CRYOSURGERY        This area treated with liquid nitrogen will form a blister. You do not need to bandage the area until after the blister forms and breaks (which may be a few  days).  When the blister breaks, begin daily dressing changes as follows:    1) Clean and dry the area with tap water using clean Q-tip or sterile gauze pad.    2) Apply Aquaphor, Vaseline, Polysporin ointment or Bacitracin ointment over entire wound.  Do NOT use Neosporin ointment.    3) Cover the wound with a band-aid or sterile non-stick gauze pad and micropore paper tape.      REPEAT THESE INSTRUCTIONS AT LEAST ONCE A DAY UNTIL THE WOUND HAS COMPLETELY HEALED.        It is an old wives tale that a wound heals better when it is exposed to air and allowed to dry out. The wound will heal faster with a better cosmetic result if it is kept moist with ointment and covered with a bandage.  Do not let the wound dry out.      Supplies Needed:     *Cotton tipped applicators (Q-tips)   *Aquaphor, Vaseline, Polysporin ointment or Bacitracin ointment (NOT NEOSPORIN)   *Band-aids, or non stick gauze pads and micropore paper tape    PATIENT INFORMATION    During the healing process you will notice a number of changes. All wounds develop a small halo of redness surrounding the wound.  This means healing is occurring. Severe itching with extensive redness usually indicates sensitivity to the ointment or bandage tape used to dress the wound.  You should call our office if this develops.      Swelling and/or discoloration around your surgical site is common, particularly when performed around the eye.    All wounds normally drain.  The larger the wound the more drainage there will be.  After 7-10 days, you will notice the wound beginning to shrink and new skin will begin to grow.  The wound is healed when you can see skin has formed over the entire area.  A healed wound has a healthy, shiny look to the surface and is red to dark pink in color to normalize.  Wounds may take approximately 4-6 weeks to heal.  Larger wounds may take 6-8 weeks.  After the wound is healed you may discontinue dressing changes.    You may experience a  sensation of tightness as your wound heals. This is normal and will gradually subside.    Your healed wound may be sensitive to temperature changes. This sensitivity improves with time, but if you re having a lot of discomfort, try to avoid temperature extremes.    Patients frequently experience itching after their wound appears to have healed because of the continue healing under the skin.  Plain Vaseline will help relieve the itching.

## 2021-04-05 NOTE — LETTER
4/5/2021         RE: Alex Stanley  [address]  [place] [place] [address]        Dear Colleague,    Thank you for referring your patient, Alex Stanley, to the [facility]. Please see a copy of my visit note below.    HPI:  Alex Stanley is a 75 year old male patient here today for growth on back .  Patient states this has been present for a while.  Patient reports the following symptoms: scaly. Per pt gone today .  Patient reports the following previous treatments: none.  Patient reports the following modifying factors: none.  Associated symptoms: none.  Patient has no other skin complaints today.  Remainder of the HPI, Meds, PMH, Allergies, FH, and SH was reviewed in chart.    Pertinent Hx:   No personal or family history of skin cancer    Past Medical History:   Diagnosis Date     Alcoholism (H)     admission 11/09     Atrial fibrillation, paroxysmal 11/07, 6/09, 11/09     Diverticulosis of colon 6/2/2011     Erosive gastritis 11/09     Hypertension, with LVH      Iron deficiency anemia 11/09     Tobacco abuse        Past Surgical History:   Procedure Laterality Date     COLONOSCOPY N/A 10/26/2020    Procedure: COLONOSCOPY, WITH POLYPECTOMY AND BIOPSY;  Surgeon: Shon Bond MD;  Location:  GI     SURGICAL HISTORY OF -   4/11    R Dupuytren's contracture release   Dr. Scruggs     [facility] NONSPECIFIC PROCEDURE  2002    Rt foot reconstructive     [facility] NONSPECIFIC PROCEDURE  10/12    R hand procedure.  Dr. Deluna        Family History   Problem Relation Age of Onset     Cancer Mother         small cell lung with mets to brain     Diabetes Maternal Grandmother      Respiratory Father         emphysema       Social History     Socioeconomic History     Marital status:      Spouse name: Not on file     Number of children: Not on file     Years of education: Not on file     Highest education level: Not on file   Occupational History     Not on file   Social  Needs     Financial resource strain: Not on file     Food insecurity     Worry: Not on file     Inability: Not on file     Transportation needs     Medical: Not on file     Non-medical: Not on file   Tobacco Use     Smoking status: Current Every Day Smoker     Packs/day: 0.50     Years: 57.00     Pack years: 28.50     Types: Cigarettes     Smokeless tobacco: Never Used     Tobacco comment: ppd since age 16 - now 1/2-1 ppd 04/05/21   Substance and Sexual Activity     Alcohol use: No     Comment: last drank at [facility] 8/2010.     Drug use: No     Sexual activity: Yes     Partners: Female   Lifestyle     Physical activity     Days per week: Not on file     Minutes per session: Not on file     Stress: Not on file   Relationships     Social connections     Talks on phone: Not on file     Gets together: Not on file     Attends Judaism service: Not on file     Active member of club or organization: Not on file     Attends meetings of clubs or organizations: Not on file     Relationship status: Not on file     Intimate partner violence     Fear of current or ex partner: Not on file     Emotionally abused: Not on file     Physically abused: Not on file     Forced sexual activity: Not on file   Other Topics Concern     Parent/sibling w/ CABG, MI or angioplasty before 65F 55M? Not Asked   Social History Narrative     Not on file       Outpatient Encounter Medications as of 4/5/2021   Medication Sig Dispense Refill     amLODIPine (NORVASC) 10 MG tablet TAKE 1 TABLET DAILY 90 tablet 3     Multiple Vitamins-Minerals (PRESERVISION AREDS 2) CAPS Take 2 mg by mouth 2 times daily       pravastatin (PRAVACHOL) 20 MG tablet TAKE 1 TABLET DAILY 90 tablet 3     warfarin ANTICOAGULANT (COUMADIN) 5 MG tablet TAKE 1 TABLET DAILY, EXCEPT ONE-HALF (1/2) TABLET ON MONDAY AND FRIDAY AS DIRECTED BY THE ANTICOAGULATION CLINIC 90 tablet 3     ASPIRIN NOT PRESCRIBED, INTENTIONAL, Antiplatelet medication not prescribed intentionally due to history   of gastrointestinal bleeding, and patient on warfarin. (Patient not taking: Reported on 3/2/2020) 0 each 0     varenicline (CHANTIX WENDY) 0.5 MG X 11 & 1 MG X 42 tablet Take 0.5 mg tab daily for 3 days, THEN 0.5 mg tab twice daily for 4 days, THEN 1 mg twice daily. (Patient not taking: Reported on 4/5/2021) 53 tablet 0     varenicline (CHANTIX) 1 MG tablet Take 1 tablet (1 mg) by mouth 2 times daily (Patient not taking: Reported on 4/5/2021) 56 tablet 2     No facility-administered encounter medications on file as of 4/5/2021.        Review Of Systems:  Skin: spot  Eyes: negative  Ears/Nose/Throat: negative  Respiratory: No shortness of breath, dyspnea on exertion, cough, or hemoptysis  Cardiovascular: negative  Gastrointestinal: negative  Genitourinary: negative  Musculoskeletal: negative  Neurologic: negative  Psychiatric: negative  Hematologic/Lymphatic/Immunologic: negative  Endocrine: negative      Objective:     /74   Pulse 84   SpO2 97%   Eyes: Conjunctivae/lids: Normal   ENT: Lips:  Normal  MSK: Normal  Cardiovascular: Peripheral edema none  Pulm: Breathing Normal  Neuro/Psych: Orientation: A/O x 3. Normal; Mood/Affect: Normal, NAD, WDWN  Pt accompanied by: self  Following areas examined: Scalp, face, eyelids, lips, neck, chest, abdomen, back, and R&L upper and lower extremities. Pt defers exam of buttock, hips, proximal thighs, groin and genitals.   Marin skin type:ii   Findings:  Red smooth well-defined macules on trunk and extremities.  Brown, stuck-on scaly appearing papules on trunk and extremities.  Well circumscribed macules with symmetric color distribution on trunk and extremities.  Tan WD smooth macules on face, neck, trunk, and extremities.  Inflamed brown, stuck-on scaly appearing papules on right superior helix x 2, left tragus x 1, left flank x 1, left clavicle x 3  Brown macule with irregular borders on occipital scalp 0.6cm  Pink and brown pearly plaque on sternum  1.0cm      Assessment and Plan:     1) Cherry angiomas, Seborrheic keratoses, Benign nevi, Lentigines     I discussed the specifics of tumor, prognosis, and genetics of benign lesions.  I explained that treatment of these lesions would be purely cosmetic and not medically neccessary.  I discussed with patient different removal options including excision, cryotherapy, cautery and /or laser.  Lesion may recur and/or may not completely resolve. May need additional treatment.     2) ISK x 7  Benign etiology and course of lesion.  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  No complications and routine wound care.  May grow back and will get a scar. Based on lesion type may need to completely remove lesion. Patient will be notified in 7-10 days of results. Wound care directions given.    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  No complications and routine wound care.  May grow back and will get a scar. Based on lesion type may need to completely remove lesion. Patient will be notified in 7-10 days of results. Wound care directions given.      Signs and Symptoms of non-melanoma skin cancer and ABCDEs of melanoma reviewed with patient. Patient encouraged to perform monthly self skin exams and educated on how to perform them. UV precautions reviewed with patient. Patient was asked about new or changing moles/lesions on body.   Wear a sunscreen with at least SPF 30 on your face, ears, neck and V of the chest daily. Wear sunscreen on other areas of the body if those areas are exposed to the sun throughout the day. Sunscreens can contain physical and/or chemical blockers. Physical blockers are less likely to clog pores, these include zinc oxide and titanium dioxide. Reapply every two hour and after  swimming. Sunscreen examples include Neutrogena, CeraVe, Blue Lizard, Elta MD and many others.    Proper skin care from [facility]:    -Eliminate harsh soaps as they strip the natural oils from the skin, often resulting in dry itchy skin ( i.e. Dial, Zest, Syriac Spring)  -Use mild soaps such as Cetaphil or Dove Sensitive Skin in the shower. You do not need to use soap on arms, legs, and trunk every time you shower unless visibly soiled.   -Avoid hot or cold showers.  -After showering, lightly dry off and apply moisturizing within 2-3 minutes. This will help trap moisture in the skin.   -Aggressive use of a moisturizer at least 1-2 times a day to the entire body (including -Vanicream, Cetaphil, Aquaphor or Cerave) and moisturize hands after every washing.  -We recommend using moisturizers that come in a tub that needs to be scooped out, not a pump. This has more of an oil base. It will hold moisture in your skin much better than a water base moisturizer. The above recommended are non-pore clogging.         It was a pleasure speaking with Alex Stanley today.       Follow up in yearly FBE        Again, thank you for allowing me to participate in the care of your patient.        Sincerely,        Chantel Salazar PA-C

## 2021-04-11 LAB — COPATH REPORT: NORMAL

## 2021-04-12 ENCOUNTER — ANTICOAGULATION THERAPY VISIT (OUTPATIENT)
Dept: ANTICOAGULATION | Facility: CLINIC | Age: 76
End: 2021-04-12

## 2021-04-12 ENCOUNTER — TELEPHONE (OUTPATIENT)
Dept: DERMATOLOGY | Facility: CLINIC | Age: 76
End: 2021-04-12

## 2021-04-12 DIAGNOSIS — I48.20 CHRONIC ATRIAL FIBRILLATION (H): ICD-10-CM

## 2021-04-12 DIAGNOSIS — Z79.01 LONG TERM CURRENT USE OF ANTICOAGULANT THERAPY: ICD-10-CM

## 2021-04-12 LAB
CAPILLARY BLOOD COLLECTION: NORMAL
INR PPP: 3 (ref 0.86–1.14)

## 2021-04-12 PROCEDURE — 36416 COLLJ CAPILLARY BLOOD SPEC: CPT | Performed by: INTERNAL MEDICINE

## 2021-04-12 PROCEDURE — 99207 PR NO CHARGE NURSE ONLY: CPT

## 2021-04-12 PROCEDURE — 85610 PROTHROMBIN TIME: CPT | Performed by: INTERNAL MEDICINE

## 2021-04-12 NOTE — PROGRESS NOTES
ANTICOAGULATION FOLLOW-UP CLINIC VISIT    Patient Name:  Alex Stanley  Date:  4/12/2021  Contact Type:  Telephone    SUBJECTIVE:  Patient Findings     Comments:  The patient was assessed for diet, medication, and activity level changes, missed or extra doses, bruising or bleeding, with no problem findings.          Clinical Outcomes     Comments:  The patient was assessed for diet, medication, and activity level changes, missed or extra doses, bruising or bleeding, with no problem findings.             OBJECTIVE    Recent labs: (last 7 days)     04/12/21  0955   INR 3.00*       ASSESSMENT / PLAN  INR assessment THER    Recheck INR In: 4 WEEKS    INR Location Clinic      Anticoagulation Summary  As of 4/12/2021    INR goal:  2.0-3.0   TTR:  84.4 % (1 y)   INR used for dosing:  3.00 (4/12/2021)   Warfarin maintenance plan:  2.5 mg (5 mg x 0.5) every Mon, Fri; 5 mg (5 mg x 1) all other days   Full warfarin instructions:  2.5 mg every Mon, Fri; 5 mg all other days   Weekly warfarin total:  30 mg   No change documented:  Tara Cavazos RN   Plan last modified:  Dianne Duncan RN (8/21/2020)   Next INR check:  5/10/2021   Priority:  Maintenance   Target end date:  Indefinite    Indications    Long-term (current) use of anticoagulants [Z79.01] [Z79.01]  Chronic atrial fibrillation/Flutter [I48.20]             Anticoagulation Episode Summary     INR check location:      Preferred lab:      Send INR reminders to:  [facility]    Comments:  INR referral approved 12/9/2019 per Dr Odonnell. Telephone message 12/9/2019      Anticoagulation Care Providers     Provider Role Specialty Phone number    Martin Odonnell MD Referring Internal Medicine [phone]    Isamar Hernandez MD Referring Internal Medicine [phone]            See the Encounter Report to view Anticoagulation Flowsheet and Dosing Calendar (Go to Encounters tab in chart review, and find the Anticoagulation Therapy  Visit)        Tara Cavazos RN

## 2021-05-10 ENCOUNTER — ANTICOAGULATION THERAPY VISIT (OUTPATIENT)
Dept: ANTICOAGULATION | Facility: CLINIC | Age: 76
End: 2021-05-10

## 2021-05-10 DIAGNOSIS — I48.20 CHRONIC ATRIAL FIBRILLATION (H): ICD-10-CM

## 2021-05-10 DIAGNOSIS — Z79.01 LONG TERM CURRENT USE OF ANTICOAGULANT THERAPY: ICD-10-CM

## 2021-05-10 LAB
CAPILLARY BLOOD COLLECTION: NORMAL
INR PPP: 3.1 (ref 0.86–1.14)

## 2021-05-10 PROCEDURE — 36416 COLLJ CAPILLARY BLOOD SPEC: CPT | Performed by: INTERNAL MEDICINE

## 2021-05-10 PROCEDURE — 85610 PROTHROMBIN TIME: CPT | Performed by: INTERNAL MEDICINE

## 2021-05-10 NOTE — PROGRESS NOTES
ANTICOAGULATION MANAGEMENT     Patient Name:  Alex Stanley  Date:  5/10/2021    ASSESSMENT /SUBJECTIVE:    Today's INR result of 3.1 is supratherapeutic. Goal INR of 2.0-3.0      Warfarin dose taken: Warfarin taken as instructed    Diet: No new diet changes affecting INR    Medication changes/ interactions: No new medications/supplements affecting INR    Previous INR: Therapeutic     S/S of bleeding or thromboembolism: No    New injury or illness: No    Upcoming surgery, procedure or cardioversion: No    Additional findings: INR is trending upwards, will decrease maintenance dose      PLAN:    Telephone call with Alex regarding INR result and instructed:     Warfarin Dosing Instructions: Change your warfarin dose to 2.5mg MWF and 5mg AOD  . (8 % change)    Instructed patient to follow up no later than: 2 weeks  Lab visit scheduled    Education provided: Target INR goal and significance of current INR result, Monitoring for bleeding signs and symptoms and Monitoring for clotting signs and symptoms      Alex verbalizes understanding and agrees to warfarin dosing plan.    Instructed to call the Anticoagulation Clinic for any changes, questions or concerns. (#[phone])        Mabel Garza RN      OBJECTIVE:  Recent labs: (last 7 days)     05/10/21  1007   INR 3.10*         No question data found.  Anticoagulation Summary  As of 5/10/2021    INR goal:  2.0-3.0   TTR:  76.8 % (1 y)   INR used for dosing:  No new INR was available at the time of this encounter.   Warfarin maintenance plan:  2.5 mg (5 mg x 0.5) every Mon, Wed, Fri; 5 mg (5 mg x 1) all other days   Full warfarin instructions:  2.5 mg every Mon, Wed, Fri; 5 mg all other days   Weekly warfarin total:  27.5 mg   Plan last modified:  Mabel Garza RN (5/10/2021)   Next INR check:  5/24/2021   Priority:  Maintenance   Target end date:  Indefinite    Indications    Long-term (current) use of anticoagulants [Z79.01] [Z79.01]  Chronic atrial  fibrillation/Flutter [I48.20]             Anticoagulation Episode Summary     INR check location:      Preferred lab:      Send INR reminders to:  [facility]    Comments:  INR referral approved 12/9/2019 per Dr Odonnell. Telephone message 12/9/2019      Anticoagulation Care Providers     Provider Role Specialty Phone number    Martin Odonnell MD Referring Internal Medicine [phone]    Isamar Hernandez MD Referring Internal Medicine [phone]

## 2021-05-11 DIAGNOSIS — I48.20 CHRONIC ATRIAL FIBRILLATION (H): ICD-10-CM

## 2021-05-11 DIAGNOSIS — I10 ESSENTIAL HYPERTENSION: ICD-10-CM

## 2021-05-12 RX ORDER — AMLODIPINE BESYLATE 10 MG/1
TABLET ORAL
Qty: 90 TABLET | Refills: 1 | Status: SHIPPED | OUTPATIENT
Start: 2021-05-12 | End: 2021-09-22

## 2021-05-12 RX ORDER — WARFARIN SODIUM 5 MG/1
TABLET ORAL
Qty: 90 TABLET | Refills: 1 | Status: SHIPPED | OUTPATIENT
Start: 2021-05-12 | End: 2021-11-09

## 2021-05-12 NOTE — TELEPHONE ENCOUNTER
Routing refill request to provider for review/approval because:  Labs out of range:  Cr    Creatinine   Date Value Ref Range Status   10/01/2020 1.26 (H) 0.66 - 1.25 mg/dL Final

## 2021-05-24 ENCOUNTER — ANTICOAGULATION THERAPY VISIT (OUTPATIENT)
Dept: ANTICOAGULATION | Facility: CLINIC | Age: 76
End: 2021-05-24

## 2021-05-24 DIAGNOSIS — Z79.01 LONG TERM CURRENT USE OF ANTICOAGULANT THERAPY: ICD-10-CM

## 2021-05-24 DIAGNOSIS — I48.20 CHRONIC ATRIAL FIBRILLATION (H): ICD-10-CM

## 2021-05-24 LAB
CAPILLARY BLOOD COLLECTION: NORMAL
INR PPP: 2.4 (ref 0.86–1.14)

## 2021-05-24 PROCEDURE — 36416 COLLJ CAPILLARY BLOOD SPEC: CPT | Performed by: INTERNAL MEDICINE

## 2021-05-24 PROCEDURE — 85610 PROTHROMBIN TIME: CPT | Performed by: INTERNAL MEDICINE

## 2021-05-24 NOTE — PROGRESS NOTES
ANTICOAGULATION MANAGEMENT     Patient Name:  Alex Stanley  Date:  5/24/2021    ASSESSMENT /SUBJECTIVE:    Today's INR result of 2.4 is therapeutic. Goal INR of 2.0-3.0      Warfarin dose taken: Warfarin taken as instructed    Diet: No new diet changes affecting INR    Medication changes/ interactions: No new medications/supplements affecting INR    Previous INR: Supratherapeutic     S/S of bleeding or thromboembolism: No    New injury or illness: No    Upcoming surgery, procedure or cardioversion: No    Additional findings: None      PLAN:    Telephone call with Alex regarding INR result and instructed:     Warfarin Dosing Instructions: Continue your current warfarin dose 2.5mg MWF and 5mg AOD    Instructed patient to follow up no later than: 4 weeks  Lab visit scheduled    Education provided: Monitoring for bleeding signs and symptoms and Monitoring for clotting signs and symptoms      Alex verbalizes understanding and agrees to warfarin dosing plan.    Instructed to call the Anticoagulation Clinic for any changes, questions or concerns. (#[phone])        Mabel Garza RN      OBJECTIVE:  Recent labs: (last 7 days)     05/24/21  0954   INR 2.40*         INR assessment THER    Recheck INR In: 4 WEEKS    INR Location Clinic      Anticoagulation Summary  As of 5/24/2021    INR goal:  2.0-3.0   TTR:  76.2 % (1 y)   INR used for dosing:  No new INR was available at the time of this encounter.   Warfarin maintenance plan:  2.5 mg (5 mg x 0.5) every Mon, Wed, Fri; 5 mg (5 mg x 1) all other days   Full warfarin instructions:  2.5 mg every Mon, Wed, Fri; 5 mg all other days   Weekly warfarin total:  27.5 mg   No change documented:  Mabel Garza RN   Plan last modified:  Mabel Garza RN (5/10/2021)   Next INR check:  6/21/2021   Priority:  Maintenance   Target end date:  Indefinite    Indications    Long-term (current) use of anticoagulants [Z79.01] [Z79.01]  Chronic atrial fibrillation/Flutter  [I48.20]             Anticoagulation Episode Summary     INR check location:      Preferred lab:      Send INR reminders to:  [facility]    Comments:  INR referral approved 12/9/2019 per Dr Odonnell. Telephone message 12/9/2019      Anticoagulation Care Providers     Provider Role Specialty Phone number    Martin Odonnell MD Referring Internal Medicine [phone]    Isamar Hernandez MD Referring Internal Medicine [phone]

## 2021-06-21 ENCOUNTER — ANTICOAGULATION THERAPY VISIT (OUTPATIENT)
Dept: INTERNAL MEDICINE | Facility: CLINIC | Age: 76
End: 2021-06-21

## 2021-06-21 ENCOUNTER — DOCUMENTATION ONLY (OUTPATIENT)
Dept: INTERNAL MEDICINE | Facility: CLINIC | Age: 76
End: 2021-06-21

## 2021-06-21 DIAGNOSIS — Z79.01 LONG TERM CURRENT USE OF ANTICOAGULANT THERAPY: ICD-10-CM

## 2021-06-21 DIAGNOSIS — I48.20 CHRONIC ATRIAL FIBRILLATION (H): ICD-10-CM

## 2021-06-21 DIAGNOSIS — I48.20 CHRONIC ATRIAL FIBRILLATION (H): Primary | ICD-10-CM

## 2021-06-21 LAB
CAPILLARY BLOOD COLLECTION: NORMAL
INR PPP: 3.1 (ref 0.86–1.14)

## 2021-06-21 PROCEDURE — 85610 PROTHROMBIN TIME: CPT | Performed by: INTERNAL MEDICINE

## 2021-06-21 PROCEDURE — 36416 COLLJ CAPILLARY BLOOD SPEC: CPT | Performed by: INTERNAL MEDICINE

## 2021-06-21 PROCEDURE — 99207 PR NO CHARGE NURSE ONLY: CPT

## 2021-06-21 NOTE — PROGRESS NOTES
ANTICOAGULATION FOLLOW-UP CLINIC VISIT    Patient Name:  Alex Stanley  Date:  6/21/2021  Contact Type:  Telephone    SUBJECTIVE:  Patient Findings     Comments:  The patient was assessed for diet, medication, and activity level changes, missed or extra doses, bruising or bleeding, with no problem findings.          Clinical Outcomes     Comments:  The patient was assessed for diet, medication, and activity level changes, missed or extra doses, bruising or bleeding, with no problem findings.             OBJECTIVE    Recent labs: (last 7 days)     06/21/21  0834   INR 3.10*       ASSESSMENT / PLAN  INR assessment SUPRA    Recheck INR In: 3 WEEKS    INR Location Clinic      Anticoagulation Summary  As of 6/21/2021    INR goal:  2.0-3.0   TTR:  75.1 % (1 y)   INR used for dosing:  3.10 (6/21/2021)   Warfarin maintenance plan:  2.5 mg (5 mg x 0.5) every Mon, Wed, Fri; 5 mg (5 mg x 1) all other days   Full warfarin instructions:  2.5 mg every Mon, Wed, Fri; 5 mg all other days   Weekly warfarin total:  27.5 mg   No change documented:  Tara Cavazos RN   Plan last modified:  Mabel Garza RN (5/10/2021)   Next INR check:  7/12/2021   Priority:  Maintenance   Target end date:  Indefinite    Indications    Long-term (current) use of anticoagulants [Z79.01] [Z79.01]  Chronic atrial fibrillation/Flutter [I48.20]             Anticoagulation Episode Summary     INR check location:      Preferred lab:      Send INR reminders to:  [facility]    Comments:  INR referral approved 12/9/2019 per Dr Odonnell. Telephone message 12/9/2019      Anticoagulation Care Providers     Provider Role Specialty Phone number    Martin Odonnell MD Referring Internal Medicine [phone]    Isamar Hernandez MD Referring Internal Medicine [phone]            See the Encounter Report to view Anticoagulation Flowsheet and Dosing Calendar (Go to Encounters tab in chart review, and find the Anticoagulation Therapy  Visit)        Tara Cavazos RN

## 2021-06-23 ENCOUNTER — OFFICE VISIT (OUTPATIENT)
Dept: INTERNAL MEDICINE | Facility: CLINIC | Age: 76
End: 2021-06-23
Payer: MEDICARE

## 2021-06-23 VITALS
WEIGHT: 169.6 LBS | HEART RATE: 74 BPM | HEIGHT: 67 IN | TEMPERATURE: 96.6 F | SYSTOLIC BLOOD PRESSURE: 100 MMHG | OXYGEN SATURATION: 98 % | DIASTOLIC BLOOD PRESSURE: 60 MMHG | BODY MASS INDEX: 26.62 KG/M2 | RESPIRATION RATE: 16 BRPM

## 2021-06-23 DIAGNOSIS — E78.5 HYPERLIPIDEMIA LDL GOAL <100: ICD-10-CM

## 2021-06-23 DIAGNOSIS — F10.21 ALCOHOL DEPENDENCE IN REMISSION (H): ICD-10-CM

## 2021-06-23 DIAGNOSIS — N18.30 STAGE 3 CHRONIC KIDNEY DISEASE, UNSPECIFIED WHETHER STAGE 3A OR 3B CKD (H): ICD-10-CM

## 2021-06-23 DIAGNOSIS — I48.20 CHRONIC ATRIAL FIBRILLATION (H): Primary | ICD-10-CM

## 2021-06-23 PROCEDURE — 99214 OFFICE O/P EST MOD 30 MIN: CPT | Performed by: INTERNAL MEDICINE

## 2021-06-23 RX ORDER — PRAVASTATIN SODIUM 20 MG
20 TABLET ORAL DAILY
Qty: 90 TABLET | Refills: 3 | Status: SHIPPED | OUTPATIENT
Start: 2021-06-23 | End: 2022-08-09

## 2021-06-23 ASSESSMENT — MIFFLIN-ST. JEOR: SCORE: 1472.93

## 2021-06-23 NOTE — PROGRESS NOTES
"    Assessment & Plan     Chronic atrial fibrillation/Flutter  Could conceivably switch to DOAC, patient will consider after next INR check.    Alcohol dependence in remission (H)  Positive reinforcement for sobriety    Stage 3 chronic kidney disease, unspecified whether stage 3a or 3b CKD  Stable    Hyperlipidemia LDL goal <100  Has been well controlled  - pravastatin (PRAVACHOL) 20 MG tablet; Take 1 tablet (20 mg) by mouth daily  - Lipid panel reflex to direct LDL Fasting; Future  - Comprehensive metabolic panel; Future             BMI:   Estimated body mass index is 26.56 kg/m  as calculated from the following:    Height as of this encounter: 1.702 m (5' 7\").    Weight as of this encounter: 76.9 kg (169 lb 9.6 oz).       See Patient Instructions    Return in about 3 months (around 9/23/2021) for Annual Wellness Visit, with pre-visit fasting lab.    Estuardo Manning MD  [facility]    Kimberlee Johnson is a 75 year old who presents for the following health issues     HPI     Pt to establish care. Pt has been working with Anticoagulation clinic to get his numbers at a good reading.       He is on warfarin for chronic atrial fibrillation.  Wonders about the possibility of switching to Eliquis, etc.  Has stage III chronic kidney disease.  Has history of CVA in 2011, no strokes since then.    History also remarkable for hypertension, well controlled with amlodipine.    History of alcohol abuse, has been dry since his stroke in 2011.        Review of Systems   Constitutional, HEENT, cardiovascular, pulmonary, GI, , musculoskeletal, neuro, skin, endocrine and psych systems are negative, except as otherwise noted.      Objective    /60 (BP Location: Left arm, Patient Position: Chair, Cuff Size: Adult Regular)   Pulse 74   Temp 96.6  F (35.9  C) (Temporal)   Resp 16   Ht 1.702 m (5' 7\")   Wt 76.9 kg (169 lb 9.6 oz)   SpO2 98%   BMI 26.56 kg/m    Body mass index is 26.56 " kg/m .  Physical Exam   GENERAL: healthy, alert and no distress  NECK: no adenopathy, no asymmetry, masses, or scars and thyroid normal to palpation  RESP: lungs clear to auscultation - no rales, rhonchi or wheezes  CV: regular rate and rhythm, normal S1 S2, no S3 or S4, no murmur, click or rub, no peripheral edema and peripheral pulses strong  ABDOMEN: soft, nontender, no hepatosplenomegaly, no masses and bowel sounds normal  MS: no gross musculoskeletal defects noted, no edema

## 2021-07-06 DIAGNOSIS — I48.20 CHRONIC ATRIAL FIBRILLATION (H): Primary | ICD-10-CM

## 2021-07-12 ENCOUNTER — ANTICOAGULATION THERAPY VISIT (OUTPATIENT)
Dept: ANTICOAGULATION | Facility: CLINIC | Age: 76
End: 2021-07-12

## 2021-07-12 ENCOUNTER — LAB (OUTPATIENT)
Dept: LAB | Facility: CLINIC | Age: 76
End: 2021-07-12
Payer: MEDICARE

## 2021-07-12 DIAGNOSIS — Z79.01 LONG TERM CURRENT USE OF ANTICOAGULANT THERAPY: Primary | ICD-10-CM

## 2021-07-12 DIAGNOSIS — I48.20 CHRONIC ATRIAL FIBRILLATION (H): ICD-10-CM

## 2021-07-12 LAB — INR BLD: 2.5 (ref 0.9–1.1)

## 2021-07-12 PROCEDURE — 85610 PROTHROMBIN TIME: CPT

## 2021-07-12 PROCEDURE — 36416 COLLJ CAPILLARY BLOOD SPEC: CPT

## 2021-07-12 NOTE — PROGRESS NOTES
ANTICOAGULATION MANAGEMENT     Alex Stanley 75 year old male is on warfarin with therapeutic INR result. (Goal INR 2.0-3.0)    Recent labs: (last 7 days)     07/12/21  0831   INR 2.5*       ASSESSMENT     Source(s): Patient/Caregiver Call       Warfarin doses taken: Warfarin taken as instructed    Diet: No new diet changes identified    New illness, injury, or hospitalization: No    Medication/supplement changes: None noted    Signs or symptoms of bleeding or clotting: No    Previous INR: Supratherapeutic    Additional findings: None     PLAN     Recommended plan for no diet, medication or health factor changes affecting INR     Dosing Instructions: Continue your current warfarin dose with next INR in 6 weeks       Summary  As of 7/12/2021    Full warfarin instructions:  2.5 mg every Mon, Wed, Fri; 5 mg all other days   Next INR check:               Telephone call with Alex who verbalizes understanding and agrees to plan    Lab visit scheduled    Education provided: Monitoring for bleeding signs and symptoms and Monitoring for clotting signs and symptoms    Plan made per [facility] anticoagulation protocol    Mabel Garza RN  Anticoagulation Clinic  7/12/2021    _______________________________________________________________________     Anticoagulation Episode Summary     Current INR goal:  2.0-3.0   TTR:  74.2 % (1 y)   Target end date:  Indefinite   Send INR reminders to:  [facility]    Indications    Long-term (current) use of anticoagulants [Z79.01] [Z79.01]  Chronic atrial fibrillation/Flutter [I48.20]           Comments:  INR referral approved 12/9/2019 per Dr Odonnell. Telephone message 12/9/2019         Anticoagulation Care Providers     Provider Role Specialty Phone number    Martin Odonnell MD Referring Internal Medicine [phone]    Isamar Hernandez MD Referring Internal Medicine [phone]

## 2021-09-01 ENCOUNTER — LAB (OUTPATIENT)
Dept: LAB | Facility: CLINIC | Age: 76
End: 2021-09-01
Payer: MEDICARE

## 2021-09-01 ENCOUNTER — ANTICOAGULATION THERAPY VISIT (OUTPATIENT)
Dept: NURSING | Facility: CLINIC | Age: 76
End: 2021-09-01

## 2021-09-01 DIAGNOSIS — I48.20 CHRONIC ATRIAL FIBRILLATION (H): ICD-10-CM

## 2021-09-01 LAB — INR BLD: 2.9 (ref 0.9–1.1)

## 2021-09-01 PROCEDURE — 85610 PROTHROMBIN TIME: CPT

## 2021-09-01 PROCEDURE — 36416 COLLJ CAPILLARY BLOOD SPEC: CPT

## 2021-09-01 NOTE — PROGRESS NOTES
Anticoagulation Management    Unable to reach [place] today.    Today's INR result of 2.9 is therapeutic (goal INR of 2.0-3.0).  Result received from: Clinic Lab    Follow up required to assess for changes     Left a voicemail for pt to continue taking warfarin 2.5 mg on Mon/Wed/Fri and 5 mg all other days. If all is well, then can recheck the INR in 6 weeks. Requested a call back to discuss.    Transfer to [phone]      Anticoagulation clinic to follow up    Willow Schilling RN

## 2021-09-02 NOTE — PROGRESS NOTES
ANTICOAGULATION MANAGEMENT     Alex Stanley 76 year old male is on warfarin with therapeutic INR result. (Goal INR 2.0-3.0)    Recent labs: (last 7 days)     09/01/21  1631   INR 2.9*       ASSESSMENT     Source(s): Chart Review and Patient/Caregiver Call       Warfarin doses taken: Warfarin taken as instructed    Diet: No new diet changes identified    New illness, injury, or hospitalization: No    Medication/supplement changes: None noted    Signs or symptoms of bleeding or clotting: No    Previous INR: Therapeutic last visit; previously outside of goal range    Additional findings: None     PLAN     Recommended plan for no diet, medication or health factor changes affecting INR     Dosing Instructions: Continue your current warfarin dose with next INR in 6 weeks       Summary  As of 9/1/2021    Full warfarin instructions:  2.5 mg every Mon, Wed, Fri; 5 mg all other days   Next INR check:  10/13/2021             Telephone call with Alex who agrees to plan and repeated back plan correctly    Lab visit scheduled    Education provided: Importance of notifying clinic for changes in medications; a sooner lab recheck maybe needed. and Contact [phone]  with any changes, questions or concerns.     Plan made per [facility] anticoagulation protocol    Willow Schilling, RN  Anticoagulation Clinic  9/2/2021    _______________________________________________________________________     Anticoagulation Episode Summary     Current INR goal:  2.0-3.0   TTR:  74.1 % (1 y)   Target end date:  Indefinite   Send INR reminders to:  [facility]    Indications    Long-term (current) use of anticoagulants [Z79.01] [Z79.01]  Chronic atrial fibrillation/Flutter [I48.20]           Comments:  INR referral approved 12/9/2019 per Dr Odonnell. Telephone message 12/9/2019         Anticoagulation Care Providers     Provider Role Specialty Phone number    Martin Odonnell MD Referring Internal Medicine [phone]    Mary  Isamar TOBIN MD [facility] Internal Medicine [phone]

## 2021-09-11 ENCOUNTER — TELEPHONE (OUTPATIENT)
Dept: URGENT CARE | Facility: URGENT CARE | Age: 76
End: 2021-09-11

## 2021-09-11 DIAGNOSIS — R21 RASH: Primary | ICD-10-CM

## 2021-09-11 RX ORDER — PERMETHRIN 50 MG/G
CREAM TOPICAL
Qty: 60 G | Refills: 1 | Status: SHIPPED | OUTPATIENT
Start: 2021-09-11 | End: 2022-02-02

## 2021-09-20 ENCOUNTER — LAB (OUTPATIENT)
Dept: LAB | Facility: CLINIC | Age: 76
End: 2021-09-20
Payer: MEDICARE

## 2021-09-20 DIAGNOSIS — E78.5 HYPERLIPIDEMIA LDL GOAL <100: ICD-10-CM

## 2021-09-20 LAB
ALBUMIN SERPL-MCNC: 3.9 G/DL (ref 3.4–5)
ALP SERPL-CCNC: 77 U/L (ref 40–150)
ALT SERPL W P-5'-P-CCNC: 22 U/L (ref 0–70)
ANION GAP SERPL CALCULATED.3IONS-SCNC: 2 MMOL/L (ref 3–14)
AST SERPL W P-5'-P-CCNC: 23 U/L (ref 0–45)
BILIRUB SERPL-MCNC: 0.5 MG/DL (ref 0.2–1.3)
BUN SERPL-MCNC: 15 MG/DL (ref 7–30)
CALCIUM SERPL-MCNC: 7.8 MG/DL (ref 8.5–10.1)
CHLORIDE BLD-SCNC: 110 MMOL/L (ref 94–109)
CHOLEST SERPL-MCNC: 100 MG/DL
CO2 SERPL-SCNC: 26 MMOL/L (ref 20–32)
CREAT SERPL-MCNC: 1.07 MG/DL (ref 0.66–1.25)
FASTING STATUS PATIENT QL REPORTED: YES
GFR SERPL CREATININE-BSD FRML MDRD: 68 ML/MIN/1.73M2
GLUCOSE BLD-MCNC: 89 MG/DL (ref 70–99)
HDLC SERPL-MCNC: 42 MG/DL
LDLC SERPL CALC-MCNC: 46 MG/DL
NONHDLC SERPL-MCNC: 58 MG/DL
POTASSIUM BLD-SCNC: 4.2 MMOL/L (ref 3.4–5.3)
PROT SERPL-MCNC: 6.9 G/DL (ref 6.8–8.8)
SODIUM SERPL-SCNC: 138 MMOL/L (ref 133–144)
TRIGL SERPL-MCNC: 61 MG/DL

## 2021-09-20 PROCEDURE — 36415 COLL VENOUS BLD VENIPUNCTURE: CPT

## 2021-09-20 PROCEDURE — 80061 LIPID PANEL: CPT

## 2021-09-20 PROCEDURE — 80053 COMPREHEN METABOLIC PANEL: CPT

## 2021-09-22 ENCOUNTER — OFFICE VISIT (OUTPATIENT)
Dept: INTERNAL MEDICINE | Facility: CLINIC | Age: 76
End: 2021-09-22
Payer: MEDICARE

## 2021-09-22 VITALS
RESPIRATION RATE: 16 BRPM | HEART RATE: 66 BPM | SYSTOLIC BLOOD PRESSURE: 115 MMHG | TEMPERATURE: 98 F | BODY MASS INDEX: 25.75 KG/M2 | OXYGEN SATURATION: 99 % | DIASTOLIC BLOOD PRESSURE: 80 MMHG | WEIGHT: 164.1 LBS | HEIGHT: 67 IN

## 2021-09-22 DIAGNOSIS — Z00.00 MEDICARE ANNUAL WELLNESS VISIT, SUBSEQUENT: Primary | ICD-10-CM

## 2021-09-22 DIAGNOSIS — I10 ESSENTIAL HYPERTENSION: ICD-10-CM

## 2021-09-22 DIAGNOSIS — Z87.891 PERSONAL HISTORY OF TOBACCO USE: ICD-10-CM

## 2021-09-22 PROCEDURE — G0439 PPPS, SUBSEQ VISIT: HCPCS | Performed by: INTERNAL MEDICINE

## 2021-09-22 RX ORDER — AMLODIPINE BESYLATE 10 MG/1
10 TABLET ORAL DAILY
Qty: 90 TABLET | Refills: 3 | Status: SHIPPED | OUTPATIENT
Start: 2021-09-22 | End: 2022-10-26

## 2021-09-22 ASSESSMENT — MIFFLIN-ST. JEOR: SCORE: 1442.98

## 2021-09-22 NOTE — PROGRESS NOTES
Lung Cancer Screening Shared Decision Making Visit     Alex Stanley is eligible for lung cancer screening on the basis of the information provided in my signed lung cancer screening order.     I have discussed with patient the risks and benefits of screening for lung cancer with low-dose CT.     The risks include:  radiation exposure: one low dose chest CT has as much ionizing radiation as about 15 chest x-rays or 6 months of background radiation living in [place]    false positives: 96% of positive findings/nodules are NOT cancer, but some might still require additional diagnostic evaluation, including biopsy  over-diagnosis: some slow growing cancers that might never have been clinically significant will be detected and treated unnecessarily     The benefit of early detection of lung cancer is contingent upon adherence to annual screening or more frequent follow up if indicated.     Furthermore, reaping the benefits of screening requires Alex Stanley to be willing and physically able to undergo diagnostic procedures, if indicated. Although no specific guide is available for determining severity of comorbidities, it is reasonable to withhold screening in patients who have greater mortality risk from other diseases.     We did discuss that the only way to prevent lung cancer is to not smoke. Smoking cessation counseling was not given.      I did not offer risk estimation using a calculator such as this one:    ShouldIScreen

## 2021-09-22 NOTE — PROGRESS NOTES
"  SUBJECTIVE:   Alex Stanley is a 76 year old male who presents for Preventive Visit.      Patient has been advised of split billing requirements and indicates understanding: Yes  Are you in the first 12 months of your Medicare Part B coverage?  No    Physical Health:    In general, how would you rate your overall physical health? Good to excellent    Outside of work, how many days during the week do you exercise? 2-3 days/week    Outside of work, approximately how many minutes a day do you exercise?less than 15 minutes    If you drink alcohol do you typically have >3 drinks per day or >7 drinks per week? No    Do you usually eat at least 4 servings of fruit and vegetables a day, include whole grains & fiber and avoid regularly eating high fat or \"junk\" foods? NO    Do you have any problems taking medications regularly?  No    Do you have any side effects from medications? none    Needs assistance for the following daily activities: no assistance needed    Which of the following safety concerns are present in your home?  none identified     Hearing impairment: No    In the past 6 months, have you been bothered by leaking of urine? no    Mental Health:    In general, how would you rate your overall mental or emotional health? excellent  PHQ-2 Score:      Do you feel safe in your environment? Yes    Have you ever done Advance Care Planning? (For example, a Health Directive, POLST, or a discussion with a medical provider or your loved ones about your wishes): Yes, advance care planning is on file.    Additional concerns to address?  No    Fall risk:  Fallen 2 or more times in the past year?: No  Any fall with injury in the past year?: No    Cognitive Screenin) Repeat 3 items (Leader, Season, Table)    2) Clock draw: NORMAL  3) 3 item recall: Recalls 3 objects  Results: 3 items recalled: COGNITIVE IMPAIRMENT LESS LIKELY    Mini-CogTM Copyright [employer]. Licensed by the author for use in [facility] " Services; reprinted with permission (soob@.[facility]). All rights reserved.      Do you have sleep apnea, excessive snoring or daytime drowsiness?: no            Reviewed and updated as needed this visit by clinical staff  Tobacco  Allergies  Meds              Reviewed and updated as needed this visit by Provider                Social History     Tobacco Use     Smoking status: Current Every Day Smoker     Packs/day: 0.50     Years: 57.00     Pack years: 28.50     Types: Cigarettes     Smokeless tobacco: Never Used     Tobacco comment: ppd since age 16 - now 1/2-1 ppd 04/05/21   Substance Use Topics     Alcohol use: No     Comment: last drank at [facility] 8/2010.                           Current providers sharing in care for this patient include:   Patient Care Team:  Martin Odonnell MD as PCP - General (Internal Medicine)  Jah Naik DPM as Assigned Musculoskeletal Provider  Estuardo Manning MD as Assigned PCP    The following health maintenance items are reviewed in Epic and correct as of today:  Health Maintenance   Topic Date Due     FALL RISK ASSESSMENT  09/15/2021     LUNG CANCER SCREENING ANNUAL  09/30/2021     MICROALBUMIN  10/01/2021     BMP  09/20/2022     LIPID  09/20/2022     MEDICARE ANNUAL WELLNESS VISIT  09/22/2022     ANNUAL REVIEW OF HM ORDERS  09/22/2022     COLORECTAL CANCER SCREENING  10/26/2025     ADVANCE CARE PLANNING  09/22/2026     DTAP/TDAP/TD IMMUNIZATION (3 - Td or Tdap) 09/15/2030     HEPATITIS C SCREENING  Completed     PHQ-2  Completed     Pneumococcal Vaccine: 65+ Years  Completed     ZOSTER IMMUNIZATION  Completed     AORTIC ANEURYSM SCREENING (SYSTEM ASSIGNED)  Completed     COVID-19 Vaccine  Completed     IPV IMMUNIZATION  Aged Out     MENINGITIS IMMUNIZATION  Aged Out     HEPATITIS B IMMUNIZATION  Aged Out     INFLUENZA VACCINE  Discontinued       Continues on medical therapy for hypertension and hyperlipidemia.  Today's blood pressure noted, most recent lipid panel  "in the last few days as follows:  Lab Results   Component Value Date    HDL 42 09/20/2021    LDL 46 09/20/2021    CHOL 100 09/20/2021    TRIG 61 09/20/2021        He is a lifelong smoker, still actively smoking though has cut back.  Has been getting yearly low-dose CT scans for screening, would like to repeat that.        ROS:  Constitutional, HEENT, cardiovascular, pulmonary, GI, , musculoskeletal, neuro, skin, endocrine and psych systems are negative, except as otherwise noted.    OBJECTIVE:   /80 (BP Location: Left arm, Patient Position: Chair, Cuff Size: Adult Regular)   Pulse 66   Temp 98  F (36.7  C) (Oral)   Resp 16   Ht 1.702 m (5' 7\")   Wt 74.4 kg (164 lb 1.6 oz)   SpO2 99%   BMI 25.70 kg/m   Estimated body mass index is 25.7 kg/m  as calculated from the following:    Height as of this encounter: 1.702 m (5' 7\").    Weight as of this encounter: 74.4 kg (164 lb 1.6 oz).  EXAM:   GENERAL: healthy, alert and no distress  NECK: no adenopathy, no asymmetry, masses, or scars and thyroid normal to palpation  RESP: lungs clear to auscultation - no rales, rhonchi or wheezes  CV: regular rate and rhythm, normal S1 S2, no S3 or S4, no murmur, click or rub, no peripheral edema and peripheral pulses strong  ABDOMEN: soft, nontender, no hepatosplenomegaly, no masses and bowel sounds normal  MS: no gross musculoskeletal defects noted, no edema        ASSESSMENT / PLAN:       ICD-10-CM    1. Medicare annual wellness visit, subsequent  Z00.00    2. Essential hypertension  I10 amLODIPine (NORVASC) 10 MG tablet   3. Personal history of tobacco use  Z87.891 Prof fee: Shared Decisionmaking for Lung Cancer Screening     CT Chest Lung Cancer Scrn Low Dose wo       Patient has been advised of split billing requirements and indicates understanding: Yes    COUNSELING:  Reviewed preventive health counseling, as reflected in patient instructions    Estimated body mass index is 25.7 kg/m  as calculated from the " "following:    Height as of this encounter: 1.702 m (5' 7\").    Weight as of this encounter: 74.4 kg (164 lb 1.6 oz).        He reports that he has been smoking cigarettes. He has a 28.50 pack-year smoking history. He has never used smokeless tobacco.  Tobacco Cessation Action Plan:   Information offered: Patient not interested at this time    Appropriate preventive services were discussed with this patient, including applicable screening as appropriate for cardiovascular disease, diabetes, osteopenia/osteoporosis, and glaucoma.  As appropriate for age/gender, discussed screening for colorectal cancer, prostate cancer, breast cancer, and cervical cancer. Checklist reviewing preventive services available has been given to the patient.    Reviewed patients plan of care and provided an AVS. The Basic Care Plan (routine screening as documented in Health Maintenance) for Alex meets the Care Plan requirement. This Care Plan has been established and reviewed with the Patient.    Counseling Resources:  ATP IV Guidelines  Pooled Cohorts Equation Calculator  Breast Cancer Risk Calculator  BRCA-Related Cancer Risk Assessment: FHS-7 Tool  FRAX Risk Assessment  ICSI Preventive Guidelines  Dietary Guidelines for Americans, 2010  USDA's MyPlate  ASA Prophylaxis  Lung CA Screening    Estuardo Manning MD  [facility]  "

## 2021-09-22 NOTE — PATIENT INSTRUCTIONS
Patient Education   Personalized Prevention Plan  You are due for the preventive services outlined below.  Your care team is available to assist you in scheduling these services.  If you have already completed any of these items, please share that information with your care team to update in your medical record.  Health Maintenance Due   Topic Date Due     ANNUAL REVIEW OF HM ORDERS  Never done     FALL RISK ASSESSMENT  09/15/2021     Annual Wellness Visit  09/15/2021     Lung Cancer Screening (CT Scan)  09/30/2021     Kidney Microalbumin Urine Test  10/01/2021        Lung Cancer Screening   Frequently Asked Questions  If you are at high-risk for lung cancer, getting screened with low-dose computed tomography (LDCT) every year can help save your life. This handout offers answers to some of the most common questions about lung cancer screening. If you have other questions, please call [phone] ([phone]).     What is it?  Lung cancer screening uses special X-ray technology to create an image of your lung tissue. The exam is quick and easy and takes less than 10 seconds. We don t give you any medicine or use any needles. You can eat before and after the exam. You don t need to change your clothes as long as the clothing on your chest doesn t contain metal. But, you do need to be able to hold your breath for at least 6 seconds during the exam.    What is the goal of lung cancer screening?  The goal of lung cancer screening is to save lives. Many times, lung cancer is not found until a person starts having physical symptoms. Lung cancer screening can help detect lung cancer in the earliest stages when it may be easier to treat.    Who should be screened for lung cancer?  We suggest lung cancer screening for anyone who is at high-risk for lung cancer. You are in the high-risk group if you:      are between the ages of 55 and 79, and    have smoked at least 1 pack of cigarettes a day for 30 or more years,  and    still smoke or have quit within the past 15 years.    However, if you have a new cough or shortness of breath, you should talk to your doctor before being screened.    Some national lung health advocacy groups also recommend screening for people ages 50 to 79 who have smoked an average of 1 pack of cigarettes a day for 20 years. They must also have at least 1 other risk factor for lung cancer, not including exposure to secondhand smoke. Other risk factors are having had cancer in the past, emphysema, pulmonary fibrosis, COPD, a family history of lung cancer, or exposure to certain materials such as arsenic, asbestos, beryllium, cadmium, chromium, diesel fumes, nickel, radon or silica. Your care team can help you know if you have one of these risk factors.     Why does it matter if I have symptoms?  Certain symptoms can be a sign that you have a condition in your lungs that should be checked and treated by your doctor. These symptoms include fever, chest pain, a new or changing cough, shortness of breath that you have never felt before, coughing up blood or unexplained weight loss. Having any of these symptoms can greatly affect the results of lung cancer screening.       Should all smokers get an LDCT lung cancer screening exam?  It depends. Lung cancer screening is for a very specific group of men and women who have a history of heavy smoking over a long period of time (see  Who should be screened for lung cancer  above).  I am in the high-risk group, but have been diagnosed with cancer in the past. Is LDCT lung cancer screening right for me?  In some cases, you should not have LDCT lung screening, such as when your doctor is already following your cancer with CT scan studies. Your doctor will help you decide if LDCT lung screening is right for you.  Do I need to have a screening exam every year?  Yes. If you are in the high-risk group described earlier, you should get an LDCT lung cancer screening exam  every year until you are 79, or are no longer willing or able to undergo screening and possible procedures to diagnose and treat lung cancer.  How effective is LDCT at preventing death from lung cancer?  Studies have shown that LDCT lung cancer screening can lower the risk of death from lung cancer by 20 percent in people who are at high-risk.  What are the risks?  There are some risks and limitations of LDCT lung cancer screening. We want to make sure you understand the risks and benefits, so please let us know if you have any questions. Your doctor may want to talk with you more about these risks.    Radiation exposure: As with any exam that uses radiation, there is a very small increased risk of cancer. The amount of radiation in LDCT is small--about the same amount a person would get from a mammogram. Your doctor orders the exam when he or she feels the potential benefits outweigh the risks.    False negatives: No test is perfect, including LDCT. It is possible that you may have a medical condition, including lung cancer, that is not found during your exam. This is called a false negative result.    False positives and more testing: LDCT very often finds something in the lung that could be cancer, but in fact is not. This is called a false positive result. False positive tests often cause anxiety. To make sure these findings are not cancer, you may need to have more tests. These tests will be done only if you give us permission. Sometimes patients need a treatment that can have side effects, such as a biopsy. For more information on false positives, see  What can I expect from the results?     Findings not related to lung cancer: Your LDCT exam also takes pictures of areas of your body next to your lungs. In a very small number of cases, the CT scan will show an abnormal finding in one of these areas, such as your kidneys, adrenal glands, liver or thyroid. This finding may not be serious, but you may need more  tests. Your doctor can help you decide what other tests you may need, if any.  What can I expect from the results?  About 1 out of 4 LDCT exams will find something that may need more tests. Most of the time, these findings are lung nodules. Lung nodules are very small collections of tissue in the lung. These nodules are very common, and the vast majority--more than 97 percent--are not cancer (benign). Most are normal lymph nodes or small areas of scarring from past infections.  But, if a small lung nodule is found to be cancer, the cancer can be cured more than 90 percent of the time. To know if the nodule is cancer, we may need to get more images before your next yearly screening exam. If the nodule has suspicious features (for example, it is large, has an odd shape or grows over time), we will refer you to a specialist for further testing.  Will my doctor also get the results?  Yes. Your doctor will get a copy of your results.  Is it okay to keep smoking now that there s a cancer screening exam?  No. Tobacco is one of the strongest cancer-causing agents. It causes not only lung cancer, but other cancers and cardiovascular (heart) diseases as well. The damage caused by smoking builds over time. This means that the longer you smoke, the higher your risk of disease. While it is never too late to quit, the sooner you quit, the better.  Where can I find help to quit smoking?  The best way to prevent lung cancer is to stop smoking. If you have already quit smoking, congratulations and keep it up! For help on quitting smoking, please call [place] at 3-542-635-BBMF ([phone]) or the [employer] at 1-256.368.5858 to find local resources near you.  One-on-one health coaching:  If you d prefer to work individually with a health care provider on tobacco cessation, we offer:      Medication Therapy Management:  Our specially trained pharmacists work closely with you and your doctor to help you quit smoking.  Call  [phone] or [phone] (toll free).     Can Do: Health coaching offered by [facility].  [email or web address]

## 2021-10-05 ENCOUNTER — HOSPITAL ENCOUNTER (OUTPATIENT)
Dept: CT IMAGING | Facility: CLINIC | Age: 76
Discharge: HOME | End: 2021-10-05
Attending: INTERNAL MEDICINE | Admitting: INTERNAL MEDICINE
Payer: MEDICARE

## 2021-10-05 DIAGNOSIS — Z87.891 PERSONAL HISTORY OF TOBACCO USE: ICD-10-CM

## 2021-10-05 PROCEDURE — 71271 CT THORAX LUNG CANCER SCR C-: CPT | Mod: ME

## 2021-10-11 ENCOUNTER — ANTICOAGULATION THERAPY VISIT (OUTPATIENT)
Dept: ANTICOAGULATION | Facility: CLINIC | Age: 76
End: 2021-10-11

## 2021-10-11 ENCOUNTER — LAB (OUTPATIENT)
Dept: LAB | Facility: CLINIC | Age: 76
End: 2021-10-11
Payer: MEDICARE

## 2021-10-11 ENCOUNTER — DOCUMENTATION ONLY (OUTPATIENT)
Dept: INTERNAL MEDICINE | Facility: CLINIC | Age: 76
End: 2021-10-11

## 2021-10-11 DIAGNOSIS — I48.20 CHRONIC ATRIAL FIBRILLATION (H): ICD-10-CM

## 2021-10-11 DIAGNOSIS — I48.20 CHRONIC ATRIAL FIBRILLATION (H): Primary | ICD-10-CM

## 2021-10-11 DIAGNOSIS — Z79.01 LONG TERM CURRENT USE OF ANTICOAGULANT THERAPY: Primary | ICD-10-CM

## 2021-10-11 LAB — INR BLD: 3.9 (ref 0.9–1.1)

## 2021-10-11 PROCEDURE — 36416 COLLJ CAPILLARY BLOOD SPEC: CPT

## 2021-10-11 PROCEDURE — 85610 PROTHROMBIN TIME: CPT

## 2021-10-11 NOTE — PATIENT INSTRUCTIONS
flonase steroid nasal spray   For sinus congestion.        Ketoconazole Pregnancy And Lactation Text: This medication is Pregnancy Category C and it isn't know if it is safe during pregnancy. It is also excreted in breast milk and breast feeding isn't recommended.

## 2021-10-11 NOTE — PROGRESS NOTES
ANTICOAGULATION MANAGEMENT      Alex Stanley due for annual renewal of referral to anticoagulation monitoring. Order pended for your review and signature.      ANTICOAGULATION SUMMARY      Warfarin indication(s)     Atrial fibrillation    Heart valve present?  NO       Current goal range   INR: 2.0-3.0     Goal appropriate for indication? Yes, INR 2-3 appropriate for hx of DVT, PE, hypercoagulable state, Afib, LVAD, or bileaflet AVR without risk factors     Current duration of therapy Indefinite/long term therapy   Time in Therapeutic Range (TTR)  (Goal > 60%) 64%       Office visit with referring provider's group within last year yes on 9/22/21       Mabel Garza RN

## 2021-10-11 NOTE — PROGRESS NOTES
ANTICOAGULATION MANAGEMENT     Alex Melarajacinta 76 year old male is on warfarin with supratherapeutic INR result. (Goal INR 2.0-3.0)    Recent labs: (last 7 days)     10/11/21  1010   INR 3.9*       ASSESSMENT     Source(s): Patient/Caregiver Call       Warfarin doses taken: Warfarin taken as instructed    Diet: No new diet changes identified    New illness, injury, or hospitalization: No    Medication/supplement changes: None noted    Signs or symptoms of bleeding or clotting: No    Previous INR: Therapeutic last 2(+) visits    Additional findings: took dose already today, will hold dose tomorrow      PLAN     Recommended plan for no diet, medication or health factor changes affecting INR     Dosing Instructions: Hold dose then continue your current warfarin dose with next INR in 2 weeks       Summary  As of 10/11/2021    Full warfarin instructions:  10/11: Hold; Otherwise 2.5 mg every Mon, Wed, Fri; 5 mg all other days   Next INR check:  10/25/2021             Telephone call with Alex who verbalizes understanding and agrees to plan    Lab visit scheduled    Education provided: Goal range and significance of current result, Monitoring for bleeding signs and symptoms and Monitoring for clotting signs and symptoms    Plan made per [facility] anticoagulation protocol    Mabel Garza RN  Anticoagulation Clinic  10/11/2021    _______________________________________________________________________     Anticoagulation Episode Summary     Current INR goal:  2.0-3.0   TTR:  64.4 % (1 y)   Target end date:  Indefinite   Send INR reminders to:  [facility]    Indications    Long-term (current) use of anticoagulants [Z79.01] [Z79.01]  Chronic atrial fibrillation/Flutter [I48.20]           Comments:  INR referral approved 12/9/2019 per Dr Odonnell. Telephone message 12/9/2019         Anticoagulation Care Providers     Provider Role Specialty Phone number    Martin Odonnell MD Referring Internal Medicine  [phone]    Isamar Hernandez MD [facility] Internal Medicine [phone]

## 2021-10-25 ENCOUNTER — LAB (OUTPATIENT)
Dept: LAB | Facility: CLINIC | Age: 76
End: 2021-10-25
Payer: MEDICARE

## 2021-10-25 ENCOUNTER — ANTICOAGULATION THERAPY VISIT (OUTPATIENT)
Dept: ANTICOAGULATION | Facility: CLINIC | Age: 76
End: 2021-10-25

## 2021-10-25 DIAGNOSIS — Z79.01 LONG TERM CURRENT USE OF ANTICOAGULANT THERAPY: Primary | ICD-10-CM

## 2021-10-25 DIAGNOSIS — I48.20 CHRONIC ATRIAL FIBRILLATION (H): ICD-10-CM

## 2021-10-25 DIAGNOSIS — N18.30 CKD (CHRONIC KIDNEY DISEASE) STAGE 3, GFR 30-59 ML/MIN (H): Primary | ICD-10-CM

## 2021-10-25 LAB — INR BLD: 2.7 (ref 0.9–1.1)

## 2021-10-25 PROCEDURE — 85610 PROTHROMBIN TIME: CPT

## 2021-10-25 PROCEDURE — 36416 COLLJ CAPILLARY BLOOD SPEC: CPT

## 2021-10-25 PROCEDURE — 99207 PR NO CHARGE NURSE ONLY: CPT

## 2021-10-25 NOTE — PROGRESS NOTES
ANTICOAGULATION MANAGEMENT     Alex Melarajacinta 76 year old male is on warfarin with therapeutic INR result. (Goal INR 2.0-3.0)    Recent labs: (last 7 days)     10/25/21  1018   INR 2.7*       ASSESSMENT     Source(s): Chart Review and Patient/Caregiver Call       Warfarin doses taken: Warfarin taken as instructed    Diet: No new diet changes identified    New illness, injury, or hospitalization: No    Medication/supplement changes: None noted    Signs or symptoms of bleeding or clotting: No    Previous INR: Supratherapeutic    Additional findings: None     PLAN     Recommended plan for no diet, medication or health factor changes affecting INR     Dosing Instructions: Continue your current warfarin dose with next INR in 4 weeks       Summary  As of 10/25/2021    Full warfarin instructions:  2.5 mg every Mon, Wed, Fri; 5 mg all other days   Next INR check:  11/15/2021             Telephone call with Alex who verbalizes understanding and agrees to plan    Lab visit scheduled    Education provided: Please call back if any changes to your diet, medications or how you've been taking warfarin    Plan made per [facility] anticoagulation protocol    Tara Cavazos RN  Anticoagulation Clinic  10/25/2021    _______________________________________________________________________     Anticoagulation Episode Summary     Current INR goal:  2.0-3.0   TTR:  62.1 % (1 y)   Target end date:  Indefinite   Send INR reminders to:  [facility]    Indications    Long-term (current) use of anticoagulants [Z79.01] [Z79.01]  Chronic atrial fibrillation/Flutter [I48.20]           Comments:  INR referral approved 12/9/2019 per Dr Odonnell. Telephone message 12/9/2019         Anticoagulation Care Providers     Provider Role Specialty Phone number    Martin Odonnell MD Referring Internal Medicine [phone]    Isamar Hernandez MD Referring Internal Medicine [phone]    Estuardo Manning MD Referring Internal Medicine  [phone]

## 2021-10-25 NOTE — PROGRESS NOTES
ANTICOAGULATION FOLLOW-UP CLINIC VISIT    Patient Name:  Alex Stanley  Date:  10/25/2021  Contact Type:  Telephone    SUBJECTIVE:         OBJECTIVE    Recent labs: (last 7 days)     10/25/21  1018   INR 2.7*       ASSESSMENT / PLAN  INR assessment THER    Recheck INR In: 4 WEEKS    INR Location Clinic      Anticoagulation Summary  As of 10/25/2021    INR goal:  2.0-3.0   TTR:  62.1 % (1 y)   INR used for dosin.7 (10/25/2021)   Warfarin maintenance plan:  2.5 mg (5 mg x 0.5) every Mon, Wed, Fri; 5 mg (5 mg x 1) all other days   Full warfarin instructions:  2.5 mg every Mon, Wed, Fri; 5 mg all other days   Weekly warfarin total:  27.5 mg   No change documented:  Tara Cavazos RN   Plan last modified:  Mabel Garza RN (5/10/2021)   Next INR check:  11/15/2021   Priority:  Maintenance   Target end date:  Indefinite    Indications    Long-term (current) use of anticoagulants [Z79.01] [Z79.01]  Chronic atrial fibrillation/Flutter [I48.20]             Anticoagulation Episode Summary     INR check location:      Preferred lab:      Send INR reminders to:  [facility]    Comments:  INR referral approved 2019 per Dr Odonnell. Telephone message 2019      Anticoagulation Care Providers     Provider Role Specialty Phone number    Martin Odonnell MD Referring Internal Medicine [phone]    Isamar Hernandez MD Referring Internal Medicine [phone]    Estuardo Manning MD Referring Internal Medicine [phone]            See the Encounter Report to view Anticoagulation Flowsheet and Dosing Calendar (Go to Encounters tab in chart review, and find the Anticoagulation Therapy Visit)        Tara Cavazos, RN

## 2021-11-09 DIAGNOSIS — I48.20 CHRONIC ATRIAL FIBRILLATION (H): ICD-10-CM

## 2021-11-09 RX ORDER — WARFARIN SODIUM 5 MG/1
TABLET ORAL
Qty: 90 TABLET | Refills: 1 | Status: SHIPPED | OUTPATIENT
Start: 2021-11-09 | End: 2022-04-12

## 2021-11-09 NOTE — TELEPHONE ENCOUNTER
"Requested Prescriptions   Pending Prescriptions Disp Refills     warfarin ANTICOAGULANT (COUMADIN) 5 MG tablet [Pharmacy Med Name: WARFARIN TABS 5MG] 90 tablet 3     Sig: TAKE ONE TABLET DAILY EXCEPT ONE-HALF (1/2) TABLET ON MONDAY, WEDNESDAY, AND FRIDAY AS DIRECTED BY THE ANTICOAGULATION CLINIC       Vitamin K Antagonists Failed - 11/9/2021 10:07 AM        Failed - INR is within goal in the past 6 weeks     Confirm INR is within goal in the past 6 weeks.     Recent Labs   Lab Test 10/25/21  1018   INR 2.7*                       Passed - Recent (12 mo) or future (30 days) visit within the authorizing provider's specialty     Patient has had an office visit with the authorizing provider or a provider within the authorizing providers department within the previous 12 mos or has a future within next 30 days. See \"Patient Info\" tab in inbasket, or \"Choose Columns\" in Meds & Orders section of the refill encounter.              Passed - Medication is active on med list        Passed - Patient is 18 years of age or older           Last office visit 9/22/21.  Warfarin dosing is managed by INR Clinic.  Prescription approved per [facility] Refill Protocol.    "

## 2021-11-22 ENCOUNTER — LAB (OUTPATIENT)
Dept: LAB | Facility: CLINIC | Age: 76
End: 2021-11-22
Payer: MEDICARE

## 2021-11-22 ENCOUNTER — ANTICOAGULATION THERAPY VISIT (OUTPATIENT)
Dept: ANTICOAGULATION | Facility: CLINIC | Age: 76
End: 2021-11-22

## 2021-11-22 DIAGNOSIS — I48.20 CHRONIC ATRIAL FIBRILLATION (H): ICD-10-CM

## 2021-11-22 DIAGNOSIS — Z79.01 LONG TERM CURRENT USE OF ANTICOAGULANT THERAPY: Primary | ICD-10-CM

## 2021-11-22 LAB — INR BLD: 2.7 (ref 0.9–1.1)

## 2021-11-22 PROCEDURE — 99207 PR NO CHARGE NURSE ONLY: CPT

## 2021-11-22 PROCEDURE — 36415 COLL VENOUS BLD VENIPUNCTURE: CPT

## 2021-11-22 PROCEDURE — 85610 PROTHROMBIN TIME: CPT

## 2021-11-22 NOTE — PROGRESS NOTES
ANTICOAGULATION FOLLOW-UP CLINIC VISIT    Patient Name:  Alex Stanley  Date:  2021  Contact Type:  Telephone    SUBJECTIVE:         OBJECTIVE    Recent labs: (last 7 days)     21  0930   INR 2.7*       ASSESSMENT / PLAN  INR assessment THER    Recheck INR In: 5 WEEKS    INR Location Clinic      Anticoagulation Summary  As of 2021    INR goal:  2.0-3.0   TTR:  65.3 % (1 y)   INR used for dosin.7 (2021)   Warfarin maintenance plan:  2.5 mg (5 mg x 0.5) every Mon, Wed, Fri; 5 mg (5 mg x 1) all other days   Full warfarin instructions:  2.5 mg every Mon, Wed, Fri; 5 mg all other days   Weekly warfarin total:  27.5 mg   No change documented:  Tara Cavazos RN   Plan last modified:  Mabel Garza RN (5/10/2021)   Next INR check:  2021   Priority:  Maintenance   Target end date:  Indefinite    Indications    Long-term (current) use of anticoagulants [Z79.01] [Z79.01]  Chronic atrial fibrillation/Flutter [I48.20]             Anticoagulation Episode Summary     INR check location:      Preferred lab:      Send INR reminders to:  [facility]    Comments:  INR referral approved 2019 per Dr Odonnell. Telephone message 2019      Anticoagulation Care Providers     Provider Role Specialty Phone number    Martin Odonnell MD Referring Internal Medicine [phone]    Isamar Hernandez MD Referring Internal Medicine [phone]    Estuardo Manning MD Referring Internal Medicine [phone]            See the Encounter Report to view Anticoagulation Flowsheet and Dosing Calendar (Go to Encounters tab in chart review, and find the Anticoagulation Therapy Visit)        Tara Cavazos RN          ANTICOAGULATION MANAGEMENT     Alex Stanley 76 year old male is on warfarin with therapeutic INR result. (Goal INR 2.0-3.0)    Recent labs: (last 7 days)     21  0930   INR 2.7*       ASSESSMENT     Source(s): Chart Review       Warfarin doses taken:  Warfarin taken as instructed    Diet: No new diet changes identified    New illness, injury, or hospitalization: No    Medication/supplement changes: None noted    Signs or symptoms of bleeding or clotting: No    Previous INR: Therapeutic last visit; previously outside of goal range    Additional findings: None     PLAN     Recommended plan for no diet, medication or health factor changes affecting INR     Dosing Instructions: Continue your current warfarin dose with next INR in 5 weeks       Summary  As of 11/22/2021    Full warfarin instructions:  2.5 mg every Mon, Wed, Fri; 5 mg all other days   Next INR check:  12/27/2021             Telephone call with Alex who verbalizes understanding and agrees to plan    Lab visit scheduled    Education provided: Please call back if any changes to your diet, medications or how you've been taking warfarin    Plan made per [facility] anticoagulation protocol    Tara Cavazos RN  Anticoagulation Clinic  11/22/2021    _______________________________________________________________________     Anticoagulation Episode Summary     Current INR goal:  2.0-3.0   TTR:  65.3 % (1 y)   Target end date:  Indefinite   Send INR reminders to:  [facility]    Indications    Long-term (current) use of anticoagulants [Z79.01] [Z79.01]  Chronic atrial fibrillation/Flutter [I48.20]           Comments:  INR referral approved 12/9/2019 per Dr Odonnell. Telephone message 12/9/2019         Anticoagulation Care Providers     Provider Role Specialty Phone number    Martin Odonnell MD Referring Internal Medicine [phone]    Isamar Hernandez MD Referring Internal Medicine [phone]    Estuardo Manning MD Referring Internal Medicine [phone]

## 2021-12-14 ENCOUNTER — OFFICE VISIT (OUTPATIENT)
Dept: DERMATOLOGY | Facility: CLINIC | Age: 76
End: 2021-12-14
Payer: MEDICARE

## 2021-12-14 VITALS — OXYGEN SATURATION: 96 % | HEART RATE: 88 BPM | DIASTOLIC BLOOD PRESSURE: 76 MMHG | SYSTOLIC BLOOD PRESSURE: 110 MMHG

## 2021-12-14 DIAGNOSIS — L82.0 INFLAMED SEBORRHEIC KERATOSIS: Primary | ICD-10-CM

## 2021-12-14 PROCEDURE — 17110 DESTRUCTION B9 LES UP TO 14: CPT | Performed by: PHYSICIAN ASSISTANT

## 2021-12-14 PROCEDURE — 99207 PR DROP WITH A PROCEDURE: CPT | Performed by: PHYSICIAN ASSISTANT

## 2021-12-14 NOTE — NURSING NOTE
"Initial /76   Pulse 88   SpO2 96%  Estimated body mass index is 25.7 kg/m  as calculated from the following:    Height as of 9/22/21: 1.702 m (5' 7\").    Weight as of 9/22/21: 74.4 kg (164 lb 1.6 oz).     RAHUL Stern-KOBEN-N  [facility]  [phone]  "

## 2021-12-14 NOTE — LETTER
12/14/2021         RE: Alex Stanley  [address]  [place] [address]        Dear Colleague,    Thank you for referring your patient, Alex Stanley, [facility]. Please see a copy of my visit note below.    HPI:   Chief complaints: Alex Stanley is a pleasant 76 year old male who presents for evaluation of a changing spot on the temple. It seems to be growing in size. He also has a new spot on the left ear.       PHYSICAL EXAM:    /76   Pulse 88   SpO2 96%   Skin exam performed as follows: Type 2 skin. Mood appropriate  Alert and Oriented X 3. Well developed, well nourished in no distress.  General appearance: Normal  Head including face: Normal  Eyes: conjunctiva and lids: Normal  Mouth: Lips, teeth, gums: Normal  Neck: Normal  Cardiovascular: Exam of peripheral vascular system by observation for swelling, varicosities, edema: Normal  Right upper extremity: Normal  Left upper extremity: Normal  Right lower extremity: Normal  Left lower extremity: Normal  Skin: Scalp and body hair: See below    Inflamed keratotic papule on the left lateral forehead x 1, left tragus x 1    ASSESSMENT/PLAN:     1. Inflamed seborrheic keratosis on the left lateral forehead x 1, left tragus x 1. As physically tender cryosurgery performed. Advised on post op care.             Follow-up: yearly FSE/PRN sooner  CC:   Scribed By: Micaela Cowart, MS, PADAVIDA          Again, thank you for allowing me to participate in the care of your patient.        Sincerely,        Micaela Cowart PA-C

## 2021-12-14 NOTE — PROGRESS NOTES
HPI:   Chief complaints: Alex Stanley is a pleasant 76 year old male who presents for evaluation of a changing spot on the temple. It seems to be growing in size. He also has a new spot on the left ear.       PHYSICAL EXAM:    /76   Pulse 88   SpO2 96%   Skin exam performed as follows: Type 2 skin. Mood appropriate  Alert and Oriented X 3. Well developed, well nourished in no distress.  General appearance: Normal  Head including face: Normal  Eyes: conjunctiva and lids: Normal  Mouth: Lips, teeth, gums: Normal  Neck: Normal  Cardiovascular: Exam of peripheral vascular system by observation for swelling, varicosities, edema: Normal  Right upper extremity: Normal  Left upper extremity: Normal  Right lower extremity: Normal  Left lower extremity: Normal  Skin: Scalp and body hair: See below    Inflamed keratotic papule on the left lateral forehead x 1, left tragus x 1    ASSESSMENT/PLAN:     1. Inflamed seborrheic keratosis on the left lateral forehead x 1, left tragus x 1. As physically tender cryosurgery performed. Advised on post op care.             Follow-up: yearly FSE/PRN sooner  CC:   Scribed By: Micaela Cowart, MS, PA-C       see HPI

## 2021-12-27 ENCOUNTER — ANTICOAGULATION THERAPY VISIT (OUTPATIENT)
Dept: LAB | Facility: CLINIC | Age: 76
End: 2021-12-27

## 2021-12-27 ENCOUNTER — LAB (OUTPATIENT)
Dept: LAB | Facility: CLINIC | Age: 76
End: 2021-12-27
Payer: MEDICARE

## 2021-12-27 DIAGNOSIS — Z79.01 LONG TERM CURRENT USE OF ANTICOAGULANT THERAPY: Primary | ICD-10-CM

## 2021-12-27 DIAGNOSIS — I48.20 CHRONIC ATRIAL FIBRILLATION (H): ICD-10-CM

## 2021-12-27 LAB — INR BLD: 2.3 (ref 0.9–1.1)

## 2021-12-27 PROCEDURE — 85610 PROTHROMBIN TIME: CPT

## 2021-12-27 PROCEDURE — 99207 PR NO CHARGE NURSE ONLY: CPT

## 2021-12-27 PROCEDURE — 36416 COLLJ CAPILLARY BLOOD SPEC: CPT

## 2021-12-27 NOTE — PROGRESS NOTES
ANTICOAGULATION FOLLOW-UP CLINIC VISIT    Patient Name:  Alex Stanley  Date:  2021  Contact Type:  Telephone    SUBJECTIVE:         OBJECTIVE    Recent labs: (last 7 days)     21  0842   INR 2.3*       ASSESSMENT / PLAN  INR assessment THER    Recheck INR In: 6 WEEKS    INR Location Clinic      Anticoagulation Summary  As of 2021    INR goal:  2.0-3.0   TTR:  65.2 % (1 y)   INR used for dosin.3 (2021)   Warfarin maintenance plan:  2.5 mg (5 mg x 0.5) every Mon, Wed, Fri; 5 mg (5 mg x 1) all other days   Full warfarin instructions:  2.5 mg every Mon, Wed, Fri; 5 mg all other days   Weekly warfarin total:  27.5 mg   No change documented:  Tara Cavazos RN   Plan last modified:  Mabel Garza RN (5/10/2021)   Next INR check:  2022   Priority:  Maintenance   Target end date:  Indefinite    Indications    Long-term (current) use of anticoagulants [Z79.01] [Z79.01]  Chronic atrial fibrillation/Flutter [I48.20]             Anticoagulation Episode Summary     INR check location:      Preferred lab:      Send INR reminders to:  [facility]    Comments:  INR referral approved 2019 per Dr Odonnell. Telephone message 2019      Anticoagulation Care Providers     Provider Role Specialty Phone number    Martin Odonnell MD Referring Internal Medicine [phone]    Isamar Hernandez MD Referring Internal Medicine [phone]    Estuardo Manning MD Referring Internal Medicine [phone]            See the Encounter Report to view Anticoagulation Flowsheet and Dosing Calendar (Go to Encounters tab in chart review, and find the Anticoagulation Therapy Visit)        Tara Cavazos RN          ANTICOAGULATION MANAGEMENT     Alex Stanley 76 year old male is on warfarin with therapeutic INR result. (Goal INR 2.0-3.0)    Recent labs: (last 7 days)     21  0842   INR 2.3*       ASSESSMENT     Source(s): Chart Review and Patient/Caregiver Call        Warfarin doses taken: Warfarin taken as instructed    Diet: No new diet changes identified    New illness, injury, or hospitalization: No    Medication/supplement changes: None noted    Signs or symptoms of bleeding or clotting: No    Previous INR: Therapeutic last 2(+) visits    Additional findings: None     PLAN     Recommended plan for no diet, medication or health factor changes affecting INR     Dosing Instructions: Continue your current warfarin dose with next INR in 6 weeks       Summary  As of 12/27/2021    Full warfarin instructions:  2.5 mg every Mon, Wed, Fri; 5 mg all other days   Next INR check:  2/7/2022             Telephone call with Alex who verbalizes understanding and agrees to plan    Lab visit scheduled    Education provided: Please call back if any changes to your diet, medications or how you've been taking warfarin    Plan made per [facility] anticoagulation protocol    Tara Cavazos RN  Anticoagulation Clinic  12/27/2021    _______________________________________________________________________     Anticoagulation Episode Summary     Current INR goal:  2.0-3.0   TTR:  65.2 % (1 y)   Target end date:  Indefinite   Send INR reminders to:  [facility]    Indications    Long-term (current) use of anticoagulants [Z79.01] [Z79.01]  Chronic atrial fibrillation/Flutter [I48.20]           Comments:  INR referral approved 12/9/2019 per Dr Odonnell. Telephone message 12/9/2019         Anticoagulation Care Providers     Provider Role Specialty Phone number    Martin Odonnell MD Referring Internal Medicine [phone]    Isamar Hernandez MD Referring Internal Medicine [phone]    Estuardo Manning MD Referring Internal Medicine [phone]

## 2022-01-12 ENCOUNTER — HOSPITAL ENCOUNTER (INPATIENT)
Facility: CLINIC | Age: 77
LOS: 2 days | Discharge: HOME | DRG: 439 | End: 2022-01-14
Attending: EMERGENCY MEDICINE | Admitting: HOSPITALIST
Payer: MEDICARE

## 2022-01-12 ENCOUNTER — APPOINTMENT (OUTPATIENT)
Dept: GENERAL RADIOLOGY | Facility: CLINIC | Age: 77
DRG: 439 | End: 2022-01-12
Attending: EMERGENCY MEDICINE
Payer: MEDICARE

## 2022-01-12 ENCOUNTER — APPOINTMENT (OUTPATIENT)
Dept: ULTRASOUND IMAGING | Facility: CLINIC | Age: 77
DRG: 439 | End: 2022-01-12
Attending: EMERGENCY MEDICINE
Payer: MEDICARE

## 2022-01-12 DIAGNOSIS — K85.10 GALLSTONE PANCREATITIS: ICD-10-CM

## 2022-01-12 DIAGNOSIS — K85.90 ACUTE PANCREATITIS, UNSPECIFIED COMPLICATION STATUS, UNSPECIFIED PANCREATITIS TYPE: Primary | ICD-10-CM

## 2022-01-12 LAB
ALBUMIN SERPL-MCNC: 3.7 G/DL (ref 3.4–5)
ALP SERPL-CCNC: 102 U/L (ref 40–150)
ALT SERPL W P-5'-P-CCNC: 51 U/L (ref 0–70)
ANION GAP SERPL CALCULATED.3IONS-SCNC: 3 MMOL/L (ref 3–14)
AST SERPL W P-5'-P-CCNC: 78 U/L (ref 0–45)
ATRIAL RATE - MUSE: 117 BPM
BASOPHILS # BLD AUTO: 0.1 10E3/UL (ref 0–0.2)
BASOPHILS NFR BLD AUTO: 1 %
BILIRUB SERPL-MCNC: 0.6 MG/DL (ref 0.2–1.3)
BUN SERPL-MCNC: 16 MG/DL (ref 7–30)
CALCIUM SERPL-MCNC: 8.6 MG/DL (ref 8.5–10.1)
CHLORIDE BLD-SCNC: 107 MMOL/L (ref 94–109)
CO2 SERPL-SCNC: 27 MMOL/L (ref 20–32)
CREAT SERPL-MCNC: 1.16 MG/DL (ref 0.66–1.25)
DIASTOLIC BLOOD PRESSURE - MUSE: NORMAL MMHG
EOSINOPHIL # BLD AUTO: 0.1 10E3/UL (ref 0–0.7)
EOSINOPHIL NFR BLD AUTO: 1 %
ERYTHROCYTE [DISTWIDTH] IN BLOOD BY AUTOMATED COUNT: 14.4 % (ref 10–15)
GFR SERPL CREATININE-BSD FRML MDRD: 66 ML/MIN/1.73M2
GLUCOSE BLD-MCNC: 108 MG/DL (ref 70–99)
HCT VFR BLD AUTO: 43.4 % (ref 40–53)
HGB BLD-MCNC: 14.4 G/DL (ref 13.3–17.7)
IMM GRANULOCYTES # BLD: 0.1 10E3/UL
IMM GRANULOCYTES NFR BLD: 1 %
INR PPP: 2.54 (ref 0.85–1.15)
INTERPRETATION ECG - MUSE: NORMAL
LIPASE SERPL-CCNC: ABNORMAL U/L (ref 73–393)
LYMPHOCYTES # BLD AUTO: 1.6 10E3/UL (ref 0.8–5.3)
LYMPHOCYTES NFR BLD AUTO: 13 %
MCH RBC QN AUTO: 31.2 PG (ref 26.5–33)
MCHC RBC AUTO-ENTMCNC: 33.2 G/DL (ref 31.5–36.5)
MCV RBC AUTO: 94 FL (ref 78–100)
MONOCYTES # BLD AUTO: 1.1 10E3/UL (ref 0–1.3)
MONOCYTES NFR BLD AUTO: 9 %
NEUTROPHILS # BLD AUTO: 9.1 10E3/UL (ref 1.6–8.3)
NEUTROPHILS NFR BLD AUTO: 75 %
NRBC # BLD AUTO: 0 10E3/UL
NRBC BLD AUTO-RTO: 0 /100
P AXIS - MUSE: NORMAL DEGREES
PLATELET # BLD AUTO: 245 10E3/UL (ref 150–450)
POTASSIUM BLD-SCNC: 3.9 MMOL/L (ref 3.4–5.3)
PR INTERVAL - MUSE: NORMAL MS
PROT SERPL-MCNC: 6.6 G/DL (ref 6.8–8.8)
QRS DURATION - MUSE: 90 MS
QT - MUSE: 390 MS
QTC - MUSE: 464 MS
R AXIS - MUSE: 172 DEGREES
RBC # BLD AUTO: 4.62 10E6/UL (ref 4.4–5.9)
SARS-COV-2 RNA RESP QL NAA+PROBE: NEGATIVE
SODIUM SERPL-SCNC: 137 MMOL/L (ref 133–144)
SYSTOLIC BLOOD PRESSURE - MUSE: NORMAL MMHG
T AXIS - MUSE: 84 DEGREES
TROPONIN I SERPL HS-MCNC: 6 NG/L
VENTRICULAR RATE- MUSE: 85 BPM
WBC # BLD AUTO: 12.1 10E3/UL (ref 4–11)

## 2022-01-12 PROCEDURE — 84484 ASSAY OF TROPONIN QUANT: CPT | Performed by: EMERGENCY MEDICINE

## 2022-01-12 PROCEDURE — 85025 COMPLETE CBC W/AUTO DIFF WBC: CPT | Performed by: EMERGENCY MEDICINE

## 2022-01-12 PROCEDURE — 96375 TX/PRO/DX INJ NEW DRUG ADDON: CPT

## 2022-01-12 PROCEDURE — 99285 EMERGENCY DEPT VISIT HI MDM: CPT | Mod: 25

## 2022-01-12 PROCEDURE — 96374 THER/PROPH/DIAG INJ IV PUSH: CPT

## 2022-01-12 PROCEDURE — 258N000003 HC RX IP 258 OP 636: Performed by: EMERGENCY MEDICINE

## 2022-01-12 PROCEDURE — 250N000011 HC RX IP 250 OP 636: Performed by: EMERGENCY MEDICINE

## 2022-01-12 PROCEDURE — 85610 PROTHROMBIN TIME: CPT | Performed by: EMERGENCY MEDICINE

## 2022-01-12 PROCEDURE — 76705 ECHO EXAM OF ABDOMEN: CPT

## 2022-01-12 PROCEDURE — 96376 TX/PRO/DX INJ SAME DRUG ADON: CPT

## 2022-01-12 PROCEDURE — C9803 HOPD COVID-19 SPEC COLLECT: HCPCS

## 2022-01-12 PROCEDURE — 36415 COLL VENOUS BLD VENIPUNCTURE: CPT | Performed by: EMERGENCY MEDICINE

## 2022-01-12 PROCEDURE — 99223 1ST HOSP IP/OBS HIGH 75: CPT | Mod: AI | Performed by: HOSPITALIST

## 2022-01-12 PROCEDURE — 87635 SARS-COV-2 COVID-19 AMP PRB: CPT | Performed by: EMERGENCY MEDICINE

## 2022-01-12 PROCEDURE — 99207 PR CDG-MDM COMPONENT: MEETS HIGH - UP CODED: CPT | Performed by: HOSPITALIST

## 2022-01-12 PROCEDURE — 96361 HYDRATE IV INFUSION ADD-ON: CPT

## 2022-01-12 PROCEDURE — 71046 X-RAY EXAM CHEST 2 VIEWS: CPT

## 2022-01-12 PROCEDURE — 120N000001 HC R&B MED SURG/OB

## 2022-01-12 PROCEDURE — 93005 ELECTROCARDIOGRAM TRACING: CPT

## 2022-01-12 PROCEDURE — 82374 ASSAY BLOOD CARBON DIOXIDE: CPT | Performed by: EMERGENCY MEDICINE

## 2022-01-12 PROCEDURE — 83690 ASSAY OF LIPASE: CPT | Performed by: EMERGENCY MEDICINE

## 2022-01-12 RX ORDER — MORPHINE SULFATE 4 MG/ML
4 INJECTION, SOLUTION INTRAMUSCULAR; INTRAVENOUS ONCE
Status: COMPLETED | OUTPATIENT
Start: 2022-01-12 | End: 2022-01-12

## 2022-01-12 RX ORDER — SODIUM CHLORIDE 9 MG/ML
INJECTION, SOLUTION INTRAVENOUS CONTINUOUS
Status: DISCONTINUED | OUTPATIENT
Start: 2022-01-12 | End: 2022-01-14 | Stop reason: HOSPADM

## 2022-01-12 RX ORDER — AMLODIPINE BESYLATE 10 MG/1
10 TABLET ORAL DAILY
Status: ON HOLD | COMMUNITY
End: 2022-01-14

## 2022-01-12 RX ORDER — ONDANSETRON 2 MG/ML
4 INJECTION INTRAMUSCULAR; INTRAVENOUS EVERY 6 HOURS PRN
Status: DISCONTINUED | OUTPATIENT
Start: 2022-01-12 | End: 2022-01-14 | Stop reason: HOSPADM

## 2022-01-12 RX ORDER — MORPHINE SULFATE 2 MG/ML
2-4 INJECTION, SOLUTION INTRAMUSCULAR; INTRAVENOUS
Status: DISCONTINUED | OUTPATIENT
Start: 2022-01-12 | End: 2022-01-14 | Stop reason: HOSPADM

## 2022-01-12 RX ORDER — PRAVASTATIN SODIUM 20 MG
20 TABLET ORAL DAILY
COMMUNITY
End: 2022-02-02

## 2022-01-12 RX ORDER — WARFARIN SODIUM 5 MG/1
2.5-5 TABLET ORAL DAILY
COMMUNITY
End: 2022-02-02

## 2022-01-12 RX ORDER — ONDANSETRON 4 MG/1
4 TABLET, ORALLY DISINTEGRATING ORAL EVERY 6 HOURS PRN
Status: DISCONTINUED | OUTPATIENT
Start: 2022-01-12 | End: 2022-01-14 | Stop reason: HOSPADM

## 2022-01-12 RX ADMIN — MORPHINE SULFATE 4 MG: 4 INJECTION INTRAVENOUS at 20:33

## 2022-01-12 RX ADMIN — MORPHINE SULFATE 4 MG: 4 INJECTION INTRAVENOUS at 18:46

## 2022-01-12 RX ADMIN — SODIUM CHLORIDE 1000 ML: 9 INJECTION, SOLUTION INTRAVENOUS at 20:37

## 2022-01-12 ASSESSMENT — ENCOUNTER SYMPTOMS
VOMITING: 0
ABDOMINAL PAIN: 0

## 2022-01-12 ASSESSMENT — ACTIVITIES OF DAILY LIVING (ADL)
ADLS_ACUITY_SCORE: 12

## 2022-01-13 LAB
ALBUMIN SERPL-MCNC: 3.1 G/DL (ref 3.4–5)
ALP SERPL-CCNC: 106 U/L (ref 40–150)
ALT SERPL W P-5'-P-CCNC: 82 U/L (ref 0–70)
ANION GAP SERPL CALCULATED.3IONS-SCNC: 4 MMOL/L (ref 3–14)
AST SERPL W P-5'-P-CCNC: 73 U/L (ref 0–45)
BILIRUB SERPL-MCNC: 0.9 MG/DL (ref 0.2–1.3)
BUN SERPL-MCNC: 11 MG/DL (ref 7–30)
CALCIUM SERPL-MCNC: 7.9 MG/DL (ref 8.5–10.1)
CHLORIDE BLD-SCNC: 112 MMOL/L (ref 94–109)
CHOLEST SERPL-MCNC: 94 MG/DL
CO2 SERPL-SCNC: 24 MMOL/L (ref 20–32)
CREAT SERPL-MCNC: 0.97 MG/DL (ref 0.66–1.25)
CRP SERPL-MCNC: <2.9 MG/L (ref 0–8)
GFR SERPL CREATININE-BSD FRML MDRD: 82 ML/MIN/1.73M2
GLUCOSE BLD-MCNC: 82 MG/DL (ref 70–99)
HDLC SERPL-MCNC: 41 MG/DL
INR PPP: 2.58 (ref 0.85–1.15)
LDLC SERPL CALC-MCNC: 39 MG/DL
LIPASE SERPL-CCNC: 820 U/L (ref 73–393)
NONHDLC SERPL-MCNC: 53 MG/DL
POTASSIUM BLD-SCNC: 4.1 MMOL/L (ref 3.4–5.3)
PROT SERPL-MCNC: 5.9 G/DL (ref 6.8–8.8)
SODIUM SERPL-SCNC: 140 MMOL/L (ref 133–144)
TRIGL SERPL-MCNC: 72 MG/DL

## 2022-01-13 PROCEDURE — 99231 SBSQ HOSP IP/OBS SF/LOW 25: CPT | Performed by: INTERNAL MEDICINE

## 2022-01-13 PROCEDURE — 258N000003 HC RX IP 258 OP 636: Performed by: INTERNAL MEDICINE

## 2022-01-13 PROCEDURE — 36415 COLL VENOUS BLD VENIPUNCTURE: CPT | Performed by: HOSPITALIST

## 2022-01-13 PROCEDURE — 250N000013 HC RX MED GY IP 250 OP 250 PS 637: Performed by: INTERNAL MEDICINE

## 2022-01-13 PROCEDURE — 80053 COMPREHEN METABOLIC PANEL: CPT | Performed by: HOSPITALIST

## 2022-01-13 PROCEDURE — 258N000003 HC RX IP 258 OP 636: Performed by: HOSPITALIST

## 2022-01-13 PROCEDURE — 250N000011 HC RX IP 250 OP 636: Performed by: HOSPITALIST

## 2022-01-13 PROCEDURE — 250N000013 HC RX MED GY IP 250 OP 250 PS 637: Performed by: HOSPITALIST

## 2022-01-13 PROCEDURE — 80061 LIPID PANEL: CPT | Performed by: INTERNAL MEDICINE

## 2022-01-13 PROCEDURE — 99207 PR CDG-MDM COMPONENT: MEETS LOW - DOWN CODED: CPT | Performed by: INTERNAL MEDICINE

## 2022-01-13 PROCEDURE — 83690 ASSAY OF LIPASE: CPT | Performed by: PHYSICIAN ASSISTANT

## 2022-01-13 PROCEDURE — 85610 PROTHROMBIN TIME: CPT | Performed by: HOSPITALIST

## 2022-01-13 PROCEDURE — 120N000001 HC R&B MED SURG/OB

## 2022-01-13 PROCEDURE — 86140 C-REACTIVE PROTEIN: CPT | Performed by: PHYSICIAN ASSISTANT

## 2022-01-13 RX ORDER — ACETAMINOPHEN 325 MG/1
650 TABLET ORAL EVERY 6 HOURS PRN
Status: DISCONTINUED | OUTPATIENT
Start: 2022-01-13 | End: 2022-01-14 | Stop reason: HOSPADM

## 2022-01-13 RX ORDER — WARFARIN SODIUM 1 MG/1
4 TABLET ORAL
Status: COMPLETED | OUTPATIENT
Start: 2022-01-13 | End: 2022-01-13

## 2022-01-13 RX ORDER — ACETAMINOPHEN 650 MG/1
650 SUPPOSITORY RECTAL EVERY 6 HOURS PRN
Status: DISCONTINUED | OUTPATIENT
Start: 2022-01-13 | End: 2022-01-14 | Stop reason: HOSPADM

## 2022-01-13 RX ORDER — PRAVASTATIN SODIUM 20 MG
20 TABLET ORAL DAILY
Status: DISCONTINUED | OUTPATIENT
Start: 2022-01-13 | End: 2022-01-14 | Stop reason: HOSPADM

## 2022-01-13 RX ORDER — AMLODIPINE BESYLATE 5 MG/1
10 TABLET ORAL DAILY
Status: DISCONTINUED | OUTPATIENT
Start: 2022-01-13 | End: 2022-01-14 | Stop reason: HOSPADM

## 2022-01-13 RX ORDER — LIDOCAINE 40 MG/G
CREAM TOPICAL
Status: DISCONTINUED | OUTPATIENT
Start: 2022-01-13 | End: 2022-01-14 | Stop reason: HOSPADM

## 2022-01-13 RX ADMIN — MORPHINE SULFATE 2 MG: 2 INJECTION, SOLUTION INTRAMUSCULAR; INTRAVENOUS at 07:57

## 2022-01-13 RX ADMIN — SODIUM CHLORIDE: 9 INJECTION, SOLUTION INTRAVENOUS at 06:36

## 2022-01-13 RX ADMIN — MORPHINE SULFATE 2 MG: 2 INJECTION, SOLUTION INTRAMUSCULAR; INTRAVENOUS at 00:21

## 2022-01-13 RX ADMIN — SODIUM CHLORIDE: 9 INJECTION, SOLUTION INTRAVENOUS at 18:21

## 2022-01-13 RX ADMIN — AMLODIPINE BESYLATE 10 MG: 5 TABLET ORAL at 07:57

## 2022-01-13 RX ADMIN — SODIUM CHLORIDE: 9 INJECTION, SOLUTION INTRAVENOUS at 11:20

## 2022-01-13 RX ADMIN — PRAVASTATIN SODIUM 20 MG: 20 TABLET ORAL at 07:56

## 2022-01-13 RX ADMIN — WARFARIN SODIUM 4 MG: 1 TABLET ORAL at 18:16

## 2022-01-13 RX ADMIN — SODIUM CHLORIDE: 9 INJECTION, SOLUTION INTRAVENOUS at 01:59

## 2022-01-13 ASSESSMENT — ACTIVITIES OF DAILY LIVING (ADL)
DIFFICULTY_COMMUNICATING: NO
ADLS_ACUITY_SCORE: 3
ADLS_ACUITY_SCORE: 7
ADLS_ACUITY_SCORE: 3
FALL_HISTORY_WITHIN_LAST_SIX_MONTHS: NO
WALKING_OR_CLIMBING_STAIRS_DIFFICULTY: NO
DOING_ERRANDS_INDEPENDENTLY_DIFFICULTY: NO
ADLS_ACUITY_SCORE: 3
CONCENTRATING,_REMEMBERING_OR_MAKING_DECISIONS_DIFFICULTY: NO
ADLS_ACUITY_SCORE: 3
HEARING_DIFFICULTY_OR_DEAF: NO
ADLS_ACUITY_SCORE: 3
ADLS_ACUITY_SCORE: 3
TOILETING_ISSUES: NO
WEAR_GLASSES_OR_BLIND: NO
ADLS_ACUITY_SCORE: 3
DIFFICULTY_EATING/SWALLOWING: NO
DRESSING/BATHING_DIFFICULTY: NO
ADLS_ACUITY_SCORE: 3

## 2022-01-13 NOTE — PLAN OF CARE
A&Ox4. VSS on RA. C/o epigastric pain- PRN morphine given x1. Instructed by MD to hold pain meds if possible. Advanced to clear liquid diet at lunch- tolerating well. L PIV infusing NS @ 150mL/hr. Continent B/B, up ad cordelia, uses bathroom. BS active. Lipase 820 trending down. GI following- no procedure for now, just monitor one more night if all goes well.

## 2022-01-13 NOTE — ED PROVIDER NOTES
History   Chief Complaint:  Chest Pain     The history is provided by the patient.      Alex Stanley is a 76 year old male with history of stroke and atrial fibrillation, on warfarin, who presents via EMS for evaluation of chest pain. 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 He loosened his belt a bit and stood and waited and the pain resolved. He states he went home after this and had some chick-danielle-a for food and around 1730 his pain came back and felt quite severe. He rated this a 10/10 in severity at first and felt it mainly in his mid chest. He called EMS and was given 2 doses of nitro en route to ED and he is unsure if it provided relief. He was given 324 mg aspirin as well. Upon evaluation here he states the pain feels better than it did before and he rates it a 5/10 in severity. He states his spouse gave him 2 Tums at home as well and this did not provide any significant relief. He notes this does not feel like his past indigestion pain. He denies any pain like this in the past, abdominal pain, vomiting, past abdominal surgeries, past cardiac surgeries, known heart disease or STEMI history, and any other known symptoms or concerns at this time. He is COVID vaccinated plus booster. He did use a at home COVID test today around noon that was negative.     Review of Systems   Cardiovascular: Positive for chest pain.   Gastrointestinal: Negative for abdominal pain and vomiting.   All other systems reviewed and are negative.    Allergies:  The patient has no known allergies.     Medications:  Amlodipine  Warfarin    Past Medical History:    Hypertension  Stroke  Atrial Fibrillation    Social History:  The patient presents to the ED alone.  Tobacco Use: 0.5-1 pack a day  Alcohol Use: No    Physical Exam     Patient Vitals for the past 24 hrs:   BP Temp Temp src  Pulse Resp SpO2   01/12/22 2130 -- 97.9  F (36.6  C) Oral -- -- --   01/12/22 1830 113/72 -- -- -- -- --   01/12/22 1822 -- -- -- 86 16 95 %     Physical Exam  SKIN:  Warm, dry.  No jaundice.  HEMATOLOGIC/IMMUNOLOGIC/LYMPHATIC:  No pallor.  EYES:  Conjunctivae normal.  Anicteric.  CARDIOVASCULAR:  Regular rate and rhythm.  No murmur.  RESPIRATORY:  No respiratory distress, breath sounds equal and normal.  GASTROINTESTINAL:  Soft tender epigastrium, no distention, active bowel sounds.  MUSCULOSKELETAL: Normal body habitus.  NEUROLOGIC:  Alert, conversant.  PSYCHIATRIC:  Normal mood.    Emergency Department Course     ECG:  Indication: Chest Pain  Completed at 1828.  Read at 1835.   Atrial fibrillation. Indeterminate axis. Anterior infarct, age undetermined. Abnormal ECG.   Rate 85 bpm. MS interval *. QRS duration 90. QT/QTc 390/464. P-R-T axes * 172 84.    Imaging:  Abdomen US, limited (RUQ only)   Final Result   IMPRESSION:   1.  Cholelithiasis without evidence for acute cholecystitis.   2.  Hepatic steatosis.            Chest XR,  PA & LAT   Final Result   IMPRESSION: The lungs are hyperexpanded but clear. No pneumothorax or pleural effusion. Heart size normal.      Report per radiology     Laboratory:  Labs Ordered and Resulted from Time of ED Arrival to Time of ED Departure   COMPREHENSIVE METABOLIC PANEL - Abnormal       Result Value    Sodium 137      Potassium 3.9      Chloride 107      Carbon Dioxide (CO2) 27      Anion Gap 3      Urea Nitrogen 16      Creatinine 1.16      Calcium 8.6      Glucose 108 (*)     Alkaline Phosphatase 102      AST 78 (*)     ALT 51      Protein Total 6.6 (*)     Albumin 3.7      Bilirubin Total 0.6      GFR Estimate 66     LIPASE - Abnormal    Lipase 10,959 (*)    INR - Abnormal    INR 2.54 (*)    CBC WITH PLATELETS AND DIFFERENTIAL - Abnormal    WBC Count 12.1 (*)     RBC Count 4.62      Hemoglobin 14.4      Hematocrit 43.4      MCV 94      MCH 31.2      MCHC 33.2      RDW 14.4       Platelet Count 245      % Neutrophils 75      % Lymphocytes 13      % Monocytes 9      % Eosinophils 1      % Basophils 1      % Immature Granulocytes 1      NRBCs per 100 WBC 0      Absolute Neutrophils 9.1 (*)     Absolute Lymphocytes 1.6      Absolute Monocytes 1.1      Absolute Eosinophils 0.1      Absolute Basophils 0.1      Absolute Immature Granulocytes 0.1      Absolute NRBCs 0.0     TROPONIN I - Normal    Troponin I High Sensitivity 6     COVID-19 VIRUS (CORONAVIRUS) BY PCR     Emergency Department Course:  Reviewed:  I reviewed nursing notes, vitals and past medical history    Assessments:  1819 EMS arrival.     1820 I performed a physical exam of the patient. Findings as above.     1929 Patient rechecked and updated.     2017 Patient rechecked and updated. Plan of care discussed and questions answered.     Consults:   2027  I spoke with Dr. Verduzco of the Hospitalist service regarding patient's presentation, findings, and plan of care.    Interventions:  1846 Morphine 4 mg IV  2033 Morphine 4 mg IV  2037 NaCl 0.9% BOLUS 1000 mL IV    Disposition:  The patient was admitted to the hospital under the care of Dr. Verduzco.     Impression & Plan   Medical Decision Making:  Alex Stanley is a 76 year old male who presents with epigastric pain.  Considered a host of etiologies.  His abdomen was tender at the epigastrium so I thought cholelithiasis was likely.  Confirmed by testing.  Complicated by pancreatitis.  The patient's pain was mild to moderate in the ED.  Responding well to morphine.  Hydration initiated.  The patient will be admitted for further management and probable gastroenterology consultation tomorrow.    Diagnosis:    ICD-10-CM    1. Gallstone pancreatitis  K85.10        Discharge Medications:  New Prescriptions    No medications on file     Scribe Disclosure:  David INMAN, am serving as a scribe at 6:29 PM on 1/12/2022 to document services personally performed by Uriah Del Valle,  MD based on my observations and the provider's statements to me.     [place] Uriah Casper MD  01/12/22 2136

## 2022-01-13 NOTE — PLAN OF CARE
[phone]  Admit from ED. A/Ox4. VSS on RA. C/o epigastric pain, managed w/ prn morphine. Denies nausea. NPO @ midnight. L PIV infusing NS @ 200ml/hr. LS clear, BS active. No skin issues. Cont b/b, up independently in room. GI following. Possible ERCP in the am. Discharge pending clinical improvement.

## 2022-01-13 NOTE — ED NOTES
[facility]  ED Nurse Handoff Report    ED Chief complaint: Chest Pain      ED Diagnosis:   Final diagnoses:   Gallstone pancreatitis       Code Status: Full Code, Admitting MD to confirm.      Allergies: No Known Allergies    Patient Story: Presents with mid lower abdominal pain, sudden onset  Focused Assessment:  Alert and oriented x4. In room air. Independent. IV in place. Pain controlled at this time. Wife was at bedside earlier.    Labs Ordered and Resulted from Time of ED Arrival to Time of ED Departure   COMPREHENSIVE METABOLIC PANEL - Abnormal       Result Value    Sodium 137      Potassium 3.9      Chloride 107      Carbon Dioxide (CO2) 27      Anion Gap 3      Urea Nitrogen 16      Creatinine 1.16      Calcium 8.6      Glucose 108 (*)     Alkaline Phosphatase 102      AST 78 (*)     ALT 51      Protein Total 6.6 (*)     Albumin 3.7      Bilirubin Total 0.6      GFR Estimate 66     LIPASE - Abnormal    Lipase 10,959 (*)    INR - Abnormal    INR 2.54 (*)    CBC WITH PLATELETS AND DIFFERENTIAL - Abnormal    WBC Count 12.1 (*)     RBC Count 4.62      Hemoglobin 14.4      Hematocrit 43.4      MCV 94      MCH 31.2      MCHC 33.2      RDW 14.4      Platelet Count 245      % Neutrophils 75      % Lymphocytes 13      % Monocytes 9      % Eosinophils 1      % Basophils 1      % Immature Granulocytes 1      NRBCs per 100 WBC 0      Absolute Neutrophils 9.1 (*)     Absolute Lymphocytes 1.6      Absolute Monocytes 1.1      Absolute Eosinophils 0.1      Absolute Basophils 0.1      Absolute Immature Granulocytes 0.1      Absolute NRBCs 0.0     TROPONIN I - Normal    Troponin I High Sensitivity 6     COVID-19 VIRUS (CORONAVIRUS) BY PCR - Normal    SARS CoV2 PCR Negative       Abdomen US, limited (RUQ only)   Final Result   IMPRESSION:   1.  Cholelithiasis without evidence for acute cholecystitis.   2.  Hepatic steatosis.            Chest XR,  PA & LAT   Final Result   IMPRESSION: The lungs are  hyperexpanded but clear. No pneumothorax or pleural effusion. Heart size normal.            Treatments and/or interventions provided: NS, morphine  Patient's response to treatments and/or interventions: Tolerated well    To be done/followed up on inpatient unit:  Continue with plan of care per admitting MD.      Does this patient have any cognitive concerns?: N/A    Activity level - Baseline/Home:  Independent  Activity Level - Current:   Independent    Patient's Preferred language: Data Unavailable   Needed?: No    Isolation: None  Infection: Not Applicable  Patient tested for COVID 19 prior to admission: YES  Bariatric?: No    Vital Signs:   Vitals:    01/12/22 1822 01/12/22 1830 01/12/22 2130   BP:  113/72    Pulse: 86     Resp: 16     Temp:   97.9  F (36.6  C)   TempSrc:   Oral   SpO2: 95%         Cardiac Rhythm:     Was the PSS-3 completed:   Yes  What interventions are required if any?               Family Comments: Wife went home  OBS brochure/video discussed/provided to patient/family: N/A                For the majority of the shift this patient's behavior was Green.     ED NURSE PHONE NUMBER: *[phone]

## 2022-01-13 NOTE — H&P
[facility]    History and Physical - Hospitalist Service       Date of Admission:  1/12/2022    Assessment & Plan      Alex Stanley is a 76 year old male admitted on 1/12/2022 with pancreatitis    Pancreatitis, potentially gallstone  cholelithiasis versus alcoholic  Hepatic steatosis  With acute onset of epigastric type pain found to have lipase of 11,000  Ultrasound with cholelithiasis without cholecystitis  No obvious obstruction pattern to his LFTs with normal CBD  Plan  - Admit to inpatient  - Pain control with IV morphine  - Normal saline at 200 an hour overnight  - Consult gastroenterology  - Repeat LFTs and lipase in the morning  - We will hold his warfarin tonight.  He will need reversal if he needs to undergo ERCP    Chronic medical conditions       Diet:   npo except ice chips  DVT Prophylaxis: Pneumatic Compression Devices  Watkins Catheter: Not present  Central Lines: None  Code Status:   full code    Clinically Significant Risk Factors Present on Admission             # Coagulation Defect: INR = 2.54 (Ref range: 0.85 - 1.15) and/or PTT = N/A on admission, will monitor for bleeding        Disposition Plan   Expected Discharge:  2-5 days     Anticipated discharge location:  Awaiting care coordination huddle  Delays:          The patient's care was discussed with the Patient and Patient's Family.    Huang Conway DO  [facility]  Securely message with the Vocera Web Console (learn more here)  Text page via Rep Paging/Directory        ______________________________________________________________________    Chief Complaint   Abdominal pain    History is obtained from the patient    History of Present Illness   Alex Stanley is a 76 year old male with past medical history of stroke, A. fib on warfarin, hypertension and hyperlipidemia who presents via EMS from home with acute onset of pain in his stomach.    The patient works as a  and he  was waiting for the high school to get out today when all of a sudden he had pain located in the upper part of his abdomen.  It was short lasting but very intense but lasted only a couple minutes.  He felt loosening his belt a bit helped.  At first he attributed the pain to some gastritis.  He thought about asking for a  but the pain resolved and he was able to drive the children home from school.  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  He did stop at [facility] to  dinner for him and his wife.  However when he got home he had acute onset of severe 10 out of 10 gnawing type epigastric pain without radiation.  It was so intense that he immediately called his wife who called EMS.    EMS arrived and gave him 2 doses of nitroglycerin that did not improve his pain as well as some aspirin.  When he arrived his pain was 5 out of 10 which improved to 2-3 out of 10 after some IV morphine.    He denies any previous episodes of pancreatitis and is no longer drinking alcohol.      Review of Systems    The 10 point Review of Systems is negative other than noted in the HPI or here.     Past Medical History    I have reviewed this patient's medical history and updated it with pertinent information if needed.   Past Medical History:   Diagnosis Date     Alcohol abuse      Benign essential hypertension      Chronic atrial fibrillation (H)      Gastritis      Hyperlipidemia LDL goal <100      Paroxysmal atrial fibrillation (H)      Stroke (H)        Past Surgical History   I have reviewed this patient's surgical history and updated it with pertinent information if needed.  No past surgical history on file.    Social History   I have reviewed this patient's social history and updated it with pertinent information if needed.  Social History     Tobacco Use     Smoking status: Current Every Day Smoker     Packs/day: 0.50     Types: Cigarettes     Smokeless tobacco: None    Substance Use Topics     Alcohol use: Not Currently     Drug use: Not Currently       Family History   I have reviewed this patient's family history and updated it with pertinent information if needed.  Family History   Problem Relation Age of Onset     Lung Cancer Mother      Emphysema Father        Prior to Admission Medications   None     Allergies   No Known Allergies    Physical Exam   Vital Signs:     BP: 113/72 Pulse: 86   Resp: 16 SpO2: 95 %      Weight: 0 lbs 0 oz    Physical Exam  Constitutional:       Appearance: Normal appearance.   HENT:      Head: Normocephalic and atraumatic.      Right Ear: External ear normal.      Left Ear: External ear normal.      Nose: Nose normal. No congestion.      Mouth/Throat:      Mouth: Mucous membranes are moist.      Pharynx: Oropharynx is clear. No oropharyngeal exudate.   Eyes:      Extraocular Movements: Extraocular movements intact.      Conjunctiva/sclera: Conjunctivae normal.      Pupils: Pupils are equal, round, and reactive to light.   Cardiovascular:      Rate and Rhythm: Normal rate. Rhythm irregular.      Pulses: Normal pulses.      Heart sounds: Normal heart sounds. No murmur heard.      Pulmonary:      Effort: Pulmonary effort is normal.      Breath sounds: Normal breath sounds.   Abdominal:      General: Abdomen is flat. Bowel sounds are normal. There is no distension.      Palpations: Abdomen is soft. There is no mass.      Tenderness: There is abdominal tenderness. There is no guarding or rebound.   Musculoskeletal:         General: No swelling or tenderness. Normal range of motion.      Cervical back: Normal range of motion and neck supple. No rigidity.   Skin:     General: Skin is warm and dry.      Capillary Refill: Capillary refill takes less than 2 seconds.   Neurological:      General: No focal deficit present.      Mental Status: He is alert and oriented to person, place, and time. Mental status is at baseline.   Psychiatric:         Mood and  Affect: Mood normal.         Behavior: Behavior normal.         Thought Content: Thought content normal.         Judgment: Judgment normal.           Data   Data reviewed today: I reviewed all medications, new labs and imaging results over the last 24 hours. I personally reviewed    EKG: Rate controlled atrial fibrillation.  T wave inversion in 3 and aVF  Chest x-ray: No acute abnormality  Ultrasound abdomen cholelithiasis without evidence of cholecystitis.  Steatohepatitis    Recent Labs   Lab 01/12/22  1930 01/12/22  1847   WBC  --  12.1*   HGB  --  14.4   MCV  --  94   PLT  --  245   INR 2.54*  --      --    POTASSIUM 3.9  --    CHLORIDE 107  --    CO2 27  --    BUN 16  --    CR 1.16  --    ANIONGAP 3  --    MARY 8.6  --    *  --    ALBUMIN 3.7  --    PROTTOTAL 6.6*  --    BILITOTAL 0.6  --    ALKPHOS 102  --    ALT 51  --    AST 78*  --    LIPASE 10,959*  --

## 2022-01-13 NOTE — PROGRESS NOTES
[facility]    Medicine Progress Note - Hospitalist Service       Date of Admission:  1/12/2022    Assessment & Plan   Alex Stanley is a 76 year old male with hx of chronic a-fib on chronic anticoagulation with warfarin, HTN, prior CVA, and alcohol use in remission who was admitted on 1/12/2022 for acute pancreatitis    Acute pancreatitis, Improved  Cholelithiasis  * Presented with acute, severe epigastric pain, and was found to have a lipase ~03445. Abd US on arrival cholelithiasis without cholecystitis and hepatic steatosis. LFTs were only mildly elevated (AST 79, ALT 51, Tbili normal). Patient denied alcohol use  * [facility] GI was consulted on admission and recommended supportive cares  - Abdominal pain improved today  - Start clear liquid diet this afternoon; decrease IV fluid rate  - Check lipid panel  - GI recommending outpatient EGD/EUS    Chronic atrial fibrillation  - Auto rate-controlled  - Continues on warfarin per PharmD dosing    Essential hypertension  - Hold PTA meds due to borderline blood pressures    History of CVA  Hyperlipidemia  - Hold PTA pravastatin due to mildly elevated LFTs    History of alcohol use disorder  Patient reports 12 year sobriety     Diet: NPO for Medical/Clinical Reasons Except for: Meds, Ice Chips    DVT Prophylaxis: Warfarin  Watkins Catheter: Not present  Code Status: Full Code         Disposition: Expected discharge once tolerating diet, possibly later tomorrow - 1/14    The patient's care was discussed with the Patient.    Maru Delgado MD  Hospitalist Service  [facility]  Contact information available via [facility] Paging/Directory    ______________________________________________________________________    Interval History   Admitted last night. Abdominal pain significantly improved. Denies nausea, cp/sob.    Data reviewed today: I reviewed all medications, new labs and imaging results over the last 24 hours. I  personally reviewed no images or EKG's today.    Physical Exam   Vital Signs: Temp: 98.2  F (36.8  C) Temp src: Oral BP: 108/65 Pulse: 67   Resp: 18 SpO2: 94 % O2 Device: None (Room air)    Weight: 0 lbs 0 oz  Constitutional: Resting in bed, NAD  HEENT: Sclera white, MMM  Respiratory: Breathing non-labored. Lungs CTAB - no wheezes, crackles, or rhonchi  Cardiovascular: Heart RRR, no m/r/g. No pedal edema  GI: +BS, abd soft, mild TTP throughout - no rebound/guarding  Skin/Integument: No rash  Musculoskeletal: Normal muscle bulk and tone  Neuro: Alert and appropriate, MATTHEW  Psych: Calm and cooperative    Data   Recent Labs   Lab 01/13/22  0712 01/12/22  1930 01/12/22  1847   WBC  --   --  12.1*   HGB  --   --  14.4   MCV  --   --  94   PLT  --   --  245   INR 2.58* 2.54*  --     137  --    POTASSIUM 4.1 3.9  --    CHLORIDE 112* 107  --    CO2 24 27  --    BUN 11 16  --    CR 0.97 1.16  --    ANIONGAP 4 3  --    MARY 7.9* 8.6  --    GLC 82 108*  --    ALBUMIN 3.1* 3.7  --    PROTTOTAL 5.9* 6.6*  --    BILITOTAL 0.9 0.6  --    ALKPHOS 106 102  --    ALT 82* 51  --    AST 73* 78*  --    LIPASE 820* 10,959*  --          Recent Results (from the past 24 hour(s))   Chest XR,  PA & LAT    Narrative    EXAM: XR CHEST 2 VW  LOCATION: [facility]  DATE/TIME: 1/12/2022 6:42 PM    INDICATION: Epigastric/chest pain, sudden onset.  COMPARISON: None.      Impression    IMPRESSION: The lungs are hyperexpanded but clear. No pneumothorax or pleural effusion. Heart size normal.   Abdomen US, limited (RUQ only)    Narrative    EXAM: US ABDOMEN LIMITED  LOCATION: [facility]  DATE/TIME: 1/12/2022 6:57 PM    INDICATION: Sudden onset right upper quadrant and epigastric abdominal pain, assess for cholelithiasis  COMPARISON: CT 06/01/2011  TECHNIQUE: Limited abdominal ultrasound.    FINDINGS:    GALLBLADDER: Cholelithiasis with small gallstones. Normal wall thickness. Negative sonographic  Charles's.    BILE DUCTS: No biliary dilatation. The common duct measures 3 mm.    LIVER: Hepatic steatosis. No focal mass.    RIGHT KIDNEY: No hydronephrosis.    PANCREAS: The visualized portions are normal.    No ascites.      Impression    IMPRESSION:  1.  Cholelithiasis without evidence for acute cholecystitis.  2.  Hepatic steatosis.           Medications     sodium chloride 200 mL/hr at 01/13/22 1120       amLODIPine  10 mg Oral Daily     pravastatin  20 mg Oral Daily     sodium chloride (PF)  3 mL Intracatheter Q8H

## 2022-01-13 NOTE — CONSULTS
[facility]  Gastroenterology Consultation         Alex Stanley  [address]   [facility] [address]  76 year old male    Admission Date/Time: 1/12/2022  Primary Care Provider: No primary care provider on file.  Referring / Attending Physician:  Dr. Huang Conway    We were asked to see the patient in consultation by Dr. Huang Conway for evaluation of pancreatitis.      CC: abdominal pain    HPI:  Alex Stanley is a 76 year old male who has a past medical history of a stroke, atrial fibrillation on warfarin, hypertension, and hyperlipidemia whom presented to ED on 1/12/22 with c/o acute onset abdominal pain. Reports pain started suddenly day of presentation. 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 He was able to complete the bus route without return of symptoms. On way home stopped to  dinner and bring home, however pain returned when he got home and was quite severe. Never ate anything- pain did not seem related to eating. Pain located in RUQ, epigastric area and radiates to back. Denies any alcohol use in 12 years. Smokes a half pack of cigarettes daily. Denies fever, chills, or flu like symptoms.    He was given nitroglycerin by EMS with no improvement in symptoms. In ED pain remained but improved with morphine.    VSS. Labs significant for; Albumin 3.1, ALT 82, AST 73, Lipase 11,000. INR 2.58    Abdominal ultrasound noted cholelithiasis without evidence of acute cholecystitis and no ductal dilation. Pancreas appears normal.    ROS: A comprehensive ten point review of systems was negative aside from those in mentioned in the HPI.      PAST MED HX:  I have reviewed this patient's medical history and updated it with pertinent information if needed.   Past Medical History:   Diagnosis Date      Alcohol abuse      Benign essential hypertension      Chronic atrial fibrillation (H)      Gastritis      Hyperlipidemia LDL goal <100      Paroxysmal atrial fibrillation (H)      Stroke (H)        MEDICATIONS:   Prior to Admission Medications   Prescriptions Last Dose Informant Patient Reported? Taking?   Multiple Vitamins-Minerals (PRESERVISION AREDS PO) 1/12/2022 at am  Yes Yes   Sig: Take 1 capsule by mouth 2 times daily   amLODIPine (NORVASC) 10 MG tablet 1/12/2022 at am  Yes Yes   Sig: Take 10 mg by mouth daily   pravastatin (PRAVACHOL) 20 MG tablet 1/12/2022 at am  Yes Yes   Sig: Take 20 mg by mouth daily   warfarin ANTICOAGULANT (COUMADIN) 5 MG tablet 1/12/2022 at AM-2.5mg  Yes Yes   Sig: Take 2.5-5 mg by mouth daily 2.5mg Mon-Wed-Fri and 5mg Tues-Thur-Sat-Sun      Facility-Administered Medications: None       ALLERGIES: No Known Allergies    SOCIAL HISTORY:  Social History     Tobacco Use     Smoking status: Current Every Day Smoker     Packs/day: 0.50     Types: Cigarettes     Smokeless tobacco: None   Substance Use Topics     Alcohol use: Not Currently     Drug use: Not Currently       FAMILY HISTORY:  Family History   Problem Relation Age of Onset     Lung Cancer Mother      Emphysema Father        PHYSICAL EXAM:   General  Alert, oriented, and comfortable  Vital Signs with Ranges  Temp: 98.2  F (36.8  C) Temp src: Oral BP: 108/65 Pulse: 67   Resp: 18 SpO2: 94 % O2 Device: None (Room air)    No intake/output data recorded.    Constitutional: healthy, alert and no distress   Cardiovascular: negative, PMI normal. No lifts, heaves, or thrills. RRR. No murmurs, clicks gallops or rub  Respiratory: negative, Percussion normal. Good diaphragmatic excursion. Lungs clear  Abdomen: Abdomen soft, non-tender. BS normal. No masses, organomegaly          ADDITIONAL COMMENTS:   I reviewed the patient's new clinical lab test results.   Recent Labs   Lab Test 01/13/22  0712 01/12/22  1930 01/12/22  1847   WBC  --   --   12.1*   HGB  --   --  14.4   MCV  --   --  94   PLT  --   --  245   INR 2.58* 2.54*  --      Recent Labs   Lab Test 01/13/22  0712 01/12/22 1930   POTASSIUM 4.1 3.9   CHLORIDE 112* 107   CO2 24 27   BUN 11 16   ANIONGAP 4 3     Recent Labs   Lab Test 01/13/22  0712 01/12/22 1930   ALBUMIN 3.1* 3.7   BILITOTAL 0.9 0.6   ALT 82* 51   AST 73* 78*   LIPASE  --  10,959*       I reviewed the patient's new imaging results.        CONSULTATION ASSESSMENT AND PLAN:    Alex Stanley is a pleasant 76 year old male with PMHx of stroke, atrial fibrillation on warfarin, hypertension, and hyperlipidemia whom presented to ED on 1/12/22 with c/o acute onset abdominal pain. Lipase elevated at [address] and GI consulted for acute pancreatitis.    Active Problems:  Acute pancreatitis  Cholelithiasis  Has no history of alcohol consumption in last 12 years  Lipase 00151. LFT minimally elevated and likely due to hepatic steatosis  Abdominal ultrasound notes cholelithiasis without evidence of acute cholecystitis and no ductal dilation. Pancreas appears normal. CBD measures 3 mm. Does note hepatic steatosis.  INR therapeutic at 2.58  Cause of pancreatitis may be due to passed gallstone, is at elevated risk as he is a smoker.    -- will treat conservatively and keep NPO until can tolerate pain without morphine for more than 3 hours.  -- Ok to start clear liquid when pain improves  -- Can advance diet as tolerated  -- CRP draw and repeat in a.m.  -- Ok to continue warfarin  -- Continue with IV fluids  -- Recommend outpatient EGD/EUS          [facility].  Office: [phone]  Cell : [phone] (Dr. Bond)  Cell: [phone] (Brittany Pierce PA-C)

## 2022-01-13 NOTE — PHARMACY-ADMISSION MEDICATION HISTORY
Pharmacy Medication History  Admission medication history interview status for the 1/12/2022  admission is complete. See EPIC admission navigator for prior to admission medications     Location of Interview: Patient room  Medication history sources: Patient    Significant changes made to the medication list:  -Added all medications    In the past week, patient estimated taking medication this percent of the time: greater than 90%    Additional medication history information:   none    Medication reconciliation completed by provider prior to medication history? No    Time spent in this activity: 10 min    Prior to Admission medications    Medication Sig Last Dose Taking? Auth Provider   amLODIPine (NORVASC) 10 MG tablet Take 10 mg by mouth daily 1/12/2022 at am Yes Unknown, Entered By History   Multiple Vitamins-Minerals (PRESERVISION AREDS PO) Take 1 capsule by mouth 2 times daily 1/12/2022 at am Yes Unknown, Entered By History   pravastatin (PRAVACHOL) 20 MG tablet Take 20 mg by mouth daily 1/12/2022 at am Yes Unknown, Entered By History   warfarin ANTICOAGULANT (COUMADIN) 5 MG tablet Take 2.5-5 mg by mouth daily 2.5mg Mon-Wed-Fri and 5mg Tues-Thur-Sat-Sun 1/12/2022 at AM-2.5mg Yes Unknown, Entered By History       The information provided in this note is only as accurate as the sources available at the time of update(s)

## 2022-01-13 NOTE — ED NOTES
Bed: ED06  Expected date: 1/12/22  Expected time: 6:08 PM  Means of arrival: Ambulance  Comments:  533 76m chest pain ETA 1817

## 2022-01-13 NOTE — PROGRESS NOTES
RECEIVING UNIT ED HANDOFF REVIEW    ED Nurse Handoff Report was reviewed by: KENDY CARTAGENA RN on January 12, 2022 at 11:54 PM

## 2022-01-13 NOTE — PHARMACY-ANTICOAGULATION SERVICE
Clinical Pharmacy - Warfarin Dosing Consult     Pharmacy has been consulted to manage this patient s warfarin therapy.  Indication: Atrial Fibrillation  Therapy Goal: INR 2-3  Warfarin Prior to Admission: Yes  Warfarin PTA Regimen: 2.5 mg MWF, 5 mg ROW  Recent documented change in oral intake/nutrition: Unknown    INR   Date Value Ref Range Status   01/13/2022 2.58 (H) 0.85 - 1.15 Final   01/12/2022 2.54 (H) 0.85 - 1.15 Final       Recommend warfarin 4 mg today.  Pharmacy will monitor Alex Stanley daily and order warfarin doses to achieve specified goal.      Please contact pharmacy as soon as possible if the warfarin needs to be held for a procedure or if the warfarin goals change.

## 2022-01-14 VITALS
OXYGEN SATURATION: 94 % | DIASTOLIC BLOOD PRESSURE: 63 MMHG | SYSTOLIC BLOOD PRESSURE: 122 MMHG | RESPIRATION RATE: 16 BRPM | HEART RATE: 67 BPM | TEMPERATURE: 98.6 F

## 2022-01-14 LAB
ALBUMIN SERPL-MCNC: 3.2 G/DL (ref 3.4–5)
ALP SERPL-CCNC: 99 U/L (ref 40–150)
ALT SERPL W P-5'-P-CCNC: 54 U/L (ref 0–70)
ANION GAP SERPL CALCULATED.3IONS-SCNC: 4 MMOL/L (ref 3–14)
AST SERPL W P-5'-P-CCNC: 40 U/L (ref 0–45)
BILIRUB SERPL-MCNC: 1 MG/DL (ref 0.2–1.3)
BUN SERPL-MCNC: 11 MG/DL (ref 7–30)
CALCIUM SERPL-MCNC: 7.8 MG/DL (ref 8.5–10.1)
CHLORIDE BLD-SCNC: 113 MMOL/L (ref 94–109)
CO2 SERPL-SCNC: 23 MMOL/L (ref 20–32)
CREAT SERPL-MCNC: 0.95 MG/DL (ref 0.66–1.25)
CRP SERPL-MCNC: <2.9 MG/L (ref 0–8)
ERYTHROCYTE [DISTWIDTH] IN BLOOD BY AUTOMATED COUNT: 14 % (ref 10–15)
GFR SERPL CREATININE-BSD FRML MDRD: 84 ML/MIN/1.73M2
GLUCOSE BLD-MCNC: 88 MG/DL (ref 70–99)
HCT VFR BLD AUTO: 44 % (ref 40–53)
HGB BLD-MCNC: 14.1 G/DL (ref 13.3–17.7)
INR PPP: 2.41 (ref 0.85–1.15)
MCH RBC QN AUTO: 30.5 PG (ref 26.5–33)
MCHC RBC AUTO-ENTMCNC: 32 G/DL (ref 31.5–36.5)
MCV RBC AUTO: 95 FL (ref 78–100)
PLATELET # BLD AUTO: 224 10E3/UL (ref 150–450)
POTASSIUM BLD-SCNC: 4.1 MMOL/L (ref 3.4–5.3)
PROT SERPL-MCNC: 5.9 G/DL (ref 6.8–8.8)
RBC # BLD AUTO: 4.63 10E6/UL (ref 4.4–5.9)
SODIUM SERPL-SCNC: 140 MMOL/L (ref 133–144)
WBC # BLD AUTO: 7.6 10E3/UL (ref 4–11)

## 2022-01-14 PROCEDURE — 80053 COMPREHEN METABOLIC PANEL: CPT | Performed by: INTERNAL MEDICINE

## 2022-01-14 PROCEDURE — 36415 COLL VENOUS BLD VENIPUNCTURE: CPT | Performed by: INTERNAL MEDICINE

## 2022-01-14 PROCEDURE — 99239 HOSP IP/OBS DSCHRG MGMT >30: CPT | Performed by: INTERNAL MEDICINE

## 2022-01-14 PROCEDURE — 85610 PROTHROMBIN TIME: CPT | Performed by: INTERNAL MEDICINE

## 2022-01-14 PROCEDURE — 86140 C-REACTIVE PROTEIN: CPT | Performed by: PHYSICIAN ASSISTANT

## 2022-01-14 PROCEDURE — 250N000013 HC RX MED GY IP 250 OP 250 PS 637: Performed by: HOSPITALIST

## 2022-01-14 PROCEDURE — 258N000003 HC RX IP 258 OP 636: Performed by: INTERNAL MEDICINE

## 2022-01-14 PROCEDURE — 85027 COMPLETE CBC AUTOMATED: CPT | Performed by: INTERNAL MEDICINE

## 2022-01-14 RX ORDER — WARFARIN SODIUM 2.5 MG/1
2.5 TABLET ORAL
Status: DISCONTINUED | OUTPATIENT
Start: 2022-01-14 | End: 2022-01-14 | Stop reason: HOSPADM

## 2022-01-14 RX ORDER — OXYCODONE HYDROCHLORIDE 5 MG/1
5-10 TABLET ORAL EVERY 6 HOURS PRN
Qty: 12 TABLET | Refills: 0 | Status: ON HOLD | OUTPATIENT
Start: 2022-01-14 | End: 2022-01-24

## 2022-01-14 RX ADMIN — AMLODIPINE BESYLATE 10 MG: 5 TABLET ORAL at 07:58

## 2022-01-14 RX ADMIN — SODIUM CHLORIDE: 9 INJECTION, SOLUTION INTRAVENOUS at 01:29

## 2022-01-14 RX ADMIN — PRAVASTATIN SODIUM 20 MG: 20 TABLET ORAL at 07:58

## 2022-01-14 ASSESSMENT — ACTIVITIES OF DAILY LIVING (ADL)
ADLS_ACUITY_SCORE: 3

## 2022-01-14 NOTE — PLAN OF CARE
A&Ox4. VSS on RA. Denies pain. On clear liquid diet- tolerating well. L PIV infusing NS @ 100mL/hr. Continent B/B, up ad cordelia, ambulating to the bathroom. BS audible. GI following- Indicating to monitor tonight to see if all goes well. Discharge pending to medical improvement once tolerating diet.

## 2022-01-14 NOTE — DISCHARGE INSTRUCTIONS
Your blood pressures were normal without need for amlodipine during this hospitalization. We recommend holding it for now until you have been advised by your primary care provider to resume

## 2022-01-14 NOTE — PROGRESS NOTES
[facility]  Gastroenterology Progress Note     Alex Stanley MRN# [id number]   YOB: 1945 Age: 76 year old          Assessment and Plan:   Alex Stanley is a pleasant 76 year old male with PMHx of stroke, atrial fibrillation on warfarin, hypertension, and hyperlipidemia whom presented to ED on 1/12/22 with c/o acute onset abdominal pain. Lipase elevated at [address] and GI consulted for acute pancreatitis.     Active Problems:  Acute pancreatitis  Cholelithiasis  Has no history of alcohol consumption in last 12 years  Lipase 25490->900. LFT minimally elevated and likely due to hepatic steatosis  Abdominal ultrasound notes cholelithiasis without evidence of acute cholecystitis and no ductal dilation. Pancreas appears normal. CBD measures 3 mm. Does note hepatic steatosis.  INR therapeutic at 2.58, CRP <<2.9  Cause of pancreatitis may be due to passed gallstone, is at elevated risk as he is a smoker.     -- full liquid diet  -- Can advance diet as tolerated to lowfat/low fiber  -- If full liquid diet tolerated ok to discharge patient  -- Recommend outpatient EGD/EUS in 2 weeks            Interval History:   no new complaints, doing well and doing well; no cp, sob, n/v/d, or abd pain. Not requiring pain medication.              Review of Systems:   C: NEGATIVE for fever, chills, change in weight  E/M: NEGATIVE for ear, mouth and throat problems  R: NEGATIVE for significant cough or SOB  CV: NEGATIVE for chest pain, palpitations or peripheral edema             Medications:   I have reviewed this patient's current medications    amLODIPine  10 mg Oral Daily     pravastatin  20 mg Oral Daily     sodium chloride (PF)  3 mL Intracatheter Q8H                  Physical Exam:   Vitals were reviewed  Vital Signs with Ranges  Temp:  [96.7  F (35.9  C)-98.6  F (37  C)] 98.6  F (37  C)  Pulse:  [67-72] 67  Resp:  [16-18] 16  BP: (105-122)/(63-72) 122/63  SpO2:  [94 %-95 %] 94 %  No  intake/output data recorded.  Constitutional: healthy, alert and no distress   Cardiovascular: negative, PMI normal. No lifts, heaves, or thrills. RRR. No murmurs, clicks gallops or rub  Respiratory: negative, Percussion normal. Good diaphragmatic excursion. Lungs clear  Abdomen: Abdomen soft, non-tender. BS normal. No masses, organomegaly           Data:   I reviewed the patient's new clinical lab test results.   Recent Labs   Lab Test 01/14/22 0713 01/13/22 0712 01/12/22 1930 01/12/22  1847   WBC 7.6  --   --  12.1*   HGB 14.1  --   --  14.4   MCV 95  --   --  94     --   --  245   INR 2.41* 2.58* 2.54*  --      Recent Labs   Lab Test 01/14/22 0713 01/13/22 0712 01/12/22 1930   POTASSIUM 4.1 4.1 3.9   CHLORIDE 113* 112* 107   CO2 23 24 27   BUN 11 11 16   ANIONGAP 4 4 3     Recent Labs   Lab Test 01/14/22 0713 01/13/22 0712 01/12/22 1930   ALBUMIN 3.2* 3.1* 3.7   BILITOTAL 1.0 0.9 0.6   ALT 54 82* 51   AST 40 73* 78*   LIPASE  --  820* 10,959*       I reviewed the patient's new imaging results.    All laboratory data reviewed  All imaging studies reviewed by me.    Brittany Pierce PA-C,  1/14/2022  [facility]  Office : [phone]  Cell: [phone] (Dr. Bond)  Cell: [phone] (Brittany Pierce PA-C)

## 2022-01-14 NOTE — DISCHARGE SUMMARY
[facility]  Hospitalist Discharge Summary      Date of Admission:  1/12/2022  Date of Discharge:  1/14/2022  Discharging Provider: Maru Delgado MD    Discharge Diagnoses   1. Acute pancreatitis  2. Cholelithiasis  3. Chronic atrial fibrillation  4. Essential hypertension  5. History of CVA  6. Hyperlipidemia  7. History of alcohol use disorder    Follow-ups Needed After Discharge   Follow-up Appointments     Follow-up and recommended labs and tests       Follow up with [facility] as scheduled. Return to Emergency Department if   you develop recurrent, worsening pain           Discharge Disposition   Discharged to home  Condition at discharge: Stable    Hospital Course   Alex Stanley is a 76 year old male with hx of chronic a-fib on chronic anticoagulation with warfarin, HTN, prior CVA, and alcohol use in remission who was admitted on 1/12/2022 for acute pancreatitis    Acute pancreatitis, Improving  Cholelithiasis  * Presented with acute, severe epigastric pain, and was found to have a lipase ~56684. Abd US on arrival cholelithiasis without cholecystitis and hepatic steatosis. LFTs were only mildly elevated (AST 79, ALT 51, Tbili normal). Patient denied alcohol use  * [facility] was consulted on admission and recommended supportive cares  - Patient has continued to improve with supportive cares and is tolerating a diet without pain at the time of discharge  - He will follow up with [facility] in 2 weeks for outpatient EGD/EUS    Chronic atrial fibrillation  - Auto rate-controlled  - Continues on therapeutic warfarin at discharge    Essential hypertension  - PTA amlodipine was held on admission and BP has remained in low 100s-120s systolic  - He has been advised to continue holding amlodipine    History of CVA  Hyperlipidemia  - Pravastatin was initially held due to mildly elevated LFTs, but will be resumed since LFTs have normalized    History of alcohol use disorder  Patient  reports 12 year sobriety    Consultations This Hospital Stay   GASTROENTEROLOGY IP CONSULT  PHARMACY TO DOSE WARFARIN    Code Status   Full Code    Time Spent on this Encounter   I, Maru Delgado MD, personally saw the patient today and spent 35 minutes discharging this patient.       Maru Delgado MD  [place] [address] ONCOLOGY  [facility] [address]  [place] [address]  Phone: [phone]  ______________________________________________________________________    Physical Exam   Vital Signs: Temp: 98.6  F (37  C) Temp src: Oral BP: 122/63 Pulse: 67   Resp: 16 SpO2: 94 % O2 Device: None (Room air)    Weight: 0 lbs 0 oz    Constitutional: Appears comfortable  HEENT: Sclera white, conjunctiva clear, EOMI, MMM  Respiratory: Breathing non-labored. Lungs CTAB - no wheezes/crackles/rhonchi  Cardiovascular: Heart RRR, no m/r/g. No pedal edema.   GI: +BS. Abd soft/NT  Skin: Warm and dry. No rash.  Musculoskeletal: Normal muscle bulk and tone  Neurologic: Alert and appropriate. MATTHEW  Psychiatric: Calm and cooperative    Primary Care Physician   Estuardo Manning    Discharge Orders      Reason for your hospital stay    Pancreatitis which gradually improved throughout your hospitalization     Follow-up and recommended labs and tests     Follow up with [facility] as scheduled. Return to Emergency Department if you develop recurrent, worsening pain     Activity    Your activity upon discharge: activity as tolerated     Diet    Follow this diet upon discharge: Regular diet as tolerated       Significant Results and Procedures   Most Recent 3 CBC's:Recent Labs   Lab Test 01/14/22  0713 01/12/22  1847   WBC 7.6 12.1*   HGB 14.1 14.4   MCV 95 94    245     Most Recent 3 BMP's:Recent Labs   Lab Test 01/14/22  0713 01/13/22  0712 01/12/22  1930    140 137   POTASSIUM 4.1 4.1 3.9   CHLORIDE 113* 112* 107   CO2 23 24 27   BUN 11 11 16   CR 0.95 0.97 1.16   ANIONGAP 4 4 3   MARY 7.8* 7.9*  8.6   GLC 88 82 108*     Most Recent 2 LFT's:Recent Labs   Lab Test 01/14/22  0713 01/13/22  0712   AST 40 73*   ALT 54 82*   ALKPHOS 99 106   BILITOTAL 1.0 0.9   ,   Results for orders placed or performed during the hospital encounter of 01/12/22   Abdomen US, limited (RUQ only)    Narrative    EXAM: US ABDOMEN LIMITED  LOCATION: [facility]  DATE/TIME: 1/12/2022 6:57 PM    INDICATION: Sudden onset right upper quadrant and epigastric abdominal pain, assess for cholelithiasis  COMPARISON: CT 06/01/2011  TECHNIQUE: Limited abdominal ultrasound.    FINDINGS:    GALLBLADDER: Cholelithiasis with small gallstones. Normal wall thickness. Negative sonographic Charles's.    BILE DUCTS: No biliary dilatation. The common duct measures 3 mm.    LIVER: Hepatic steatosis. No focal mass.    RIGHT KIDNEY: No hydronephrosis.    PANCREAS: The visualized portions are normal.    No ascites.      Impression    IMPRESSION:  1.  Cholelithiasis without evidence for acute cholecystitis.  2.  Hepatic steatosis.       Chest XR,  PA & LAT    Narrative    EXAM: XR CHEST 2 VW  LOCATION: [facility]  DATE/TIME: 1/12/2022 6:42 PM    INDICATION: Epigastric/chest pain, sudden onset.  COMPARISON: None.      Impression    IMPRESSION: The lungs are hyperexpanded but clear. No pneumothorax or pleural effusion. Heart size normal.       Discharge Medications   Current Discharge Medication List      START taking these medications    Details   oxyCODONE (ROXICODONE) 5 MG tablet Take 1-2 tablets (5-10 mg) by mouth every 6 hours as needed for pain  Qty: 12 tablet, Refills: 0    Associated Diagnoses: Acute pancreatitis, unspecified complication status, unspecified pancreatitis type         CONTINUE these medications which have NOT CHANGED    Details   Multiple Vitamins-Minerals (PRESERVISION AREDS PO) Take 1 capsule by mouth 2 times daily      pravastatin (PRAVACHOL) 20 MG tablet Take 20 mg by mouth daily       warfarin ANTICOAGULANT (COUMADIN) 5 MG tablet Take 2.5-5 mg by mouth daily 2.5mg Mon-Wed-Fri and 5mg Tues-Thur-Sat-Sun         STOP taking these medications       amLODIPine (NORVASC) 10 MG tablet Comments:   Reason for Stopping:             Allergies   No Known Allergies

## 2022-01-14 NOTE — PROGRESS NOTES
Discharge criteria met. Discharge order received. All discharge instructions reviewed with pt. Pt verbalized understanding. All questions answered. PIV removed. Rx filled and given to pt. All personal belongs returned to pt. Pt left the unit via W/C in stable condition accompanied with staff and wife.

## 2022-01-15 DIAGNOSIS — Z71.89 OTHER SPECIFIED COUNSELING: ICD-10-CM

## 2022-01-17 ENCOUNTER — PATIENT OUTREACH (OUTPATIENT)
Dept: CARE COORDINATION | Facility: CLINIC | Age: 77
End: 2022-01-17
Payer: OTHER GOVERNMENT

## 2022-01-17 NOTE — PROGRESS NOTES
"Clinic Care Coordination Contact  [facility]: Post-Discharge Note  SITUATION                                                      Admission:    Admission Date: 01/12/22   Reason for Admission: CP  Discharge:   Discharge Date: 01/14/22  Discharge Diagnosis: Acute pancreatitis    BACKGROUND                                                    Alex Stanley is a 76 year old male with hx of chronic a-fib on chronic anticoagulation with warfarin, HTN, prior CVA, and alcohol use in remission who was admitted on 1/12/2022 for acute pancreatitis     Acute pancreatitis, Improving  Cholelithiasis  * Presented with acute, severe epigastric pain, and was found to have a lipase ~43679. Abd US on arrival cholelithiasis without cholecystitis and hepatic steatosis. LFTs were only mildly elevated (AST 79, ALT 51, Tbili normal). Patient denied alcohol use  * [facility] was consulted on admission and recommended supportive cares  - Patient has continued to improve with supportive cares and is tolerating a diet without pain at the time of discharge  - He will follow up with [facility] in 2 weeks for outpatient EGD/EUS      ASSESSMENT      Enrollment  Primary Care Care Coordination Status: Declined    Discharge Assessment  How are you doing now that you are home?: \" I am doing pretty good and the pain is gone\"  How are your symptoms? (Red Flag symptoms escalate to triage hotline per guidelines): Improved  Do you feel your condition is stable enough to be safe at home until your provider visit?: Yes  Does the patient have their discharge instructions? : Yes  Does the patient have questions regarding their discharge instructions? : No  Were you started on any new medications or were there changes to any of your previous medications? : Yes  Does the patient have all of their medications?: Yes  Do you have questions regarding any of your medications? : No  Do you have all of your needed medical supplies or equipment (DME)?  (i.e. " oxygen tank, CPAP, cane, etc.): Yes  Discharge follow-up appointment scheduled within 14 calendar days? : No  Is patient agreeable to assistance with scheduling? : No (GI clinic will return pt's call and schedule a follow up)    Post-op ([facility] Only)  If the patient had a surgery or procedure, do they have any questions for a nurse?: No             PLAN                                                      Outpatient Plan:  Alex Stanley is a 76 year old male with hx of chronic a-fib on chronic anticoagulation with warfarin, HTN, prior CVA, and alcohol use in remission who was admitted on 1/12/2022 for acute pancreatitis     Acute pancreatitis, Improving  Cholelithiasis  * Presented with acute, severe epigastric pain, and was found to have a lipase ~27813. Abd US on arrival cholelithiasis without cholecystitis and hepatic steatosis. LFTs were only mildly elevated (AST 79, ALT 51, Tbili normal). Patient denied alcohol use  * [facility] was consulted on admission and recommended supportive cares  - Patient has continued to improve with supportive cares and is tolerating a diet without pain at the time of discharge  - He will follow up with [facility] in 2 weeks for outpatient EGD/EUS    No future appointments.      For any urgent concerns, please contact our 24 hour nurse triage line: [phone] ([phone])         Aleks Farooq

## 2022-01-18 ENCOUNTER — TELEPHONE (OUTPATIENT)
Dept: INTERNAL MEDICINE | Facility: CLINIC | Age: 77
End: 2022-01-18
Payer: OTHER GOVERNMENT

## 2022-01-18 DIAGNOSIS — Z11.59 ENCOUNTER FOR SCREENING FOR OTHER VIRAL DISEASES: Primary | ICD-10-CM

## 2022-01-18 NOTE — TELEPHONE ENCOUNTER
Reason for Call:  Other call back    Detailed comments: Tejas walked into the  today requesting to speak with  nurse-- Tejas will be having a procedure done on Monday 1/24 and was told about stopping his warfarin prior to his surgery. Tejas would like a nurse to call him back with directions for when to stop warfarin.     Phone Number Patient can be reached at: Cell number on file:    Telephone Information:   Mobile [phone]       Best Time: asap    Can we leave a detailed message on this number? YES    Call taken on 1/18/2022 at 12:11 PM by Micaela Corrales

## 2022-01-18 NOTE — TELEPHONE ENCOUNTER
Dr. Manning,     Pt takes warfarin.   He has ENDOSCOPIC ULTRASOUND, ESOPHAGOSCOPY / UPPER GASTROINTESTINAL TRACT (GI), Conscious Sedation, on 1/24.    What are your instructions for stopping and/or bridging warfarin?

## 2022-01-22 ENCOUNTER — LAB (OUTPATIENT)
Dept: URGENT CARE | Facility: URGENT CARE | Age: 77
End: 2022-01-22
Attending: INTERNAL MEDICINE
Payer: MEDICARE

## 2022-01-22 DIAGNOSIS — Z11.59 ENCOUNTER FOR SCREENING FOR OTHER VIRAL DISEASES: ICD-10-CM

## 2022-01-22 LAB — SARS-COV-2 RNA RESP QL NAA+PROBE: NEGATIVE

## 2022-01-22 PROCEDURE — U0005 INFEC AGEN DETEC AMPLI PROBE: HCPCS

## 2022-01-22 PROCEDURE — U0003 INFECTIOUS AGENT DETECTION BY NUCLEIC ACID (DNA OR RNA); SEVERE ACUTE RESPIRATORY SYNDROME CORONAVIRUS 2 (SARS-COV-2) (CORONAVIRUS DISEASE [COVID-19]), AMPLIFIED PROBE TECHNIQUE, MAKING USE OF HIGH THROUGHPUT TECHNOLOGIES AS DESCRIBED BY CMS-2020-01-R: HCPCS

## 2022-01-24 ENCOUNTER — TELEPHONE (OUTPATIENT)
Dept: INTERNAL MEDICINE | Facility: CLINIC | Age: 77
End: 2022-01-24
Payer: OTHER GOVERNMENT

## 2022-01-24 ENCOUNTER — HOSPITAL ENCOUNTER (OUTPATIENT)
Facility: CLINIC | Age: 77
Discharge: HOME | End: 2022-01-24
Attending: INTERNAL MEDICINE | Admitting: INTERNAL MEDICINE
Payer: MEDICARE

## 2022-01-24 VITALS
DIASTOLIC BLOOD PRESSURE: 77 MMHG | SYSTOLIC BLOOD PRESSURE: 102 MMHG | HEART RATE: 76 BPM | OXYGEN SATURATION: 95 % | RESPIRATION RATE: 16 BRPM

## 2022-01-24 DIAGNOSIS — Z79.01 LONG TERM CURRENT USE OF ANTICOAGULANT THERAPY: Primary | ICD-10-CM

## 2022-01-24 DIAGNOSIS — I48.20 CHRONIC ATRIAL FIBRILLATION (H): ICD-10-CM

## 2022-01-24 LAB — UPPER EUS: NORMAL

## 2022-01-24 PROCEDURE — 250N000011 HC RX IP 250 OP 636: Performed by: INTERNAL MEDICINE

## 2022-01-24 PROCEDURE — 250N000009 HC RX 250: Performed by: INTERNAL MEDICINE

## 2022-01-24 PROCEDURE — G0500 MOD SEDAT ENDO SERVICE >5YRS: HCPCS | Performed by: INTERNAL MEDICINE

## 2022-01-24 PROCEDURE — 43259 EGD US EXAM DUODENUM/JEJUNUM: CPT | Performed by: INTERNAL MEDICINE

## 2022-01-24 PROCEDURE — 43237 ENDOSCOPIC US EXAM ESOPH: CPT | Performed by: INTERNAL MEDICINE

## 2022-01-24 PROCEDURE — 999N000099 HC STATISTIC MODERATE SEDATION < 10 MIN: Performed by: INTERNAL MEDICINE

## 2022-01-24 RX ORDER — FENTANYL CITRATE 50 UG/ML
INJECTION, SOLUTION INTRAMUSCULAR; INTRAVENOUS PRN
Status: COMPLETED | OUTPATIENT
Start: 2022-01-24 | End: 2022-01-24

## 2022-01-24 RX ADMIN — TOPICAL ANESTHETIC 1 EACH: 200 SPRAY DENTAL; PERIODONTAL at 16:00

## 2022-01-24 RX ADMIN — MIDAZOLAM 2 MG: 1 INJECTION INTRAMUSCULAR; INTRAVENOUS at 16:05

## 2022-01-24 RX ADMIN — FENTANYL CITRATE 100 MCG: 50 INJECTION, SOLUTION INTRAMUSCULAR; INTRAVENOUS at 16:02

## 2022-01-24 RX ADMIN — MIDAZOLAM 2 MG: 1 INJECTION INTRAMUSCULAR; INTRAVENOUS at 16:03

## 2022-01-24 NOTE — TELEPHONE ENCOUNTER
ANTICOAGULATION  MANAGEMENT: Discharge Review    Alex Stanley chart reviewed for anticoagulation continuity of care    Outpatient surgery/procedure on 1/24/22 for ENDOSCOPIC ULTRASOUND, ESOPHAGOSCOPY / UPPER GASTROINTESTINAL TRACT.    Discharge disposition: Home    Results:    No results for input(s): INR, [id number], F2, ALMWH, AAUFH in the last 168 hours.    Anticoagulation inpatient management:     not applicable     Anticoagulation discharge instructions:     Warfarin dosing: home regimen to be restarted tomorrow 1/25/22 per performing physician    Bridging: No   INR goal change: No      Medication changes affecting anticoagulation: No    Additional factors affecting anticoagulation: 5 day warfarin hold approved per Dr. Manning     Plan     Recommend to check INR on 1/31/22 - pt declined and will f/u as scheduled on 2/7/22    Spoke with Tejas    Anticoagulation Calendar updated    Shahrzad Santizo RN

## 2022-01-24 NOTE — H&P
[facility]  Pre-Endoscopy History and Physical     Alex Stanley MRN# [id number]   YOB: 1945 Age: 76 year old     Date of Procedure: (Not on file)  Primary care provider: Estuardo Manning  Type of Endoscopy: endoscopic ultrasound  Reason for Procedure: Acute pancreatitis  Type of Anesthesia Anticipated: Moderate Sedation    HPI:    Alex is a 76 year old male who will be undergoing the above procedure.      A history and physical has been performed. The patient's medications and allergies have been reviewed. The risks and benefits of the procedure and the sedation options and risks were discussed with the patient.  All questions were answered and informed consent was obtained.      He denies a personal or family history of anesthesia complications or bleeding disorders.     No Known Allergies     Cannot display prior to admission medications because the patient has not been admitted in this contact.       Patient Active Problem List   Diagnosis     Essential hypertension     Chronic atrial fibrillation/Flutter     Tobacco abuse     Iron deficiency anemia     Alcohol dependence in remission (H)     Erosive gastritis     Hyperlipidemia LDL goal <100     History of CVA (cerebrovascular accident)     Fatty liver, alcoholic     Diverticulosis of colon     CKD (chronic kidney disease) stage 3, GFR 30-59 ml/min (H)     Long-term (current) use of anticoagulants [Z79.01]     Macular degeneration (senile) of retina, left eye     Gallstone pancreatitis        Past Medical History:   Diagnosis Date     Alcohol abuse      Alcoholism (H)     admission 11/09     Atrial fibrillation, paroxysmal 11/07, 6/09, 11/09     Benign essential hypertension      Chronic atrial fibrillation (H)      Diverticulosis of colon 6/2/2011     Erosive gastritis 11/09     Gastritis      Hyperlipidemia LDL goal <100      Hypertension, with LVH      Iron deficiency anemia 11/09     Paroxysmal atrial  "fibrillation (H)      Stroke (H)      Tobacco abuse         Past Surgical History:   Procedure Laterality Date     COLONOSCOPY N/A 10/26/2020    Procedure: COLONOSCOPY, WITH POLYPECTOMY AND BIOPSY;  Surgeon: Shon Bond MD;  Location:  GI     SURGICAL HISTORY OF -   4/11    R Dupuytren's contracture release   Dr. Scruggs     [facility] NONSPECIFIC PROCEDURE  2002    Rt foot reconstructive     [facility] NONSPECIFIC PROCEDURE  10/12    R hand procedure.  Dr. Deluna       Social History     Tobacco Use     Smoking status: Current Every Day Smoker     Packs/day: 0.50     Years: 57.00     Pack years: 28.50     Types: Cigarettes     Smokeless tobacco: Never Used     Tobacco comment: ppd since age 16 - now 1/2-1 ppd 04/05/21   Substance Use Topics     Alcohol use: Not Currently     Comment: last drank at [facility] 8/2010.       Family History   Problem Relation Age of Onset     Lung Cancer Mother      Emphysema Father      Cancer Mother         small cell lung with mets to brain     Diabetes Maternal Grandmother      Respiratory Father         emphysema       REVIEW OF SYSTEMS:     5 point ROS negative except as noted above in HPI, including Gen., Resp., CV, GI &  system review.      PHYSICAL EXAM:   There were no vitals taken for this visit. Estimated body mass index is 25.7 kg/m  as calculated from the following:    Height as of 9/22/21: 1.702 m (5' 7\").    Weight as of 9/22/21: 74.4 kg (164 lb 1.6 oz).   GENERAL APPEARANCE: healthy, alert and no distress  MENTAL STATUS: alert  AIRWAY EXAM: Mallampatti Class I (visualization of the soft palate, fauces, uvula, anterior and posterior pillars)  RESP: lungs clear to auscultation - no rales, rhonchi or wheezes  CV: regular rates and rhythm      DIAGNOSTICS:    Not indicated      IMPRESSION   ASA Class 2 - Mild systemic disease        PLAN:       Plan for endoscopic ultrasound. We discussed the risks, benefits and alternatives and the patient wished to proceed.    The above has " been forwarded to the consulting provider.      Signed Electronically by: Shon Bond MD,MD  January 24, 2022

## 2022-01-24 NOTE — H&P (VIEW-ONLY)
[facility]  Pre-Endoscopy History and Physical     Alex Stanley MRN# [id number]   YOB: 1945 Age: 76 year old     Date of Procedure: (Not on file)  Primary care provider: Estuardo Manning  Type of Endoscopy: endoscopic ultrasound  Reason for Procedure: Acute pancreatitis  Type of Anesthesia Anticipated: Moderate Sedation    HPI:    Alex is a 76 year old male who will be undergoing the above procedure.      A history and physical has been performed. The patient's medications and allergies have been reviewed. The risks and benefits of the procedure and the sedation options and risks were discussed with the patient.  All questions were answered and informed consent was obtained.      He denies a personal or family history of anesthesia complications or bleeding disorders.     No Known Allergies     Cannot display prior to admission medications because the patient has not been admitted in this contact.       Patient Active Problem List   Diagnosis     Essential hypertension     Chronic atrial fibrillation/Flutter     Tobacco abuse     Iron deficiency anemia     Alcohol dependence in remission (H)     Erosive gastritis     Hyperlipidemia LDL goal <100     History of CVA (cerebrovascular accident)     Fatty liver, alcoholic     Diverticulosis of colon     CKD (chronic kidney disease) stage 3, GFR 30-59 ml/min (H)     Long-term (current) use of anticoagulants [Z79.01]     Macular degeneration (senile) of retina, left eye     Gallstone pancreatitis        Past Medical History:   Diagnosis Date     Alcohol abuse      Alcoholism (H)     admission 11/09     Atrial fibrillation, paroxysmal 11/07, 6/09, 11/09     Benign essential hypertension      Chronic atrial fibrillation (H)      Diverticulosis of colon 6/2/2011     Erosive gastritis 11/09     Gastritis      Hyperlipidemia LDL goal <100      Hypertension, with LVH      Iron deficiency anemia 11/09     Paroxysmal atrial  "fibrillation (H)      Stroke (H)      Tobacco abuse         Past Surgical History:   Procedure Laterality Date     COLONOSCOPY N/A 10/26/2020    Procedure: COLONOSCOPY, WITH POLYPECTOMY AND BIOPSY;  Surgeon: Shon Bond MD;  Location:  GI     SURGICAL HISTORY OF -   4/11    R Dupuytren's contracture release   Dr. Scruggs     [facility] NONSPECIFIC PROCEDURE  2002    Rt foot reconstructive     [facility] NONSPECIFIC PROCEDURE  10/12    R hand procedure.  Dr. Deluna       Social History     Tobacco Use     Smoking status: Current Every Day Smoker     Packs/day: 0.50     Years: 57.00     Pack years: 28.50     Types: Cigarettes     Smokeless tobacco: Never Used     Tobacco comment: ppd since age 16 - now 1/2-1 ppd 04/05/21   Substance Use Topics     Alcohol use: Not Currently     Comment: last drank at [facility] 8/2010.       Family History   Problem Relation Age of Onset     Lung Cancer Mother      Emphysema Father      Cancer Mother         small cell lung with mets to brain     Diabetes Maternal Grandmother      Respiratory Father         emphysema       REVIEW OF SYSTEMS:     5 point ROS negative except as noted above in HPI, including Gen., Resp., CV, GI &  system review.      PHYSICAL EXAM:   There were no vitals taken for this visit. Estimated body mass index is 25.7 kg/m  as calculated from the following:    Height as of 9/22/21: 1.702 m (5' 7\").    Weight as of 9/22/21: 74.4 kg (164 lb 1.6 oz).   GENERAL APPEARANCE: healthy, alert and no distress  MENTAL STATUS: alert  AIRWAY EXAM: Mallampatti Class I (visualization of the soft palate, fauces, uvula, anterior and posterior pillars)  RESP: lungs clear to auscultation - no rales, rhonchi or wheezes  CV: regular rates and rhythm      DIAGNOSTICS:    Not indicated      IMPRESSION   ASA Class 2 - Mild systemic disease        PLAN:       Plan for endoscopic ultrasound. We discussed the risks, benefits and alternatives and the patient wished to proceed.    The above has " been forwarded to the consulting provider.      Signed Electronically by: Shon Bond MD,MD  January 24, 2022

## 2022-01-27 ENCOUNTER — TRANSFERRED RECORDS (OUTPATIENT)
Dept: HEALTH INFORMATION MANAGEMENT | Facility: CLINIC | Age: 77
End: 2022-01-27
Payer: OTHER GOVERNMENT

## 2022-01-28 DIAGNOSIS — Z11.59 ENCOUNTER FOR SCREENING FOR OTHER VIRAL DISEASES: Primary | ICD-10-CM

## 2022-02-02 ENCOUNTER — OFFICE VISIT (OUTPATIENT)
Dept: INTERNAL MEDICINE | Facility: CLINIC | Age: 77
End: 2022-02-02
Payer: MEDICARE

## 2022-02-02 VITALS
HEART RATE: 91 BPM | BODY MASS INDEX: 26.93 KG/M2 | TEMPERATURE: 96.8 F | OXYGEN SATURATION: 98 % | DIASTOLIC BLOOD PRESSURE: 76 MMHG | WEIGHT: 171.6 LBS | HEIGHT: 67 IN | SYSTOLIC BLOOD PRESSURE: 110 MMHG

## 2022-02-02 DIAGNOSIS — F10.21 ALCOHOL DEPENDENCE IN REMISSION (H): ICD-10-CM

## 2022-02-02 DIAGNOSIS — Z01.818 PREOP GENERAL PHYSICAL EXAM: Primary | ICD-10-CM

## 2022-02-02 DIAGNOSIS — K85.10 GALLSTONE PANCREATITIS: ICD-10-CM

## 2022-02-02 DIAGNOSIS — I10 ESSENTIAL HYPERTENSION: ICD-10-CM

## 2022-02-02 DIAGNOSIS — I48.20 CHRONIC ATRIAL FIBRILLATION (H): ICD-10-CM

## 2022-02-02 DIAGNOSIS — N18.30 STAGE 3 CHRONIC KIDNEY DISEASE, UNSPECIFIED WHETHER STAGE 3A OR 3B CKD (H): ICD-10-CM

## 2022-02-02 DIAGNOSIS — Z86.73 HISTORY OF CVA (CEREBROVASCULAR ACCIDENT): ICD-10-CM

## 2022-02-02 DIAGNOSIS — K80.20 CALCULUS OF GALLBLADDER WITHOUT CHOLECYSTITIS WITHOUT OBSTRUCTION: ICD-10-CM

## 2022-02-02 DIAGNOSIS — K80.50 BILIARY COLIC: ICD-10-CM

## 2022-02-02 DIAGNOSIS — E78.5 HYPERLIPIDEMIA LDL GOAL <100: ICD-10-CM

## 2022-02-02 PROCEDURE — 99214 OFFICE O/P EST MOD 30 MIN: CPT | Performed by: INTERNAL MEDICINE

## 2022-02-02 ASSESSMENT — MIFFLIN-ST. JEOR: SCORE: 1477

## 2022-02-02 NOTE — PROGRESS NOTES
[address] [address]  Phone: [phone]  Primary Provider: Estuardo Manning  Pre-op Performing Provider: MAREK DE LA CRUZ      PREOPERATIVE EVALUATION:  Today's date: 2/2/2022    Alex Stanley is a 76 year old male who presents for a preoperative evaluation.    Surgical Information:  Surgery/Procedure: LAPAROSCOPIC CHOLECYSTECTOMY  Surgery Location: [facility]  Surgeon: Herson Retana   Surgery Date: 2/8/2022  Time of Surgery: TBD  Where patient plans to recover: At home with family  Fax number for surgical facility: Note does not need to be faxed, will be available electronically in Epic.    Type of Anesthesia Anticipated: Generala    Assessment & Plan     The proposed surgical procedure is considered LOW risk.    1. Preop general physical exam    2. Biliary colic    3. Calculus of gallbladder without cholecystitis without obstruction    4. Gallstone pancreatitis    5. Essential hypertension    6. Hyperlipidemia LDL goal <100    7. Chronic atrial fibrillation/Flutter    8. History of CVA (cerebrovascular accident)    9. 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 Stage 3 chronic kidney disease, unspecified whether stage 3a or 3b CKD (H)              Risks and Recommendations:  The patient has the following additional risks and recommendations for perioperative complications:   - No identified additional risk factors other than previously addressed    Medication Instructions:  Patient is to take all scheduled medications on the day of surgery EXCEPT for modifications listed below:   - aspirin: Discontinue aspirin 7-10 days prior to procedure to reduce bleeding risk. It may be resumed postoperatively.    - warfarin: Thromboembolic risk is low (<4%/year). HOLD warfarin for 5 days without bridging before procedure.    - Calcium Channel Blockers: May be continued on the day of surgery.    **felicitas  routine INR testing 7-10 after resuming Warfarin after surgery.    RECOMMENDATION:  APPROVAL GIVEN to proceed with proposed procedure, without further diagnostic evaluation.                      Subjective     HPI related to upcoming procedure: gall stone pancreatitis, cholelithiasis, biliary colic    Preop Questions 2/2/2022   1. Have you ever had a heart attack or stroke? YES - stroke 2010: Hemorrhagic infarct, embolic CVA from Afib, on anticoagulation since that time.   No new neurological symptoms.    2. Have you ever had surgery on your heart or blood vessels, such as a stent placement, a coronary artery bypass, or surgery on an artery in your head, neck, heart, or legs? No   3. Do you have chest pain with activity? No   4. Do you have a history of  heart failure? No   5. Do you currently have a cold, bronchitis or symptoms of other infection? No   6. Do you have a cough, shortness of breath, or wheezing? No   7. Do you or anyone in your family have previous history of blood clots? No   8. Do you or does anyone in your family have a serious bleeding problem such as prolonged bleeding following surgeries or cuts? No   9. Have you ever had problems with anemia or been told to take iron pills? No   10. Have you had any abnormal blood loss such as black, tarry or bloody stools? No   11. Have you ever had a blood transfusion? No   12. Are you willing to have a blood transfusion if it is medically needed before, during, or after your surgery? Yes   13. Have you or any of your relatives ever had problems with anesthesia? No   14. Do you have sleep apnea, excessive snoring or daytime drowsiness? No   15. Do you have any artifical heart valves or other implanted medical devices like a pacemaker, defibrillator, or continuous glucose monitor? No   16. Do you have artificial joints? No   17. Are you allergic to latex? No       Health Care Directive:  Patient has a Health Care Directive on file      Preoperative Review of  :   reviewed - controlled substances reflected in medication list.      *  No recent infectious illnesses.    *  No recent cardiac or pulmonary issues or symptoms.    *  No problems performing vigorous physical activity, no changes in exercise tolerance.    *  No personal or family history of anesthesia complications.    *  No personal or family history of bleeding or clotting disorders.        Hypertension:  History of hypertension, on medication.  No reported side effects from medications.    Reviewed last 6 BP readings in chart:  BP Readings from Last 6 Encounters:   02/02/22 110/76   01/24/22 102/77   01/14/22 122/63   12/14/21 110/76   09/22/21 115/80   06/23/21 100/60     No active cardiac complaints or symptoms with exercise.     Has history of atrial fibrillation.    No palpitations, no dizziness, no dyspnea on exertion, no shortness of breath.  No racing heart, no fast heart rates.    Taking medications as ordered, no side effects reported.   Patient is taking anticoagulation according to direction, with no reported side effects.         Review of Systems  Constitutional, neuro, ENT, endocrine, pulmonary, cardiac, gastrointestinal, genitourinary, musculoskeletal, integument and psychiatric systems are negative, except as otherwise noted.    Patient Active Problem List    Diagnosis Date Noted     Gallstone pancreatitis 01/12/2022     Priority: Medium     Macular degeneration (senile) of retina, left eye 09/15/2020     Priority: Medium     Long-term (current) use of anticoagulants [Z79.01] 12/21/2015     Priority: Medium     CKD (chronic kidney disease) stage 3, GFR 30-59 ml/min (H) 02/18/2013     Priority: Medium     History of CVA (cerebrovascular accident) 06/02/2011     Priority: Medium     Hemorrhagic infarct.  Echo, carotid ultrasound noncontributory 5/11.  Dr. Bailey approved anticoagulation as of 7/11       Fatty liver, alcoholic 06/02/2011     Priority: Medium     Diverticulosis of colon  06/02/2011     Priority: Medium     Hyperlipidemia LDL goal <100 10/31/2010     Priority: Medium     History of alcohol dependence in remission sober since 2010 (H)      Priority: Medium     Inpatient treatment 2005 at [facility]  Outpatient treatment about 2003, [facility]  Detox in past  Longest patient was dry about 3 years.  Admission 11/09  Admission 5/11 at time of stroke, not using alcohol since       Erosive gastritis      Priority: Medium     Felt due to Etoh.  Aspirin stopped 11/09 due to anemia.       Iron deficiency anemia      Priority: Medium     Essential hypertension      Priority: Medium     Mild LVH on echo 2007, impaired relaxation on echo 6/09       Chronic atrial fibrillation/Flutter      Priority: Medium     Cardiolyte stress test 11/07 negative   Patient chose to stop warfarin, which likely led to stroke 5/11.    Warfarin started 8/31/2011        Tobacco abuse      Priority: Medium      Past Medical History:   Diagnosis Date     Alcohol abuse      Alcoholism (H)     admission 11/09     Atrial fibrillation, paroxysmal 11/07, 6/09, 11/09     Benign essential hypertension      Chronic atrial fibrillation (H)      Diverticulosis of colon 6/2/2011     Erosive gastritis 11/09     Gastritis      Hyperlipidemia LDL goal <100      Hypertension, with LVH      Iron deficiency anemia 11/09     Paroxysmal atrial fibrillation (H)      Stroke (H)      Tobacco abuse      Past Surgical History:   Procedure Laterality Date     COLONOSCOPY N/A 10/26/2020    Procedure: COLONOSCOPY, WITH POLYPECTOMY AND BIOPSY;  Surgeon: Shon Bond MD;  Location:  GI     ENDOSCOPIC ULTRASOUND UPPER GASTROINTESTINAL TRACT (GI) N/A 1/24/2022    Procedure: ENDOSCOPIC ULTRASOUND, ESOPHAGOSCOPY / UPPER GASTROINTESTINAL TRACT (GI);  Surgeon: Shon Bond MD;  Location:  GI     SURGICAL HISTORY OF -   4/11    R Dupuytren's contracture release   Dr. Kael CARR NONSPECIFIC PROCEDURE  2002    Rt foot  "reconstructive     [facility] NONSPECIFIC PROCEDURE  10/12    R hand procedure.  Dr. Deluna     Current Outpatient Medications   Medication Sig Dispense Refill     amLODIPine (NORVASC) 10 MG tablet Take 1 tablet (10 mg) by mouth daily 90 tablet 3     ASPIRIN NOT PRESCRIBED, INTENTIONAL, Antiplatelet medication not prescribed intentionally due to history  of gastrointestinal bleeding, and patient on warfarin. 0 each 0     Multiple Vitamins-Minerals (PRESERVISION AREDS 2) CAPS Take 2 mg by mouth 2 times daily       Multiple Vitamins-Minerals (PRESERVISION AREDS PO) Take 1 capsule by mouth 2 times daily       pravastatin (PRAVACHOL) 20 MG tablet Take 1 tablet (20 mg) by mouth daily 90 tablet 3     warfarin ANTICOAGULANT (COUMADIN) 5 MG tablet TAKE ONE TABLET DAILY EXCEPT ONE-HALF (1/2) TABLET ON MONDAY, WEDNESDAY, AND FRIDAY AS DIRECTED BY THE ANTICOAGULATION CLINIC 90 tablet 1       No Known Allergies     Social History     Tobacco Use     Smoking status: Current Every Day Smoker     Packs/day: 0.50     Years: 57.00     Pack years: 28.50     Types: Cigarettes     Smokeless tobacco: Never Used     Tobacco comment: ppd since age 16 - now 1/2-1 ppd 04/05/21   Substance Use Topics     Alcohol use: Not Currently     Comment: last drank at [facility] 8/2010.     Family History   Problem Relation Age of Onset     Lung Cancer Mother      Emphysema Father      Cancer Mother         small cell lung with mets to brain     Diabetes Maternal Grandmother      Respiratory Father         emphysema     History   Drug Use Unknown         Objective     /76   Pulse 91   Temp 96.8  F (36  C) (Tympanic)   Ht 1.702 m (5' 7\")   Wt 77.8 kg (171 lb 9.6 oz)   SpO2 98%   BMI 26.88 kg/m      Physical Exam  GENERAL alert and no distress  EYES:  Normal sclera,conjunctiva, EOMI  HENT: oral and posterior pharynx without lesions or erythema, facies symmetric  NECK: Neck supple. No LAD, without thyroidmegaly.  RESP: Clear to ausculation bilaterally " without wheezes or crackles. Normal BS in all fields.  CV: Irregular rhythm ( consistent with known atrial fibrillation), regular rate; normal S1S2 without murmurs, rubs or gallops   LYMPH: no cervical lymph adenopathy appreciated  MS: extremities- no gross deformities of the visible extremities noted,   EXT:  no lower extremity edema  PSYCH: Alert and oriented times 3; speech- coherent  SKIN:  No obvious significant skin lesions on visible portions of face  ABD:  Soft, nontender. Normal bowel sounds.     Recent Labs   Lab Test 01/14/22  0713 01/13/22  0712 01/12/22  1930 01/12/22  1847   HGB 14.1  --   --  14.4     --   --  245   INR 2.41* 2.58*   < >  --     140   < >  --    POTASSIUM 4.1 4.1   < >  --    CR 0.95 0.97   < >  --     < > = values in this interval not displayed.        Diagnostics:  No labs were ordered during this visit.     EKG (1/12/22): atrial fibrillation, rate 85, normal axis, normal intervals, no acute ST/T changes c/w ischemia, no LVH by voltage criteria.    Revised Cardiac Risk Index (RCRI):  The patient has the following serious cardiovascular risks for perioperative complications:   - No serious cardiac risks = 0 points     RCRI Interpretation: 0 points: Class I (very low risk - 0.4% complication rate)           Signed Electronically by: Oumar Winters MD  Copy of this evaluation report is provided to requesting physician.

## 2022-02-02 NOTE — PATIENT INSTRUCTIONS
PRE-OPERATIVE INSTRUCTIONS:     *  Contact your surgeon if there is any change in your health. This includes signs of new infection, such as cold or flu (such as a sore throat, runny nose, cough, rash or fever).       *  Confirm any Covid testing requirements before your procedure.   Be aware that each surgery facility has their specific Covid testing requirements.  Many surgery facilities require Covid testing within 2-4 days of the surgery.  Typically the surgeon's office is responsible for arranging and completing this testing before surgery.    Follow any instructions you have been given.  Contact the surgery office for questions about this.       --[facility] Covid Scheduling number: [phone]   (to arrange Covid testing and/or Covid vaccine appointments within the [facility] system)    *  STOP WARFARIN AFTER TONIGHT'S DOSE. Resume after the the surgery at the same dosing schedule.  (want 5 doses off the Warfarin prior to abdominal surgery)    *  Stop aspirin of any kind for 7 days before procedure.   (even low dose daily aspirin).    *  No NSAIDs (Motrin, Advil, ibuprofen, Aleve, etc) within 5-7 days of surgery.    *  Stop any Fish Oil, multi vitamin (and specifically anything containing vitamin E) supplements for 7 days prior to surgery because these can affect platelet function.      *  Tylenol (acetaminophen) OK to take if needed for pain or headache.  Follow instructions on the bottle    *  Prepare your body as instructed by the surgery clinic.  If instructed, Take a shower or bath the night before surgery. Use any special soaps or cleaning instructions according to instructions from your surgeon.  If you do not have soap from your surgeon, use your regular soap. Do not shave or scrub the surgery site.  Wear clean pajamas and have clean sheets on your bed     *  ON THE MORNING OF SURGERY:     --Take ONLY the Amlodipine with small sip of water.      *  Resume all medications at the same  doses after surgery, unless instructed otherwise by the medical staff.     *  Attend all follow up appointments with the surgeons (and/or therapists if applicable) as instructed.     *  Contact the surgeon's office for any specific questions about after-surgery cares and follow up instructions.        You do not need to necessarily return to see us after your surgery unless instructed to do so.      *  Schedule an INR recheck approximatley 7-10 days after resuming.      (check with the pharmacy about the prices of one of the newer anticoagulation options: novel oral anticoagulants (NOACs), e.g Xarelto, Eliquis, Pradaxa)  If one of these options is available to you at an acceptable price, then you can be changed to this.       IF YOU REQUIRE NARCOTIC PAIN MEDICATION AFTER YOUR SURGERY:    --Take the narcotic pain medication exactly as prescribed, and use the absolute lowest dose needed for pain control.   The goal of pain medication is not complete pain relief, aim to just make it manageable.    --Beware of drowsiness, nausea, vomiting, when taking this medication.  Do not drive, or operate dangerous equipment after taking this.    --The main side effects from narcotic pain medication can also include intestinal side effects including nausea, vomiting, constipation, or diarrhea.    --If your pain is not able to be controlled with the pain medication supplied to you, contact the surgeons because uncontrolled pain can be a sign of possible surgical complications.    --In case of constipation from pain medications, take over the counter Miralax powder or stool softner Senokot.  If you have a history of constipation with narcotic pain medication, consider taking Miralax powder on any day that you take a pain tablet.

## 2022-02-05 ENCOUNTER — LAB (OUTPATIENT)
Dept: URGENT CARE | Facility: URGENT CARE | Age: 77
End: 2022-02-05
Attending: SURGERY
Payer: MEDICARE

## 2022-02-05 DIAGNOSIS — Z11.59 ENCOUNTER FOR SCREENING FOR OTHER VIRAL DISEASES: ICD-10-CM

## 2022-02-05 PROCEDURE — U0005 INFEC AGEN DETEC AMPLI PROBE: HCPCS

## 2022-02-05 PROCEDURE — U0003 INFECTIOUS AGENT DETECTION BY NUCLEIC ACID (DNA OR RNA); SEVERE ACUTE RESPIRATORY SYNDROME CORONAVIRUS 2 (SARS-COV-2) (CORONAVIRUS DISEASE [COVID-19]), AMPLIFIED PROBE TECHNIQUE, MAKING USE OF HIGH THROUGHPUT TECHNOLOGIES AS DESCRIBED BY CMS-2020-01-R: HCPCS

## 2022-02-06 LAB — SARS-COV-2 RNA RESP QL NAA+PROBE: NEGATIVE

## 2022-02-07 ENCOUNTER — LAB (OUTPATIENT)
Dept: LAB | Facility: CLINIC | Age: 77
End: 2022-02-07

## 2022-02-07 ENCOUNTER — ANTICOAGULATION THERAPY VISIT (OUTPATIENT)
Dept: ANTICOAGULATION | Facility: CLINIC | Age: 77
End: 2022-02-07

## 2022-02-07 DIAGNOSIS — I48.20 CHRONIC ATRIAL FIBRILLATION (H): ICD-10-CM

## 2022-02-07 DIAGNOSIS — Z79.01 LONG TERM CURRENT USE OF ANTICOAGULANT THERAPY: Primary | ICD-10-CM

## 2022-02-07 LAB — INR BLD: 1.1 (ref 0.9–1.1)

## 2022-02-07 PROCEDURE — 36416 COLLJ CAPILLARY BLOOD SPEC: CPT

## 2022-02-07 PROCEDURE — 85610 PROTHROMBIN TIME: CPT

## 2022-02-07 NOTE — PROGRESS NOTES
ANTICOAGULATION MANAGEMENT     Alex LUND [facility] 76 year old male is on warfarin with subtherapeutic INR result. (Goal INR 2.0-3.0)    Recent labs: (last 7 days)     02/07/22  0923   INR 1.1       ASSESSMENT     Source(s): Chart Review and Patient/Caregiver Call       Warfarin doses taken: Warfarin taken as instructed    Diet: No new diet changes identified    New illness, injury, or hospitalization: No  Cholecystectomy scheduled for 2/8/2022    Medication/supplement changes: None noted    Signs or symptoms of bleeding or clotting: No    Previous INR: Therapeutic last 2(+) visits    Additional findings: On hold for laproscopic cholecystectomy 2/8     PLAN     Recommended plan for temporary change(s) affecting INR     Dosing Instructions: on hold for gall bladder surgery tomorrow with next INR in 1 week       Summary  As of 2/7/2022    Full warfarin instructions:  2/7: Hold; 2/8: 7.5 mg; Otherwise 2.5 mg every Mon, Wed, Fri; 5 mg all other days   Next INR check:  2/14/2022             Telephone call with Alxe who verbalizes understanding and agrees to plan    Patient offered & declined to schedule next visit    Education provided: Please call back if any changes to your diet, medications or how you've been taking warfarin    Plan made per [facility] anticoagulation protocol    Tara Cavazos RN  Anticoagulation Clinic  2/7/2022    _______________________________________________________________________     Anticoagulation Episode Summary     Current INR goal:  2.0-3.0   TTR:  63.0 % (11.3 mo)   Target end date:  Indefinite   Send INR reminders to:  [facility]    Indications    Long-term (current) use of anticoagulants [Z79.01] [Z79.01]  Chronic atrial fibrillation/Flutter [I48.20]           Comments:  INR referral approved 12/9/2019 per Dr Odonnell. Telephone message 12/9/2019         Anticoagulation Care Providers     Provider Role Specialty Phone number    Martin Odonnell MD Referring Internal  Medicine [phone]    Isamar Hernandez MD Referring Internal Medicine [phone]    Estuardo Manning MD Referring Internal Medicine [phone]

## 2022-02-08 ENCOUNTER — TELEPHONE (OUTPATIENT)
Dept: INTERNAL MEDICINE | Facility: CLINIC | Age: 77
End: 2022-02-08

## 2022-02-08 ENCOUNTER — ANESTHESIA (OUTPATIENT)
Dept: SURGERY | Facility: CLINIC | Age: 77
End: 2022-02-08
Payer: MEDICARE

## 2022-02-08 ENCOUNTER — ANESTHESIA EVENT (OUTPATIENT)
Dept: SURGERY | Facility: CLINIC | Age: 77
End: 2022-02-08
Payer: MEDICARE

## 2022-02-08 ENCOUNTER — HOSPITAL ENCOUNTER (OUTPATIENT)
Facility: CLINIC | Age: 77
Discharge: HOME | End: 2022-02-08
Attending: SURGERY | Admitting: SURGERY
Payer: MEDICARE

## 2022-02-08 VITALS
BODY MASS INDEX: 26.37 KG/M2 | SYSTOLIC BLOOD PRESSURE: 123 MMHG | HEIGHT: 67 IN | TEMPERATURE: 97.3 F | OXYGEN SATURATION: 93 % | DIASTOLIC BLOOD PRESSURE: 80 MMHG | RESPIRATION RATE: 16 BRPM | WEIGHT: 168 LBS | HEART RATE: 68 BPM

## 2022-02-08 DIAGNOSIS — K85.10 GALLSTONE PANCREATITIS: Primary | ICD-10-CM

## 2022-02-08 LAB
INR PPP: 1.02 (ref 0.85–1.15)
POTASSIUM BLD-SCNC: 3.8 MMOL/L (ref 3.4–5.3)

## 2022-02-08 PROCEDURE — 258N000001 HC RX 258: Performed by: SURGERY

## 2022-02-08 PROCEDURE — 258N000003 HC RX IP 258 OP 636: Performed by: REGISTERED NURSE

## 2022-02-08 PROCEDURE — 250N000011 HC RX IP 250 OP 636: Performed by: SURGERY

## 2022-02-08 PROCEDURE — 84132 ASSAY OF SERUM POTASSIUM: CPT | Performed by: ANESTHESIOLOGY

## 2022-02-08 PROCEDURE — 710N000012 HC RECOVERY PHASE 2, PER MINUTE: Performed by: SURGERY

## 2022-02-08 PROCEDURE — 258N000003 HC RX IP 258 OP 636: Performed by: ANESTHESIOLOGY

## 2022-02-08 PROCEDURE — 88304 TISSUE EXAM BY PATHOLOGIST: CPT | Mod: TC | Performed by: SURGERY

## 2022-02-08 PROCEDURE — 85610 PROTHROMBIN TIME: CPT | Performed by: ANESTHESIOLOGY

## 2022-02-08 PROCEDURE — 710N000009 HC RECOVERY PHASE 1, LEVEL 1, PER MIN: Performed by: SURGERY

## 2022-02-08 PROCEDURE — 370N000017 HC ANESTHESIA TECHNICAL FEE, PER MIN: Performed by: SURGERY

## 2022-02-08 PROCEDURE — 360N000076 HC SURGERY LEVEL 3, PER MIN: Performed by: SURGERY

## 2022-02-08 PROCEDURE — 999N000141 HC STATISTIC PRE-PROCEDURE NURSING ASSESSMENT: Performed by: SURGERY

## 2022-02-08 PROCEDURE — 250N000013 HC RX MED GY IP 250 OP 250 PS 637: Performed by: SURGERY

## 2022-02-08 PROCEDURE — 272N000001 HC OR GENERAL SUPPLY STERILE: Performed by: SURGERY

## 2022-02-08 PROCEDURE — 250N000009 HC RX 250: Performed by: REGISTERED NURSE

## 2022-02-08 PROCEDURE — 250N000009 HC RX 250: Performed by: SURGERY

## 2022-02-08 PROCEDURE — 250N000013 HC RX MED GY IP 250 OP 250 PS 637: Performed by: ANESTHESIOLOGY

## 2022-02-08 PROCEDURE — 36415 COLL VENOUS BLD VENIPUNCTURE: CPT | Performed by: ANESTHESIOLOGY

## 2022-02-08 PROCEDURE — 250N000025 HC SEVOFLURANE, PER MIN: Performed by: SURGERY

## 2022-02-08 PROCEDURE — 250N000011 HC RX IP 250 OP 636: Performed by: ANESTHESIOLOGY

## 2022-02-08 PROCEDURE — 250N000011 HC RX IP 250 OP 636: Performed by: REGISTERED NURSE

## 2022-02-08 PROCEDURE — 250N000013 HC RX MED GY IP 250 OP 250 PS 637: Performed by: REGISTERED NURSE

## 2022-02-08 RX ORDER — HYDROMORPHONE HCL IN WATER/PF 6 MG/30 ML
0.2 PATIENT CONTROLLED ANALGESIA SYRINGE INTRAVENOUS EVERY 5 MIN PRN
Status: DISCONTINUED | OUTPATIENT
Start: 2022-02-08 | End: 2022-02-08 | Stop reason: HOSPADM

## 2022-02-08 RX ORDER — ONDANSETRON 4 MG/1
4 TABLET, ORALLY DISINTEGRATING ORAL
Status: DISCONTINUED | OUTPATIENT
Start: 2022-02-08 | End: 2022-02-08 | Stop reason: HOSPADM

## 2022-02-08 RX ORDER — PROPOFOL 10 MG/ML
INJECTION, EMULSION INTRAVENOUS PRN
Status: DISCONTINUED | OUTPATIENT
Start: 2022-02-08 | End: 2022-02-08

## 2022-02-08 RX ORDER — FENTANYL CITRATE 0.05 MG/ML
25 INJECTION, SOLUTION INTRAMUSCULAR; INTRAVENOUS
Status: DISCONTINUED | OUTPATIENT
Start: 2022-02-08 | End: 2022-02-08 | Stop reason: HOSPADM

## 2022-02-08 RX ORDER — ACETAMINOPHEN 325 MG/1
650 TABLET ORAL
Status: DISCONTINUED | OUTPATIENT
Start: 2022-02-08 | End: 2022-02-08 | Stop reason: HOSPADM

## 2022-02-08 RX ORDER — ALBUTEROL SULFATE 90 UG/1
AEROSOL, METERED RESPIRATORY (INHALATION) PRN
Status: DISCONTINUED | OUTPATIENT
Start: 2022-02-08 | End: 2022-02-08

## 2022-02-08 RX ORDER — ONDANSETRON 2 MG/ML
INJECTION INTRAMUSCULAR; INTRAVENOUS PRN
Status: DISCONTINUED | OUTPATIENT
Start: 2022-02-08 | End: 2022-02-08

## 2022-02-08 RX ORDER — ACETAMINOPHEN 325 MG/1
975 TABLET ORAL ONCE
Status: COMPLETED | OUTPATIENT
Start: 2022-02-08 | End: 2022-02-08

## 2022-02-08 RX ORDER — DEXAMETHASONE SODIUM PHOSPHATE 4 MG/ML
INJECTION, SOLUTION INTRA-ARTICULAR; INTRALESIONAL; INTRAMUSCULAR; INTRAVENOUS; SOFT TISSUE PRN
Status: DISCONTINUED | OUTPATIENT
Start: 2022-02-08 | End: 2022-02-08

## 2022-02-08 RX ORDER — GLYCOPYRROLATE 0.2 MG/ML
INJECTION, SOLUTION INTRAMUSCULAR; INTRAVENOUS PRN
Status: DISCONTINUED | OUTPATIENT
Start: 2022-02-08 | End: 2022-02-08

## 2022-02-08 RX ORDER — LIDOCAINE HYDROCHLORIDE 20 MG/ML
INJECTION, SOLUTION INFILTRATION; PERINEURAL PRN
Status: DISCONTINUED | OUTPATIENT
Start: 2022-02-08 | End: 2022-02-08

## 2022-02-08 RX ORDER — NEOSTIGMINE METHYLSULFATE 1 MG/ML
VIAL (ML) INJECTION PRN
Status: DISCONTINUED | OUTPATIENT
Start: 2022-02-08 | End: 2022-02-08

## 2022-02-08 RX ORDER — OXYCODONE HYDROCHLORIDE 5 MG/1
5 TABLET ORAL EVERY 4 HOURS PRN
Status: DISCONTINUED | OUTPATIENT
Start: 2022-02-08 | End: 2022-02-08 | Stop reason: HOSPADM

## 2022-02-08 RX ORDER — LABETALOL HYDROCHLORIDE 5 MG/ML
10 INJECTION, SOLUTION INTRAVENOUS
Status: DISCONTINUED | OUTPATIENT
Start: 2022-02-08 | End: 2022-02-08 | Stop reason: HOSPADM

## 2022-02-08 RX ORDER — CELECOXIB 200 MG/1
200 CAPSULE ORAL ONCE
Status: COMPLETED | OUTPATIENT
Start: 2022-02-08 | End: 2022-02-08

## 2022-02-08 RX ORDER — FENTANYL CITRATE 50 UG/ML
INJECTION, SOLUTION INTRAMUSCULAR; INTRAVENOUS PRN
Status: DISCONTINUED | OUTPATIENT
Start: 2022-02-08 | End: 2022-02-08

## 2022-02-08 RX ORDER — LIDOCAINE 40 MG/G
CREAM TOPICAL
Status: DISCONTINUED | OUTPATIENT
Start: 2022-02-08 | End: 2022-02-08 | Stop reason: HOSPADM

## 2022-02-08 RX ORDER — CEFAZOLIN SODIUM/WATER 2 G/20 ML
2 SYRINGE (ML) INTRAVENOUS SEE ADMIN INSTRUCTIONS
Status: DISCONTINUED | OUTPATIENT
Start: 2022-02-08 | End: 2022-02-08 | Stop reason: HOSPADM

## 2022-02-08 RX ORDER — MEPERIDINE HYDROCHLORIDE 25 MG/ML
12.5 INJECTION INTRAMUSCULAR; INTRAVENOUS; SUBCUTANEOUS
Status: DISCONTINUED | OUTPATIENT
Start: 2022-02-08 | End: 2022-02-08 | Stop reason: HOSPADM

## 2022-02-08 RX ORDER — DEXMEDETOMIDINE HYDROCHLORIDE 4 UG/ML
INJECTION, SOLUTION INTRAVENOUS PRN
Status: DISCONTINUED | OUTPATIENT
Start: 2022-02-08 | End: 2022-02-08

## 2022-02-08 RX ORDER — BUPIVACAINE HYDROCHLORIDE AND EPINEPHRINE 2.5; 5 MG/ML; UG/ML
INJECTION, SOLUTION INFILTRATION; PERINEURAL PRN
Status: DISCONTINUED | OUTPATIENT
Start: 2022-02-08 | End: 2022-02-08 | Stop reason: HOSPADM

## 2022-02-08 RX ORDER — MAGNESIUM HYDROXIDE 1200 MG/15ML
LIQUID ORAL PRN
Status: DISCONTINUED | OUTPATIENT
Start: 2022-02-08 | End: 2022-02-08 | Stop reason: HOSPADM

## 2022-02-08 RX ORDER — TRAMADOL HYDROCHLORIDE 50 MG/1
50 TABLET ORAL EVERY 6 HOURS PRN
Qty: 12 TABLET | Refills: 0 | Status: SHIPPED | OUTPATIENT
Start: 2022-02-08 | End: 2022-02-12

## 2022-02-08 RX ORDER — SODIUM CHLORIDE, SODIUM LACTATE, POTASSIUM CHLORIDE, CALCIUM CHLORIDE 600; 310; 30; 20 MG/100ML; MG/100ML; MG/100ML; MG/100ML
INJECTION, SOLUTION INTRAVENOUS CONTINUOUS
Status: DISCONTINUED | OUTPATIENT
Start: 2022-02-08 | End: 2022-02-08 | Stop reason: HOSPADM

## 2022-02-08 RX ORDER — FENTANYL CITRATE 0.05 MG/ML
25 INJECTION, SOLUTION INTRAMUSCULAR; INTRAVENOUS EVERY 5 MIN PRN
Status: DISCONTINUED | OUTPATIENT
Start: 2022-02-08 | End: 2022-02-08 | Stop reason: HOSPADM

## 2022-02-08 RX ORDER — CEFAZOLIN SODIUM/WATER 2 G/20 ML
2 SYRINGE (ML) INTRAVENOUS
Status: COMPLETED | OUTPATIENT
Start: 2022-02-08 | End: 2022-02-08

## 2022-02-08 RX ORDER — ONDANSETRON 2 MG/ML
4 INJECTION INTRAMUSCULAR; INTRAVENOUS EVERY 30 MIN PRN
Status: DISCONTINUED | OUTPATIENT
Start: 2022-02-08 | End: 2022-02-08 | Stop reason: HOSPADM

## 2022-02-08 RX ORDER — ONDANSETRON 4 MG/1
4 TABLET, ORALLY DISINTEGRATING ORAL EVERY 30 MIN PRN
Status: DISCONTINUED | OUTPATIENT
Start: 2022-02-08 | End: 2022-02-08 | Stop reason: HOSPADM

## 2022-02-08 RX ADMIN — FENTANYL CITRATE 50 MCG: 50 INJECTION, SOLUTION INTRAMUSCULAR; INTRAVENOUS at 12:34

## 2022-02-08 RX ADMIN — ROCURONIUM BROMIDE 40 MG: 50 INJECTION, SOLUTION INTRAVENOUS at 12:34

## 2022-02-08 RX ADMIN — FENTANYL CITRATE 25 MCG: 50 INJECTION, SOLUTION INTRAMUSCULAR; INTRAVENOUS at 14:33

## 2022-02-08 RX ADMIN — DEXMEDETOMIDINE HYDROCHLORIDE 8 MCG: 200 INJECTION INTRAVENOUS at 12:46

## 2022-02-08 RX ADMIN — DEXAMETHASONE SODIUM PHOSPHATE 4 MG: 4 INJECTION, SOLUTION INTRA-ARTICULAR; INTRALESIONAL; INTRAMUSCULAR; INTRAVENOUS; SOFT TISSUE at 12:45

## 2022-02-08 RX ADMIN — OXYCODONE HYDROCHLORIDE 5 MG: 5 TABLET ORAL at 14:40

## 2022-02-08 RX ADMIN — DEXMEDETOMIDINE HYDROCHLORIDE 12 MCG: 200 INJECTION INTRAVENOUS at 12:39

## 2022-02-08 RX ADMIN — ALBUTEROL SULFATE 2 PUFF: 90 AEROSOL, METERED RESPIRATORY (INHALATION) at 12:29

## 2022-02-08 RX ADMIN — CELECOXIB 200 MG: 200 CAPSULE ORAL at 11:03

## 2022-02-08 RX ADMIN — PHENYLEPHRINE HYDROCHLORIDE 100 MCG: 10 INJECTION INTRAVENOUS at 12:46

## 2022-02-08 RX ADMIN — FENTANYL CITRATE 50 MCG: 50 INJECTION, SOLUTION INTRAMUSCULAR; INTRAVENOUS at 13:13

## 2022-02-08 RX ADMIN — PHENYLEPHRINE HYDROCHLORIDE 200 MCG: 10 INJECTION INTRAVENOUS at 12:55

## 2022-02-08 RX ADMIN — ONDANSETRON 4 MG: 2 INJECTION INTRAMUSCULAR; INTRAVENOUS at 12:45

## 2022-02-08 RX ADMIN — GLYCOPYRROLATE 0.6 MG: 0.2 INJECTION, SOLUTION INTRAMUSCULAR; INTRAVENOUS at 13:32

## 2022-02-08 RX ADMIN — Medication 2 G: at 12:30

## 2022-02-08 RX ADMIN — NEOSTIGMINE METHYLSULFATE 4 MG: 1 INJECTION, SOLUTION INTRAVENOUS at 13:32

## 2022-02-08 RX ADMIN — SODIUM CHLORIDE, POTASSIUM CHLORIDE, SODIUM LACTATE AND CALCIUM CHLORIDE: 600; 310; 30; 20 INJECTION, SOLUTION INTRAVENOUS at 12:29

## 2022-02-08 RX ADMIN — PROPOFOL 150 MG: 10 INJECTION, EMULSION INTRAVENOUS at 12:34

## 2022-02-08 RX ADMIN — FENTANYL CITRATE 50 MCG: 50 INJECTION, SOLUTION INTRAMUSCULAR; INTRAVENOUS at 13:33

## 2022-02-08 RX ADMIN — ACETAMINOPHEN 975 MG: 325 TABLET, FILM COATED ORAL at 11:03

## 2022-02-08 RX ADMIN — LIDOCAINE HYDROCHLORIDE 80 MG: 20 INJECTION, SOLUTION INFILTRATION; PERINEURAL at 12:34

## 2022-02-08 RX ADMIN — PHENYLEPHRINE HYDROCHLORIDE 0.5 MCG/KG/MIN: 10 INJECTION INTRAVENOUS at 13:06

## 2022-02-08 RX ADMIN — HYDROMORPHONE HYDROCHLORIDE 0.5 MG: 1 INJECTION, SOLUTION INTRAMUSCULAR; INTRAVENOUS; SUBCUTANEOUS at 12:48

## 2022-02-08 RX ADMIN — FENTANYL CITRATE 25 MCG: 50 INJECTION, SOLUTION INTRAMUSCULAR; INTRAVENOUS at 14:26

## 2022-02-08 ASSESSMENT — ENCOUNTER SYMPTOMS: DYSRHYTHMIAS: 1

## 2022-02-08 ASSESSMENT — LIFESTYLE VARIABLES: TOBACCO_USE: 1

## 2022-02-08 ASSESSMENT — MIFFLIN-ST. JEOR: SCORE: 1460.67

## 2022-02-08 NOTE — ANESTHESIA PREPROCEDURE EVALUATION
Anesthesia Pre-Procedure Evaluation    Patient: Alex Stanley   MRN: [id number] : 1945        Preoperative Diagnosis: Gallstone pancreatitis [K85.10]    Procedure : Procedure(s):  LAPAROSCOPIC CHOLECYSTECTOMY          Past Medical History:   Diagnosis Date     Alcohol abuse      Alcoholism (H)     admission      Atrial fibrillation, paroxysmal , ,      Benign essential hypertension      Chronic atrial fibrillation (H)      Diverticulosis of colon 2011     Erosive gastritis      Gastritis      Hyperlipidemia LDL goal <100      Hypertension, with LVH      Iron deficiency anemia      Paroxysmal atrial fibrillation (H)      Stroke (H)      Tobacco abuse       Past Surgical History:   Procedure Laterality Date     COLONOSCOPY N/A 10/26/2020    Procedure: COLONOSCOPY, WITH POLYPECTOMY AND BIOPSY;  Surgeon: Shon Bond MD;  Location:  GI     ENDOSCOPIC ULTRASOUND UPPER GASTROINTESTINAL TRACT (GI) N/A 2022    Procedure: ENDOSCOPIC ULTRASOUND, ESOPHAGOSCOPY / UPPER GASTROINTESTINAL TRACT (GI);  Surgeon: Shon Bond MD;  Location:  GI     SURGICAL HISTORY OF -       R Dupuytren's contracture release   Dr. Scruggs     [facility] NONSPECIFIC PROCEDURE      Rt foot reconstructive     [facility] NONSPECIFIC PROCEDURE  10/12    R hand procedure.  Dr. Deluna      No Known Allergies   Social History     Tobacco Use     Smoking status: Current Every Day Smoker     Packs/day: 0.50     Years: 57.00     Pack years: 28.50     Types: Cigarettes     Smokeless tobacco: Never Used     Tobacco comment: ppd since age 16 - now /-1 ppd 23   Substance Use Topics     Alcohol use: Not Currently     Comment: last drank at [facility] 2010.      Wt Readings from Last 1 Encounters:   22 77.8 kg (171 lb 9.6 oz)        Anesthesia Evaluation            ROS/MED HX  ENT/Pulmonary:     (+) tobacco use, Current use, 1 packs/day,  (-) sleep apnea   Neurologic:     (+) CVA,      Cardiovascular:     (+) Dyslipidemia hypertension-----dysrhythmias, a-fib,     METS/Exercise Tolerance:     Hematologic:     (+) anemia,     Musculoskeletal:       GI/Hepatic: Comment: Diverticulosis; fatty liver disease;    (+) liver disease,  (-) GERD   Renal/Genitourinary:     (+) renal disease, type: CRI,     Endo:       Psychiatric/Substance Use:     (+) alcohol abuse     Infectious Disease:       Malignancy:       Other:            Physical Exam    Airway        Mallampati: II   TM distance: > 3 FB   Neck ROM: full   Mouth opening: > 3 cm    Respiratory Devices and Support         Dental  no notable dental history         Cardiovascular   cardiovascular exam normal          Pulmonary   pulmonary exam normal                OUTSIDE LABS:  CBC:   Lab Results   Component Value Date    WBC 7.6 01/14/2022    WBC 12.1 (H) 01/12/2022    HGB 14.1 01/14/2022    HGB 14.4 01/12/2022    HCT 44.0 01/14/2022    HCT 43.4 01/12/2022     01/14/2022     01/12/2022     BMP:   Lab Results   Component Value Date     01/14/2022     01/13/2022    POTASSIUM 4.1 01/14/2022    POTASSIUM 4.1 01/13/2022    CHLORIDE 113 (H) 01/14/2022    CHLORIDE 112 (H) 01/13/2022    CO2 23 01/14/2022    CO2 24 01/13/2022    BUN 11 01/14/2022    BUN 11 01/13/2022    CR 0.95 01/14/2022    CR 0.97 01/13/2022    GLC 88 01/14/2022    GLC 82 01/13/2022     COAGS:   Lab Results   Component Value Date    PTT 57 (H) 06/01/2011    INR 1.1 02/07/2022     POC:   Lab Results   Component Value Date     (H) 12/03/2009     HEPATIC:   Lab Results   Component Value Date    ALBUMIN 3.2 (L) 01/14/2022    PROTTOTAL 5.9 (L) 01/14/2022    ALT 54 01/14/2022    AST 40 01/14/2022    GGT 61 07/06/2005    ALKPHOS 99 01/14/2022    BILITOTAL 1.0 01/14/2022     OTHER:   Lab Results   Component Value Date    MARY 7.8 (L) 01/14/2022    MAG 1.9 06/01/2011    LIPASE 820 (H) 01/13/2022    AMYLASE 65 07/06/2005    TSH 1.68 06/01/2011    CRP <2.9 01/14/2022     SED 9 06/01/2011       Anesthesia Plan    ASA Status:  3   NPO Status:  NPO Appropriate    Anesthesia Type: General.     - Airway: ETT   Induction: Intravenous.   Maintenance: Balanced.        Consents    Anesthesia Plan(s) and associated risks, benefits, and realistic alternatives discussed. Questions answered and patient/representative(s) expressed understanding.    - Discussed:     - Discussed with:  Patient         Postoperative Care    Pain management: IV analgesics.   PONV prophylaxis: Ondansetron (or other 5HT-3), Dexamethasone or Solumedrol     Comments:                CHEO JOSEPH MD

## 2022-02-08 NOTE — TELEPHONE ENCOUNTER
ANTICOAGULATION  MANAGEMENT: Discharge Review    Alex ASHELY Stanley chart reviewed for anticoagulation continuity of care    Outpatient surgery/procedure on 2/8/22 for cholecystectomy.    Discharge disposition: Home    Results:    Recent labs: (last 7 days)     02/07/22  0923 02/08/22  1042   INR 1.1 1.02     Anticoagulation inpatient management:     not applicable     Anticoagulation discharge instructions:     Warfarin dosing: home regimen continued   Bridging: No   INR goal change: No      Medication changes affecting anticoagulation: No    Additional factors affecting anticoagulation: No    Plan     No adjustment to anticoagulation plan needed    Patient not contacted    No adjustment to Anticoagulation Calendar was required    Adriana Larsen RN

## 2022-02-08 NOTE — ANESTHESIA PROCEDURE NOTES
Airway       Patient location during procedure: OR       Procedure Start/Stop Times: 2/8/2022 12:37 PM  Staff -        Anesthesiologist:  Baltazar Thomas MD       CRNA: Juan Acevedo APRN CRNA       Performed By: CRNAIndications and Patient Condition       Indications for airway management: eze-procedural        Final Airway Details       Final airway type: endotracheal airway       Successful airway: ETT - single  Endotracheal Airway Details        ETT size (mm): 8.0       Cuffed: yes       Cuff volume (mL): 9       Successful intubation technique: direct laryngoscopy       DL Blade Type: MAC 3       Grade View of Cords: 1       Adjucts: stylet       Position: Right       Measured from: lips       Secured at (cm): 22       Bite block used: None    Post intubation assessment        Placement verified by: capnometry, equal breath sounds and chest rise        Number of attempts at approach: 1       Number of other approaches attempted: 0       Secured with: pink tape       Ease of procedure: easy       Dentition: Intact and Unchanged

## 2022-02-08 NOTE — ANESTHESIA CARE TRANSFER NOTE
Patient: Alex Stanley    Procedure: Procedure(s):  LAPAROSCOPIC CHOLECYSTECTOMY       Diagnosis: Gallstone pancreatitis [K85.10]  Diagnosis Additional Information: No value filed.    Anesthesia Type:   General     Note:    Oropharynx: oropharynx clear of all foreign objects and spontaneously breathing  Level of Consciousness: awake  Oxygen Supplementation: face mask  Level of Supplemental Oxygen (L/min / FiO2): 6  Independent Airway: airway patency satisfactory and stable  Dentition: dentition unchanged  Vital Signs Stable: post-procedure vital signs reviewed and stable  Report to RN Given: handoff report given  Patient transferred to: Phase II  Comments: Neuromuscular blockade reversed after TOF 3/4, spontaneous respirations, adequate tidal volumes, followed commands to voice, oropharynx suctioned with soft flexible catheter, extubated atraumatically, extubated with suction, airway patent after extubation.  Oxygen via facemask at 6 liters per minute to PACU. Oxygen tubing connected to wall O2 in PACU, SpO2, NiBP, and EKG monitors and alarms on and functioning, Joann Hugger warmer connected to patient gown, report on patient's clinical status given to PACU RN, RN questions answered.     Handoff Report: Identifed the Patient, Identified the Reponsible Provider, Reviewed the pertinent medical history, Discussed the surgical course, Reviewed Intra-OP anesthesia mangement and issues during anesthesia, Set expectations for post-procedure period and Allowed opportunity for questions and acknowledgement of understanding      Vitals:  Vitals Value Taken Time   /91 02/08/22 1345   Temp 98    Pulse 95 02/08/22 1346   Resp 12 02/08/22 1346   SpO2 100 % 02/08/22 1346   Vitals shown include unvalidated device data.    Electronically Signed By: LAST Cuello CRNA  February 8, 2022  1:48 PM

## 2022-02-08 NOTE — DISCHARGE INSTRUCTIONS
Today you were given 975 mg of Tylenol at 11:00 AM. The recommended daily maximum dose is 4000 mg.       One pain pill (oxycodone 5mg) taken at 2:30 pm.        YOU RECEIVED A MEDICATION CALLED CELEBREX AT 11 AM. DO NOT TAKE IBUPROFEN UNTIL 5 MPM TONIGHT.      Resume your blood thinner this evening as prescribed by the anticoagulation clinic and this was approved by Dr. Bains    Same Day Surgery Discharge Instructions for  Sedation and General Anesthesia       It's not unusual to feel dizzy, light-headed or faint for up to 24 hours after surgery or while taking pain medication.  If you have these symptoms: sit for a few minutes before standing and have someone assist you when you get up to walk or use the bathroom.      You should rest and relax for the next 24 hours. We recommend you make arrangements to have an adult stay with you for at least 24 hours after your discharge.  Avoid hazardous and strenuous activity.      DO NOT DRIVE any vehicle or operate mechanical equipment for 24 hours following the end of your surgery.  Even though you may feel normal, your reactions may be affected by the medication you have received.      Do not drink alcoholic beverages for 24 hours following surgery.       Slowly progress to your regular diet as you feel able. It's not unusual to feel nauseated and/or vomit after receiving anesthesia.  If you develop these symptoms, drink clear liquids (apple juice, ginger ale, broth, 7-up, etc. ) until you feel better.  If your nausea and vomiting persists for 24 hours, please notify your surgeon.        All narcotic pain medications, along with inactivity and anesthesia, can cause constipation. Drinking plenty of liquids and increasing fiber intake will help.      For any questions of a medical nature, call your surgeon.      Do not make important decisions for 24 hours.      If you had general anesthesia, you may have a sore throat for a couple of days related to the breathing tube used  during surgery.  You may use Cepacol lozenges to help with this discomfort.  If it worsens or if you develop a fever, contact your surgeon.       If you feel your pain is not well managed with the pain medications prescribed by your surgeon, please contact your surgeon's office to let them know so they can address your concerns.       CoVid 19 Information    We want to give you information regarding Covid. Please consult your primary care provider with any questions you might have.     Patient who have symptoms (cough, fever, or shortness of breath), need to isolate for 7 days from when symptoms started OR 72 hours after fever resolves (without fever reducing medications) AND improvement of respiratory symptoms (whichever is longer).      Isolate yourself at home (in own room/own bathroom if possible)    Do Not allow any visitors    Do Not go to work or school    Do Not go to [employer],  centers, shopping, or other public places.    Do Not shake hands.    Avoid close and intimate contact with others (hugging, kissing).    Follow CDC recommendations for household cleaning of frequently touched services.     After the initial 7 days, continue to isolate yourself from household members as much as possible. To continue decrease the risk of community spread and exposure, you and any members of your household should limit activities in public for 14 days after starting home isolation.     You can reference the following CDC link for helpful home isolation/care tips:  https://www.cdc.gov/coronavirus/2019-ncov/downloads/10Things.pdf    Protect Others:    Cover Your Mouth and Nose with a mask, disposable tissue or wash cloth to avoid spreading germs to others.    Wash your hands and face frequently with soap and water    Call Your Primary Doctor If: Breathing difficulty develops or you become worse.    For more information about COVID19 and options for caring for yourself at home, please visit the CDC website at  https://www.cdc.gov/coronavirus/2019-ncov/about/steps-when-sick.html  For more options for care at [facility], please visit our website at [email or web address]    [facility]   Discharge Instructions for Laparoscopic Cholecystectomy          ACTIVITY    You may climb stairs.    You may lift or exercise after four days if comfortable.(see lifting restriction)    You may drive without restrictions when not using prescription pain medication.    INCISION    May take bandaids off in 24 hours. Leave steri strips in place if present.     BATHING    You may shower starting tomorrow.     DIET    Return to the diet you were on before surgery.    CALL YOUR PHYSICIAN IF YOU HAVE    Chills or fever above 101   F.    Drainage from the incisions    Significant bleeding    Pain not relieved by your pain medication or rest.    Increasing pain after the first 48 hours    HELPFUL HINTS    It is not uncommon to experience some loose stools or diarrhea after surgery.  This is your body s way of adapting to the bile which will slowly drain into your intestine.    You may experience shoulder pain which is due to the air injected during the procedure.  This is temporary and usually passes in a day.                       **If you have questions or concerns about your procedure, call   Dr. Bains at [phone].**

## 2022-02-08 NOTE — OP NOTE
OPERATIVE REPORT    Surgeon(s)/Proceduralist(s) and Assistants (if any):  Surgeon(s):  Mazin Bains MD Kudav, Vishal, MD  Circulator: Sadie Coffey RN  Relief Scrub: Mazin Stanley; Griselda Becerra  Scrub Person: Dom Burnett    Pre-operative/Pre-procedural Diagnosis:  Gallstone pancreatitis [K85.10]    Procedure(s):  Procedure(s):  LAPAROSCOPIC CHOLECYSTECTOMY (N/A)    Procedure(s) findings:   Chronic inflammation of the gallbladder    Specimen(s) removed:   Gallbladder, sent for permanent    (EBL) Estimated blood loss (ml): 5 ml    Postoperative/Postprocedure Diagnosis:   Gallstone Pancreatitis  Chronic cholecystitis    Description of the Procedure:    After patient identification was performed and preoperative antibiotics were given, the patient was taken to the operating room and placed supine on the operating room table. General endotracheal anesthesia was induced without complication. The abdomen was prepped and draped in standard surgical fashion.    A 5 mm incision was performed in the midline, at supraumbilical level, after the skin at this level was anesthetized with Marcaine. The abdominal cavity was entered with a Veress needle. Pneumoperitoneum to 15 mmHg was induced. The Veress needle was withdrawn and replaced with a laparoscopic 5 mm Visiport, which was introduced in the abdominal cavity under direct vision. We inspected for any signs of Veress needle or trocar injury. None were identified. We continued with insertion of the reminder of the ports. Another 5 mm port was placed in the right abdominal flank, another one on the midclavicular line, approximately 2 fingerbreadths below the rib cage, and a 12 mm port in the epigastrium. We then performed bilateral transversus abdominis plane (TAP) blocks in three locations on each side of the abdominal wall, lateral to the midclavicular line, using 5 ml of 0.25% Marcaine local anesthetic in each location to assist in perioperative  pain relief.    The gallbladder was inspected. It had signs of chronic inflammation with some adhesions to the surrounding omentum. The gallbladder was grasped at the fundus and retracted cephalad. Lateral retraction on the gallbladder infundibulum exposed the cystic triangle area. Dissection in this area was performed. The cystic artery and cystic duct were circumferentially dissected. The critical view of safety was achieved. The cystic artery was clipped with 2 clips proximally, 1 distally, and divided in between the clips. The cystic duct was clipped with 2 clips proximally, 1 distally and divided in between the clips. Subsequently the gallbladder was removed from the liver bed with the aid of the electrocautery. It was placed in an Endo Catch bag. Inspection of the liver bed was performed and hemostasis was adequate. The clips were in adequate position.    The gallbladder was removed from the peritoneal cavity through the 12 mm port. The CO2 was desufflated and all ports removed. The fascia of the 12 mm port was closed with a interrupted figure-of-eight 0 Vicryl suture. The skin of the 4 incisions was closed with subcuticular Monocryl followed by Exofinskin glue.    The patient tolerated the procedure well. He was awakened from anesthesia and moved to the postanesthesia care unit in stable condition. All instrument, needle and sponge counts were correct at the end of the case. There were no complications. I was present in the operating room for the entire duration of the procedure.    There were no qualified resident available to assist with this case and thus Dr. Mata Castillo served as the first-assistant in this case. He assisted with peritoneal entry, retraction, dissection and closure.    Mazin Bains MD, FACS 2/8/2022 1:45 PM  Minimally Invasive & Bariatric Surgery   [facility]

## 2022-02-08 NOTE — ANESTHESIA POSTPROCEDURE EVALUATION
Patient: Alex Stanley    Procedure: Procedure(s):  LAPAROSCOPIC CHOLECYSTECTOMY       Diagnosis:Gallstone pancreatitis [K85.10]  Diagnosis Additional Information: No value filed.    Anesthesia Type:  General    Note:  Disposition: Outpatient   Postop Pain Control: Uneventful            Sign Out: Well controlled pain   PONV: No   Neuro/Psych: Uneventful            Sign Out: Acceptable/Baseline neuro status   Airway/Respiratory: Uneventful            Sign Out: Acceptable/Baseline resp. status   CV/Hemodynamics: Uneventful            Sign Out: Acceptable CV status; No obvious hypovolemia; No obvious fluid overload   Other NRE: NONE   DID A NON-ROUTINE EVENT OCCUR? No           Last vitals:  Vitals Value Taken Time   /77 02/08/22 1430   Temp 36.1  C (96.9  F) 02/08/22 1345   Pulse 85 02/08/22 1443   Resp 22 02/08/22 1443   SpO2 91 % 02/08/22 1443   Vitals shown include unvalidated device data.    Electronically Signed By: CHEO JOSEPH MD  February 8, 2022  2:45 PM

## 2022-02-09 LAB
PATH REPORT.COMMENTS IMP SPEC: NORMAL
PATH REPORT.COMMENTS IMP SPEC: NORMAL
PATH REPORT.FINAL DX SPEC: NORMAL
PATH REPORT.GROSS SPEC: NORMAL
PATH REPORT.MICROSCOPIC SPEC OTHER STN: NORMAL
PATH REPORT.RELEVANT HX SPEC: NORMAL
PHOTO IMAGE: NORMAL

## 2022-02-09 PROCEDURE — 88304 TISSUE EXAM BY PATHOLOGIST: CPT | Mod: 26 | Performed by: PATHOLOGY

## 2022-02-10 ENCOUNTER — TRANSFERRED RECORDS (OUTPATIENT)
Dept: HEALTH INFORMATION MANAGEMENT | Facility: CLINIC | Age: 77
End: 2022-02-10
Payer: MEDICARE

## 2022-02-15 ENCOUNTER — TRANSFERRED RECORDS (OUTPATIENT)
Dept: HEALTH INFORMATION MANAGEMENT | Facility: CLINIC | Age: 77
End: 2022-02-15
Payer: MEDICARE

## 2022-02-23 ENCOUNTER — TELEPHONE (OUTPATIENT)
Dept: INTERNAL MEDICINE | Facility: CLINIC | Age: 77
End: 2022-02-23
Payer: MEDICARE

## 2022-02-23 NOTE — TELEPHONE ENCOUNTER
ANTICOAGULATION     Alex Stanley is overdue for INR check.      Left message for patient to call and schedule lab appointment as soon as possible. If returning call, please schedule.     Adriana Larsen RN

## 2022-02-25 ENCOUNTER — ANTICOAGULATION THERAPY VISIT (OUTPATIENT)
Dept: ANTICOAGULATION | Facility: CLINIC | Age: 77
End: 2022-02-25

## 2022-02-25 ENCOUNTER — LAB (OUTPATIENT)
Dept: LAB | Facility: CLINIC | Age: 77
End: 2022-02-25
Payer: MEDICARE

## 2022-02-25 DIAGNOSIS — Z79.01 LONG TERM CURRENT USE OF ANTICOAGULANT THERAPY: Primary | ICD-10-CM

## 2022-02-25 DIAGNOSIS — I48.20 CHRONIC ATRIAL FIBRILLATION (H): ICD-10-CM

## 2022-02-25 LAB — INR BLD: 3 (ref 0.9–1.1)

## 2022-02-25 PROCEDURE — 36416 COLLJ CAPILLARY BLOOD SPEC: CPT

## 2022-02-25 PROCEDURE — 85610 PROTHROMBIN TIME: CPT

## 2022-02-25 NOTE — PROGRESS NOTES
ANTICOAGULATION MANAGEMENT     Alex Melarajacinta 76 year old male is on warfarin with therapeutic INR result. (Goal INR 2.0-3.0)    Recent labs: (last 7 days)     02/25/22  1343   INR 3.0*       ASSESSMENT     Source(s): Chart Review and Patient/Caregiver Call       Warfarin doses taken: Warfarin taken as instructed    Diet: No new diet changes identified    New illness, injury, or hospitalization: No Had gall bladder surgery 2/8    Medication/supplement changes: None noted    Signs or symptoms of bleeding or clotting: No    Previous INR: Subtherapeutic    Additional findings: None     PLAN     Recommended plan for no diet, medication or health factor changes affecting INR     Dosing Instructions: Continue your current warfarin dose with next INR in 3 weeks       Summary  As of 2/25/2022    Full warfarin instructions:  2.5 mg every Mon, Wed, Fri; 5 mg all other days   Next INR check:  3/18/2022             Telephone call with Alex who verbalizes understanding and agrees to plan    Lab visit scheduled    Education provided: Please call back if any changes to your diet, medications or how you've been taking warfarin    Plan made per [facility] anticoagulation protocol    Tara Cavazos RN  Anticoagulation Clinic  2/25/2022    _______________________________________________________________________     Anticoagulation Episode Summary     Current INR goal:  2.0-3.0   TTR:  65.5 % (11.3 mo)   Target end date:  Indefinite   Send INR reminders to:  [facility]    Indications    Long-term (current) use of anticoagulants [Z79.01] [Z79.01]  Chronic atrial fibrillation/Flutter [I48.20]           Comments:  INR referral approved 12/9/2019 per Dr Odonnell. Telephone message 12/9/2019         Anticoagulation Care Providers     Provider Role Specialty Phone number    Martin Odonnell MD Referring Internal Medicine [phone]    Isamar Hernandez MD Referring Internal Medicine [phone]    Estuardo Manning MD  Referring Internal Medicine [phone]

## 2022-03-18 ENCOUNTER — LAB (OUTPATIENT)
Dept: LAB | Facility: CLINIC | Age: 77
End: 2022-03-18
Payer: MEDICARE

## 2022-03-18 ENCOUNTER — ANTICOAGULATION THERAPY VISIT (OUTPATIENT)
Dept: ANTICOAGULATION | Facility: CLINIC | Age: 77
End: 2022-03-18

## 2022-03-18 DIAGNOSIS — I48.20 CHRONIC ATRIAL FIBRILLATION (H): ICD-10-CM

## 2022-03-18 DIAGNOSIS — Z79.01 LONG TERM CURRENT USE OF ANTICOAGULANT THERAPY: Primary | ICD-10-CM

## 2022-03-18 LAB — INR BLD: 2.7 (ref 0.9–1.1)

## 2022-03-18 PROCEDURE — 36416 COLLJ CAPILLARY BLOOD SPEC: CPT

## 2022-03-18 PROCEDURE — 85610 PROTHROMBIN TIME: CPT

## 2022-03-18 NOTE — PROGRESS NOTES
ANTICOAGULATION MANAGEMENT     Alex Stanley 76 year old male is on warfarin with therapeutic INR result. (Goal INR 2.0-3.0)    Recent labs: (last 7 days)     03/18/22  0918   INR 2.7*       ASSESSMENT       Source(s): Chart Review and Patient/Caregiver Call       Warfarin doses taken: Warfarin taken as instructed    Diet: No new diet changes identified    New illness, injury, or hospitalization: No    Medication/supplement changes: None noted    Signs or symptoms of bleeding or clotting: No    Previous INR: Therapeutic last visit; previously outside of goal range    Additional findings: None       PLAN     Recommended plan for no diet, medication or health factor changes affecting INR     Dosing Instructions: Continue your current warfarin dose with next INR in 5 weeks       Summary  As of 3/18/2022    Full warfarin instructions:  2.5 mg every Mon, Wed, Fri; 5 mg all other days   Next INR check:  4/22/2022             Telephone call with Alex who verbalizes understanding and agrees to plan    Lab visit scheduled    Education provided: Please call back if any changes to your diet, medications or how you've been taking warfarin    Plan made per [facility] anticoagulation protocol    Tara Cavazos RN  Anticoagulation Clinic  3/18/2022    _______________________________________________________________________     Anticoagulation Episode Summary     Current INR goal:  2.0-3.0   TTR:  71.7 % (11.3 mo)   Target end date:  Indefinite   Send INR reminders to:  [facility]    Indications    Long-term (current) use of anticoagulants [Z79.01] [Z79.01]  Chronic atrial fibrillation/Flutter [I48.20]           Comments:  INR referral approved 12/9/2019 per Dr Odonnell. Telephone message 12/9/2019         Anticoagulation Care Providers     Provider Role Specialty Phone number    Martin Odonnell MD Referring Internal Medicine [phone]    Isamar Hernandez MD Referring Internal Medicine [phone]    Nigel  Estuardo Martinez MD [facility] Internal Medicine [phone]

## 2022-04-12 DIAGNOSIS — I48.20 CHRONIC ATRIAL FIBRILLATION (H): ICD-10-CM

## 2022-04-12 RX ORDER — WARFARIN SODIUM 5 MG/1
TABLET ORAL
Qty: 90 TABLET | Refills: 1 | Status: SHIPPED | OUTPATIENT
Start: 2022-04-12 | End: 2022-10-26

## 2022-04-12 NOTE — TELEPHONE ENCOUNTER
Warfarin - Prescription approved per [facility] Refill Protocol.    Heidi Larsen RN   [facility] Anticoagulation Clinic        Statement Selected

## 2022-04-22 ENCOUNTER — ANTICOAGULATION THERAPY VISIT (OUTPATIENT)
Dept: ANTICOAGULATION | Facility: CLINIC | Age: 77
End: 2022-04-22

## 2022-04-22 ENCOUNTER — LAB (OUTPATIENT)
Dept: LAB | Facility: CLINIC | Age: 77
End: 2022-04-22
Payer: MEDICARE

## 2022-04-22 DIAGNOSIS — Z79.01 LONG TERM CURRENT USE OF ANTICOAGULANT THERAPY: Primary | ICD-10-CM

## 2022-04-22 DIAGNOSIS — I48.20 CHRONIC ATRIAL FIBRILLATION (H): ICD-10-CM

## 2022-04-22 LAB — INR BLD: 2.3 (ref 0.9–1.1)

## 2022-04-22 PROCEDURE — 85610 PROTHROMBIN TIME: CPT

## 2022-04-22 PROCEDURE — 36416 COLLJ CAPILLARY BLOOD SPEC: CPT

## 2022-04-22 NOTE — PROGRESS NOTES
ANTICOAGULATION MANAGEMENT     Alex Stanley 76 year old male is on warfarin with therapeutic INR result. (Goal INR 2.0-3.0)    Recent labs: (last 7 days)     04/22/22  0929   INR 2.3*       ASSESSMENT       Source(s): Chart Review and Patient/Caregiver Call       Warfarin doses taken: Warfarin taken as instructed    Diet: No new diet changes identified    New illness, injury, or hospitalization: No    Medication/supplement changes: None noted    Signs or symptoms of bleeding or clotting: No    Previous INR: Therapeutic last 2(+) visits    Additional findings: None       PLAN     Recommended plan for no diet, medication or health factor changes affecting INR Looking in to switching to Eliquis  Dosing Instructions: continue your current warfarin dose with next INR in 6 weeks       Summary  As of 4/22/2022    Full warfarin instructions:  2.5 mg every Mon, Wed, Fri; 5 mg all other days   Next INR check:  6/3/2022             Telephone call with Tejas who verbalizes understanding and agrees to plan    Lab visit scheduled    Education provided: Please call back if any changes to your diet, medications or how you've been taking warfarin    Plan made per [facility] anticoagulation protocol    Tara Cavazos RN  Anticoagulation Clinic  4/22/2022    _______________________________________________________________________     Anticoagulation Episode Summary     Current INR goal:  2.0-3.0   TTR:  75.4 % (11.3 mo)   Target end date:  Indefinite   Send INR reminders to:  [facility]    Indications    Long-term (current) use of anticoagulants [Z79.01] [Z79.01]  Chronic atrial fibrillation/Flutter [I48.20]           Comments:  INR referral approved 12/9/2019 per Dr Odonnell. Telephone message 12/9/2019         Anticoagulation Care Providers     Provider Role Specialty Phone number    Martin Odonnell MD Referring Internal Medicine [phone]    Isamar Hernandez MD Referring Internal Medicine [phone]    Nigel  Estuardo Martinez MD [facility] Internal Medicine [phone]

## 2022-06-03 ENCOUNTER — LAB (OUTPATIENT)
Dept: LAB | Facility: CLINIC | Age: 77
End: 2022-06-03
Payer: MEDICARE

## 2022-06-03 ENCOUNTER — ANTICOAGULATION THERAPY VISIT (OUTPATIENT)
Dept: ANTICOAGULATION | Facility: CLINIC | Age: 77
End: 2022-06-03

## 2022-06-03 DIAGNOSIS — I48.20 CHRONIC ATRIAL FIBRILLATION (H): ICD-10-CM

## 2022-06-03 DIAGNOSIS — Z79.01 LONG TERM CURRENT USE OF ANTICOAGULANT THERAPY: Primary | ICD-10-CM

## 2022-06-03 LAB — INR BLD: 2.4 (ref 0.9–1.1)

## 2022-06-03 PROCEDURE — 85610 PROTHROMBIN TIME: CPT

## 2022-06-03 PROCEDURE — 36416 COLLJ CAPILLARY BLOOD SPEC: CPT

## 2022-06-03 NOTE — PROGRESS NOTES
ANTICOAGULATION MANAGEMENT     Alex Stanley 76 year old male is on warfarin with therapeutic INR result. (Goal INR 2.0-3.0)    Recent labs: (last 7 days)     06/03/22  0932   INR 2.4*       ASSESSMENT       Source(s): Chart Review and Patient/Caregiver Call       Warfarin doses taken: Warfarin taken as instructed    Diet: No new diet changes identified    New illness, injury, or hospitalization: No    Medication/supplement changes: None noted    Signs or symptoms of bleeding or clotting: No    Previous INR: Therapeutic last 2(+) visits    Additional findings: None       PLAN     Recommended plan for no diet, medication or health factor changes affecting INR     Dosing Instructions: continue your current warfarin dose with next INR in 6 weeks       Summary  As of 6/3/2022    Full warfarin instructions:  2.5 mg every Mon, Wed, Fri; 5 mg all other days   Next INR check:  7/15/2022             Telephone call with Tejas who verbalizes understanding and agrees to plan    Lab visit scheduled    Education provided: Please call back if any changes to your diet, medications or how you've been taking warfarin    Plan made per [facility] anticoagulation protocol    Tara Cavazos RN  Anticoagulation Clinic  6/3/2022    _______________________________________________________________________     Anticoagulation Episode Summary     Current INR goal:  2.0-3.0   TTR:  81.4 % (11.3 mo)   Target end date:  Indefinite   Send INR reminders to:  [facility]    Indications    Long-term (current) use of anticoagulants [Z79.01] [Z79.01]  Chronic atrial fibrillation/Flutter [I48.20]           Comments:  INR referral approved 12/9/2019 per Dr Odonnell. Telephone message 12/9/2019         Anticoagulation Care Providers     Provider Role Specialty Phone number    Martin Odonnell MD Referring Internal Medicine [phone]    Isamar Hernandez MD Referring Internal Medicine [phone]    Estuardo Manning MD Referring  Internal Medicine [phone]

## 2022-07-15 ENCOUNTER — LAB (OUTPATIENT)
Dept: LAB | Facility: CLINIC | Age: 77
End: 2022-07-15
Payer: MEDICARE

## 2022-07-15 ENCOUNTER — ANTICOAGULATION THERAPY VISIT (OUTPATIENT)
Dept: ANTICOAGULATION | Facility: CLINIC | Age: 77
End: 2022-07-15

## 2022-07-15 DIAGNOSIS — I48.20 CHRONIC ATRIAL FIBRILLATION (H): ICD-10-CM

## 2022-07-15 DIAGNOSIS — Z79.01 LONG TERM CURRENT USE OF ANTICOAGULANT THERAPY: Primary | ICD-10-CM

## 2022-07-15 LAB — INR BLD: 2.1 (ref 0.9–1.1)

## 2022-07-15 PROCEDURE — 36416 COLLJ CAPILLARY BLOOD SPEC: CPT

## 2022-07-15 PROCEDURE — 85610 PROTHROMBIN TIME: CPT

## 2022-07-15 NOTE — PROGRESS NOTES
ANTICOAGULATION MANAGEMENT     Alex Stanley 76 year old male is on warfarin with therapeutic INR result. (Goal INR 2.0-3.0)    Recent labs: (last 7 days)     07/15/22  0816   INR 2.1*       ASSESSMENT       Source(s): Chart Review and Patient/Caregiver Call       Warfarin doses taken: Warfarin taken as instructed    Diet: No new diet changes identified    New illness, injury, or hospitalization: No    Medication/supplement changes: None noted    Signs or symptoms of bleeding or clotting: No    Previous INR: Therapeutic last 2(+) visits    Additional findings: None       PLAN     Recommended plan for no diet, medication or health factor changes affecting INR     Dosing Instructions: continue your current warfarin dose with next INR in 6 weeks       Summary  As of 7/15/2022    Full warfarin instructions:  2.5 mg every Mon, Wed, Fri; 5 mg all other days   Next INR check:  8/26/2022             Telephone call with Tejas who verbalizes understanding and agrees to plan    Lab visit scheduled    Education provided: Please call back if any changes to your diet, medications or how you've been taking warfarin    Plan made per [facility] anticoagulation protocol    Tara Cavazos RN  Anticoagulation Clinic  7/15/2022    _______________________________________________________________________     Anticoagulation Episode Summary     Current INR goal:  2.0-3.0   TTR:  83.6 % (11.3 mo)   Target end date:  Indefinite   Send INR reminders to:  [facility]    Indications    Long-term (current) use of anticoagulants [Z79.01] [Z79.01]  Chronic atrial fibrillation/Flutter [I48.20]           Comments:  INR referral approved 12/9/2019 per Dr Odonnell. Telephone message 12/9/2019         Anticoagulation Care Providers     Provider Role Specialty Phone number    Martin Odonnell MD Referring Internal Medicine [phone]    Isamar Hernandez MD Referring Internal Medicine [phone]    Estuardo Manning MD Referring  Internal Medicine [phone]

## 2022-08-08 ENCOUNTER — VIRTUAL VISIT (OUTPATIENT)
Dept: INTERNAL MEDICINE | Facility: CLINIC | Age: 77
End: 2022-08-08
Payer: MEDICARE

## 2022-08-08 ENCOUNTER — TELEPHONE (OUTPATIENT)
Dept: INTERNAL MEDICINE | Facility: CLINIC | Age: 77
End: 2022-08-08

## 2022-08-08 DIAGNOSIS — U07.1 INFECTION DUE TO 2019 NOVEL CORONAVIRUS: ICD-10-CM

## 2022-08-08 DIAGNOSIS — Z79.01 LONG TERM CURRENT USE OF ANTICOAGULANT THERAPY: ICD-10-CM

## 2022-08-08 DIAGNOSIS — I10 ESSENTIAL HYPERTENSION: Primary | ICD-10-CM

## 2022-08-08 DIAGNOSIS — I48.20 CHRONIC ATRIAL FIBRILLATION (H): ICD-10-CM

## 2022-08-08 PROCEDURE — 99441 PR PHYSICIAN TELEPHONE EVALUATION 5-10 MIN: CPT | Mod: 95 | Performed by: PHYSICIAN ASSISTANT

## 2022-08-08 NOTE — PATIENT INSTRUCTIONS
Reduce dose of amlodipine to 5 mg daily and monitor blood pressure while on Paxlovid- Take 1/2 tab of Amlodipine 10 mg daily   Stop pravastatin for the 5 days while on Paxlovid and for an additional 5 days after completing the course.  You will be off of pravastatin for 10 days total    Do not take your warfarin for the 5 days while on Paxlovid - restart the day after you finish Paxlovid and contact the Anticoagulation clinic to have an INR done sooner that your scheduled one on 8/26/2022

## 2022-08-08 NOTE — PROGRESS NOTES
"Tejas is a 76 year old who is being evaluated via a billable telephone visit.      What phone number would you like to be contacted at? [phone]  How would you like to obtain your AVS? MyChart    Assessment & Plan     Essential hypertension      Chronic atrial fibrillation/Flutter      Long-term (current) use of anticoagulants [Z79.01]      Infection due to 2019 novel coronavirus    - nirmatrelvir and ritonavir (PAXLOVID) therapy pack; Take 3 tablets by mouth 2 times daily for 5 days (Take 2 Nirmatrelvir tablets and 1 Ritonavir tablet twice daily for 5 days)             BMI:   Estimated body mass index is 26.31 kg/m  as calculated from the following:    Height as of 2/8/22: 1.702 m (5' 7\").    Weight as of 2/8/22: 76.2 kg (168 lb).       COVID-19 positive patient.  Encounter for consideration of medication intervention. Patient does qualify for a prescription. Full discussion with patient including medication options, risks and benefits. Potential drug interactions reviewed with patient.     Treatment Planned Paxlovid, Rx sent to [facility]   [phone]  [address] [place] [address]    Hours:  Mon-Fri: 8:30a - 6:00p  Sat-Sun: 8:30a - 12:30p    Curbside Delivery: Patient to call [phone] to set up and  at door 2 of [facility]  Temporary change to home medications:   Patient Instructions   Reduce dose of amlodipine to 5 mg daily and monitor blood pressure while on Paxlovid- Take 1/2 tab of Amlodipine 10 mg daily   Stop pravastatin for the 5 days while on Paxlovid and for an additional 5 days after completing the course.  You will be off of pravastatin for 10 days total    Do not take your warfarin for the 5 days while on Paxlovid - restart the day after you finish Paxlovid and contact the Anticoagulation clinic to have an INR done sooner that your scheduled one on 8/26/2022      Estimated body mass index is 26.31 kg/m  as calculated from the following:    Height as " "of 2/8/22: 1.702 m (5' 7\").    Weight as of 2/8/22: 76.2 kg (168 lb).  GFR Estimate   Date Value Ref Range Status   01/14/2022 84 >60 mL/min/1.73m2 Final     Comment:     Effective December 21, 2021 eGFRcr in adults is calculated using the 2021 CKD-EPI creatinine equation which includes age and gender (Sita et al., NEJ, DOI: 10.1056/LHATeu2725113)   10/01/2020 56 (L) >60 mL/min/[1.73_m2] Final     Comment:     Non  GFR Calc  Starting 12/18/2018, serum creatinine based estimated GFR (eGFR) will be   calculated using the Chronic Kidney Disease Epidemiology Collaboration   (CKD-EPI) equation.       Lab Results   Component Value Date    [id number] Negative 02/05/2022       Return in about 1 week (around 8/15/2022) for recheck if not improving, regular primary provider.    Alycia Caldwell PA-C  [facility]    Kimberlee Lazaro is a 76 year old, presenting for the following health issues:  Covid treatment      HPI       COVID-19 Symptom Review  How many days ago did these symptoms start? 08/06    Are any of the following symptoms significant for you?    New or worsening difficulty breathing? No    Worsening cough? Yes, I am coughing up mucus.    Fever or chills? Yes, I  had chills.    Headache: YES    Sore throat: No    Chest pain: No    Diarrhea: YES    Body aches? No    What treatments has patient tried? Nothing   Does patient live in a nursing home, group home, or shelter? No  Does patient have a way to get food/medications during quarantined? Yes, I have a friend or family member who can help me.                  Review of Systems   Constitutional, HEENT, cardiovascular, pulmonary, gi and gu systems are negative, except as otherwise noted.      Objective           Vitals:  No vitals were obtained today due to virtual visit.    Physical Exam   healthy, alert and no distress  PSYCH: Alert and oriented times 3; coherent speech, normal   rate and volume, able to " articulate logical thoughts, able   to abstract reason, no tangential thoughts, no hallucinations   or delusions  His affect is normal  RESP: No cough, no audible wheezing, able to talk in full sentences  Remainder of exam unable to be completed due to telephone visits                Phone call duration: 9 minutes    .  ..

## 2022-08-08 NOTE — TELEPHONE ENCOUNTER
Hello,     I have a question on the patient's Paxlovid instructions.  It looks like the patient was told to stop warfarin during the course of therapy, though Paxlovid decreases serum concentrations of medications like warfarin in the blood, making it less effective.  If anything, providers have had patients slightly increase the dose during Paxlovid, though the duration of therapy is so short that the INR may be relatively unaffected.     Would you like to continue with your original instruction?  I appreciate your time!     Best,  Martin

## 2022-08-09 NOTE — TELEPHONE ENCOUNTER
Would not stop warfarin as initially recommended.  Would continue usual warfarin dose without adjustment, for the short duration of the Paxlovid course.

## 2022-08-09 NOTE — TELEPHONE ENCOUNTER
Patient was called with this message. Will forward doctor's message to Martin [facility]. Patient would like a call back from pharmacy and  Rx today. He said Martin is located at [facility] pharmacy.

## 2022-08-09 NOTE — TELEPHONE ENCOUNTER
Received a call from [facility] wondering about the patient's Warfarin. Will route message to patient's PCP as well for review.     Joselyn Moya RN

## 2022-08-26 ENCOUNTER — ANTICOAGULATION THERAPY VISIT (OUTPATIENT)
Dept: ANTICOAGULATION | Facility: CLINIC | Age: 77
End: 2022-08-26

## 2022-08-26 ENCOUNTER — LAB (OUTPATIENT)
Dept: LAB | Facility: CLINIC | Age: 77
End: 2022-08-26
Payer: MEDICARE

## 2022-08-26 DIAGNOSIS — I48.20 CHRONIC ATRIAL FIBRILLATION (H): ICD-10-CM

## 2022-08-26 DIAGNOSIS — Z79.01 LONG TERM CURRENT USE OF ANTICOAGULANT THERAPY: Primary | ICD-10-CM

## 2022-08-26 LAB — INR BLD: 3.6 (ref 0.9–1.1)

## 2022-08-26 PROCEDURE — 36416 COLLJ CAPILLARY BLOOD SPEC: CPT

## 2022-08-26 PROCEDURE — 85610 PROTHROMBIN TIME: CPT

## 2022-08-26 NOTE — PROGRESS NOTES
ANTICOAGULATION MANAGEMENT     Alex Stanley 76 year old male is on warfarin with supratherapeutic INR result. (Goal INR 2.0-3.0)    Recent labs: (last 7 days)     08/26/22  0823   INR 3.6*       ASSESSMENT       Source(s): Chart Review and Patient/Caregiver Call       Warfarin doses taken: Warfarin taken as instructed - took today's dose already (takes in the AM)    Diet: Decreased greens/vitamin K in diet; plans to resume previous intake    New illness, injury, or hospitalization: Yes: ongoing fatigue post-covid    Medication/supplement changes: None noted    Signs or symptoms of bleeding or clotting: No    Previous INR: Therapeutic last 2(+) visits    Additional findings: None       PLAN     Recommended plan for temporary change(s) and ongoing change(s) affecting INR     Dosing Instructions: partial hold then continue your current warfarin dose and increase greens x 2 days with next INR in 2 weeks       Summary  As of 8/26/2022    Full warfarin instructions:  8/27: 2.5 mg; Otherwise 2.5 mg every Mon, Wed, Fri; 5 mg all other days   Next INR check:  9/9/2022             Telephone call with Tejas who verbalizes understanding and agrees to plan    Lab visit scheduled    Education provided: Please call back if any changes to your diet, medications or how you've been taking warfarin, Monitoring for bleeding signs and symptoms, Monitoring for clotting signs and symptoms and Importance of notifying clinic for changes in medications; a sooner lab recheck maybe needed.    Plan made per [facility] anticoagulation protocol    Shahrzad Santizo RN  Anticoagulation Clinic  8/26/2022    _______________________________________________________________________     Anticoagulation Episode Summary     Current INR goal:  2.0-3.0   TTR:  78.6 % (11.3 mo)   Target end date:  Indefinite   Send INR reminders to:  [facility]    Indications    Long-term (current) use of anticoagulants [Z79.01] [Z79.01]  Chronic atrial  fibrillation/Flutter [I48.20]           Comments:  INR referral approved 12/9/2019 per Dr Odonnell. Telephone message 12/9/2019         Anticoagulation Care Providers     Provider Role Specialty Phone number    Martin Odonnell MD Referring Internal Medicine [phone]    Isamar Hernandez MD Referring Internal Medicine [phone]    Estuardo Manning MD Referring Internal Medicine [phone]

## 2022-08-31 ENCOUNTER — NURSE TRIAGE (OUTPATIENT)
Dept: NURSING | Facility: CLINIC | Age: 77
End: 2022-08-31

## 2022-08-31 ENCOUNTER — VIRTUAL VISIT (OUTPATIENT)
Dept: INTERNAL MEDICINE | Facility: CLINIC | Age: 77
End: 2022-08-31
Payer: MEDICARE

## 2022-08-31 DIAGNOSIS — R07.9 CHEST PAIN, UNSPECIFIED TYPE: Primary | ICD-10-CM

## 2022-08-31 PROCEDURE — 99441 PR PHYSICIAN TELEPHONE EVALUATION 5-10 MIN: CPT | Mod: 95 | Performed by: INTERNAL MEDICINE

## 2022-08-31 NOTE — TELEPHONE ENCOUNTER
Called and scheduled patient for a virtual visit with Dr. Manning today, 8/31/22, per recommendations.    Joselyn Moya RN

## 2022-08-31 NOTE — TELEPHONE ENCOUNTER
Provider Response to 2nd Level Triage Request    I have reviewed the RN documentation. My recommendation is:  Let's start with a virtual visit today - I have a virtual opening.

## 2022-08-31 NOTE — PROGRESS NOTES
Tejas is a 77 year old who is being evaluated via a billable telephone visit.      What phone number would you like to be contacted at? [phone]  How would you like to obtain your AVS? MyChart    Assessment & Plan     Chest pain, unspecified type  By description somewhat atypical, but will check stress echo to rule out clinically significant coronary disease  - Echocardiogram Exercise Stress; Future             Nicotine/Tobacco Cessation:  He reports that he has been smoking cigarettes. He has a 28.50 pack-year smoking history. He has never used smokeless tobacco.  Nicotine/Tobacco Cessation Plan:   Information offered: Patient not interested at this time      See Patient Instructions    Return in about 4 weeks (around 9/28/2022) for recheck, if symptoms fail to improve.    Estuardo Manning MD  [facility]   Tejas is a 77 year old, presenting for the following health issues:  Chest Pain      HPI     Chest Pain  Onset/Duration: Since having covid on 8/5/22  Description:   Location: Center of the chest   Character: Pressure   Radiation: none   Duration: 2-10 minutes   Intensity: moderate  Progression of Symptoms: same  Accompanying Signs & Symptoms:   Shortness of breath: No  Sweating: No  Nausea/vomiting: No  Lightheadedness: No  Palpitations: No  Fever/Chills: No  Cough: No           Heartburn: No  History:   Family history of heart disease: No  Tobacco use: YES- .5 PPD   Previous similar symptoms: no   Precipitating factors:   Worse with exertion: YES  Worse with deep breaths: No           Related to eating: No           Better with burping: No  Therapies tried and outcome: None       Reports well localized chest discomfort intermittently, seemed to start after recent COVID illness earlier this month.  Well localized to his lower sternum, but also described as an intermittent pressure sensation.  Feels somewhat more fatigued than usual.  Exertional capacity  reduced.      Review of Systems         Objective    Vitals - Patient Reported  Systolic (Patient Reported): 102  Diastolic (Patient Reported): 61  Weight (Patient Reported): 72.6 kg (160 lb)      Vitals:  No vitals were obtained today due to virtual visit.    Physical Exam   healthy, alert and no distress  PSYCH: Alert and oriented times 3; coherent speech, normal   rate and volume, able to articulate logical thoughts, able   to abstract reason, no tangential thoughts, no hallucinations   or delusions  His affect is normal  RESP: No cough, no audible wheezing, able to talk in full sentences  Remainder of exam unable to be completed due to telephone visits                Phone call duration: 6 minutes    .  ..

## 2022-08-31 NOTE — TELEPHONE ENCOUNTER
"Triage Call:    Is this a 2nd Level Triage? YES, LICENSED PRACTITIONER REVIEW IS REQUIRED    Situation: Pt calling to report intermittent chest \"pressure\" and evening time fatigue since having COVID-19.     Background: Pt had covid-19 on 8/5/2022 and completed his course of Paxlovid on 8/13/2022.     Pt has a hx of stroke several years ago when asked about clotting hx. He has a diagnosis of A-fib, HTN, Hyperlipidemia, and several other diagnosis.     Assessment: Pt reports the following sx that he would like to be seen by his provider for:   1) Intermittent chest \"pressure\" sensation. Pt reports that once in a while he gets \"pressure in the center of his chest\" that lasts approximately 2-10 minutes. When this happens he does not notice any change in his breathing, heart rate, or have any pain in his chest. It feels like \"pressure\" and an \"uncomfortable feeling\". Pt reports his blood pressure, oxygen saturations, heart rate have all been \"really good\"    2) Fatigue: Pt reports that he feels energized in the morning, but is needing to go to bed earlier than usual in the evening since having COVID-19. Pt states he is hydrated.     Protocol Recommended Disposition:   See in Office Within 3 Days, See in Office Today    Recommendation: Pt was given the care advice. Writer is routing this message to patient's PCP and care team to advise patient if he should be seen in clinic vs UCC vs ED vs ADS    Routed to provider    Does the patient meet one of the following criteria for ADS visit consideration? 16+ years old, with an [facility] PCP     TIP  Providers, please consider if this condition is appropriate for management at one of our Acute and Diagnostic Services sites.     If patient is a good candidate, please use dotphrase <dot>triageresponse and select Refer to ADS to document.    Brittany Pacheco RN  [facility] Nurse Advisor 8:51 AM 8/31/2022      Reason for Disposition    Patient wants to be seen    Fatigue (i.e., tires " easily, decreased energy) and persists > 1 week    Patient wants to be seen    Additional Information    Negative: SEVERE difficulty breathing (e.g., struggling for each breath, speaks in single words)    Negative: Shock suspected (e.g., cold/pale/clammy skin, too weak to stand, low BP, rapid pulse)    Negative: Difficult to awaken or acting confused (e.g., disoriented, slurred speech)    Negative: Fainted > 15 minutes ago and still feels too weak or dizzy to stand    Negative: SEVERE weakness (i.e., unable to walk or barely able to walk, requires support) and new-onset or worsening    Negative: Sounds like a life-threatening emergency to the triager    Negative: Weakness of the face, arm or leg on one side of the body    Negative: Has diabetes mellitus and weakness from low blood sugar (i.e., < 60 mg/dL or 3.5 mmol/L)    Negative: Recent heat exposure, suspected cause of weakness    Negative: Vomiting is main symptom    Negative: Diarrhea is main symptom    Negative: Difficulty breathing    Negative: Heart beating < 50 beats per minute OR > 140 beats per minute    Negative: Extra heartbeats OR irregular heart beating (i.e., 'palpitations')    Negative: Follows bleeding (e.g., from vomiting, rectum, vagina)  (Exception: Small transient weakness from sight of a small amount blood.)    Negative: Bloody, black, or tarry bowel movements  (Exception: Chronic-unchanged black-grey bowel movements and is taking iron pills or Pepto-Bismol.)    Negative: MODERATE weakness from poor fluid intake with no improvement after 2 hours of rest and fluids    Negative: Drinking very little and dehydration suspected (e.g., no urine > 12 hours, very dry mouth, very lightheaded)    Negative: Patient sounds very sick or weak to the triager    Negative: MODERATE weakness (i.e., interferes with work, school, normal activities) and cause unknown  (Exceptions: Weakness with acute minor illness, or weakness from poor fluid intake.)    Negative:  Fever > 103 F  (39.4 C) and not able to get the Fever down using CARE ADVICE    Negative: Fever > 100.0 F (37.8 C) and bedridden (e.g., nursing home patient, stroke, chronic illness, recovering from surgery)    Negative: Fever > 101 F (38.3 C) and over 60 years of age    Negative: Fever > 100.0 F (37.8 C) and diabetes mellitus or weak immune system (e.g., HIV positive, cancer chemo, splenectomy, organ transplant, chronic steroids)    Negative: Pale skin (pallor)    Negative: MODERATE weakness (i.e., interferes with work, school, normal activities) and persists > 3 days    Negative: Taking a medicine that could cause weakness (e.g., blood pressure medications, diuretics)    Negative: MILD weakness (i.e., does not interfere with ability to work, go to school, normal activities) and persists > 1 week    Negative: SEVERE difficulty breathing (e.g., struggling for each breath, speaks in single words)    Negative: Passed out (i.e., fainted, collapsed and was not responding)    Negative: Difficult to awaken or acting confused (e.g., disoriented, slurred speech)    Negative: Shock suspected (e.g., cold/pale/clammy skin, too weak to stand, low BP, rapid pulse)    Negative: Chest pain lasting longer than 5 minutes and ANY of the following:* Over 44 years old* Over 30 years old and at least one cardiac risk factor (e.g., diabetes mellitus, high blood pressure, high cholesterol, smoker, or strong family history of heart disease)* History of heart disease (i.e., angina, heart attack, heart failure, bypass surgery, takes nitroglycerin)* Pain is crushing, pressure-like, or heavy    Negative: Heart beating < 50 beats per minute OR > 140 beats per minute    Negative: Visible sweat on face or sweat dripping down face    Negative: Sounds like a life-threatening emergency to the triager    Negative: Followed an injury to chest    Negative: SEVERE chest pain    Negative: Pain also in shoulder(s) or arm(s) or jaw    Negative: Difficulty  breathing    Negative: Heart beating irregularly or very rapidly    Negative: Cocaine use within last 3 days    Negative: Major surgery in the past month    Negative: Hip or leg fracture (broken bone) in past month (or had cast on leg or ankle in past month)    Negative: Illness requiring prolonged bedrest in past month (e.g., immobilization, long hospital stay)    Negative: Long-distance travel in past month (e.g., car, bus, train, plane; with trip lasting 6 or more hours)    Negative: History of prior 'blood clot' in leg or lungs (i.e., deep vein thrombosis, pulmonary embolism)    Negative: History of inherited increased risk of blood clots (e.g., Factor 5 Leiden, Anti-thrombin 3, Protein C or Protein S deficiency, Prothrombin mutation)    Negative: Cancer treatment in the past two months (or has cancer now)    Negative: Chest pain lasting longer than 5 minutes and occurred in last 3 days (72 hours) (Exception: feels exactly the same as previously diagnosed heartburn and has accompanying sour taste in mouth)    Negative: Chest pain or 'angina' comes and goes and is happening more often (increasing in frequency) or getting worse (increasing in severity) (Exception: chest pains that last only a few seconds)    Negative: Dizziness or lightheadedness    Negative: Coughing up blood    Negative: Patient sounds very sick or weak to the triager    Negative: Patient says chest pain feels exactly the same as previously diagnosed 'heartburn' and describes burning in chest and accompanying sour taste in mouth    Negative: Fever > 100.4 F (38.0 C)    Negative: All other patients with chest pain (Exception: fleeting chest pain lasting a few seconds)    Negative: Rash in same area as pain (may be described as 'small blisters')    Negative: Chest pain(s) lasting a few seconds persists > 3 days    Protocols used: WEAKNESS (GENERALIZED) AND FATIGUE-A-OH, CHEST PAIN-A-OH

## 2022-09-08 NOTE — TELEPHONE ENCOUNTER
----- Message from Chantel Salazar PA-C sent at 4/12/2021  8:02 AM CDT -----  A. Skin, sternum, shave:   - Seborrheic keratosis, inflamed - (see description)     B. Skin, occipital scalp, shave:   - Seborrheic keratosis, clonal type     Both benign genetic lesions. This is a benign lesion that does not need any additional treatment.     
Called and spoke to patient.    Educated patient on biopsy results- SK (x2), benign genetic lesions.    No further treatment needed.    Patient voiced understanding.    RAHUL Stern-BSN-N  [facility]  [phone]  
Detail Level: Detailed
Add 85444 Cpt? (Important Note: In 2017 The Use Of 26954 Is Being Tracked By Cms To Determine Future Global Period Reimbursement For Global Periods): yes

## 2022-09-09 ENCOUNTER — LAB (OUTPATIENT)
Dept: LAB | Facility: CLINIC | Age: 77
End: 2022-09-09
Payer: MEDICARE

## 2022-09-09 ENCOUNTER — ANTICOAGULATION THERAPY VISIT (OUTPATIENT)
Dept: ANTICOAGULATION | Facility: CLINIC | Age: 77
End: 2022-09-09

## 2022-09-09 DIAGNOSIS — E08.9 DIABETES MELLITUS RELATED TO CYSTIC FIBROSIS (H): Primary | ICD-10-CM

## 2022-09-09 DIAGNOSIS — Z79.01 LONG TERM CURRENT USE OF ANTICOAGULANT THERAPY: Primary | ICD-10-CM

## 2022-09-09 DIAGNOSIS — E84.8 DIABETES MELLITUS RELATED TO CYSTIC FIBROSIS (H): Primary | ICD-10-CM

## 2022-09-09 DIAGNOSIS — I48.20 CHRONIC ATRIAL FIBRILLATION (H): ICD-10-CM

## 2022-09-09 LAB — INR BLD: 3.3 (ref 0.9–1.1)

## 2022-09-09 PROCEDURE — 85610 PROTHROMBIN TIME: CPT

## 2022-09-09 PROCEDURE — 36416 COLLJ CAPILLARY BLOOD SPEC: CPT

## 2022-09-09 NOTE — PROGRESS NOTES
ANTICOAGULATION MANAGEMENT     Alex Stanley 77 year old male is on warfarin with supratherapeutic INR result. (Goal INR 2.0-3.0)    Recent labs: (last 7 days)     09/09/22  0918   INR 3.3*       ASSESSMENT       Source(s): Chart Review and Patient/Caregiver Call       Warfarin doses taken: Warfarin taken as instructed    Diet: No new diet changes identified    New illness, injury, or hospitalization: No- still  fatigued post covid    Medication/supplement changes: None noted    Signs or symptoms of bleeding or clotting: No    Previous INR: Supratherapeutic    Additional findings: None       PLAN     Recommended plan for no diet, medication or health factor changes affecting INR     Dosing Instructions: decrease your warfarin dose (9% change) with next INR in 2 weeks       Summary  As of 9/9/2022    Full warfarin instructions:  5 mg every Sun, Tue, Thu; 2.5 mg all other days   Next INR check:  9/23/2022             Telephone call with Tejas who verbalizes understanding and agrees to plan    Lab visit scheduled    Education provided: Goal range and significance of current result, Importance of therapeutic range and Importance of following up at instructed interval    Plan made per [facility] anticoagulation protocol    Chayito Castro RN  Anticoagulation Clinic  9/9/2022    _______________________________________________________________________     Anticoagulation Episode Summary     Current INR goal:  2.0-3.0   TTR:  75.5 % (11.3 mo)   Target end date:  Indefinite   Send INR reminders to:  [facility]    Indications    Long-term (current) use of anticoagulants [Z79.01] [Z79.01]  Chronic atrial fibrillation/Flutter [I48.20]           Comments:  INR referral approved 12/9/2019 per Dr Odonnell. Telephone message 12/9/2019         Anticoagulation Care Providers     Provider Role Specialty Phone number    Martin Odonnell MD Referring Internal Medicine [phone]    Isamar Hernandez MD Referring Internal  Medicine [phone]    Estuardo Manning MD Referring Internal Medicine [phone]

## 2022-09-23 ENCOUNTER — DOCUMENTATION ONLY (OUTPATIENT)
Dept: ANTICOAGULATION | Facility: CLINIC | Age: 77
End: 2022-09-23

## 2022-09-23 ENCOUNTER — ANTICOAGULATION THERAPY VISIT (OUTPATIENT)
Dept: ANTICOAGULATION | Facility: CLINIC | Age: 77
End: 2022-09-23

## 2022-09-23 ENCOUNTER — LAB (OUTPATIENT)
Dept: LAB | Facility: CLINIC | Age: 77
End: 2022-09-23
Payer: MEDICARE

## 2022-09-23 DIAGNOSIS — Z79.01 LONG TERM CURRENT USE OF ANTICOAGULANT THERAPY: Primary | ICD-10-CM

## 2022-09-23 DIAGNOSIS — I48.20 CHRONIC ATRIAL FIBRILLATION (H): ICD-10-CM

## 2022-09-23 LAB — INR BLD: 2.7 (ref 0.9–1.1)

## 2022-09-23 PROCEDURE — 85610 PROTHROMBIN TIME: CPT

## 2022-09-23 PROCEDURE — 36416 COLLJ CAPILLARY BLOOD SPEC: CPT

## 2022-09-23 NOTE — PROGRESS NOTES
ANTICOAGULATION MANAGEMENT     Alex Stanley 77 year old male is on warfarin with therapeutic INR result. (Goal INR 2.0-3.0)    Recent labs: (last 7 days)     09/23/22  0845   INR 2.7*       ASSESSMENT       Source(s): Chart Review and Patient/Caregiver Call       Warfarin doses taken: More warfarin taken than planned which may be affecting INR - patient misunderstood previous dosing plan & returned to old MD on 9/17/22    Diet: No new diet changes identified    New illness, injury, or hospitalization: No - feeling fully recovered from COVID - patient had lingering sx most of August - relates to elevated levels    Medication/supplement changes: None noted    Signs or symptoms of bleeding or clotting: No    Previous INR: Supratherapeutic    Additional findings: None       PLAN     Recommended plan for temporary change(s) affecting INR     Dosing Instructions: Continue your current warfarin dose (technically 10% increase from previous plan) with next INR in 2-3 weeks       Summary  As of 9/23/2022    Full warfarin instructions:  2.5 mg every Mon, Wed, Fri; 5 mg all other days   Next INR check:  10/21/2022             Telephone call with Tejas who verbalizes understanding and agrees to plan    Patient elected to schedule next visit 10/21/22 - due to day/time available    Education provided: Please call back if any changes to your diet, medications or how you've been taking warfarin, Monitoring for bleeding signs and symptoms, Monitoring for clotting signs and symptoms and Importance of notifying clinic for changes in medications; a sooner lab recheck maybe needed.    Plan made with [facility] Pharmacist Aliza Jay - dose adjustment outside of protocol    Shahrzad Santizo RN  Anticoagulation Clinic  9/23/2022    _______________________________________________________________________     Anticoagulation Episode Summary     Current INR goal:  2.0-3.0   TTR:  77.5 % (11.3 mo)   Target end date:  Indefinite   Send INR  reminders to:  [facility]    Indications    Long-term (current) use of anticoagulants [Z79.01] [Z79.01]  Chronic atrial fibrillation/Flutter [I48.20]           Comments:  INR referral approved 12/9/2019 per Dr Odonnell. Telephone message 12/9/2019         Anticoagulation Care Providers     Provider Role Specialty Phone number    Martin Odonnell MD Referring Internal Medicine [phone]    Isamar Hernandez MD Referring Internal Medicine [phone]    Estuardo Manning MD Referring Internal Medicine [phone]

## 2022-09-23 NOTE — PROGRESS NOTES
ANTICOAGULATION CLINIC REFERRAL RENEWAL REQUEST       An annual renewal order is required for all patients referred to [facility] Anticoagulation [facility].?  Please review and sign the pended referral order for Alex Stanley.       ANTICOAGULATION SUMMARY      Warfarin indication(s)   Atrial Fibrillation and Stroke    Mechanical heart valve present?  NO       Current goal range   INR: 2.0-3.0     Goal appropriate for indication? Goal INR 2-3, standard for indication(s) above     Time in Therapeutic Range (TTR)  (Goal > 60%) 77.5%       Office visit with referring provider's group within last year yes on 8/31/22       Shahrzad Santizo RN  [facility] Anticoagulation Clinic

## 2022-09-27 ENCOUNTER — TELEPHONE (OUTPATIENT)
Dept: INTERNAL MEDICINE | Facility: CLINIC | Age: 77
End: 2022-09-27

## 2022-09-27 NOTE — TELEPHONE ENCOUNTER
"Pt called the clinic stating-   The cardiologist didn't think the pt should do the stress test that was ordered because of his a-fib.The \"heart clinic\" canceled pts 9/21/22 appt due to this.     Cardiology clinic was going to send over the recommendations for PCP to sign. Routing to PCP to review and advise.      Can we leave a detailed message on this number? YES  Phone number patient can be reached at: Home number on file [phone] (home)    Aleta Mckeon RN  [facility] Triage        "
[Negative] : Heme/Lymph

## 2022-10-06 ENCOUNTER — NURSE TRIAGE (OUTPATIENT)
Dept: CARDIOLOGY | Facility: CLINIC | Age: 77
End: 2022-10-06

## 2022-10-06 ENCOUNTER — TELEPHONE (OUTPATIENT)
Dept: CARDIOLOGY | Facility: CLINIC | Age: 77
End: 2022-10-06

## 2022-10-06 NOTE — TELEPHONE ENCOUNTER
Received a message to call spouse about an appointment that was scheduled for tomorrow 10/7/22 with Dr. Dawson to establish care..  Called and spoke with Angela who says her  had Covid and ever since, feels like something is not right with his heart so his PCP ordered a stress test, but was told by a technologist that this test is not appropriate because of his A fib, and would need an alternate ordered. She says they have reached out to his PCP multiple times to get another test ordered, but have not heard back from anyone. She is wondering if he should just schedule the original stress test that was ordered. Advised to wait and discuss with Dr. Dawson during visit tomorrow, and any questions or concerns will be addressed at that time. She verbalized agreement and understanding. She says Tejas is not having any symptoms of the A fib such as palpitations, shortness of breath, dizziness, weakness, or feeling unwell. Blood pressures today were 100/70 HR 79 and 108/74 HR 88 @1045 on blood pressure monitor.   Advised to call back for worsening symptoms.

## 2022-10-06 NOTE — TELEPHONE ENCOUNTER
[facility]    Phone Message    May a detailed message be left on voicemail: yes     Reason for Call: Appointment Intake    Referring Provider Name: Self  Diagnosis and/or Symptoms: Afib    Pt's spouse called to schedule appointment with cardiology for afib, pt was scheduled for tomorrow 10/7/22 with Dr. Dawson. Please review, Thank you!  [facility]    Action Taken: Other: Cardiology    Travel Screening: Not Applicable

## 2022-10-07 ENCOUNTER — OFFICE VISIT (OUTPATIENT)
Dept: CARDIOLOGY | Facility: CLINIC | Age: 77
End: 2022-10-07
Payer: MEDICARE

## 2022-10-07 VITALS
OXYGEN SATURATION: 96 % | BODY MASS INDEX: 26.02 KG/M2 | SYSTOLIC BLOOD PRESSURE: 126 MMHG | HEIGHT: 67 IN | WEIGHT: 165.8 LBS | DIASTOLIC BLOOD PRESSURE: 70 MMHG | HEART RATE: 79 BPM

## 2022-10-07 DIAGNOSIS — I10 ESSENTIAL HYPERTENSION: ICD-10-CM

## 2022-10-07 DIAGNOSIS — I48.20 CHRONIC ATRIAL FIBRILLATION (H): ICD-10-CM

## 2022-10-07 DIAGNOSIS — R07.89 ATYPICAL CHEST PAIN: Primary | ICD-10-CM

## 2022-10-07 PROCEDURE — 99203 OFFICE O/P NEW LOW 30 MIN: CPT | Performed by: INTERNAL MEDICINE

## 2022-10-07 NOTE — PROGRESS NOTES
Service Date: 10/07/2022    OFFICE PROGRESS NOTE    REASON FOR CONSULTATION:  Chest pressure.    HISTORY OF PRESENT ILLNESS:  I had the pleasure of seeing Alex Stanley at the [facility] in [place] this afternoon.  He is a very pleasant, 77-year-old gentleman with chronic atrial fibrillation, hypertension and hyperlipidemia.  He was last seen in our clinic in 2010.  He has been stable from a cardiac point of view and manages his atrial fibrillation with rate control and anticoagulation strategy.    The patient contracted COVID in mid to late August of this year.  He took Paxlovid for 5 days.  He subsequently developed profound fatigue.  He later noticed chest tightness that was more noticeable at the end of the day.  The tightness was not always exertional in nature, but at times was noted when he was doing things around the house.  He saw his primary care doctor, who ordered a stress echocardiogram.  Given the patient's underlying atrial fibrillation, the stress test was canceled.    He does not have any echocardiogram in our system at least over the past 15 years.  His recent lipid profile was reviewed, and his cholesterol numbers look satisfactory.  His LDL is 39.  His blood pressure is well controlled with amlodipine.  He is on warfarin, and his INR is managed by his primary care.    ASSESSMENT AND PLAN:  A very pleasant, 77-year-old gentleman with atypical chest pain and fatigue.  Whether this is related to a recent COVID infection or perhaps a cardiac issue is unclear at this point.  We will obtain an echocardiogram to make sure he has not developed cardiomyopathy, perhaps related to the atrial fibrillation or the recent COVID infection.  We will also perform a nuclear stress test to rule out significant underlying ischemia given the aforementioned symptoms and risk factors.    I will contact him once we get the results of those studies.    I appreciate the opportunity to participate in his  care.    Lamont Dawson MD        D: 10/07/2022   T: 10/07/2022   MT: [email or web address]    Name:     SOL HENDRIXAyla  MRN:      [id number]        Account:      [id number]   :      1945           Service Date: 10/07/2022       Document: [id number]

## 2022-10-07 NOTE — PROGRESS NOTES
HPI and Plan:   See dictation    Today's clinic visit entailed:  45 minutes spent on the date of the encounter doing chart review, history and exam, documentation and further activities per the note  Provider  Link to MDM Help Grid     Orders Placed This Encounter   Procedures     Follow-Up with Cardiology MILI     Echocardiogram Complete       No orders of the defined types were placed in this encounter.      Medications Discontinued During This Encounter   Medication Reason     Multiple Vitamins-Minerals (PRESERVISION AREDS PO) Medication Reconciliation Clean Up         Encounter Diagnoses   Name Primary?     Essential hypertension      Chronic atrial fibrillation/Flutter      Atypical chest pain Yes       CURRENT MEDICATIONS:  Current Outpatient Medications   Medication Sig Dispense Refill     amLODIPine (NORVASC) 10 MG tablet Take 1 tablet (10 mg) by mouth daily 90 tablet 3     Multiple Vitamins-Minerals (PRESERVISION AREDS 2) CAPS Take 2 mg by mouth 2 times daily       pravastatin (PRAVACHOL) 20 MG tablet TAKE 1 TABLET DAILY 90 tablet 0     warfarin ANTICOAGULANT (COUMADIN) 5 MG tablet 2.5 mg Mon, Wed, Fri; 5 mg all other days or as directed by INR clinic 90 tablet 1     ASPIRIN NOT PRESCRIBED, INTENTIONAL, Antiplatelet medication not prescribed intentionally due to history  of gastrointestinal bleeding, and patient on warfarin. (Patient not taking: Reported on 10/7/2022) 0 each 0       ALLERGIES   No Known Allergies    PAST MEDICAL HISTORY:  Past Medical History:   Diagnosis Date     Alcohol abuse      Alcoholism (H)     admission 11/09     Atrial fibrillation, paroxysmal 11/07, 6/09, 11/09     Benign essential hypertension      Chronic atrial fibrillation (H)      Diverticulosis of colon 6/2/2011     Erosive gastritis 11/09     Gastritis      Hyperlipidemia LDL goal <100      Hypertension, with LVH      Iron deficiency anemia 11/09     Paroxysmal atrial fibrillation (H)      Stroke (H)      Tobacco abuse         PAST SURGICAL HISTORY:  Past Surgical History:   Procedure Laterality Date     COLONOSCOPY N/A 10/26/2020    Procedure: COLONOSCOPY, WITH POLYPECTOMY AND BIOPSY;  Surgeon: Shon Bond MD;  Location:  GI     ENDOSCOPIC ULTRASOUND UPPER GASTROINTESTINAL TRACT (GI) N/A 1/24/2022    Procedure: ENDOSCOPIC ULTRASOUND, ESOPHAGOSCOPY / UPPER GASTROINTESTINAL TRACT (GI);  Surgeon: Shon Bond MD;  Location:  GI     LAPAROSCOPIC CHOLECYSTECTOMY N/A 2/8/2022    Procedure: LAPAROSCOPIC CHOLECYSTECTOMY;  Surgeon: Mazin Bains MD;  Location:  OR     SURGICAL HISTORY OF -   4/11    R Dupuytren's contracture release   Dr. Scruggs     Z NONSPECIFIC PROCEDURE  2002    Rt foot reconstructive     Z NONSPECIFIC PROCEDURE  10/12    R hand procedure.  Dr. Deluna       FAMILY HISTORY:  Family History   Problem Relation Age of Onset     Lung Cancer Mother      Emphysema Father      Cancer Mother         small cell lung with mets to brain     Diabetes Maternal Grandmother      Respiratory Father         emphysema       SOCIAL HISTORY:  Social History     Socioeconomic History     Marital status:      Spouse name: None     Number of children: None     Years of education: None     Highest education level: None   Tobacco Use     Smoking status: Current Every Day Smoker     Packs/day: 0.50     Types: Cigarettes     Start date: 1965     Smokeless tobacco: Never Used     Tobacco comment: ppd since age 16 - now 1/2-1 ppd 04/05/21   Vaping Use     Vaping Use: Never used   Substance and Sexual Activity     Alcohol use: Not Currently     Comment: last drank at [facility] 8/2010.     Drug use: Not Currently     Sexual activity: Yes     Partners: Female   Social History Narrative    ** Merged History Encounter **            Review of Systems:  Skin:  not assessed       Eyes:  not assessed      ENT:  not assessed      Respiratory:  Negative       Cardiovascular:    Positive for;chest pain;fatigue;edema OCC DULL  "PAIN  IN CENTER OF CHEST SINCE HAVING covid AND TAKING pAXLOVID AND HAS OCC TIGHTNESS, OCC EDEMA AT SOCK LEVEL  Gastroenterology: not assessed      Genitourinary:  not assessed      Musculoskeletal:  not assessed      Neurologic:  not assessed      Psychiatric:  not assessed      Heme/Lymph/Imm:  not assessed      Endocrine:  not assessed        Physical Exam:  Vitals: /70   Pulse 79   Ht 1.702 m (5' 7\")   Wt 75.2 kg (165 lb 12.8 oz)   SpO2 96%   BMI 25.97 kg/m      Constitutional:  cooperative;in no acute distress        Skin:  warm and dry to the touch          Head:  normocephalic        Eyes:  conjunctivae and lids unremarkable        Lymph:      ENT:  no pallor or cyanosis        Neck:  JVP normal;no carotid bruit        Respiratory:  clear to auscultation         Cardiac: regular rhythm;no murmurs, gallops or rubs detected                pulses full and equal, no bruits auscultated                                        GI:  abdomen soft;non-tender        Extremities and Muscular Skeletal:  no edema              Neurological:  no gross motor deficits        Psych:  Alert and Oriented x 3        CC  Referred Self, MD  No address on file              "

## 2022-10-07 NOTE — LETTER
10/7/2022    Estuardo Manning MD  [address] [address]    RE: Alex Stanley       Dear Colleague,     I had the [facility].  HPI and Plan:   See dictation    Today's clinic visit entailed:  45 minutes spent on the date of the encounter doing chart review, history and exam, documentation and further activities per the note  Provider  Link to MDM Help Grid     Orders Placed This Encounter   Procedures     Follow-Up with Cardiology MILI     Echocardiogram Complete       No orders of the defined types were placed in this encounter.      Medications Discontinued During This Encounter   Medication Reason     Multiple Vitamins-Minerals (PRESERVISION AREDS PO) Medication Reconciliation Clean Up         Encounter Diagnoses   Name Primary?     Essential hypertension      Chronic atrial fibrillation/Flutter      Atypical chest pain Yes       CURRENT MEDICATIONS:  Current Outpatient Medications   Medication Sig Dispense Refill     amLODIPine (NORVASC) 10 MG tablet Take 1 tablet (10 mg) by mouth daily 90 tablet 3     Multiple Vitamins-Minerals (PRESERVISION AREDS 2) CAPS Take 2 mg by mouth 2 times daily       pravastatin (PRAVACHOL) 20 MG tablet TAKE 1 TABLET DAILY 90 tablet 0     warfarin ANTICOAGULANT (COUMADIN) 5 MG tablet 2.5 mg Mon, Wed, Fri; 5 mg all other days or as directed by INR clinic 90 tablet 1     ASPIRIN NOT PRESCRIBED, INTENTIONAL, Antiplatelet medication not prescribed intentionally due to history  of gastrointestinal bleeding, and patient on warfarin. (Patient not taking: Reported on 10/7/2022) 0 each 0       ALLERGIES   No Known Allergies    PAST MEDICAL HISTORY:  Past Medical History:   Diagnosis Date     Alcohol abuse      Alcoholism (H)     admission 11/09     Atrial fibrillation, paroxysmal 11/07, 6/09, 11/09     Benign essential hypertension      Chronic atrial fibrillation (H)      Diverticulosis of colon 6/2/2011      Erosive gastritis 11/09     Gastritis      Hyperlipidemia LDL goal <100      Hypertension, with LVH      Iron deficiency anemia 11/09     Paroxysmal atrial fibrillation (H)      Stroke (H)      Tobacco abuse        PAST SURGICAL HISTORY:  Past Surgical History:   Procedure Laterality Date     COLONOSCOPY N/A 10/26/2020    Procedure: COLONOSCOPY, WITH POLYPECTOMY AND BIOPSY;  Surgeon: Shon Bond MD;  Location:  GI     ENDOSCOPIC ULTRASOUND UPPER GASTROINTESTINAL TRACT (GI) N/A 1/24/2022    Procedure: ENDOSCOPIC ULTRASOUND, ESOPHAGOSCOPY / UPPER GASTROINTESTINAL TRACT (GI);  Surgeon: Shon Bond MD;  Location:  GI     LAPAROSCOPIC CHOLECYSTECTOMY N/A 2/8/2022    Procedure: LAPAROSCOPIC CHOLECYSTECTOMY;  Surgeon: Mazin Bains MD;  Location:  OR     SURGICAL HISTORY OF -   4/11    R Dupuytren's contracture release   Dr. Scruggs     [facility] NONSPECIFIC PROCEDURE  2002    Rt foot reconstructive     [facility] NONSPECIFIC PROCEDURE  10/12    R hand procedure.  Dr. Deluna       FAMILY HISTORY:  Family History   Problem Relation Age of Onset     Lung Cancer Mother      Emphysema Father      Cancer Mother         small cell lung with mets to brain     Diabetes Maternal Grandmother      Respiratory Father         emphysema       SOCIAL HISTORY:  Social History     Socioeconomic History     Marital status:      Spouse name: None     Number of children: None     Years of education: None     Highest education level: None   Tobacco Use     Smoking status: Current Every Day Smoker     Packs/day: 0.50     Types: Cigarettes     Start date: 1965     Smokeless tobacco: Never Used     Tobacco comment: ppd since age 16 - now 1/2-1 ppd 04/05/21   Vaping Use     Vaping Use: Never used   Substance and Sexual Activity     Alcohol use: Not Currently     Comment: last drank at [facility] 8/2010.     Drug use: Not Currently     Sexual activity: Yes     Partners: Female   Social History Narrative    ** Merged History  "Encounter **            Review of Systems:  Skin:  not assessed       Eyes:  not assessed      ENT:  not assessed      Respiratory:  Negative       Cardiovascular:    Positive for;chest pain;fatigue;edema OCC DULL PAIN  IN CENTER OF CHEST SINCE HAVING covid AND TAKING pAXLOVID AND HAS OCC TIGHTNESS, OCC EDEMA AT SOCK LEVEL  Gastroenterology: not assessed      Genitourinary:  not assessed      Musculoskeletal:  not assessed      Neurologic:  not assessed      Psychiatric:  not assessed      Heme/Lymph/Imm:  not assessed      Endocrine:  not assessed        Physical Exam:  Vitals: /70   Pulse 79   Ht 1.702 m (5' 7\")   Wt 75.2 kg (165 lb 12.8 oz)   SpO2 96%   BMI 25.97 kg/m      Constitutional:  cooperative;in no acute distress        Skin:  warm and dry to the touch          Head:  normocephalic        Eyes:  conjunctivae and lids unremarkable        Lymph:      ENT:  no pallor or cyanosis        Neck:  JVP normal;no carotid bruit        Respiratory:  clear to auscultation         Cardiac: regular rhythm;no murmurs, gallops or rubs detected                pulses full and equal, no bruits auscultated                                        GI:  abdomen soft;non-tender        Extremities and Muscular Skeletal:  no edema              Neurological:  no gross motor deficits        Psych:  Alert and Oriented x 3        CC  Referred Self,     Thank you for allowing me to participate in the care of your patient.      Sincerely,     Lamont Dawson MD, MD     [facility]  "

## 2022-10-20 ENCOUNTER — HOSPITAL ENCOUNTER (OUTPATIENT)
Dept: CARDIOLOGY | Facility: CLINIC | Age: 77
Discharge: HOME | End: 2022-10-20
Attending: INTERNAL MEDICINE
Payer: MEDICARE

## 2022-10-20 ENCOUNTER — HOSPITAL ENCOUNTER (OUTPATIENT)
Dept: CARDIOLOGY | Facility: CLINIC | Age: 77
Discharge: HOME | End: 2022-10-20
Attending: INTERNAL MEDICINE | Admitting: INTERNAL MEDICINE
Payer: MEDICARE

## 2022-10-20 ENCOUNTER — TELEPHONE (OUTPATIENT)
Dept: CARDIOLOGY | Facility: CLINIC | Age: 77
End: 2022-10-20

## 2022-10-20 VITALS
OXYGEN SATURATION: 98 % | BODY MASS INDEX: 26.15 KG/M2 | HEIGHT: 67 IN | HEART RATE: 60 BPM | DIASTOLIC BLOOD PRESSURE: 76 MMHG | SYSTOLIC BLOOD PRESSURE: 122 MMHG | WEIGHT: 166.6 LBS

## 2022-10-20 DIAGNOSIS — R07.89 ATYPICAL CHEST PAIN: Primary | ICD-10-CM

## 2022-10-20 DIAGNOSIS — R07.89 ATYPICAL CHEST PAIN: ICD-10-CM

## 2022-10-20 DIAGNOSIS — I48.20 CHRONIC ATRIAL FIBRILLATION (H): ICD-10-CM

## 2022-10-20 LAB
CV STRESS MAX HR HE: 101
LVEF ECHO: NORMAL
NUC STRESS EJECTION FRACTION: 58 %
RATE PRESSURE PRODUCT: NORMAL
STRESS ECHO BASELINE DIASTOLIC HE: 76
STRESS ECHO BASELINE HR: 60 BPM
STRESS ECHO BASELINE SYSTOLIC BP: 122
STRESS ECHO CALCULATED PERCENT HR: 70 %
STRESS ECHO LAST STRESS DIASTOLIC BP: 68
STRESS ECHO LAST STRESS SYSTOLIC BP: 110
STRESS ECHO TARGET HR: 145

## 2022-10-20 PROCEDURE — G1010 CDSM STANSON: HCPCS

## 2022-10-20 PROCEDURE — A9502 TC99M TETROFOSMIN: HCPCS | Performed by: INTERNAL MEDICINE

## 2022-10-20 PROCEDURE — 78452 HT MUSCLE IMAGE SPECT MULT: CPT | Mod: 26 | Performed by: INTERNAL MEDICINE

## 2022-10-20 PROCEDURE — 93018 CV STRESS TEST I&R ONLY: CPT | Performed by: INTERNAL MEDICINE

## 2022-10-20 PROCEDURE — 93306 TTE W/DOPPLER COMPLETE: CPT | Mod: 26 | Performed by: INTERNAL MEDICINE

## 2022-10-20 PROCEDURE — G1010 CDSM STANSON: HCPCS | Performed by: INTERNAL MEDICINE

## 2022-10-20 PROCEDURE — 93016 CV STRESS TEST SUPVJ ONLY: CPT | Performed by: INTERNAL MEDICINE

## 2022-10-20 PROCEDURE — 343N000001 HC RX 343: Performed by: INTERNAL MEDICINE

## 2022-10-20 PROCEDURE — 93306 TTE W/DOPPLER COMPLETE: CPT

## 2022-10-20 PROCEDURE — 250N000011 HC RX IP 250 OP 636: Performed by: INTERNAL MEDICINE

## 2022-10-20 RX ORDER — CAFFEINE CITRATE 20 MG/ML
60 SOLUTION INTRAVENOUS
Status: DISCONTINUED | OUTPATIENT
Start: 2022-10-20 | End: 2022-10-21 | Stop reason: HOSPADM

## 2022-10-20 RX ORDER — AMINOPHYLLINE 25 MG/ML
50-100 INJECTION, SOLUTION INTRAVENOUS
Status: DISCONTINUED | OUTPATIENT
Start: 2022-10-20 | End: 2022-10-21 | Stop reason: HOSPADM

## 2022-10-20 RX ORDER — ALBUTEROL SULFATE 90 UG/1
2 AEROSOL, METERED RESPIRATORY (INHALATION) EVERY 5 MIN PRN
Status: DISCONTINUED | OUTPATIENT
Start: 2022-10-20 | End: 2022-10-21 | Stop reason: HOSPADM

## 2022-10-20 RX ORDER — REGADENOSON 0.08 MG/ML
0.4 INJECTION, SOLUTION INTRAVENOUS ONCE
Status: COMPLETED | OUTPATIENT
Start: 2022-10-20 | End: 2022-10-20

## 2022-10-20 RX ORDER — ACYCLOVIR 200 MG/1
0-1 CAPSULE ORAL
Status: DISCONTINUED | OUTPATIENT
Start: 2022-10-20 | End: 2022-10-21 | Stop reason: HOSPADM

## 2022-10-20 RX ADMIN — TETROFOSMIN 9.03 MCI.: 1.38 INJECTION, POWDER, LYOPHILIZED, FOR SOLUTION INTRAVENOUS at 11:06

## 2022-10-20 RX ADMIN — TETROFOSMIN 3.6 MCI.: 1.38 INJECTION, POWDER, LYOPHILIZED, FOR SOLUTION INTRAVENOUS at 09:15

## 2022-10-20 RX ADMIN — REGADENOSON 0.4 MG: 0.08 INJECTION, SOLUTION INTRAVENOUS at 11:03

## 2022-10-20 NOTE — TELEPHONE ENCOUNTER
"Echo and nuclear stress test done today.     Echo:      Left ventricular systolic function is low normal.  The visual ejection fraction is 50-55%.  The left atrium is moderate to severely dilated.  No hemodynamically significant valvular abnormalities on 2D or color flow  Imaging.    Nuclear stress test:        The nuclear stress test is abnormal.     There is a small area of mild ischemia in the apical segment(s) of the left ventricle.     Left ventricular function is normal.     The left ventricular ejection fraction at stress is 58%.     There is no prior study for comparison.    Pt seen by Dr. Dawson on 10/7/22, per note:     \"A very pleasant, 77-year-old gentleman with atypical chest pain and fatigue.  Whether this is related to a recent COVID infection or perhaps a cardiac issue is unclear at this point.  We will obtain an echocardiogram to make sure he has not developed cardiomyopathy, perhaps related to the atrial fibrillation or the recent COVID infection.  We will also perform a nuclear stress test to rule out significant underlying ischemia given the aforementioned symptoms and risk factors.\"    Routing to Dr. Dawson to review.   "

## 2022-10-21 ENCOUNTER — ANTICOAGULATION THERAPY VISIT (OUTPATIENT)
Dept: ANTICOAGULATION | Facility: CLINIC | Age: 77
End: 2022-10-21

## 2022-10-21 ENCOUNTER — LAB (OUTPATIENT)
Dept: LAB | Facility: CLINIC | Age: 77
End: 2022-10-21
Payer: MEDICARE

## 2022-10-21 DIAGNOSIS — Z79.01 LONG TERM CURRENT USE OF ANTICOAGULANT THERAPY: ICD-10-CM

## 2022-10-21 DIAGNOSIS — I48.20 CHRONIC ATRIAL FIBRILLATION (H): ICD-10-CM

## 2022-10-21 DIAGNOSIS — Z79.01 LONG TERM CURRENT USE OF ANTICOAGULANT THERAPY: Primary | ICD-10-CM

## 2022-10-21 LAB — INR BLD: 2.8 (ref 0.9–1.1)

## 2022-10-21 PROCEDURE — 85610 PROTHROMBIN TIME: CPT

## 2022-10-21 PROCEDURE — 36416 COLLJ CAPILLARY BLOOD SPEC: CPT

## 2022-10-21 RX ORDER — ISOSORBIDE MONONITRATE 30 MG/1
30 TABLET, EXTENDED RELEASE ORAL DAILY
Qty: 30 TABLET | Refills: 1 | Status: SHIPPED | OUTPATIENT
Start: 2022-10-21 | End: 2022-11-15

## 2022-10-21 NOTE — TELEPHONE ENCOUNTER
Lamont Dawson MD Hair, Ashley W, RN; Ann Edwards RN  Caller: Unspecified (Yesterday,  3:57 PM)  I reviewed the stress results. There is a mild apical ischemia. Recommend starting Imdur 30 mg daily and follow up with MILI in 1 month. If still symptomatic at that point will consider stress test.

## 2022-10-21 NOTE — PROGRESS NOTES
ANTICOAGULATION MANAGEMENT     Alex Stanley 77 year old male is on warfarin with therapeutic INR result. (Goal INR 2.0-3.0)    Recent labs: (last 7 days)     10/21/22  0815   INR 2.8*       ASSESSMENT       Source(s): Chart Review and Patient/Caregiver Call       Warfarin doses taken: Warfarin taken as instructed    Diet: No new diet changes identified    New illness, injury, or hospitalization: No    Medication/supplement changes: None noted    Signs or symptoms of bleeding or clotting: No    Previous INR: Therapeutic last visit; previously outside of goal range    Additional findings: None       PLAN     Recommended plan for no diet, medication or health factor changes affecting INR     Dosing Instructions: Continue your current warfarin dose with next INR in 4 weeks       Summary  As of 10/21/2022    Full warfarin instructions:  2.5 mg every Mon, Wed, Fri; 5 mg all other days; Starting 10/21/2022   Next INR check:  11/18/2022             Telephone call with Tejas who verbalizes understanding and agrees to plan    Lab visit scheduled    Education provided:     Please call back if any changes to your diet, medications or how you've been taking warfarin    Plan made per [facility] anticoagulation protocol    Tara Cavazos RN  Anticoagulation Clinic  10/21/2022    _______________________________________________________________________     Anticoagulation Episode Summary     Current INR goal:  2.0-3.0   TTR:  85.8 % (11.3 mo)   Target end date:  Indefinite   Send INR reminders to:  [facility]    Indications    Long-term (current) use of anticoagulants [Z79.01] [Z79.01]  Chronic atrial fibrillation/Flutter [I48.20]           Comments:  INR referral approved 12/9/2019 per Dr Odonnell. Telephone message 12/9/2019         Anticoagulation Care Providers     Provider Role Specialty Phone number    Martin Odonnell MD Referring Internal Medicine [phone]    Isamar Hernandez MD Referring Internal  Medicine [phone]    Estuardo Manning MD Referring Internal Medicine [phone]

## 2022-10-21 NOTE — TELEPHONE ENCOUNTER
Left detailed voicemail for pt to call back and discuss Dr. Dawson's recommendations.   Left team 1's call back number.

## 2022-10-21 NOTE — TELEPHONE ENCOUNTER
Spoke with pt about Dr. Dawson's recommendations regarding pt's echo and stress test.   Pt is OK with starting Imdur 30mg daily, informed pt about side effect of possible headache.   Scheduled pt to see Dr. Dawson on 11/15/22.  Provided pt with Team 1's call back number in case pt has any questions or concerns.   Pt gave verbal understanding.

## 2022-10-22 ENCOUNTER — HEALTH MAINTENANCE LETTER (OUTPATIENT)
Age: 77
End: 2022-10-22

## 2022-10-25 DIAGNOSIS — I10 ESSENTIAL HYPERTENSION: ICD-10-CM

## 2022-10-25 DIAGNOSIS — I48.20 CHRONIC ATRIAL FIBRILLATION (H): ICD-10-CM

## 2022-10-26 RX ORDER — AMLODIPINE BESYLATE 10 MG/1
TABLET ORAL
Qty: 90 TABLET | Refills: 1 | Status: SHIPPED | OUTPATIENT
Start: 2022-10-26 | End: 2023-04-06

## 2022-10-26 RX ORDER — WARFARIN SODIUM 5 MG/1
TABLET ORAL
Qty: 90 TABLET | Refills: 1 | Status: SHIPPED | OUTPATIENT
Start: 2022-10-26 | End: 2023-03-03 | Stop reason: ALTCHOICE

## 2022-10-26 NOTE — TELEPHONE ENCOUNTER
ANTICOAGULATION MANAGEMENT:  Medication Refill    Anticoagulation Summary  As of 10/21/2022    Warfarin maintenance plan:  2.5 mg (5 mg x 0.5) every Mon, Wed, Fri; 5 mg (5 mg x 1) all other days; Starting 10/21/2022   Next INR check:  11/18/2022   Target end date:  Indefinite    Indications    Long-term (current) use of anticoagulants [Z79.01] [Z79.01]  Chronic atrial fibrillation/Flutter [I48.20]             Anticoagulation Care Providers     Provider Role Specialty Phone number    Martin Odonnell MD Referring Internal Medicine [phone]    Isamar Hernandez MD Referring Internal Medicine [phone]    Estuardo Manning MD Referring Internal Medicine [phone]          Visit with referring provider/group within last year: Yes    [facility] referral signed within last year: Yes    Alex meets all criteria for refill (current [facility] referral, office visit with referring provider/group in last year, lab monitoring up to date or not exceeding 2 weeks overdue). Rx instructions and quantity supplied updated to match patient's current dosing plan. Warfarin 90 day supply with 1 refill granted per [facility] protocol     Adriana Larsen RN  Anticoagulation Clinic

## 2022-11-04 DIAGNOSIS — E78.5 HYPERLIPIDEMIA LDL GOAL <100: ICD-10-CM

## 2022-11-04 RX ORDER — PRAVASTATIN SODIUM 20 MG
20 TABLET ORAL DAILY
Qty: 90 TABLET | Refills: 3 | Status: SHIPPED | OUTPATIENT
Start: 2022-11-04 | End: 2023-10-02

## 2022-11-15 ENCOUNTER — OFFICE VISIT (OUTPATIENT)
Dept: CARDIOLOGY | Facility: CLINIC | Age: 77
End: 2022-11-15
Payer: MEDICARE

## 2022-11-15 VITALS
DIASTOLIC BLOOD PRESSURE: 70 MMHG | HEIGHT: 67 IN | SYSTOLIC BLOOD PRESSURE: 116 MMHG | BODY MASS INDEX: 26.84 KG/M2 | OXYGEN SATURATION: 96 % | HEART RATE: 94 BPM | WEIGHT: 171 LBS

## 2022-11-15 DIAGNOSIS — R07.89 ATYPICAL CHEST PAIN: Primary | ICD-10-CM

## 2022-11-15 DIAGNOSIS — I10 ESSENTIAL HYPERTENSION: ICD-10-CM

## 2022-11-15 DIAGNOSIS — E78.5 HYPERLIPIDEMIA LDL GOAL <100: ICD-10-CM

## 2022-11-15 DIAGNOSIS — I48.20 CHRONIC ATRIAL FIBRILLATION (H): ICD-10-CM

## 2022-11-15 PROCEDURE — 99214 OFFICE O/P EST MOD 30 MIN: CPT | Performed by: INTERNAL MEDICINE

## 2022-11-15 RX ORDER — ISOSORBIDE MONONITRATE 30 MG/1
30 TABLET, EXTENDED RELEASE ORAL DAILY
Qty: 90 TABLET | Refills: 3 | Status: SHIPPED | OUTPATIENT
Start: 2022-11-15 | End: 2023-10-02

## 2022-11-15 RX ORDER — ISOSORBIDE MONONITRATE 30 MG/1
30 TABLET, EXTENDED RELEASE ORAL DAILY
Qty: 90 TABLET | Refills: 3 | Status: SHIPPED | OUTPATIENT
Start: 2022-11-15 | End: 2022-11-15

## 2022-11-15 NOTE — LETTER
11/15/2022    Estuardo Manning MD  [address] [place] [id number]    RE: Alex Stanley       Dear Colleague,     I had the pleasure of seeing Alex Stanley in the [facility].  HPI and Plan:   The patient is a very pleasant 77-year-old gentleman with chronic atrial fibrillation, hypertension hyperlipidemia who was evaluated last month for fatigue and atypical chest pain.  Please see my note dated October 7, 2022 for details regarding his symptoms.    I ordered several studies including echocardiogram as well as nuclear stress test to assess the etiology of his symptoms.  He is here to discuss results of those tests.    His echocardiogram revealed borderline LV systolic function with an EF 50 to 55% and no significant valvular heart disease.  The nuclear stress test revealed small area of mild ischemia in the apical segment.  Ejection fraction at stress was 58%.    Following the stress test findings, we initiated him on low-dose Imdur.  Since then his symptoms have completely resolved.  He no longer endorses chest discomfort or fatigue.  He is tolerating the Imdur quite well.      IMPRESSION/PLAN:  I reviewed the echo and stress test findings with the patient.  His stress test shows mild apical ischemia.  His symptoms resolved with isosorbide mononitrate therefore I would recommend continuing current medical regimen.  If he has recurrent symptoms while on Imdur he will let us know and we will consider angiography.  We will see him in 6 months for follow-up but sooner if he develops recurrent symptoms.    Lamont Dawson MD    Today's clinic visit entailed:  30 minutes spent on the date of the encounter doing chart review, history and exam, documentation and further activities per the note  Provider  Link to [facility] Help Grid         Orders Placed This Encounter   Procedures     Follow-Up with Cardiology MILI       Orders Placed This Encounter   Medications     DISCONTD: isosorbide  mononitrate (IMDUR) 30 MG 24 hr tablet     Sig: Take 1 tablet (30 mg) by mouth daily     Dispense:  90 tablet     Refill:  3     isosorbide mononitrate (IMDUR) 30 MG 24 hr tablet     Sig: Take 1 tablet (30 mg) by mouth daily     Dispense:  90 tablet     Refill:  3       Medications Discontinued During This Encounter   Medication Reason     isosorbide mononitrate (IMDUR) 30 MG 24 hr tablet Reorder     isosorbide mononitrate (IMDUR) 30 MG 24 hr tablet Reorder         Encounter Diagnoses   Name Primary?     Atypical chest pain Yes     Essential hypertension      Chronic atrial fibrillation/Flutter      Hyperlipidemia LDL goal <100        CURRENT MEDICATIONS:  Current Outpatient Medications   Medication Sig Dispense Refill     amLODIPine (NORVASC) 10 MG tablet TAKE 1 TABLET DAILY 90 tablet 1     isosorbide mononitrate (IMDUR) 30 MG 24 hr tablet Take 1 tablet (30 mg) by mouth daily 90 tablet 3     Multiple Vitamins-Minerals (PRESERVISION AREDS 2) CAPS Take 2 mg by mouth 2 times daily       pravastatin (PRAVACHOL) 20 MG tablet Take 1 tablet (20 mg) by mouth daily 90 tablet 3     warfarin ANTICOAGULANT (COUMADIN) 5 MG tablet TAKE ONE-HALF (1/2) TABLET (2.5 MG) MONDAY, WEDNESDAY, FRIDAY AND 1 TABLET (5 MG) ALL OTHER DAYS OR AS DIRECTED BY INR CLINIC 90 tablet 1     ASPIRIN NOT PRESCRIBED, INTENTIONAL, Antiplatelet medication not prescribed intentionally due to history  of gastrointestinal bleeding, and patient on warfarin. (Patient not taking: Reported on 10/7/2022) 0 each 0       ALLERGIES   No Known Allergies    PAST MEDICAL HISTORY:  Past Medical History:   Diagnosis Date     Alcohol abuse      Alcoholism (H)     admission 11/09     Atrial fibrillation, paroxysmal 11/07, 6/09, 11/09     Benign essential hypertension      Chronic atrial fibrillation (H)      Diverticulosis of colon 6/2/2011     Erosive gastritis 11/09     Gastritis      Hyperlipidemia LDL goal <100      Hypertension, with LVH      Iron deficiency anemia  11/09     Paroxysmal atrial fibrillation (H)      Stroke (H)      Tobacco abuse        PAST SURGICAL HISTORY:  Past Surgical History:   Procedure Laterality Date     COLONOSCOPY N/A 10/26/2020    Procedure: COLONOSCOPY, WITH POLYPECTOMY AND BIOPSY;  Surgeon: Shon Bond MD;  Location:  GI     ENDOSCOPIC ULTRASOUND UPPER GASTROINTESTINAL TRACT (GI) N/A 1/24/2022    Procedure: ENDOSCOPIC ULTRASOUND, ESOPHAGOSCOPY / UPPER GASTROINTESTINAL TRACT (GI);  Surgeon: Shon Bond MD;  Location:  GI     LAPAROSCOPIC CHOLECYSTECTOMY N/A 2/8/2022    Procedure: LAPAROSCOPIC CHOLECYSTECTOMY;  Surgeon: Mazin Bains MD;  Location:  OR     SURGICAL HISTORY OF -   4/11    R Dupuytren's contracture release   Dr. Scruggs     [facility] NONSPECIFIC PROCEDURE  2002    Rt foot reconstructive     [facility] NONSPECIFIC PROCEDURE  10/12    R hand procedure.  Dr. Deluna       FAMILY HISTORY:  Family History   Problem Relation Age of Onset     Lung Cancer Mother      Emphysema Father      Cancer Mother         small cell lung with mets to brain     Diabetes Maternal Grandmother      Respiratory Father         emphysema       SOCIAL HISTORY:  Social History     Socioeconomic History     Marital status:      Spouse name: None     Number of children: None     Years of education: None     Highest education level: None   Tobacco Use     Smoking status: Every Day     Packs/day: 0.50     Types: Cigarettes     Start date: 1965     Smokeless tobacco: Never     Tobacco comments:     ppd since age 16 - now 1/2-1 ppd 04/05/21   Vaping Use     Vaping Use: Never used   Substance and Sexual Activity     Alcohol use: Not Currently     Comment: last drank at [facility] 8/2010.     Drug use: Not Currently     Sexual activity: Yes     Partners: Female   Social History Narrative    ** Merged History Encounter **            Review of Systems:  Skin:  not assessed       Eyes:  not assessed      ENT:  not assessed      Respiratory:  Negative      "  Cardiovascular:  Negative      Gastroenterology: not assessed      Genitourinary:  not assessed      Musculoskeletal:  not assessed      Neurologic:  not assessed      Psychiatric:  not assessed      Heme/Lymph/Imm:  not assessed      Endocrine:  not assessed        Physical Exam:  Vitals: /70   Pulse 94   Ht 1.702 m (5' 7\")   Wt 77.6 kg (171 lb)   SpO2 96%   BMI 26.78 kg/m      Constitutional:  cooperative;in no acute distress        Skin:  warm and dry to the touch          Head:  normocephalic        Eyes:  conjunctivae and lids unremarkable        Lymph:      ENT:  no pallor or cyanosis        Neck:  JVP normal;no carotid bruit        Respiratory:  clear to auscultation         Cardiac: regular rhythm;no murmurs, gallops or rubs detected                pulses full and equal, no bruits auscultated                                        GI:  abdomen soft;non-tender        Extremities and Muscular Skeletal:  no edema              Neurological:  no gross motor deficits        Psych:  Alert and Oriented x 3        CC  No referring provider defined for this encounter.    Thank you for allowing me to participate in the care of your patient.      Sincerely,     Lamont Dawson MD, MD     [facility]  "

## 2022-11-15 NOTE — PROGRESS NOTES
HPI and Plan:   The patient is a very pleasant 77-year-old gentleman with chronic atrial fibrillation, hypertension hyperlipidemia who was evaluated last month for fatigue and atypical chest pain.  Please see my note dated October 7, 2022 for details regarding his symptoms.    I ordered several studies including echocardiogram as well as nuclear stress test to assess the etiology of his symptoms.  He is here to discuss results of those tests.    His echocardiogram revealed borderline LV systolic function with an EF 50 to 55% and no significant valvular heart disease.  The nuclear stress test revealed small area of mild ischemia in the apical segment.  Ejection fraction at stress was 58%.    Following the stress test findings, we initiated him on low-dose Imdur.  Since then his symptoms have completely resolved.  He no longer endorses chest discomfort or fatigue.  He is tolerating the Imdur quite well.      IMPRESSION/PLAN:  I reviewed the echo and stress test findings with the patient.  His stress test shows mild apical ischemia.  His symptoms resolved with isosorbide mononitrate therefore I would recommend continuing current medical regimen.  If he has recurrent symptoms while on Imdur he will let us know and we will consider angiography.  We will see him in 6 months for follow-up but sooner if he develops recurrent symptoms.    Lamont Dawson MD    Today's clinic visit entailed:  30 minutes spent on the date of the encounter doing chart review, history and exam, documentation and further activities per the note  Provider  Link to [facility] Help Grid         Orders Placed This Encounter   Procedures     Follow-Up with Cardiology MILI       Orders Placed This Encounter   Medications     DISCONTD: isosorbide mononitrate (IMDUR) 30 MG 24 hr tablet     Sig: Take 1 tablet (30 mg) by mouth daily     Dispense:  90 tablet     Refill:  3     isosorbide mononitrate (IMDUR) 30 MG 24 hr tablet     Sig: Take 1 tablet (30 mg) by mouth  daily     Dispense:  90 tablet     Refill:  3       Medications Discontinued During This Encounter   Medication Reason     isosorbide mononitrate (IMDUR) 30 MG 24 hr tablet Reorder     isosorbide mononitrate (IMDUR) 30 MG 24 hr tablet Reorder         Encounter Diagnoses   Name Primary?     Atypical chest pain Yes     Essential hypertension      Chronic atrial fibrillation/Flutter      Hyperlipidemia LDL goal <100        CURRENT MEDICATIONS:  Current Outpatient Medications   Medication Sig Dispense Refill     amLODIPine (NORVASC) 10 MG tablet TAKE 1 TABLET DAILY 90 tablet 1     isosorbide mononitrate (IMDUR) 30 MG 24 hr tablet Take 1 tablet (30 mg) by mouth daily 90 tablet 3     Multiple Vitamins-Minerals (PRESERVISION AREDS 2) CAPS Take 2 mg by mouth 2 times daily       pravastatin (PRAVACHOL) 20 MG tablet Take 1 tablet (20 mg) by mouth daily 90 tablet 3     warfarin ANTICOAGULANT (COUMADIN) 5 MG tablet TAKE ONE-HALF (1/2) TABLET (2.5 MG) MONDAY, WEDNESDAY, FRIDAY AND 1 TABLET (5 MG) ALL OTHER DAYS OR AS DIRECTED BY INR CLINIC 90 tablet 1     ASPIRIN NOT PRESCRIBED, INTENTIONAL, Antiplatelet medication not prescribed intentionally due to history  of gastrointestinal bleeding, and patient on warfarin. (Patient not taking: Reported on 10/7/2022) 0 each 0       ALLERGIES   No Known Allergies    PAST MEDICAL HISTORY:  Past Medical History:   Diagnosis Date     Alcohol abuse      Alcoholism (H)     admission 11/09     Atrial fibrillation, paroxysmal 11/07, 6/09, 11/09     Benign essential hypertension      Chronic atrial fibrillation (H)      Diverticulosis of colon 6/2/2011     Erosive gastritis 11/09     Gastritis      Hyperlipidemia LDL goal <100      Hypertension, with LVH      Iron deficiency anemia 11/09     Paroxysmal atrial fibrillation (H)      Stroke (H)      Tobacco abuse        PAST SURGICAL HISTORY:  Past Surgical History:   Procedure Laterality Date     COLONOSCOPY N/A 10/26/2020    Procedure: COLONOSCOPY,  WITH POLYPECTOMY AND BIOPSY;  Surgeon: Shon Bond MD;  Location:  GI     ENDOSCOPIC ULTRASOUND UPPER GASTROINTESTINAL TRACT (GI) N/A 1/24/2022    Procedure: ENDOSCOPIC ULTRASOUND, ESOPHAGOSCOPY / UPPER GASTROINTESTINAL TRACT (GI);  Surgeon: Shon Bond MD;  Location:  GI     LAPAROSCOPIC CHOLECYSTECTOMY N/A 2/8/2022    Procedure: LAPAROSCOPIC CHOLECYSTECTOMY;  Surgeon: Mazin Bains MD;  Location:  OR     SURGICAL HISTORY OF -   4/11    R Dupuytren's contracture release   Dr. Scruggs     [facility] NONSPECIFIC PROCEDURE  2002    Rt foot reconstructive     [facility] NONSPECIFIC PROCEDURE  10/12    R hand procedure.  Dr. Deluna       FAMILY HISTORY:  Family History   Problem Relation Age of Onset     Lung Cancer Mother      Emphysema Father      Cancer Mother         small cell lung with mets to brain     Diabetes Maternal Grandmother      Respiratory Father         emphysema       SOCIAL HISTORY:  Social History     Socioeconomic History     Marital status:      Spouse name: None     Number of children: None     Years of education: None     Highest education level: None   Tobacco Use     Smoking status: Every Day     Packs/day: 0.50     Types: Cigarettes     Start date: 1965     Smokeless tobacco: Never     Tobacco comments:     ppd since age 16 - now 1/2-1 ppd 04/05/21   Vaping Use     Vaping Use: Never used   Substance and Sexual Activity     Alcohol use: Not Currently     Comment: last drank at [facility] 8/2010.     Drug use: Not Currently     Sexual activity: Yes     Partners: Female   Social History Narrative    ** Merged History Encounter **            Review of Systems:  Skin:  not assessed       Eyes:  not assessed      ENT:  not assessed      Respiratory:  Negative       Cardiovascular:  Negative      Gastroenterology: not assessed      Genitourinary:  not assessed      Musculoskeletal:  not assessed      Neurologic:  not assessed      Psychiatric:  not assessed      Heme/Lymph/Imm:   "not assessed      Endocrine:  not assessed        Physical Exam:  Vitals: /70   Pulse 94   Ht 1.702 m (5' 7\")   Wt 77.6 kg (171 lb)   SpO2 96%   BMI 26.78 kg/m      Constitutional:  cooperative;in no acute distress        Skin:  warm and dry to the touch          Head:  normocephalic        Eyes:  conjunctivae and lids unremarkable        Lymph:      ENT:  no pallor or cyanosis        Neck:  JVP normal;no carotid bruit        Respiratory:  clear to auscultation         Cardiac: regular rhythm;no murmurs, gallops or rubs detected                pulses full and equal, no bruits auscultated                                        GI:  abdomen soft;non-tender        Extremities and Muscular Skeletal:  no edema              Neurological:  no gross motor deficits        Psych:  Alert and Oriented x 3        CC  No referring provider defined for this encounter.              "

## 2022-11-18 ENCOUNTER — LAB (OUTPATIENT)
Dept: LAB | Facility: CLINIC | Age: 77
End: 2022-11-18
Payer: MEDICARE

## 2022-11-18 ENCOUNTER — ANTICOAGULATION THERAPY VISIT (OUTPATIENT)
Dept: ANTICOAGULATION | Facility: CLINIC | Age: 77
End: 2022-11-18

## 2022-11-18 DIAGNOSIS — Z79.01 LONG TERM CURRENT USE OF ANTICOAGULANT THERAPY: Primary | ICD-10-CM

## 2022-11-18 DIAGNOSIS — I48.20 CHRONIC ATRIAL FIBRILLATION (H): ICD-10-CM

## 2022-11-18 DIAGNOSIS — Z79.01 LONG TERM CURRENT USE OF ANTICOAGULANT THERAPY: ICD-10-CM

## 2022-11-18 LAB — INR BLD: 2.4 (ref 0.9–1.1)

## 2022-11-18 PROCEDURE — 85610 PROTHROMBIN TIME: CPT

## 2022-11-18 PROCEDURE — 36416 COLLJ CAPILLARY BLOOD SPEC: CPT

## 2022-11-18 NOTE — PROGRESS NOTES
ANTICOAGULATION MANAGEMENT     Alex Stanley 77 year old male is on warfarin with therapeutic INR result. (Goal INR 2.0-3.0)    Recent labs: (last 7 days)     11/18/22  0818   INR 2.4*       ASSESSMENT       Source(s): Chart Review and Patient/Caregiver Call       Warfarin doses taken: Warfarin taken as instructed    Diet: No new diet changes identified    New illness, injury, or hospitalization: No    Medication/supplement changes: None noted    Signs or symptoms of bleeding or clotting: No    Previous INR: Therapeutic last 2(+) visits    Additional findings: None       PLAN     Recommended plan for no diet, medication or health factor changes affecting INR     Dosing Instructions: Continue your current warfarin dose with next INR in 6 weeks       Summary  As of 11/18/2022    Full warfarin instructions:  2.5 mg every Mon, Wed, Fri; 5 mg all other days; Starting 11/18/2022   Next INR check:  12/16/2022             Telephone call with Tejas who verbalizes understanding and agrees to plan    Lab visit scheduled    Education provided:     Please call back if any changes to your diet, medications or how you've been taking warfarin    Plan made per [facility] anticoagulation protocol    Tara Cavazos RN  Anticoagulation Clinic  11/18/2022    _______________________________________________________________________     Anticoagulation Episode Summary     Current INR goal:  2.0-3.0   TTR:  86.1 % (11.3 mo)   Target end date:  Indefinite   Send INR reminders to:  [facility]    Indications    Long-term (current) use of anticoagulants [Z79.01] [Z79.01]  Chronic atrial fibrillation/Flutter [I48.20]           Comments:  INR referral approved 12/9/2019 per Dr Odonnell. Telephone message 12/9/2019         Anticoagulation Care Providers     Provider Role Specialty Phone number    Martin Odonnell MD Referring Internal Medicine [phone]    Isamar Hernandez MD Referring Internal Medicine [phone]    Nigel  Estuardo Martinez MD [facility] Internal Medicine [phone]

## 2022-12-27 DIAGNOSIS — Z87.891 PERSONAL HISTORY OF TOBACCO USE, PRESENTING HAZARDS TO HEALTH: Primary | ICD-10-CM

## 2022-12-30 ENCOUNTER — LAB (OUTPATIENT)
Dept: LAB | Facility: CLINIC | Age: 77
End: 2022-12-30
Payer: MEDICARE

## 2022-12-30 ENCOUNTER — ANTICOAGULATION THERAPY VISIT (OUTPATIENT)
Dept: ANTICOAGULATION | Facility: CLINIC | Age: 77
End: 2022-12-30

## 2022-12-30 DIAGNOSIS — I48.20 CHRONIC ATRIAL FIBRILLATION (H): ICD-10-CM

## 2022-12-30 DIAGNOSIS — Z79.01 LONG TERM CURRENT USE OF ANTICOAGULANT THERAPY: ICD-10-CM

## 2022-12-30 DIAGNOSIS — Z79.01 LONG TERM CURRENT USE OF ANTICOAGULANT THERAPY: Primary | ICD-10-CM

## 2022-12-30 LAB — INR BLD: 2.8 (ref 0.9–1.1)

## 2022-12-30 PROCEDURE — 85610 PROTHROMBIN TIME: CPT

## 2022-12-30 PROCEDURE — 36416 COLLJ CAPILLARY BLOOD SPEC: CPT

## 2022-12-30 NOTE — PROGRESS NOTES
ANTICOAGULATION MANAGEMENT     Alex Stanley 77 year old male is on warfarin with therapeutic INR result. (Goal INR 2.0-3.0)    Recent labs: (last 7 days)     12/30/22  0814   INR 2.8*       ASSESSMENT       Source(s): Chart Review and Patient/Caregiver Call       Warfarin doses taken: Warfarin taken as instructed    Diet: No new diet changes identified    New illness, injury, or hospitalization: No    Medication/supplement changes: None noted    Signs or symptoms of bleeding or clotting: No    Previous INR: Therapeutic last 2(+) visits    Additional findings: None       PLAN     Recommended plan for no diet, medication or health factor changes affecting INR     Dosing Instructions: Continue your current warfarin dose with next INR in 6 weeks       Summary  As of 12/30/2022    Full warfarin instructions:  2.5 mg every Mon, Wed, Fri; 5 mg all other days   Next INR check:  2/10/2023             Telephone call with Tejas who verbalizes understanding and agrees to plan    Lab visit scheduled    Education provided:     Please call back if any changes to your diet, medications or how you've been taking warfarin    Plan made per [facility] anticoagulation protocol    Tara Cavazos RN  Anticoagulation Clinic  12/30/2022    _______________________________________________________________________     Anticoagulation Episode Summary     Current INR goal:  2.0-3.0   TTR:  86.1 % (11.3 mo)   Target end date:  Indefinite   Send INR reminders to:  [facility]    Indications    Long-term (current) use of anticoagulants [Z79.01] [Z79.01]  Chronic atrial fibrillation/Flutter [I48.20]           Comments:  INR referral approved 12/9/2019 per Dr Odonnell. Telephone message 12/9/2019         Anticoagulation Care Providers     Provider Role Specialty Phone number    Martin Odonnell MD Referring Internal Medicine [phone]    Isamar Hernandez MD Referring Internal Medicine [phone]    Estuardo Manning MD  Referring Internal Medicine [phone]

## 2023-01-18 ENCOUNTER — HOSPITAL ENCOUNTER (OUTPATIENT)
Dept: CT IMAGING | Facility: CLINIC | Age: 78
Discharge: HOME | End: 2023-01-18
Attending: INTERNAL MEDICINE | Admitting: INTERNAL MEDICINE
Payer: MEDICARE

## 2023-01-18 DIAGNOSIS — Z87.891 PERSONAL HISTORY OF TOBACCO USE, PRESENTING HAZARDS TO HEALTH: ICD-10-CM

## 2023-01-18 PROCEDURE — 71271 CT THORAX LUNG CANCER SCR C-: CPT

## 2023-02-10 ENCOUNTER — LAB (OUTPATIENT)
Dept: LAB | Facility: CLINIC | Age: 78
End: 2023-02-10
Payer: MEDICARE

## 2023-02-10 ENCOUNTER — ANTICOAGULATION THERAPY VISIT (OUTPATIENT)
Dept: ANTICOAGULATION | Facility: CLINIC | Age: 78
End: 2023-02-10

## 2023-02-10 DIAGNOSIS — Z79.01 LONG TERM CURRENT USE OF ANTICOAGULANT THERAPY: ICD-10-CM

## 2023-02-10 DIAGNOSIS — I48.20 CHRONIC ATRIAL FIBRILLATION (H): ICD-10-CM

## 2023-02-10 LAB — INR BLD: 2.8 (ref 0.9–1.1)

## 2023-02-10 PROCEDURE — 36416 COLLJ CAPILLARY BLOOD SPEC: CPT

## 2023-02-10 PROCEDURE — 85610 PROTHROMBIN TIME: CPT

## 2023-02-10 NOTE — PROGRESS NOTES
ANTICOAGULATION MANAGEMENT     Alex Stanley 77 year old male is on warfarin with therapeutic INR result. (Goal INR 2.0-3.0)    Recent labs: (last 7 days)     02/10/23  0831   INR 2.8*       ASSESSMENT       Source(s): Chart Review and Patient/Caregiver Call       Warfarin doses taken: Warfarin taken as instructed    Diet: No new diet changes identified    New illness, injury, or hospitalization: No    Medication/supplement changes: None noted    Signs or symptoms of bleeding or clotting: No    Previous INR: Therapeutic last 2(+) visits    Additional findings: None       PLAN     Recommended plan for no diet, medication or health factor changes affecting INR     Dosing Instructions: Continue your current warfarin dose with next INR in 6 weeks       Summary  As of 2/10/2023    Full warfarin instructions:  2.5 mg every Mon, Wed, Fri; 5 mg all other days   Next INR check:  3/24/2023             Telephone call with Tejas who verbalizes understanding and agrees to plan    Lab visit scheduled    Education provided:     Please call back if any changes to your diet, medications or how you've been taking warfarin    Contact [phone]  with any changes, questions or concerns.     Plan made per [facility] anticoagulation protocol    Willow Schilling RN  Anticoagulation Clinic  2/10/2023    _______________________________________________________________________     Anticoagulation Episode Summary     Current INR goal:  2.0-3.0   TTR:  87.8 % (1 y)   Target end date:  Indefinite   Send INR reminders to:  [facility]    Indications    Long-term (current) use of anticoagulants [Z79.01] [Z79.01]  Chronic atrial fibrillation/Flutter [I48.20]           Comments:  INR referral approved 12/9/2019 per Dr Odonnell. Telephone message 12/9/2019         Anticoagulation Care Providers     Provider Role Specialty Phone number    Martin Odonnell MD Referring Internal Medicine [phone]    Isamar Hernandez MD Referring  Internal Medicine [phone]    Estuardo Manning MD Referring Internal Medicine [phone]

## 2023-02-24 NOTE — PROGRESS NOTES
"SUBJECTIVE:   Tejas is a 77 year old who presents for Preventive Visit.  Patient has been advised of split billing requirements and indicates understanding: Yes  Are you in the first 12 months of your Medicare coverage?  No    Healthy Habits:     In general, how would you rate your overall health?  Good    Frequency of exercise:  2-3 days/week    Duration of exercise:  30-45 minutes    Do you usually eat at least 4 servings of fruit and vegetables a day, include whole grains    & fiber and avoid regularly eating high fat or \"junk\" foods?  No    Taking medications regularly:  No    Barriers to taking medications:  None    Medication side effects:  None    Ability to successfully perform activities of daily living:  No assistance needed    Home Safety:  No safety concerns identified    Hearing Impairment:  No hearing concerns    In the past 6 months, have you been bothered by leaking of urine?  No    In general, how would you rate your overall mental or emotional health?  Excellent      PHQ-2 Total Score: 0    Additional concerns today:  No      Have you ever done Advance Care Planning? (For example, a Health Directive, POLST, or a discussion with a medical provider or your loved ones about your wishes): Yes, advance care planning is on file.       Fall risk  Fallen 2 or more times in the past year?: No  Any fall with injury in the past year?: No    Cognitive Screening   1) Repeat 3 items (Leader, Season, Table)    2) Clock draw: NORMAL  3) 3 item recall: Recalls 3 objects  Results: 3 items recalled: COGNITIVE IMPAIRMENT LESS LIKELY    Mini-CogTM Copyright S Ni. Licensed by the author for use in [facility]; reprinted with permission (isac@.[facility]). All rights reserved.          Reviewed and updated as needed this visit by clinical staff     Meds              Reviewed and updated as needed this visit by Provider                 Social History     Tobacco Use     Smoking status: Every Day     Packs/day: " 0.50     Types: Cigarettes     Start date: 1965     Smokeless tobacco: Never     Tobacco comments:     ppd since age 16 - now 1/2-1 ppd 04/05/21   Substance Use Topics     Alcohol use: Not Currently     Comment: last drank at [facility] 8/2010.     If you drink alcohol do you typically have >3 drinks per day or >7 drinks per week? No    Alcohol Use 2/27/2023   Prescreen: >3 drinks/day or >7 drinks/week? Not Applicable   Prescreen: >3 drinks/day or >7 drinks/week? -               Current providers sharing in care for this patient include:   Patient Care Team:  Estuardo Manning MD as PCP - General (Internal Medicine)  Estuardo Manning MD as Assigned PCP  Micaela Cowart PA-C as Assigned Surgical Provider  Estuardo Manning MD (Internal Medicine)  Lamont Dawson MD as MD (Cardiovascular Disease)  Lamont Dawson MD as Assigned Heart and Vascular Provider    The following health maintenance items are reviewed in Epic and correct as of today:  Health Maintenance   Topic Date Due     MICROALBUMIN  10/01/2021     MEDICARE ANNUAL WELLNESS VISIT  09/22/2022     LIPID  01/13/2023     BMP  01/14/2023     HEMOGLOBIN  01/14/2023     LUNG CANCER SCREENING  01/18/2024     NICOTINE/TOBACCO CESSATION COUNSELING Q 1 YR  02/27/2024     ANNUAL REVIEW OF HM ORDERS  02/27/2024     FALL RISK ASSESSMENT  02/27/2024     COLORECTAL CANCER SCREENING  10/26/2025     ADVANCE CARE PLANNING  02/27/2028     DTAP/TDAP/TD IMMUNIZATION (3 - Td or Tdap) 09/15/2030     HEPATITIS C SCREENING  Completed     PHQ-2 (once per calendar year)  Completed     Pneumococcal Vaccine: 65+ Years  Completed     URINALYSIS  Completed     ZOSTER IMMUNIZATION  Completed     AORTIC ANEURYSM SCREENING (SYSTEM ASSIGNED)  Completed     COVID-19 Vaccine  Completed     IPV IMMUNIZATION  Aged Out     MENINGITIS IMMUNIZATION  Aged Out     DEXA  Discontinued     CYSTIC FIBROSIS ANNUAL STUDY  Discontinued     INFLUENZA VACCINE  Discontinued     CYSTIC FIBROSIS:  "COLONOSCOPY  Discontinued       Patient continues on warfarin for chronic atrial fibrillation, and is interested in switching to Eliquis if appropriate.        Review of Systems  Constitutional, HEENT, cardiovascular, pulmonary, GI, , musculoskeletal, neuro, skin, endocrine and psych systems are negative, except as otherwise noted.    OBJECTIVE:   /74   Pulse 92   Temp 98  F (36.7  C) (Tympanic)   Resp 18   Ht 1.702 m (5' 7\")   Wt 79.7 kg (175 lb 9.6 oz)   SpO2 96%   BMI 27.50 kg/m   Estimated body mass index is 27.5 kg/m  as calculated from the following:    Height as of this encounter: 1.702 m (5' 7\").    Weight as of this encounter: 79.7 kg (175 lb 9.6 oz).  Physical Exam  GENERAL: healthy, alert and no distress  NECK: no adenopathy, no asymmetry, masses, or scars and thyroid normal to palpation  RESP: lungs clear to auscultation - no rales, rhonchi or wheezes  CV: Irregular, no audible murmur  ABDOMEN: soft, nontender, no hepatosplenomegaly, no masses and bowel sounds normal  MS: no gross musculoskeletal defects noted, no edema        ASSESSMENT / PLAN:   Medicare annual wellness visit, subsequent      Chronic atrial fibrillation (H)  Certainly reasonable to switch to Eliquis, et al.  Will recheck kidney function today to determine dosing.  Patient wishes to receive this via mail order; once creatinine known will send prescription to his mail order pharmacy, then instructed him to stop his warfarin after receipt of this new prescription, then start Eliquis in its place 3 to 4 days after warfarin cessation.    Alcohol dependence in remission (H)  Has maintained sobriety for several years    Stage 3 chronic kidney disease, unspecified whether stage 3a or 3b CKD (H)  Recheck renal indices today  - Albumin Random Urine Quantitative with Creat Ratio; Future  - Albumin Random Urine Quantitative with Creat Ratio    Hyperlipidemia LDL goal <100    - Comprehensive metabolic panel (BMP + Alb, Alk Phos, ALT, " "AST, Total. Bili, TP); Future  - Lipid panel reflex to direct LDL Fasting; Future  - Comprehensive metabolic panel (BMP + Alb, Alk Phos, ALT, AST, Total. Bili, TP)  - Lipid panel reflex to direct LDL Fasting    Screening for prostate cancer    - PSA, screen; Future  - PSA, screen      Patient has been advised of split billing requirements and indicates understanding: Yes      COUNSELING:  Reviewed preventive health counseling, as reflected in patient instructions      BMI:   Estimated body mass index is 27.5 kg/m  as calculated from the following:    Height as of this encounter: 1.702 m (5' 7\").    Weight as of this encounter: 79.7 kg (175 lb 9.6 oz).         He reports that he has been smoking cigarettes. He started smoking about 58 years ago. He has been smoking an average of .5 packs per day. He has never used smokeless tobacco.  Nicotine/Tobacco Cessation Plan:   Information offered: Patient not interested at this time      Appropriate preventive services were discussed with this patient, including applicable screening as appropriate for cardiovascular disease, diabetes, osteopenia/osteoporosis, and glaucoma.  As appropriate for age/gender, discussed screening for colorectal cancer, prostate cancer, breast cancer, and cervical cancer. Checklist reviewing preventive services available has been given to the patient.    Reviewed patients plan of care and provided an AVS. The Basic Care Plan (routine screening as documented in Health Maintenance) for Alex meets the Care Plan requirement. This Care Plan has been established and reviewed with the Patient.      Estuardo Manning MD  [facility]    Identified Health Risks:  "

## 2023-02-27 ENCOUNTER — OFFICE VISIT (OUTPATIENT)
Dept: INTERNAL MEDICINE | Facility: CLINIC | Age: 78
End: 2023-02-27
Payer: MEDICARE

## 2023-02-27 VITALS
HEIGHT: 67 IN | HEART RATE: 92 BPM | TEMPERATURE: 98 F | RESPIRATION RATE: 18 BRPM | DIASTOLIC BLOOD PRESSURE: 74 MMHG | SYSTOLIC BLOOD PRESSURE: 118 MMHG | BODY MASS INDEX: 27.56 KG/M2 | WEIGHT: 175.6 LBS | OXYGEN SATURATION: 96 %

## 2023-02-27 DIAGNOSIS — E78.5 HYPERLIPIDEMIA LDL GOAL <100: ICD-10-CM

## 2023-02-27 DIAGNOSIS — F10.21 ALCOHOL DEPENDENCE IN REMISSION (H): ICD-10-CM

## 2023-02-27 DIAGNOSIS — Z00.00 MEDICARE ANNUAL WELLNESS VISIT, SUBSEQUENT: Primary | ICD-10-CM

## 2023-02-27 DIAGNOSIS — Z12.5 SCREENING FOR PROSTATE CANCER: ICD-10-CM

## 2023-02-27 DIAGNOSIS — I48.20 CHRONIC ATRIAL FIBRILLATION (H): ICD-10-CM

## 2023-02-27 DIAGNOSIS — N18.30 STAGE 3 CHRONIC KIDNEY DISEASE, UNSPECIFIED WHETHER STAGE 3A OR 3B CKD (H): ICD-10-CM

## 2023-02-27 LAB
ALBUMIN SERPL BCG-MCNC: 4.3 G/DL (ref 3.5–5.2)
ALP SERPL-CCNC: 93 U/L (ref 40–129)
ALT SERPL W P-5'-P-CCNC: 22 U/L (ref 10–50)
ANION GAP SERPL CALCULATED.3IONS-SCNC: 10 MMOL/L (ref 7–15)
AST SERPL W P-5'-P-CCNC: 31 U/L (ref 10–50)
BILIRUB SERPL-MCNC: 0.4 MG/DL
BUN SERPL-MCNC: 17.5 MG/DL (ref 8–23)
CALCIUM SERPL-MCNC: 8.9 MG/DL (ref 8.8–10.2)
CHLORIDE SERPL-SCNC: 105 MMOL/L (ref 98–107)
CHOLEST SERPL-MCNC: 116 MG/DL
CREAT SERPL-MCNC: 1.19 MG/DL (ref 0.67–1.17)
CREAT UR-MCNC: 179 MG/DL
DEPRECATED HCO3 PLAS-SCNC: 24 MMOL/L (ref 22–29)
GFR SERPL CREATININE-BSD FRML MDRD: 64 ML/MIN/1.73M2
GLUCOSE SERPL-MCNC: 98 MG/DL (ref 70–99)
HDLC SERPL-MCNC: 42 MG/DL
LDLC SERPL CALC-MCNC: 57 MG/DL
MICROALBUMIN UR-MCNC: 177 MG/L
MICROALBUMIN/CREAT UR: 98.88 MG/G CR (ref 0–17)
NONHDLC SERPL-MCNC: 74 MG/DL
POTASSIUM SERPL-SCNC: 4.6 MMOL/L (ref 3.4–5.3)
PROT SERPL-MCNC: 6.9 G/DL (ref 6.4–8.3)
PSA SERPL-MCNC: 0.26 NG/ML (ref 0–6.5)
SODIUM SERPL-SCNC: 139 MMOL/L (ref 136–145)
TRIGL SERPL-MCNC: 85 MG/DL

## 2023-02-27 PROCEDURE — G0439 PPPS, SUBSEQ VISIT: HCPCS | Performed by: INTERNAL MEDICINE

## 2023-02-27 PROCEDURE — 80053 COMPREHEN METABOLIC PANEL: CPT | Performed by: INTERNAL MEDICINE

## 2023-02-27 PROCEDURE — 82570 ASSAY OF URINE CREATININE: CPT | Performed by: INTERNAL MEDICINE

## 2023-02-27 PROCEDURE — 99214 OFFICE O/P EST MOD 30 MIN: CPT | Mod: 25 | Performed by: INTERNAL MEDICINE

## 2023-02-27 PROCEDURE — 36415 COLL VENOUS BLD VENIPUNCTURE: CPT | Performed by: INTERNAL MEDICINE

## 2023-02-27 PROCEDURE — G0103 PSA SCREENING: HCPCS | Performed by: INTERNAL MEDICINE

## 2023-02-27 PROCEDURE — 82043 UR ALBUMIN QUANTITATIVE: CPT | Performed by: INTERNAL MEDICINE

## 2023-02-27 PROCEDURE — 80061 LIPID PANEL: CPT | Performed by: INTERNAL MEDICINE

## 2023-02-27 ASSESSMENT — ACTIVITIES OF DAILY LIVING (ADL): CURRENT_FUNCTION: NO ASSISTANCE NEEDED

## 2023-03-03 ENCOUNTER — TELEPHONE (OUTPATIENT)
Dept: INTERNAL MEDICINE | Facility: CLINIC | Age: 78
End: 2023-03-03
Payer: MEDICARE

## 2023-03-03 DIAGNOSIS — Z79.01 LONG TERM CURRENT USE OF ANTICOAGULANT THERAPY: ICD-10-CM

## 2023-03-03 DIAGNOSIS — I48.20 CHRONIC ATRIAL FIBRILLATION (H): Primary | ICD-10-CM

## 2023-03-03 NOTE — TELEPHONE ENCOUNTER
Patient calling to check on status of Eliquis script. Per patient, during 2/27/2023 OV it was decided to switch patient to Eliquis and patient has not received script yet. Creatinine lab result came back at 1.19. Patient is requesting a call back with PCP response.     Routing to PCP for review.

## 2023-03-03 NOTE — TELEPHONE ENCOUNTER
Writer reviewed notes below and consulted with LORETO Abrams who recommends holding 2 days of warfarin and then starting Eliquis on day 3 in the AM. Patient informed and agrees to plan. Patient is expecting to start taking Eliquis on 3/14/23. Writer also reviewed general medication information, half-life, BID dosing, importane of taking at the same time every day, monitoring for s/e, and encouraged to call PCP/ACC with any questions or concerns. Patient's ACC episode resolved, standing inr orders and referral discontinued. Shahrzad Santizo, KOBEN, RN

## 2023-03-03 NOTE — TELEPHONE ENCOUNTER
Script sent to mail order pharmacy.  Same instructions still apply - after he receives this prescription, he should then stop his warfarin, and start the Eliquis twice daily 3 days later.    Forwarding to [employer] pool for awareness.

## 2023-04-06 DIAGNOSIS — I10 ESSENTIAL HYPERTENSION: ICD-10-CM

## 2023-04-06 RX ORDER — AMLODIPINE BESYLATE 10 MG/1
TABLET ORAL
Qty: 90 TABLET | Refills: 3 | Status: SHIPPED | OUTPATIENT
Start: 2023-04-06 | End: 2023-10-02

## 2023-05-15 ENCOUNTER — OFFICE VISIT (OUTPATIENT)
Dept: CARDIOLOGY | Facility: CLINIC | Age: 78
End: 2023-05-15
Attending: INTERNAL MEDICINE
Payer: MEDICARE

## 2023-05-15 VITALS
DIASTOLIC BLOOD PRESSURE: 68 MMHG | HEIGHT: 67 IN | WEIGHT: 173.2 LBS | OXYGEN SATURATION: 94 % | SYSTOLIC BLOOD PRESSURE: 114 MMHG | HEART RATE: 69 BPM | BODY MASS INDEX: 27.18 KG/M2

## 2023-05-15 DIAGNOSIS — R07.89 ATYPICAL CHEST PAIN: ICD-10-CM

## 2023-05-15 DIAGNOSIS — I48.20 CHRONIC ATRIAL FIBRILLATION (H): ICD-10-CM

## 2023-05-15 DIAGNOSIS — I25.10 CORONARY ARTERY DISEASE INVOLVING NATIVE CORONARY ARTERY OF NATIVE HEART WITHOUT ANGINA PECTORIS: Primary | ICD-10-CM

## 2023-05-15 PROCEDURE — 99214 OFFICE O/P EST MOD 30 MIN: CPT | Performed by: NURSE PRACTITIONER

## 2023-05-15 NOTE — LETTER
5/15/2023    Estuardo Manning MD  [address] [place] [id number]    RE: Alex Stanley       Dear Colleague,     I had the pleasure of seeing Alex Stanley in the [facility].  Cardiology Clinic Progress Note  Alex Stanley MRN# [id number]   YOB: 1945 Age: 77 year old   Primary Cardiologist: Dr. Dawson Reason for visit: 6 month follow up            Assessment and Plan:       1.  Atypical chest discomfort, resolved       Presumed coronary artery disease based on abnormal stress test (10/2022)       Low normal LVEF 50 to 55%  -10/2022 Echocardiogram LVEF 50-55%, no wall motion abnormality   -10/2022 Lexiscan nuclear stress test with small area of mild ischemia in the apical segment  -Symptoms resolved with start of isosorbide    2.  Atrial fibrillation  -Anticoagulated with eliquis for thromboembolism prophylaxis   -Rate controlled without beta-blockers    3. Hyperlipidemia, controlled  -LDL 57 (2/2023)    4.  Hypertension, controlled  -Continue amlodipine 10 mg daily    5. Chronic tobacco abuse  -Continues to smoke 1/2 PPD, interested in quitting, working with PCP  -Recommend complete cessation       Changes today: No medication changes made today     Follow up plan: Follow up with Dr. Dawson in November 2023        History of Presenting Illness:    Alex Stanley is a very pleasant 77 year old male with a history of chronic atrial fibrillation, hypertension, hyperlipidemia, CVA with residual deficits.    Patient was evaluated in October 2022 for atypical chest discomfort.  He was experiencing chest tightness at the end of the day and profound fatigue following his COVID infection in August 2022.    His echocardiogram revealed borderline LV systolic function with an EF of 50 to 55% and no significant valve disease.  Moderate to severe left atrial dilation was noted.  Nuclear stress test showed a small area of mild ischemia in the apical segment.  Ejection fraction at  stress was 58%.  He was started on a low-dose of Imdur and he reported his symptoms resolved at a November 2022 follow-up visit with Dr. Dawson.    Patient is here today for a 6 month follow up. Patient reports feeling good. Patient denies chest pain or chest tightness. Denies dizziness, lightheadedness or other presyncopal symptoms. Denies tachycardia or palpitations. Denies shortness of breath, orthopnea or PND.     Patient denies any bleeding issues with eliquis.     Labs from 2/2023 sodium 139, potassium 4.6, BUN 17, creatinine 1.19, GFR 64, ALT 22, cholesterol 116, HDL 42, LDL 57, and triglycerides 85.    Blood pressure 114/68 and HR 69 in clinic today.    Diet: maintains a low fat diet, does not add salt   Exercise: Walks 1/2 mile-1mile 5 times/week, yard work   Alcohol:quit in 2011  Tobacco: smokes 1/2 PPD         Recent Hospitalizations   None in [id number]          Social History      Social History     Socioeconomic History    Marital status:      Spouse name: Not on file    Number of children: Not on file    Years of education: Not on file    Highest education level: Not on file   Occupational History    Not on file   Tobacco Use    Smoking status: Every Day     Packs/day: 0.50     Types: Cigarettes     Start date: 1965    Smokeless tobacco: Never    Tobacco comments:     ppd since age 16 - now 1/2-1 ppd 04/05/21   Vaping Use    Vaping status: Never Used   Substance and Sexual Activity    Alcohol use: Not Currently     Comment: last drank at [facility] 8/2010.    Drug use: Not Currently    Sexual activity: Yes     Partners: Female   Other Topics Concern    Parent/sibling w/ CABG, MI or angioplasty before 65F 55M? Not Asked   Social History Narrative    ** Merged History Encounter **          Social Determinants of Health     Financial Resource Strain: Not on file   Food Insecurity: Not on file   Transportation Needs: Not on file   Physical Activity: Not on file   Stress: Not on file   Social Connections:  "Not on file   Intimate Partner Violence: Not on file   Housing Stability: Not on file            Review of Systems:   Skin:  not assessed     Eyes:  not assessed    ENT:  not assessed    Respiratory:  Negative    Cardiovascular:  Negative    Gastroenterology: not assessed    Genitourinary:  not assessed    Musculoskeletal:  not assessed    Neurologic:  not assessed    Psychiatric:  not assessed    Heme/Lymph/Imm:  not assessed    Endocrine:  not assessed           Physical Exam:   Vitals: /68   Pulse 69   Ht 1.702 m (5' 7\")   Wt 78.6 kg (173 lb 3.2 oz)   SpO2 94%   BMI 27.13 kg/m     Wt Readings from Last 4 Encounters:   05/15/23 78.6 kg (173 lb 3.2 oz)   02/27/23 79.7 kg (175 lb 9.6 oz)   11/15/22 77.6 kg (171 lb)   10/20/22 75.6 kg (166 lb 9.6 oz)     GEN: well nourished, in no acute distress.  HEENT:  Pupils equal, round. Sclerae nonicteric.   NECK: Supple, no masses appreciated. No JVD with patient .  C/V:  Irregularly irregular rate and rhythm, no murmur, rub or gallop.    RESP: Respirations are unlabored. Clear to auscultation bilaterally without wheezing, rales, or rhonchi.  GI: Abdomen soft, nontender.  EXTREM:trace bilateral LE edema.  NEURO: Alert and oriented, cooperative.  SKIN: Warm and dry.        Data:     LIPID RESULTS:  Lab Results   Component Value Date    CHOL 116 02/27/2023    CHOL 102 10/01/2020    HDL 42 02/27/2023    HDL 46 10/01/2020    LDL 57 02/27/2023    LDL 45 10/01/2020    TRIG 85 02/27/2023    TRIG 57 10/01/2020    CHOLHDLRATIO 3.2 01/20/2015     LIVER ENZYME RESULTS:  Lab Results   Component Value Date    AST 31 02/27/2023    AST 23 10/01/2020    ALT 22 02/27/2023    ALT 24 10/01/2020     CBC RESULTS:  Lab Results   Component Value Date    WBC 7.6 01/14/2022    WBC 8.3 10/08/2012    RBC 4.63 01/14/2022    RBC 4.80 10/08/2012    HGB 14.1 01/14/2022    HGB 14.1 06/14/2018    HCT 44.0 01/14/2022    HCT 44.3 10/08/2012    MCV 95 01/14/2022    MCV 92 10/08/2012    MCH 30.5 " 01/14/2022    MCH 30.2 10/08/2012    MCHC 32.0 01/14/2022    MCHC 32.7 10/08/2012    RDW 14.0 01/14/2022    RDW 14.1 10/08/2012     01/14/2022     10/08/2012     BMP RESULTS:  Lab Results   Component Value Date     02/27/2023     10/01/2020    POTASSIUM 4.6 02/27/2023    POTASSIUM 3.8 02/08/2022    POTASSIUM 3.9 10/01/2020    CHLORIDE 105 02/27/2023    CHLORIDE 113 (H) 01/14/2022    CHLORIDE 108 10/01/2020    CO2 24 02/27/2023    CO2 23 01/14/2022    CO2 28 10/01/2020    ANIONGAP 10 02/27/2023    ANIONGAP 4 01/14/2022    ANIONGAP 4 10/01/2020    GLC 98 02/27/2023    GLC 88 01/14/2022    GLC 70 10/01/2020    BUN 17.5 02/27/2023    BUN 11 01/14/2022    BUN 14 10/01/2020    CR 1.19 (H) 02/27/2023    CR 1.26 (H) 10/01/2020    GFRESTIMATED 64 02/27/2023    GFRESTIMATED 56 (L) 10/01/2020    GFRESTBLACK 65 10/01/2020    MARY 8.9 02/27/2023    MARY 8.8 10/01/2020      A1C RESULTS:  No results found for: A1C  INR RESULTS:  Lab Results   Component Value Date    INR 2.8 (H) 02/10/2023    INR 2.8 (H) 12/30/2022    INR 1.02 02/08/2022    INR 2.41 (H) 01/14/2022    INR 3.10 (H) 06/21/2021    INR 2.40 (H) 05/24/2021            Medications     Current Outpatient Medications   Medication Sig Dispense Refill    amLODIPine (NORVASC) 10 MG tablet TAKE 1 TABLET DAILY 90 tablet 3    apixaban ANTICOAGULANT (ELIQUIS ANTICOAGULANT) 5 MG tablet Take 1 tablet (5 mg) by mouth 2 times daily 180 tablet 3    isosorbide mononitrate (IMDUR) 30 MG 24 hr tablet Take 1 tablet (30 mg) by mouth daily 90 tablet 3    Multiple Vitamins-Minerals (PRESERVISION AREDS 2) CAPS Take 2 mg by mouth 2 times daily      pravastatin (PRAVACHOL) 20 MG tablet Take 1 tablet (20 mg) by mouth daily 90 tablet 3    ASPIRIN NOT PRESCRIBED, INTENTIONAL, Antiplatelet medication not prescribed intentionally due to history  of gastrointestinal bleeding, and patient on warfarin. (Patient not taking: Reported on 5/15/2023) 0 each 0          Past Medical  History     Past Medical History:   Diagnosis Date    Alcohol abuse     Alcoholism (H)     admission 11/09    Atrial fibrillation, paroxysmal 11/07, 6/09, 11/09    Benign essential hypertension     Chronic atrial fibrillation (H)     Diverticulosis of colon 6/2/2011    Erosive gastritis 11/09    Gastritis     Hyperlipidemia LDL goal <100     Hypertension, with LVH     Iron deficiency anemia 11/09    Paroxysmal atrial fibrillation (H)     Stroke (H)     Tobacco abuse      Past Surgical History:   Procedure Laterality Date    COLONOSCOPY N/A 10/26/2020    Procedure: COLONOSCOPY, WITH POLYPECTOMY AND BIOPSY;  Surgeon: Shon Bond MD;  Location:  GI    ENDOSCOPIC ULTRASOUND UPPER GASTROINTESTINAL TRACT (GI) N/A 1/24/2022    Procedure: ENDOSCOPIC ULTRASOUND, ESOPHAGOSCOPY / UPPER GASTROINTESTINAL TRACT (GI);  Surgeon: Shon Bond MD;  Location:  GI    LAPAROSCOPIC CHOLECYSTECTOMY N/A 2/8/2022    Procedure: LAPAROSCOPIC CHOLECYSTECTOMY;  Surgeon: Mazin Bains MD;  Location:  OR    SURGICAL HISTORY OF -   4/11    R Dupuytren's contracture release   Dr. Scruggs    [facility] NONSPECIFIC PROCEDURE  2002    Rt foot reconstructive    [facility] NONSPECIFIC PROCEDURE  10/12    R hand procedure.  Dr. Deluna     Family History   Problem Relation Age of Onset    Lung Cancer Mother     Emphysema Father     Cancer Mother         small cell lung with mets to brain    Diabetes Maternal Grandmother     Respiratory Father         emphysema            Allergies   Patient has no known allergies.    A total of 37 minutes was spent with patient, on chart review and documentation today.     Mely Whitmore NP  [facility]  Pager: [phone]        Thank you for allowing me to participate in the care of your patient.      Sincerely,     Mely Whitmore NP     [facility]  cc:   Lamont Dawson MD  [id number] [place] [address]  [phone]

## 2023-05-15 NOTE — PROGRESS NOTES
Cardiology Clinic Progress Note  Alex Stanley MRN# [id number]   YOB: 1945 Age: 77 year old   Primary Cardiologist: Dr. Dawson Reason for visit: 6 month follow up            Assessment and Plan:       1.  Atypical chest discomfort, resolved       Presumed coronary artery disease based on abnormal stress test (10/2022)       Low normal LVEF 50 to 55%  -10/2022 Echocardiogram LVEF 50-55%, no wall motion abnormality   -10/2022 Lexiscan nuclear stress test with small area of mild ischemia in the apical segment  -Symptoms resolved with start of isosorbide    2.  Atrial fibrillation  -Anticoagulated with eliquis for thromboembolism prophylaxis   -Rate controlled without beta-blockers    3. Hyperlipidemia, controlled  -LDL 57 (2/2023)    4.  Hypertension, controlled  -Continue amlodipine 10 mg daily    5. Chronic tobacco abuse  -Continues to smoke 1/2 PPD, interested in quitting, working with PCP  -Recommend complete cessation       Changes today: No medication changes made today     Follow up plan: Follow up with Dr. Dawson in November 2023        History of Presenting Illness:    Alex Stanley is a very pleasant 77 year old male with a history of chronic atrial fibrillation, hypertension, hyperlipidemia, CVA with residual deficits.    Patient was evaluated in October 2022 for atypical chest discomfort.  He was experiencing chest tightness at the end of the day and profound fatigue following his COVID infection in August 2022.    His echocardiogram revealed borderline LV systolic function with an EF of 50 to 55% and no significant valve disease.  Moderate to severe left atrial dilation was noted.  Nuclear stress test showed a small area of mild ischemia in the apical segment.  Ejection fraction at stress was 58%.  He was started on a low-dose of Imdur and he reported his symptoms resolved at a November 2022 follow-up visit with Dr. Dawson.    Patient is here today for a 6 month follow up. Patient reports  feeling good. Patient denies chest pain or chest tightness. Denies dizziness, lightheadedness or other presyncopal symptoms. Denies tachycardia or palpitations. Denies shortness of breath, orthopnea or PND.     Patient denies any bleeding issues with eliquis.     Labs from 2/2023 sodium 139, potassium 4.6, BUN 17, creatinine 1.19, GFR 64, ALT 22, cholesterol 116, HDL 42, LDL 57, and triglycerides 85.    Blood pressure 114/68 and HR 69 in clinic today.    Diet: maintains a low fat diet, does not add salt   Exercise: Walks 1/2 mile-1mile 5 times/week, yard work   Alcohol:quit in 2011  Tobacco: smokes 1/2 PPD         Recent Hospitalizations   None in [id number]          Social History      Social History     Socioeconomic History     Marital status:      Spouse name: Not on file     Number of children: Not on file     Years of education: Not on file     Highest education level: Not on file   Occupational History     Not on file   Tobacco Use     Smoking status: Every Day     Packs/day: 0.50     Types: Cigarettes     Start date: 1965     Smokeless tobacco: Never     Tobacco comments:     ppd since age 16 - now 1/2-1 ppd 04/05/21   Vaping Use     Vaping status: Never Used   Substance and Sexual Activity     Alcohol use: Not Currently     [facility]     Drug use: Not Currently     Sexual activity: Yes     Partners: Female   Other Topics Concern     Parent/sibling w/ CABG, MI or angioplasty before 65F 55M? Not Asked   Social History Narrative    ** Merged History Encounter **          Social Determinants of Health     Financial Resource Strain: Not on file   Food Insecurity: Not on file   Transportation Needs: Not on file   Physical Activity: Not on file   Stress: Not on file   Social Connections: Not on file   Intimate Partner Violence: Not on file   Housing Stability: Not on file            Review of Systems:   Skin:  not assessed     Eyes:  not assessed    ENT:  not assessed   "  Respiratory:  Negative    Cardiovascular:  Negative    Gastroenterology: not assessed    Genitourinary:  not assessed    Musculoskeletal:  not assessed    Neurologic:  not assessed    Psychiatric:  not assessed    Heme/Lymph/Imm:  not assessed    Endocrine:  not assessed           Physical Exam:   Vitals: /68   Pulse 69   Ht 1.702 m (5' 7\")   Wt 78.6 kg (173 lb 3.2 oz)   SpO2 94%   BMI 27.13 kg/m     Wt Readings from Last 4 Encounters:   05/15/23 78.6 kg (173 lb 3.2 oz)   02/27/23 79.7 kg (175 lb 9.6 oz)   11/15/22 77.6 kg (171 lb)   10/20/22 75.6 kg (166 lb 9.6 oz)     GEN: well nourished, in no acute distress.  HEENT:  Pupils equal, round. Sclerae nonicteric.   NECK: Supple, no masses appreciated. No JVD with patient .  C/V:  Irregularly irregular rate and rhythm, no murmur, rub or gallop.    RESP: Respirations are unlabored. Clear to auscultation bilaterally without wheezing, rales, or rhonchi.  GI: Abdomen soft, nontender.  EXTREM:trace bilateral LE edema.  NEURO: Alert and oriented, cooperative.  SKIN: Warm and dry.        Data:     LIPID RESULTS:  Lab Results   Component Value Date    CHOL 116 02/27/2023    CHOL 102 10/01/2020    HDL 42 02/27/2023    HDL 46 10/01/2020    LDL 57 02/27/2023    LDL 45 10/01/2020    TRIG 85 02/27/2023    TRIG 57 10/01/2020    CHOLHDLRATIO 3.2 01/20/2015     LIVER ENZYME RESULTS:  Lab Results   Component Value Date    AST 31 02/27/2023    AST 23 10/01/2020    ALT 22 02/27/2023    ALT 24 10/01/2020     CBC RESULTS:  Lab Results   Component Value Date    WBC 7.6 01/14/2022    WBC 8.3 10/08/2012    RBC 4.63 01/14/2022    RBC 4.80 10/08/2012    HGB 14.1 01/14/2022    HGB 14.1 06/14/2018    HCT 44.0 01/14/2022    HCT 44.3 10/08/2012    MCV 95 01/14/2022    MCV 92 10/08/2012    MCH 30.5 01/14/2022    MCH 30.2 10/08/2012    MCHC 32.0 01/14/2022    MCHC 32.7 10/08/2012    RDW 14.0 01/14/2022    RDW 14.1 10/08/2012     01/14/2022     10/08/2012     BMP " RESULTS:  Lab Results   Component Value Date     02/27/2023     10/01/2020    POTASSIUM 4.6 02/27/2023    POTASSIUM 3.8 02/08/2022    POTASSIUM 3.9 10/01/2020    CHLORIDE 105 02/27/2023    CHLORIDE 113 (H) 01/14/2022    CHLORIDE 108 10/01/2020    CO2 24 02/27/2023    CO2 23 01/14/2022    CO2 28 10/01/2020    ANIONGAP 10 02/27/2023    ANIONGAP 4 01/14/2022    ANIONGAP 4 10/01/2020    GLC 98 02/27/2023    GLC 88 01/14/2022    GLC 70 10/01/2020    BUN 17.5 02/27/2023    BUN 11 01/14/2022    BUN 14 10/01/2020    CR 1.19 (H) 02/27/2023    CR 1.26 (H) 10/01/2020    GFRESTIMATED 64 02/27/2023    GFRESTIMATED 56 (L) 10/01/2020    GFRESTBLACK 65 10/01/2020    MARY 8.9 02/27/2023    MARY 8.8 10/01/2020      A1C RESULTS:  No results found for: A1C  INR RESULTS:  Lab Results   Component Value Date    INR 2.8 (H) 02/10/2023    INR 2.8 (H) 12/30/2022    INR 1.02 02/08/2022    INR 2.41 (H) 01/14/2022    INR 3.10 (H) 06/21/2021    INR 2.40 (H) 05/24/2021            Medications     Current Outpatient Medications   Medication Sig Dispense Refill     amLODIPine (NORVASC) 10 MG tablet TAKE 1 TABLET DAILY 90 tablet 3     apixaban ANTICOAGULANT (ELIQUIS ANTICOAGULANT) 5 MG tablet Take 1 tablet (5 mg) by mouth 2 times daily 180 tablet 3     isosorbide mononitrate (IMDUR) 30 MG 24 hr tablet Take 1 tablet (30 mg) by mouth daily 90 tablet 3     Multiple Vitamins-Minerals (PRESERVISION AREDS 2) CAPS Take 2 mg by mouth 2 times daily       pravastatin (PRAVACHOL) 20 MG tablet Take 1 tablet (20 mg) by mouth daily 90 tablet 3     ASPIRIN NOT PRESCRIBED, INTENTIONAL, Antiplatelet medication not prescribed intentionally due to history  of gastrointestinal bleeding, and patient on warfarin. (Patient not taking: Reported on 5/15/2023) 0 each 0          Past Medical History     Past Medical History:   Diagnosis Date     Alcohol abuse      Alcoholism (H)     admission 11/09     Atrial fibrillation, paroxysmal 11/07, 6/09, 11/09     Benign  essential hypertension      Chronic atrial fibrillation (H)      Diverticulosis of colon 6/2/2011     Erosive gastritis 11/09     Gastritis      Hyperlipidemia LDL goal <100      Hypertension, with LVH      Iron deficiency anemia 11/09     Paroxysmal atrial fibrillation (H)      Stroke (H)      Tobacco abuse      Past Surgical History:   Procedure Laterality Date     COLONOSCOPY N/A 10/26/2020    Procedure: COLONOSCOPY, WITH POLYPECTOMY AND BIOPSY;  Surgeon: Shon Bond MD;  Location:  GI     ENDOSCOPIC ULTRASOUND UPPER GASTROINTESTINAL TRACT (GI) N/A 1/24/2022    Procedure: ENDOSCOPIC ULTRASOUND, ESOPHAGOSCOPY / UPPER GASTROINTESTINAL TRACT (GI);  Surgeon: Shon Bond MD;  Location:  GI     LAPAROSCOPIC CHOLECYSTECTOMY N/A 2/8/2022    Procedure: LAPAROSCOPIC CHOLECYSTECTOMY;  Surgeon: Mazin Bains MD;  Location:  OR     SURGICAL HISTORY OF -   4/11    R Dupuytren's contracture release   Dr. Scruggs     [facility] NONSPECIFIC PROCEDURE  2002    Rt foot reconstructive     [facility] NONSPECIFIC PROCEDURE  10/12    R hand procedure.  Dr. Deluna     Family History   Problem Relation Age of Onset     Lung Cancer Mother      Emphysema Father      Cancer Mother         small cell lung with mets to brain     Diabetes Maternal Grandmother      Respiratory Father         emphysema            Allergies   Patient has no known allergies.    A total of 37 minutes was spent with patient, on chart review and documentation today.     Mely Whitmore NP  [facility]  Pager: [phone]

## 2023-05-15 NOTE — PATIENT INSTRUCTIONS
Today's Recommendations    I would like you to work on quitting smoking, setting a quit date, and following up with your primary care provider  Your blood pressure and cholesterol look good  Watch your leg swelling, if it doesn't go away overnight, or if you start experiencing weight gain or shortness of breath, watching your salt intake in the food your eating   Continue all medications without changes.  Please follow up with Dr. Dawson in 6 months.    Please send a [employer] message or call [phone] to the RN team with questions or concerns.     Scheduling number [phone]  LAST Dang, CNP   
(0) swallows foods/liquids without difficulty

## 2023-07-27 ENCOUNTER — OFFICE VISIT (OUTPATIENT)
Dept: URGENT CARE | Facility: URGENT CARE | Age: 78
End: 2023-07-27
Payer: MEDICARE

## 2023-07-27 ENCOUNTER — HOSPITAL ENCOUNTER (EMERGENCY)
Facility: CLINIC | Age: 78
Discharge: HOME | End: 2023-07-27
Attending: EMERGENCY MEDICINE | Admitting: EMERGENCY MEDICINE
Payer: MEDICARE

## 2023-07-27 ENCOUNTER — APPOINTMENT (OUTPATIENT)
Dept: CT IMAGING | Facility: CLINIC | Age: 78
End: 2023-07-27
Attending: EMERGENCY MEDICINE
Payer: MEDICARE

## 2023-07-27 VITALS
BODY MASS INDEX: 27.1 KG/M2 | OXYGEN SATURATION: 96 % | SYSTOLIC BLOOD PRESSURE: 130 MMHG | RESPIRATION RATE: 16 BRPM | TEMPERATURE: 97.8 F | WEIGHT: 173 LBS | HEART RATE: 91 BPM | DIASTOLIC BLOOD PRESSURE: 70 MMHG

## 2023-07-27 VITALS
TEMPERATURE: 97.9 F | RESPIRATION RATE: 18 BRPM | OXYGEN SATURATION: 95 % | BODY MASS INDEX: 27.1 KG/M2 | SYSTOLIC BLOOD PRESSURE: 118 MMHG | DIASTOLIC BLOOD PRESSURE: 89 MMHG | WEIGHT: 173 LBS | HEART RATE: 72 BPM

## 2023-07-27 DIAGNOSIS — R93.5 ABNORMAL CT OF THE ABDOMEN: ICD-10-CM

## 2023-07-27 DIAGNOSIS — N39.0 URINARY TRACT INFECTION WITH HEMATURIA, SITE UNSPECIFIED: ICD-10-CM

## 2023-07-27 DIAGNOSIS — R31.0 GROSS HEMATURIA: Primary | ICD-10-CM

## 2023-07-27 DIAGNOSIS — R30.0 DYSURIA: ICD-10-CM

## 2023-07-27 DIAGNOSIS — R31.9 HEMATURIA, UNSPECIFIED TYPE: ICD-10-CM

## 2023-07-27 DIAGNOSIS — R31.9 URINARY TRACT INFECTION WITH HEMATURIA, SITE UNSPECIFIED: ICD-10-CM

## 2023-07-27 LAB
ALBUMIN UR-MCNC: ABNORMAL G/DL
ANION GAP SERPL CALCULATED.3IONS-SCNC: 13 MMOL/L (ref 7–15)
APPEARANCE UR: ABNORMAL
BACTERIA #/AREA URNS HPF: ABNORMAL /HPF
BASOPHILS # BLD AUTO: 0.1 10E3/UL (ref 0–0.2)
BASOPHILS NFR BLD AUTO: 1 %
BILIRUB UR QL STRIP: ABNORMAL
BUN SERPL-MCNC: 14.8 MG/DL (ref 8–23)
CALCIUM SERPL-MCNC: 9.2 MG/DL (ref 8.8–10.2)
CHLORIDE SERPL-SCNC: 101 MMOL/L (ref 98–107)
COLOR UR AUTO: ABNORMAL
CREAT SERPL-MCNC: 1.18 MG/DL (ref 0.67–1.17)
DEPRECATED HCO3 PLAS-SCNC: 22 MMOL/L (ref 22–29)
EOSINOPHIL # BLD AUTO: 0.2 10E3/UL (ref 0–0.7)
EOSINOPHIL NFR BLD AUTO: 2 %
ERYTHROCYTE [DISTWIDTH] IN BLOOD BY AUTOMATED COUNT: 14 % (ref 10–15)
GFR SERPL CREATININE-BSD FRML MDRD: 64 ML/MIN/1.73M2
GLUCOSE SERPL-MCNC: 96 MG/DL (ref 70–99)
GLUCOSE UR STRIP-MCNC: ABNORMAL MG/DL
HCT VFR BLD AUTO: 48.1 % (ref 40–53)
HGB BLD-MCNC: 15.7 G/DL (ref 13.3–17.7)
HGB UR QL STRIP: ABNORMAL
HOLD SPECIMEN: NORMAL
IMM GRANULOCYTES # BLD: 0 10E3/UL
IMM GRANULOCYTES NFR BLD: 0 %
KETONES UR STRIP-MCNC: ABNORMAL MG/DL
LEUKOCYTE ESTERASE UR QL STRIP: ABNORMAL
LYMPHOCYTES # BLD AUTO: 1.4 10E3/UL (ref 0.8–5.3)
LYMPHOCYTES NFR BLD AUTO: 14 %
MCH RBC QN AUTO: 30.8 PG (ref 26.5–33)
MCHC RBC AUTO-ENTMCNC: 32.6 G/DL (ref 31.5–36.5)
MCV RBC AUTO: 95 FL (ref 78–100)
MONOCYTES # BLD AUTO: 1.1 10E3/UL (ref 0–1.3)
MONOCYTES NFR BLD AUTO: 11 %
NEUTROPHILS # BLD AUTO: 6.9 10E3/UL (ref 1.6–8.3)
NEUTROPHILS NFR BLD AUTO: 72 %
NITRATE UR QL: ABNORMAL
NRBC # BLD AUTO: 0 10E3/UL
NRBC BLD AUTO-RTO: 0 /100
PH UR STRIP: ABNORMAL [PH]
PLATELET # BLD AUTO: 286 10E3/UL (ref 150–450)
POTASSIUM SERPL-SCNC: 4.2 MMOL/L (ref 3.4–5.3)
RADIOLOGIST FLAGS: NORMAL
RBC # BLD AUTO: 5.09 10E6/UL (ref 4.4–5.9)
RBC URINE: >182 /HPF
SODIUM SERPL-SCNC: 136 MMOL/L (ref 136–145)
SP GR UR STRIP: 1.03 (ref 1–1.03)
UROBILINOGEN UR STRIP-MCNC: ABNORMAL MG/DL
WBC # BLD AUTO: 9.6 10E3/UL (ref 4–11)
WBC URINE: >182 /HPF

## 2023-07-27 PROCEDURE — 99285 EMERGENCY DEPT VISIT HI MDM: CPT | Mod: 25

## 2023-07-27 PROCEDURE — 81001 URINALYSIS AUTO W/SCOPE: CPT | Performed by: EMERGENCY MEDICINE

## 2023-07-27 PROCEDURE — 85025 COMPLETE CBC W/AUTO DIFF WBC: CPT | Performed by: EMERGENCY MEDICINE

## 2023-07-27 PROCEDURE — 82310 ASSAY OF CALCIUM: CPT | Performed by: EMERGENCY MEDICINE

## 2023-07-27 PROCEDURE — 74176 CT ABD & PELVIS W/O CONTRAST: CPT | Mod: MA

## 2023-07-27 PROCEDURE — 250N000011 HC RX IP 250 OP 636: Mod: JZ | Performed by: EMERGENCY MEDICINE

## 2023-07-27 PROCEDURE — 87086 URINE CULTURE/COLONY COUNT: CPT | Performed by: EMERGENCY MEDICINE

## 2023-07-27 PROCEDURE — 51798 US URINE CAPACITY MEASURE: CPT

## 2023-07-27 PROCEDURE — 99214 OFFICE O/P EST MOD 30 MIN: CPT | Performed by: NURSE PRACTITIONER

## 2023-07-27 PROCEDURE — 96365 THER/PROPH/DIAG IV INF INIT: CPT

## 2023-07-27 PROCEDURE — 36415 COLL VENOUS BLD VENIPUNCTURE: CPT | Performed by: EMERGENCY MEDICINE

## 2023-07-27 RX ORDER — CEFTRIAXONE 2 G/1
2 INJECTION, POWDER, FOR SOLUTION INTRAMUSCULAR; INTRAVENOUS ONCE
Status: COMPLETED | OUTPATIENT
Start: 2023-07-27 | End: 2023-07-27

## 2023-07-27 RX ORDER — CEPHALEXIN 500 MG/1
500 CAPSULE ORAL 2 TIMES DAILY
Qty: 14 CAPSULE | Refills: 0 | Status: SHIPPED | OUTPATIENT
Start: 2023-07-27 | End: 2023-08-03

## 2023-07-27 RX ADMIN — CEFTRIAXONE SODIUM 2 G: 2 INJECTION, POWDER, FOR SOLUTION INTRAMUSCULAR; INTRAVENOUS at 19:50

## 2023-07-27 ASSESSMENT — ACTIVITIES OF DAILY LIVING (ADL): ADLS_ACUITY_SCORE: 33

## 2023-07-27 NOTE — ED PROVIDER NOTES
"  History     Chief Complaint:  Hematuria       The history is provided by the patient.      Alex Stanley is a 77 year old male, anticoagulated on Eliquis, who presents with hematuria. He went to urinate earlier this morning and noticed that it appeared dark, which prompted to him to drink water. Then later at 1400, he went to urinate again and it was \"solid red\". Since then, he has had difficulty urinating, and is concerned that there are blood clots in his urethra. He does not have a history of urinary tract infections or kidney stones. He denies abdominal pain, dysuria, flank pain, nausea, vomiting, or diarrhea.     Independent Historian:   None - Patient Only    Review of External Notes:   I reviewed 2 prior urine cultures from 2009 and 2011 that were negative. I also reviewed a note from Mayela Lalmas.      Medications:    Norvasc   Eliquis   Imdur   Pravachol     Past Medical History:    Alcohol abuse   A-fib   HTN   Diverticulosis of colon   Erosive gastritis   HLD   HTN   COMFORT   Stroke   Tobacco abuse   Fatty liver, alcoholic   CKD stage 3   Gallstones pancreatitis     Past Surgical History:    Colonoscopy w/ polypectomy/bx   Cholecystectomy   R Dupuytren's contracture release   Unspecified R hand procedure     Physical Exam     Patient Vitals for the past 24 hrs:   BP Temp Temp src Pulse Resp SpO2 Weight   07/27/23 2031 118/89 -- -- 72 18 95 % --   07/27/23 1950 122/80 -- -- 78 18 98 % --   07/27/23 1800 (!) 153/83 97.9  F (36.6  C) Temporal 91 16 98 % 78.5 kg (173 lb)        Physical Exam  General:  Sitting on bed.  HENT:  No obvious trauma to head  Right Ear:  External ear normal.   Left Ear:  External ear normal.   Nose:  Nose normal.   Eyes:  Conjunctivae and EOM are normal. Pupils are equal, round, and reactive.   Neck: Normal range of motion. Neck supple. No tracheal deviation present.   CV:  Normal heart sounds. No murmur heard.  Pulm/Chest: Effort normal and breath sounds normal.   Abd: Soft. " No distension. There is no tenderness. There is no rigidity, no rebound and no guarding.   M/S: Normal range of motion.   Neuro: Awake and alert.   Skin: Skin is warm and dry. No rash noted. Not diaphoretic.   Psych: Normal mood and affect. Behavior is normal.     Emergency Department Course   Imaging:  Abd/pelvis CT no contrast - Stone Protocol  Final Result  IMPRESSION:   1.  Cystitis superimposed on changes of chronic bladder outlet obstruction.  2.  Indeterminate 0.8 cm nodule along the right posterior bladder wall, incompletely evaluated on this noncontrast exam. Although this might represent a small adherent blood clot given the reported hematuria, a urothelial neoplasm is not excluded and   correlation with cystoscopy is recommended.  3.  No urinary calculi or hydronephrosis.  4.  Pancolonic diverticulosis. No inflamed colonic diverticuli.  [Recommend Follow Up: Bladder wall nodule]  This report will be copied to the [facility] to ensure a provider acknowledges the finding.     Report per radiology    Laboratory:  Labs Ordered and Resulted from Time of ED Arrival to Time of ED Departure   ROUTINE UA WITH MICROSCOPIC REFLEX TO CULTURE - Abnormal       Result Value    Color Urine Dark Red (*)     Appearance Urine Bloody (*)     Glucose Urine        Bilirubin Urine        Ketones Urine        Specific Gravity Urine 1.029      Blood Urine Large (*)     pH Urine        Protein Albumin Urine        Urobilinogen Urine        Nitrite Urine        Leukocyte Esterase Urine        Bacteria Urine Moderate (*)     RBC Urine >182 (*)     WBC Urine >182 (*)    BASIC METABOLIC PANEL - Abnormal    Sodium 136      Potassium 4.2      Chloride 101      Carbon Dioxide (CO2) 22      Anion Gap 13      Urea Nitrogen 14.8      Creatinine 1.18 (*)     Calcium 9.2      Glucose 96      GFR Estimate 64     CBC WITH PLATELETS AND DIFFERENTIAL    WBC Count 9.6      RBC Count 5.09      Hemoglobin 15.7      Hematocrit 48.1       MCV 95      MCH 30.8      MCHC 32.6      RDW 14.0      Platelet Count 286      % Neutrophils 72      % Lymphocytes 14      % Monocytes 11      % Eosinophils 2      % Basophils 1      % Immature Granulocytes 0      NRBCs per 100 WBC 0      Absolute Neutrophils 6.9      Absolute Lymphocytes 1.4      Absolute Monocytes 1.1      Absolute Eosinophils 0.2      Absolute Basophils 0.1      Absolute Immature Granulocytes 0.0      Absolute NRBCs 0.0     URINE CULTURE      Emergency Department Course & Assessments:    Interventions:  Medications   cefTRIAXone (ROCEPHIN) 2 g vial to attach to  ml bag for ADULTS or NS 50 ml bag for PEDS (0 g Intravenous Stopped 7/27/23 2032)        Assessments:  1901 I obtained history and examined the patient as noted above.  2028 I rechecked the patient and explained findings. I discussed plan for discharge home.    Independent Interpretation (X-rays, CTs, rhythm strip):  None    Consultations/Discussion of Management or Tests:  None     Social Determinants of Health affecting care:   None    Disposition:  The patient was discharged to home.     Impression & Plan    Medical Decision Making:  Alex Stanley is a very pleasant 77 year old year old patient who presents to the emergency department with concern of hematuria.  It started today.  Urine shows evidence of hematuria along with white cells consistent with a urinary tract infection.  He has not had a UTI in the past and 2 prior urine cultures were negative from many years ago.  Hemoglobin is stable.  He is hemodynamically stable as well.  CT was performed which shows no evidence of renal stone.  He was provided a copy of the CT report and I reviewed the incidental findings with him including the mass within the bladder that may be a clot versus a bladder tumor.  He was referred to urology for follow-up cystoscopy.  He expressed verbal understanding of this.  He was provided Rocephin here and Keflex prescribed as below.  Discussed  reasons to return including any near syncope, lightheadedness, persistent symptoms, fever, etc.  He is in agreement and felt much better.  He was able to urinate just fine he did not require catheter.  He will continue his Eliquis.    The treatment plan was discussed with the patient and they expressed understanding of this plan and consented to the plan.  In addition, the patient will return to the emergency department if their symptoms persist, worsen, if new symptoms arise or if there is any concern as other pathology may be present that is not evident at this time. They also understand the importance of close follow up in the clinic and if unable to do so will return to the emergency department for a reevaluation. All questions were answered.    Diagnosis:    ICD-10-CM    1. Urinary tract infection with hematuria, site unspecified  N39.0     R31.9       2. Hematuria, unspecified type  R31.9       3. Abnormal CT of the abdomen  R93.5     possible mass in bladder           Discharge Medications:  Discharge Medication List as of 7/27/2023  8:35 PM      START taking these medications    Details   cephALEXin (KEFLEX) 500 MG capsule Take 1 capsule (500 mg) by mouth 2 times daily for 7 days, Disp-14 capsule, R-0, E-Prescribe                Scribe Disclosure:  I, Zak Herrera, am serving as a scribe at 7:14 PM on 7/27/2023 to document services personally performed by Mansoor Burgos DO based on my observations and the provider's statements to me.   7/27/2023   Mansoor Burgos DO Anderson, Robert James, DO  07/27/23 2214

## 2023-07-27 NOTE — ED TRIAGE NOTES
Pt presents with hematuria- reports he takes eliquis and reports noted dark urine and then tonight noted blood clots      Triage Assessment       Row Name 07/27/23 7626       Triage Assessment (Adult)    Airway WDL WDL       Respiratory WDL    Respiratory WDL WDL       Skin Circulation/Temperature WDL    Skin Circulation/Temperature WDL WDL       Cardiac WDL    Cardiac WDL WDL       Peripheral/Neurovascular WDL    Peripheral Neurovascular WDL WDL       Cognitive/Neuro/Behavioral WDL    Cognitive/Neuro/Behavioral WDL WDL

## 2023-07-27 NOTE — ED NOTES
PIT/Triage Evaluation    Patient presented with blood in the urine. He has been peeing blood and recently he started having blood clots come out. He says these blood clots are preventing him from peeing despite feeling like peeing. He has been on warfarin for the past 10 years due to a minor stroke and going into Afib and he switched to Eliquis last fall. Denies experiencing this before.  Denies any fever, flank, back, or abdominal pain. Does not use aspirin or ibuprofen.    Exam is notable for:  Resp:               Non-labored  Neuro:             Alert and cooperative  MSkel:             Moving all extremities  Skin:                No rash on exposed skin  GI:                   No abdominal tenderness, lower abdominal distension not appreciated in sitting position  :                  No CVA tenderness         Appropriate interventions for symptom management were initiated if applicable.  Appropriate diagnostic tests were initiated if indicated.    Important information for subsequent clinician:  Labs and urine sent.  Pink top sent in case type and screen needed.  CT to evaluate for stone or structural lesion.      I briefly evaluated the patient and developed an initial plan of care. I discussed this plan and explained that this brief interaction does not constitute a full evaluation. Patient/family understands that they should wait to be fully evaluated and discuss any test results with another clinician prior to leaving the hospital.       Larissa Wu MD  07/27/23 4332

## 2023-07-27 NOTE — PROGRESS NOTES
Chief Complaint   Patient presents with    Hematuria     Pt has blood in urine X 1 day          ICD-10-CM    1. Gross hematuria  R31.0       2. Dysuria  R30.0 CANCELED: UA Macroscopic with reflex to Microscopic and Culture - Clinic Collect        Patient will need more emergent work-up than is available at the urgent care.  Including scans and possible catheterization.  Patient is agreeable to going to [facility] emergency room.  Silver for [facility] emergency room and inform them of patient's impending arrival.      Subjective     Alex Stanley is an 77 year old male who presents to clinic today for blood in the urine.  He first noticed some discoloration of the urine yesterday and thought he was just dehydrated so began drinking more fluids.  Today he noticed it was julia blood at times with clots present.  He is taking Eliquis.    He denies any trauma, fever, chills, low back pain, nausea or vomiting.      Objective    /70   Pulse 91   Temp 97.8  F (36.6  C) (Tympanic)   Resp 16   Wt 78.5 kg (173 lb)   SpO2 96%   BMI 27.10 kg/m    Nurses notes and VS have been reviewed.    Physical Exam       GENERAL APPEARANCE: healthy appearing, alert     MS: extremities normal- no gross deformities noted; normal muscle tone.     SKIN: no suspicious lesions or rashes  ABD: Soft and nontender.      LAST Dias, CNP  [facility] Provider    The use of [employer] dictation services may have been used to construct the content in this note; any grammatical or spelling errors are non-intentional. Please contact the author of this note directly if you are in need of any clarification.

## 2023-07-28 ENCOUNTER — TELEPHONE (OUTPATIENT)
Dept: INTERNAL MEDICINE | Facility: CLINIC | Age: 78
End: 2023-07-28
Payer: MEDICARE

## 2023-07-28 ENCOUNTER — TELEPHONE (OUTPATIENT)
Dept: UROLOGY | Facility: CLINIC | Age: 78
End: 2023-07-28
Payer: MEDICARE

## 2023-07-28 NOTE — TELEPHONE ENCOUNTER
[facility] Call Center    Phone Message    May a detailed message be left on voicemail: yes     Reason for Call: Other: Patients spouse Angela calling in regards to patient being seen at the ED yesterday and was told to follow up with Urology. There is no referral so writer viewed the after visit summary. Patient was seen for a UTI and Gross Hematuria but the after visit summary states Bladder Mass and they want patient to follow up with Urology for a Cystoscopy. Writer sending TE in regards to if patient to be scheduled for Bladder Mass. Please contact patients spouse Angela in regards to getting patient scheduled. Thank you        Action Taken: Other: Urology    Travel Screening: Not Applicable

## 2023-07-28 NOTE — TELEPHONE ENCOUNTER
Pt's wife, Angela called. Pt was in the ED yesterday for blood clots in his urine. On eliquis. Pt was instructed to follow up with provider in 2 days. Still having clots, urinating.     Pt was also instructed to follow up with urology as well, [place] urologist in [place] or some other [place] urologist. Angela will call the number given on discharge paper work to schedule that appointment. He will be taking an antibiotic over the weekend and go to the ER if unable to urinate or symptoms worsen.     No hosp follow up appointment available within the recommended time frame. Routing to provider; okay to use virtual visit on Monday 7/31? Pt is ok to see different provider if needed.     Can we leave a detailed message on this number? YES  Phone number patient can be reached at: Home number on file [phone] (home)    Cheyanne Francis RN  [facility] Triage

## 2023-07-28 NOTE — TELEPHONE ENCOUNTER
Called and spoke to the patient's wife. Assisted in getting the patient scheduled for an appointment per PCP recommendations.     Appointments in Next Year      Jul 31, 2023 10:00 AM  (Arrive by 9:40 AM)  Provider Visit with Etsuardo Manning MD  [facility] ([facility] ) [phone]     Wife had no additional questions at this time.     Joselyn Moya RN

## 2023-07-29 LAB — BACTERIA UR CULT: NO GROWTH

## 2023-07-29 NOTE — RESULT ENCOUNTER NOTE
"Final urine culture report shows \"NO GROWTH\" and is NEGATIVE.  [facility] Emergency Dept discharge antibiotic: Cephalexin (Keflex) 500 mg capsule, 1 capsule (500 mg) by mouth 2 times daily for 7 days.  [facility] Emergency Dept discharge Rx antibiotic for UTI only (Yes/No): Yes  RN to confirm if Patient took antibiotic within 3 days prior to urine culture collection.  Recommendations in treatment per [facility] ED Lab result Urine culture protocol.  "

## 2023-07-31 ENCOUNTER — OFFICE VISIT (OUTPATIENT)
Dept: INTERNAL MEDICINE | Facility: CLINIC | Age: 78
End: 2023-07-31
Payer: MEDICARE

## 2023-07-31 VITALS
WEIGHT: 167 LBS | OXYGEN SATURATION: 99 % | HEART RATE: 73 BPM | SYSTOLIC BLOOD PRESSURE: 102 MMHG | BODY MASS INDEX: 26.21 KG/M2 | DIASTOLIC BLOOD PRESSURE: 65 MMHG | TEMPERATURE: 97.3 F | HEIGHT: 67 IN

## 2023-07-31 DIAGNOSIS — N32.89 BLADDER MASS: ICD-10-CM

## 2023-07-31 DIAGNOSIS — R31.0 GROSS HEMATURIA: Primary | ICD-10-CM

## 2023-07-31 PROCEDURE — 99214 OFFICE O/P EST MOD 30 MIN: CPT | Performed by: INTERNAL MEDICINE

## 2023-07-31 NOTE — PROGRESS NOTES
Assessment & Plan     Gross hematuria  With possible bladder mass on CT scan, needs urology referral for consideration of cystoscopy.  Temporary interruption of Eliquis should not be a problem, if necessary for cystoscopic procedure.  - Adult Urology  Referral; Future    Bladder mass  As above  - Adult Urology  Referral; Future           MED REC REQUIRED  Post Medication Reconciliation Status:       Estuardo Manning MD  [facility]aro is a 77 year old, presenting for the following health issues:  Hospital F/U            History of Present Illness       Reason for visit:  Bladder infection  Symptom onset:  1-3 days ago  Symptoms include:  None now  Symptom intensity:  Mild  Symptom progression:  Improving  Had these symptoms before:  No  What makes it worse:  No  What makes it better:  Good news    He eats 2-3 servings of fruits and vegetables daily.He consumes 0 sweetened beverage(s) daily.He exercises with enough effort to increase his heart rate 10 to 19 minutes per day.  He exercises with enough effort to increase his heart rate 4 days per week.   He is taking medications regularly.         Hospital Follow-up Visit:    Hospital/Nursing Home/IP Rehab Facility: [facility]  Date of Admission: 7/27/2023  Date of Discharge: 7/27/2023  Reason(s) for Admission: Hematuria    Was your hospitalization related to COVID-19? No   Problems taking medications regularly:  None  Medication changes since discharge: Was given Cephalexin 500mg  Problems adhering to non-medication therapy:  None    Summary of hospitalization:  [facility] discharge summary reviewed  Diagnostic Tests/Treatments reviewed.  Follow up needed: none  Other Healthcare Providers Involved in Patient s Care:         None  Update since discharge: improved.         Plan of care communicated with patient             As above.  Patient was  "actually not hospitalized, this was a ER visit on 7/27.  He had presented with gross hematuria of 1 to 2 days duration.  Urinalysis was grossly positive for blood and showed extensive white blood cells as well, CT of his abdomen showed no nephrolithiasis, but did show diffuse bladder wall thickening, as well as a nodular 0.8 cm density on the right posterior bladder wall.  He was treated with a dose of Rocephin and given a prescription for Keflex empirically for cystitis, urology referral was placed in the ER but he needs referral to a different provider.    Has been feeling much better since ER visit, hematuria has not resolved completely but has dramatically improved.    The patient does have a past history of tobacco use, is not currently smoking.  He is on chronic anticoagulation (Eliquis) due to atrial fibrillation, in context of a CVA that he suffered in 2011.          Review of Systems   Constitutional, HEENT, cardiovascular, pulmonary, gi and gu systems are negative, except as otherwise noted.      Objective    /65   Pulse 73   Temp 97.3  F (36.3  C) (Temporal)   Ht 1.702 m (5' 7\")   Wt 75.8 kg (167 lb)   SpO2 99%   BMI 26.16 kg/m    Body mass index is 26.16 kg/m .  Physical Exam   GENERAL: healthy, alert and no distress  NECK: no adenopathy, no asymmetry, masses, or scars and thyroid normal to palpation  RESP: lungs clear to auscultation - no rales, rhonchi or wheezes  CV: regular rate and rhythm, normal S1 S2, no S3 or S4, no murmur, click or rub, no peripheral edema and peripheral pulses strong  ABDOMEN: soft, nontender, no hepatosplenomegaly, no masses and bowel sounds normal  MS: no gross musculoskeletal defects noted, no edema                      "

## 2023-07-31 NOTE — TELEPHONE ENCOUNTER
Spoke to pt's wife, she declined to make an appointment, she said they saw Dr. Manning today and he referred them to a different urology office.

## 2023-07-31 NOTE — TELEPHONE ENCOUNTER
Tejas called to schedule. He states he wants to be seen by us and at the [place] location. Referral was for [facility] and he wanted to schedule. Writer scheduled for soonest available which was 8/31. Priority states 1-2wks. Please review, thanks.

## 2023-08-01 ENCOUNTER — PATIENT OUTREACH (OUTPATIENT)
Dept: UROLOGY | Facility: CLINIC | Age: 78
End: 2023-08-01
Payer: MEDICARE

## 2023-08-01 NOTE — TELEPHONE ENCOUNTER
Writer reached out to pt to offer sooner appointment for referral regarding a bladder mass.     No answer, writer left generic VM with call back name and number.     Will continue to follow as needed.

## 2023-08-01 NOTE — TELEPHONE ENCOUNTER
MEDICAL RECORDS REQUEST   [facility]  Urology Clinic  [address] [place] [address]  PHONE: [phone]  Fax: [phone]        FUTURE VISIT INFORMATION                                                   Alex Stanley, : 1945 scheduled for future visit at [facility]    APPOINTMENT INFORMATION:  Date: 2023  Provider:  Sydney Weaver MD  Reason for Visit/Diagnosis: hematuria and bladder mass    REFERRAL INFORMATION:  Referring provider:  Estuardo Manning MD in  INTERNAL MEDICINE      RECORDS REQUESTED FOR VISIT                                                     NOTES  STATUS/DETAILS   OFFICE NOTE from referring provider  yes, 2023 -- Estuardo Manning MD in  INTERNAL MEDICINE   OFFICE NOTE from other specialist  yes   DISCHARGE REPORT from the ER  yes, 2023 -- [facility] ED   MEDICATION LIST  yes   LABS     URINALYSIS (UA)  yes   BLADDER CANCER     CT ABDOMEN/PELVIS (IMAGES & REPORT)  yes, 2023     PRE-VISIT CHECKLIST      Joint diagnostic appointment coordinated correctly          (ensure right order & amount of time) Yes   RECORD COLLECTION COMPLETE Yes

## 2023-08-03 ENCOUNTER — PATIENT OUTREACH (OUTPATIENT)
Dept: UROLOGY | Facility: CLINIC | Age: 78
End: 2023-08-03

## 2023-08-03 ENCOUNTER — VIRTUAL VISIT (OUTPATIENT)
Dept: UROLOGY | Facility: CLINIC | Age: 78
End: 2023-08-03
Payer: MEDICARE

## 2023-08-03 DIAGNOSIS — R31.0 GROSS HEMATURIA: Primary | ICD-10-CM

## 2023-08-03 PROCEDURE — 99204 OFFICE O/P NEW MOD 45 MIN: CPT | Mod: VID | Performed by: UROLOGY

## 2023-08-03 NOTE — PROGRESS NOTES
Virtual Visit Details    Type of service:  Video Visit   Video Start Time:  1:30 PM   Video End Time:2:02 PM    Originating Location (pt. Location): Home    Distant Location (provider location):  On-site  Platform used for Video Visit: [facility]      Urology clinic            Chief Complaint:   Hematuria         Consult or Referral:     Alex Stanley is a 77 year old male seen in consultation from Emergency room.           History of Present Illness:    Alex Stanley is a very pleasant 77 year old male who presents with a history of gross  hematuria.  This occurred about 2 weeks ago.  he has not had similar symptoms in the past.There are associated urinary symptoms including nocturia.  He denies any flank or abdominal pain.  The hematuria is not associated with vigorous exercise.    Concerning typical risk factors for urinary tract malignancies, the patient does not have a family history of  malignancies.    he has not received pelvic radiation, exposure to known carcinogenic agents such as benzenes, aromatic amines, alkylating agents or chronic indwelling foreign bodies in the urinary tract in the past.  Furthermore he does not have a history of recurrent urinary tract infections in the past.    The patient is currently smoking cigarettes.           Past Medical History:     Past Medical History:   Diagnosis Date    Alcohol abuse     Alcoholism (H)     admission 11/09    Atrial fibrillation, paroxysmal 11/07, 6/09, 11/09    Benign essential hypertension     Chronic atrial fibrillation (H)     Diverticulosis of colon 6/2/2011    Erosive gastritis 11/09    Gastritis     Hyperlipidemia LDL goal <100     Hypertension, with LVH     Iron deficiency anemia 11/09    Paroxysmal atrial fibrillation (H)     Stroke (H)     Tobacco abuse             Past Surgical History:     Past Surgical History:   Procedure Laterality Date    COLONOSCOPY N/A 10/26/2020    Procedure: COLONOSCOPY, WITH POLYPECTOMY AND BIOPSY;  Surgeon:  Shon Bond MD;  Location:  GI    ENDOSCOPIC ULTRASOUND UPPER GASTROINTESTINAL TRACT (GI) N/A 1/24/2022    Procedure: ENDOSCOPIC ULTRASOUND, ESOPHAGOSCOPY / UPPER GASTROINTESTINAL TRACT (GI);  Surgeon: Shon Bond MD;  Location:  GI    LAPAROSCOPIC CHOLECYSTECTOMY N/A 2/8/2022    Procedure: LAPAROSCOPIC CHOLECYSTECTOMY;  Surgeon: Mazin Bains MD;  Location:  OR    SURGICAL HISTORY OF -   4/11    R Dupuytren's contracture release   Dr. Scruggs    [facility] NONSPECIFIC PROCEDURE  2002    Rt foot reconstructive    [facility] NONSPECIFIC PROCEDURE  10/12    R hand procedure.  Dr. Deluna            Medications     Current Outpatient Medications   Medication    amLODIPine (NORVASC) 10 MG tablet    apixaban ANTICOAGULANT (ELIQUIS ANTICOAGULANT) 5 MG tablet    ASPIRIN NOT PRESCRIBED, INTENTIONAL,    cephALEXin (KEFLEX) 500 MG capsule    isosorbide mononitrate (IMDUR) 30 MG 24 hr tablet    Multiple Vitamins-Minerals (PRESERVISION AREDS 2) CAPS    pravastatin (PRAVACHOL) 20 MG tablet     No current facility-administered medications for this visit.            Family History:     Family History   Problem Relation Age of Onset    Lung Cancer Mother     Emphysema Father     Cancer Mother         small cell lung with mets to brain    Diabetes Maternal Grandmother     Respiratory Father         emphysema            Social History:     Social History     Socioeconomic History    Marital status:      Spouse name: Not on file    Number of children: Not on file    Years of education: Not on file    Highest education level: Not on file   Occupational History    Not on file   Tobacco Use    Smoking status: Every Day     Packs/day: 0.50     Types: Cigarettes     Start date: 1965    Smokeless tobacco: Never    Tobacco comments:     ppd since age 16 - now 1/2-1 ppd 04/05/21. Cutting down to 5 a day trying to quit   Vaping Use    Vaping Use: Never used   Substance and Sexual Activity    Alcohol use: Not Currently      [facility]    Drug use: Not Currently    Sexual activity: Yes     Partners: Female   Other Topics Concern    Parent/sibling w/ CABG, MI or angioplasty before 65F 55M? Not Asked   Social History Narrative    ** Merged History Encounter **          Social Determinants of Health     Financial Resource Strain: Not on file   Food Insecurity: Not on file   Transportation Needs: Not on file   Physical Activity: Not on file   Stress: Not on file   Social Connections: Not on file   Intimate Partner Violence: Not on file   Housing Stability: Not on file            Allergies:   Patient has no known allergies.         Review of Systems:  From intake questionnaire     Negative 14 system review except as noted on HPI, nurse's note.         Physical Exam:     Vitals:  No vitals were obtained today due to virtual visit.    Physical Exam   GENERAL: Healthy, alert and no distress  EYES: Eyes grossly normal to inspection.  No discharge or erythema, or obvious scleral/conjunctival abnormalities.  RESP: No audible wheeze, cough, or visible cyanosis.  No visible retractions or increased work of breathing.    SKIN: Visible skin clear. No significant rash, abnormal pigmentation or lesions.  NEURO: Cranial nerves grossly intact.  Mentation and speech appropriate for age.  PSYCH: Mentation appears normal, affect normal/bright, judgement and insight intact, normal speech and appearance well-groomed.        Labs and Pathology:    I reviewed all applicable laboratory and pathology data and went over findings with patient  Significant for     Lab Results   Component Value Date    WBC 9.6 07/27/2023    WBC 8.3 10/08/2012     Lab Results   Component Value Date    RBC 5.09 07/27/2023    RBC 4.80 10/08/2012     Lab Results   Component Value Date    HGB 15.7 07/27/2023    HGB 14.1 06/14/2018     Lab Results   Component Value Date    HCT 48.1 07/27/2023    HCT 44.3 10/08/2012     No components found for: MCT  Lab Results    Component Value Date    MCV 95 07/27/2023    MCV 92 10/08/2012     Lab Results   Component Value Date    MCH 30.8 07/27/2023    MCH 30.2 10/08/2012     Lab Results   Component Value Date    MCHC 32.6 07/27/2023    MCHC 32.7 10/08/2012     Lab Results   Component Value Date    RDW 14.0 07/27/2023    RDW 14.1 10/08/2012     Lab Results   Component Value Date     07/27/2023     10/08/2012       Last Comprehensive Metabolic Panel:  Sodium   Date Value Ref Range Status   07/27/2023 136 136 - 145 mmol/L Final   10/01/2020 140 133 - 144 mmol/L Final     Potassium   Date Value Ref Range Status   07/27/2023 4.2 3.4 - 5.3 mmol/L Final   02/08/2022 3.8 3.4 - 5.3 mmol/L Final   10/01/2020 3.9 3.4 - 5.3 mmol/L Final     Chloride   Date Value Ref Range Status   07/27/2023 101 98 - 107 mmol/L Final   01/14/2022 113 (H) 94 - 109 mmol/L Final   10/01/2020 108 94 - 109 mmol/L Final     Carbon Dioxide   Date Value Ref Range Status   10/01/2020 28 20 - 32 mmol/L Final     Carbon Dioxide (CO2)   Date Value Ref Range Status   07/27/2023 22 22 - 29 mmol/L Final   01/14/2022 23 20 - 32 mmol/L Final     Anion Gap   Date Value Ref Range Status   07/27/2023 13 7 - 15 mmol/L Final   01/14/2022 4 3 - 14 mmol/L Final   10/01/2020 4 3 - 14 mmol/L Final     Glucose   Date Value Ref Range Status   07/27/2023 96 70 - 99 mg/dL Final   01/14/2022 88 70 - 99 mg/dL Final   10/01/2020 70 70 - 99 mg/dL Final     Comment:     Fasting specimen     Urea Nitrogen   Date Value Ref Range Status   07/27/2023 14.8 8.0 - 23.0 mg/dL Final   01/14/2022 11 7 - 30 mg/dL Final   10/01/2020 14 7 - 30 mg/dL Final     Creatinine   Date Value Ref Range Status   07/27/2023 1.18 (H) 0.67 - 1.17 mg/dL Final   10/01/2020 1.26 (H) 0.66 - 1.25 mg/dL Final     GFR Estimate   Date Value Ref Range Status   07/27/2023 64 >60 mL/min/1.73m2 Final   10/01/2020 56 (L) >60 mL/min/[1.73_m2] Final     Comment:     Non  GFR Calc  Starting 12/18/2018, serum  creatinine based estimated GFR (eGFR) will be   calculated using the Chronic Kidney Disease Epidemiology Collaboration   (CKD-EPI) equation.       Calcium   Date Value Ref Range Status   07/27/2023 9.2 8.8 - 10.2 mg/dL Final   10/01/2020 8.8 8.5 - 10.1 mg/dL Final     Bilirubin Total   Date Value Ref Range Status   02/27/2023 0.4 <=1.2 mg/dL Final   10/01/2020 0.7 0.2 - 1.3 mg/dL Final     Alkaline Phosphatase   Date Value Ref Range Status   02/27/2023 93 40 - 129 U/L Final   10/01/2020 74 40 - 150 U/L Final     ALT   Date Value Ref Range Status   02/27/2023 22 10 - 50 U/L Final   10/01/2020 24 0 - 70 U/L Final     AST   Date Value Ref Range Status   02/27/2023 31 10 - 50 U/L Final   10/01/2020 23 0 - 45 U/L Final       INR/Prothrombin Time    Creatinine   Date Value Ref Range Status   07/27/2023 1.18 (H) 0.67 - 1.17 mg/dL Final   10/01/2020 1.26 (H) 0.66 - 1.25 mg/dL Final        PSA   Date Value Ref Range Status   06/01/2011 0.12 0 - 4 ug/L Final     Prostate Specific Antigen Screen   Date Value Ref Range Status   02/27/2023 0.26 0.00 - 6.50 ng/mL Final           Imaging:    I reviewed all applicable imaging and went over findings with patient.  Significant for nodule in the posterior bladder wall, pelvic or retroperitoneal or pelvic LAP     Abd/pelvis CT no contrast - Stone Protocol    Result Date: 7/27/2023  EXAM: CT ABDOMEN PELVIS W/O CONTRAST LOCATION: [facility] DATE: 7/27/2023 INDICATION: Hematuria. Urgency. COMPARISON: CT abdomen pelvis 06/01/2011. TECHNIQUE: CT scan of the abdomen and pelvis was performed without IV contrast. Multiplanar reformats were obtained. Dose reduction techniques were used. CONTRAST: None. FINDINGS: LOWER CHEST: Linear scarring and/or atelectasis. No pleural effusions. Trace pericardial effusion. HEPATOBILIARY: Gallbladder is absent. No biliary ductal dilation. PANCREAS: Normal. SPLEEN: Multiple punctate granulomas. No splenomegaly. ADRENAL GLANDS: Normal.  KIDNEYS/BLADDER: Small benign left lower pole renal cyst, which does not require follow-up. Partially duplicated right renal collecting system. No urinary calculi or hydronephrosis. Diffuse bladder wall thickening, somewhat greater along the right posterolateral aspect of the bladder, with haziness of the perivesical fat. Small right lateral bladder diverticulum. Nodular 0.8 cm density along the right posterior bladder wall (4/#166). BOWEL: No obstruction or inflammation. Normal appendix. Pancolonic diverticulosis. No inflamed colonic diverticuli. LYMPH NODES: No enlarged lymph nodes. Few punctate calcifications within periportal lymph nodes related to previous granulomatous disease. VASCULATURE: Mild aortobiiliac atherosclerosis. No abdominal aortic aneurysm. PELVIC ORGANS: Normal. MUSCULOSKELETAL: Multilevel degenerative change of the spine. No acute bony abnormality. Tiny fat-containing periumbilical hernia.     IMPRESSION: 1.  Cystitis superimposed on changes of chronic bladder outlet obstruction. 2.  Indeterminate 0.8 cm nodule along the right posterior bladder wall, incompletely evaluated on this noncontrast exam. Although this might represent a small adherent blood clot given the reported hematuria, a urothelial neoplasm is not excluded and correlation with cystoscopy is recommended. 3.  No urinary calculi or hydronephrosis. 4.  Pancolonic diverticulosis. No inflamed colonic diverticuli. [Recommend Follow Up: Bladder wall nodule] This report will be copied to the [facility] to ensure a provider acknowledges the finding.            Assessment and Plan:     Assessment     77 year old male with gross hematuria    The differential diagnosis for hematuria is broad, but the causes can be narrowed into distinct categories. The most important one to rule out would be malignancy including bladder, prostate and kidney malignancies. Other causes of hematuria include infection, stone, trauma, and medical  renal disease.      We discussed essential components of a work up to rule out genitourinary tract malignancies including CT urogram and flexible cystoscopy.       Plan  Obtain CT urogram   Schedule for office cystoscopy    45 total minutes spent on the date of the encounter including direct interaction with the patient, performing chart review, documentation and further activities as noted above.         Sydney Weaver MD   Department of Urology   [facility]

## 2023-08-03 NOTE — TELEPHONE ENCOUNTER
Writer reached out to pt to schedule for CT urogram and cysto per providers recommendations.     Pt agreeable to dates and times of both appts.     Writer will send [employer] message with address for cysto appointment.     Will continue to follow as needed.

## 2023-08-03 NOTE — LETTER
8/3/2023       RE: Alex Stanley  [address]  [facility] [address]     Dear Colleague,    Thank you for referring your patient, Alex Stanley, to the [facility] UROLOGY CLINIC [place] at [facility]. Please see a copy of my visit note below.    Virtual Visit Details    Type of service:  Video Visit   Video Start Time:  1:30 PM   Video End Time:2:02 PM    Originating Location (pt. Location): Home    Distant Location (provider location):  On-site  Platform used for Video Visit: [facility]      Urology clinic            Chief Complaint:   Hematuria         Consult or Referral:     Alex Stanley is a 77 year old male seen in consultation from Emergency room.           History of Present Illness:    Alex Stanley is a very pleasant 77 year old male who presents with a history of gross  hematuria.  This occurred about 2 weeks ago.  he has not had similar symptoms in the past.There are associated urinary symptoms including nocturia.  He denies any flank or abdominal pain.  The hematuria is not associated with vigorous exercise.    Concerning typical risk factors for urinary tract malignancies, the patient does not have a family history of  malignancies.    he has not received pelvic radiation, exposure to known carcinogenic agents such as benzenes, aromatic amines, alkylating agents or chronic indwelling foreign bodies in the urinary tract in the past.  Furthermore he does not have a history of recurrent urinary tract infections in the past.    The patient is currently smoking cigarettes.           Past Medical History:     Past Medical History:   Diagnosis Date    Alcohol abuse     Alcoholism (H)     admission 11/09    Atrial fibrillation, paroxysmal 11/07, 6/09, 11/09    Benign essential hypertension     Chronic atrial fibrillation (H)     Diverticulosis of colon 6/2/2011    Erosive gastritis 11/09    Gastritis     Hyperlipidemia LDL goal <100      Hypertension, with LVH     Iron deficiency anemia 11/09    Paroxysmal atrial fibrillation (H)     Stroke (H)     Tobacco abuse             Past Surgical History:     Past Surgical History:   Procedure Laterality Date    COLONOSCOPY N/A 10/26/2020    Procedure: COLONOSCOPY, WITH POLYPECTOMY AND BIOPSY;  Surgeon: Shon Bond MD;  Location:  GI    ENDOSCOPIC ULTRASOUND UPPER GASTROINTESTINAL TRACT (GI) N/A 1/24/2022    Procedure: ENDOSCOPIC ULTRASOUND, ESOPHAGOSCOPY / UPPER GASTROINTESTINAL TRACT (GI);  Surgeon: Shon Bond MD;  Location:  GI    LAPAROSCOPIC CHOLECYSTECTOMY N/A 2/8/2022    Procedure: LAPAROSCOPIC CHOLECYSTECTOMY;  Surgeon: Mazin Bains MD;  Location:  OR    SURGICAL HISTORY OF -   4/11    R Dupuytren's contracture release   Dr. Scruggs    [facility] NONSPECIFIC PROCEDURE  2002    Rt foot reconstructive    [facility] NONSPECIFIC PROCEDURE  10/12    R hand procedure.  Dr. Deluna            Medications     Current Outpatient Medications   Medication    amLODIPine (NORVASC) 10 MG tablet    apixaban ANTICOAGULANT (ELIQUIS ANTICOAGULANT) 5 MG tablet    ASPIRIN NOT PRESCRIBED, INTENTIONAL,    cephALEXin (KEFLEX) 500 MG capsule    isosorbide mononitrate (IMDUR) 30 MG 24 hr tablet    Multiple Vitamins-Minerals (PRESERVISION AREDS 2) CAPS    pravastatin (PRAVACHOL) 20 MG tablet     No current facility-administered medications for this visit.            Family History:     Family History   Problem Relation Age of Onset    Lung Cancer Mother     Emphysema Father     Cancer Mother         small cell lung with mets to brain    Diabetes Maternal Grandmother     Respiratory Father         emphysema            Social History:     Social History     Socioeconomic History    Marital status:      Spouse name: Not on file    Number of children: Not on file    Years of education: Not on file    Highest education level: Not on file   Occupational History    Not on file   Tobacco Use    Smoking status:  Every Day     Packs/day: 0.50     Types: Cigarettes     Start date: 1965    Smokeless tobacco: Never    Tobacco comments:     ppd since age 16 - now 1/2-1 ppd 04/05/21. Cutting down to 5 a day trying to quit   Vaping Use    Vaping Use: Never used   Substance and Sexual Activity    Alcohol use: Not Currently     Comment: last drank at [facility] 8/2010.    Drug use: Not Currently    Sexual activity: Yes     Partners: Female   Other Topics Concern    Parent/sibling w/ CABG, MI or angioplasty before 65F 55M? Not Asked   Social History Narrative    ** Merged History Encounter **          Social Determinants of Health     Financial Resource Strain: Not on file   Food Insecurity: Not on file   Transportation Needs: Not on file   Physical Activity: Not on file   Stress: Not on file   Social Connections: Not on file   Intimate Partner Violence: Not on file   Housing Stability: Not on file            Allergies:   Patient has no known allergies.         Review of Systems:  From intake questionnaire     Negative 14 system review except as noted on HPI, nurse's note.         Physical Exam:     Vitals:  No vitals were obtained today due to virtual visit.    Physical Exam   GENERAL: Healthy, alert and no distress  EYES: Eyes grossly normal to inspection.  No discharge or erythema, or obvious scleral/conjunctival abnormalities.  RESP: No audible wheeze, cough, or visible cyanosis.  No visible retractions or increased work of breathing.    SKIN: Visible skin clear. No significant rash, abnormal pigmentation or lesions.  NEURO: Cranial nerves grossly intact.  Mentation and speech appropriate for age.  PSYCH: Mentation appears normal, affect normal/bright, judgement and insight intact, normal speech and appearance well-groomed.        Labs and Pathology:    I reviewed all applicable laboratory and pathology data and went over findings with patient  Significant for     Lab Results   Component Value Date    WBC 9.6 07/27/2023    WBC 8.3  10/08/2012     Lab Results   Component Value Date    RBC 5.09 07/27/2023    RBC 4.80 10/08/2012     Lab Results   Component Value Date    HGB 15.7 07/27/2023    HGB 14.1 06/14/2018     Lab Results   Component Value Date    HCT 48.1 07/27/2023    HCT 44.3 10/08/2012     No components found for: MCT  Lab Results   Component Value Date    MCV 95 07/27/2023    MCV 92 10/08/2012     Lab Results   Component Value Date    MCH 30.8 07/27/2023    MCH 30.2 10/08/2012     Lab Results   Component Value Date    MCHC 32.6 07/27/2023    MCHC 32.7 10/08/2012     Lab Results   Component Value Date    RDW 14.0 07/27/2023    RDW 14.1 10/08/2012     Lab Results   Component Value Date     07/27/2023     10/08/2012       Last Comprehensive Metabolic Panel:  Sodium   Date Value Ref Range Status   07/27/2023 136 136 - 145 mmol/L Final   10/01/2020 140 133 - 144 mmol/L Final     Potassium   Date Value Ref Range Status   07/27/2023 4.2 3.4 - 5.3 mmol/L Final   02/08/2022 3.8 3.4 - 5.3 mmol/L Final   10/01/2020 3.9 3.4 - 5.3 mmol/L Final     Chloride   Date Value Ref Range Status   07/27/2023 101 98 - 107 mmol/L Final   01/14/2022 113 (H) 94 - 109 mmol/L Final   10/01/2020 108 94 - 109 mmol/L Final     Carbon Dioxide   Date Value Ref Range Status   10/01/2020 28 20 - 32 mmol/L Final     Carbon Dioxide (CO2)   Date Value Ref Range Status   07/27/2023 22 22 - 29 mmol/L Final   01/14/2022 23 20 - 32 mmol/L Final     Anion Gap   Date Value Ref Range Status   07/27/2023 13 7 - 15 mmol/L Final   01/14/2022 4 3 - 14 mmol/L Final   10/01/2020 4 3 - 14 mmol/L Final     Glucose   Date Value Ref Range Status   07/27/2023 96 70 - 99 mg/dL Final   01/14/2022 88 70 - 99 mg/dL Final   10/01/2020 70 70 - 99 mg/dL Final     Comment:     Fasting specimen     Urea Nitrogen   Date Value Ref Range Status   07/27/2023 14.8 8.0 - 23.0 mg/dL Final   01/14/2022 11 7 - 30 mg/dL Final   10/01/2020 14 7 - 30 mg/dL Final     Creatinine   Date Value Ref  Range Status   07/27/2023 1.18 (H) 0.67 - 1.17 mg/dL Final   10/01/2020 1.26 (H) 0.66 - 1.25 mg/dL Final     GFR Estimate   Date Value Ref Range Status   07/27/2023 64 >60 mL/min/1.73m2 Final   10/01/2020 56 (L) >60 mL/min/[1.73_m2] Final     Comment:     Non  GFR Calc  Starting 12/18/2018, serum creatinine based estimated GFR (eGFR) will be   calculated using the Chronic Kidney Disease Epidemiology Collaboration   (CKD-EPI) equation.       Calcium   Date Value Ref Range Status   07/27/2023 9.2 8.8 - 10.2 mg/dL Final   10/01/2020 8.8 8.5 - 10.1 mg/dL Final     Bilirubin Total   Date Value Ref Range Status   02/27/2023 0.4 <=1.2 mg/dL Final   10/01/2020 0.7 0.2 - 1.3 mg/dL Final     Alkaline Phosphatase   Date Value Ref Range Status   02/27/2023 93 40 - 129 U/L Final   10/01/2020 74 40 - 150 U/L Final     ALT   Date Value Ref Range Status   02/27/2023 22 10 - 50 U/L Final   10/01/2020 24 0 - 70 U/L Final     AST   Date Value Ref Range Status   02/27/2023 31 10 - 50 U/L Final   10/01/2020 23 0 - 45 U/L Final       INR/Prothrombin Time    Creatinine   Date Value Ref Range Status   07/27/2023 1.18 (H) 0.67 - 1.17 mg/dL Final   10/01/2020 1.26 (H) 0.66 - 1.25 mg/dL Final        PSA   Date Value Ref Range Status   06/01/2011 0.12 0 - 4 ug/L Final     Prostate Specific Antigen Screen   Date Value Ref Range Status   02/27/2023 0.26 0.00 - 6.50 ng/mL Final           Imaging:    I reviewed all applicable imaging and went over findings with patient.  Significant for nodule in the posterior bladder wall, pelvic or retroperitoneal or pelvic LAP     Abd/pelvis CT no contrast - Stone Protocol    Result Date: 7/27/2023  EXAM: CT ABDOMEN PELVIS W/O CONTRAST LOCATION: [facility] DATE: 7/27/2023 INDICATION: Hematuria. Urgency. COMPARISON: CT abdomen pelvis 06/01/2011. TECHNIQUE: CT scan of the abdomen and pelvis was performed without IV contrast. Multiplanar reformats were obtained. Dose  reduction techniques were used. CONTRAST: None. FINDINGS: LOWER CHEST: Linear scarring and/or atelectasis. No pleural effusions. Trace pericardial effusion. HEPATOBILIARY: Gallbladder is absent. No biliary ductal dilation. PANCREAS: Normal. SPLEEN: Multiple punctate granulomas. No splenomegaly. ADRENAL GLANDS: Normal. KIDNEYS/BLADDER: Small benign left lower pole renal cyst, which does not require follow-up. Partially duplicated right renal collecting system. No urinary calculi or hydronephrosis. Diffuse bladder wall thickening, somewhat greater along the right posterolateral aspect of the bladder, with haziness of the perivesical fat. Small right lateral bladder diverticulum. Nodular 0.8 cm density along the right posterior bladder wall (4/#166). BOWEL: No obstruction or inflammation. Normal appendix. Pancolonic diverticulosis. No inflamed colonic diverticuli. LYMPH NODES: No enlarged lymph nodes. Few punctate calcifications within periportal lymph nodes related to previous granulomatous disease. VASCULATURE: Mild aortobiiliac atherosclerosis. No abdominal aortic aneurysm. PELVIC ORGANS: Normal. MUSCULOSKELETAL: Multilevel degenerative change of the spine. No acute bony abnormality. Tiny fat-containing periumbilical hernia.     IMPRESSION: 1.  Cystitis superimposed on changes of chronic bladder outlet obstruction. 2.  Indeterminate 0.8 cm nodule along the right posterior bladder wall, incompletely evaluated on this noncontrast exam. Although this might represent a small adherent blood clot given the reported hematuria, a urothelial neoplasm is not excluded and correlation with cystoscopy is recommended. 3.  No urinary calculi or hydronephrosis. 4.  Pancolonic diverticulosis. No inflamed colonic diverticuli. [Recommend Follow Up: Bladder wall nodule] This report will be copied to the [facility] to ensure a provider acknowledges the finding.            Assessment and Plan:     Assessment     77 year old  male with gross hematuria    The differential diagnosis for hematuria is broad, but the causes can be narrowed into distinct categories. The most important one to rule out would be malignancy including bladder, prostate and kidney malignancies. Other causes of hematuria include infection, stone, trauma, and medical renal disease.      We discussed essential components of a work up to rule out genitourinary tract malignancies including CT urogram and flexible cystoscopy.       Plan  Obtain CT urogram   Schedule for office cystoscopy    45 total minutes spent on the date of the encounter including direct interaction with the patient, performing chart review, documentation and further activities as noted above.         Sydney Weaver MD   Department of Urology   [facility]

## 2023-08-03 NOTE — NURSING NOTE
Patient denies any changes since echeck-in regarding medication and allergies and states all information entered during echeck-in remains accurate.    Is the patient currently in the state of [place]? YES    Visit mode:VIDEO    If the visit is dropped, the patient can be reconnected by: VIDEO VISIT: Text to cell phone: [phone]    Will anyone else be joining the visit? NO      How would you like to obtain your AVS? MyChart    Are changes needed to the allergy or medication list? NO    Reason for visit: RECHECK

## 2023-08-08 ENCOUNTER — HOSPITAL ENCOUNTER (OUTPATIENT)
Dept: CT IMAGING | Facility: CLINIC | Age: 78
Discharge: HOME | End: 2023-08-08
Attending: UROLOGY | Admitting: UROLOGY
Payer: MEDICARE

## 2023-08-08 DIAGNOSIS — R31.0 GROSS HEMATURIA: ICD-10-CM

## 2023-08-08 PROCEDURE — 250N000009 HC RX 250: Performed by: UROLOGY

## 2023-08-08 PROCEDURE — G1010 CDSM STANSON: HCPCS

## 2023-08-08 PROCEDURE — 250N000011 HC RX IP 250 OP 636: Performed by: UROLOGY

## 2023-08-08 RX ORDER — IOPAMIDOL 755 MG/ML
82 INJECTION, SOLUTION INTRAVASCULAR ONCE
Status: COMPLETED | OUTPATIENT
Start: 2023-08-08 | End: 2023-08-08

## 2023-08-08 RX ADMIN — SODIUM CHLORIDE 63 ML: 9 INJECTION, SOLUTION INTRAVENOUS at 14:10

## 2023-08-08 RX ADMIN — IOPAMIDOL 82 ML: 755 INJECTION, SOLUTION INTRAVENOUS at 14:10

## 2023-08-09 ENCOUNTER — PRE VISIT (OUTPATIENT)
Dept: UROLOGY | Facility: CLINIC | Age: 78
End: 2023-08-09

## 2023-08-17 ENCOUNTER — OFFICE VISIT (OUTPATIENT)
Dept: ONCOLOGY | Facility: CLINIC | Age: 78
End: 2023-08-17
Attending: UROLOGY
Payer: MEDICARE

## 2023-08-17 VITALS
BODY MASS INDEX: 26.33 KG/M2 | DIASTOLIC BLOOD PRESSURE: 76 MMHG | SYSTOLIC BLOOD PRESSURE: 115 MMHG | HEART RATE: 62 BPM | OXYGEN SATURATION: 98 % | TEMPERATURE: 97.3 F | WEIGHT: 168.1 LBS | RESPIRATION RATE: 16 BRPM

## 2023-08-17 DIAGNOSIS — N32.9 LESION OF BLADDER: Primary | ICD-10-CM

## 2023-08-17 DIAGNOSIS — D49.4 BLADDER TUMOR: Primary | ICD-10-CM

## 2023-08-17 PROCEDURE — 52000 CYSTOURETHROSCOPY: CPT | Performed by: UROLOGY

## 2023-08-17 PROCEDURE — 88112 CYTOPATH CELL ENHANCE TECH: CPT | Mod: TC | Performed by: UROLOGY

## 2023-08-17 PROCEDURE — 52000 CYSTOURETHROSCOPY: CPT

## 2023-08-17 RX ORDER — CEFAZOLIN SODIUM 2 G/50ML
2 SOLUTION INTRAVENOUS SEE ADMIN INSTRUCTIONS
Status: CANCELLED | OUTPATIENT
Start: 2023-08-17

## 2023-08-17 RX ORDER — CEFAZOLIN SODIUM 2 G/50ML
2 SOLUTION INTRAVENOUS
Status: CANCELLED | OUTPATIENT
Start: 2023-08-17

## 2023-08-17 ASSESSMENT — PAIN SCALES - GENERAL: PAINLEVEL: NO PAIN (0)

## 2023-08-17 NOTE — NURSING NOTE
"Oncology Rooming Note    August 17, 2023 8:38 AM   Alex Stanley is a 77 year old male who presents for:    Chief Complaint   Patient presents with    Oncology Clinic Visit     Cystdianne     Initial Vitals: /76   Pulse 62   Temp 97.3  F (36.3  C) (Tympanic)   Resp 16   Wt 76.2 kg (168 lb 1.6 oz)   SpO2 98%   BMI 26.33 kg/m   Estimated body mass index is 26.33 kg/m  as calculated from the following:    Height as of 7/31/23: 1.702 m (5' 7\").    Weight as of this encounter: 76.2 kg (168 lb 1.6 oz). Body surface area is 1.9 meters squared.  No Pain (0) Comment: Data Unavailable   No LMP for male patient.  Allergies reviewed: Yes  Medications reviewed: Yes    Medications: Medication refills not needed today.  Pharmacy name entered into [employer]:    [facility] - [place], [place] - [address] [place] [address]  [facility]  FOR [facility] - [address]  [facility] - [address]    Clinical concerns: Dg Jimenez, CMA            '  "

## 2023-08-17 NOTE — PROGRESS NOTES
Nursing Note:  Alex LUND Jaden presents today for Cystoscope.  Patient seen by provider today: Yes: .   present during visit today: Not Applicable.    Note: N/A.    Labs:  Urine Cytology    Procedure:  Urology Procedure: Cystoscopy.    Images:                       Verified:  Time out included verbal and active participation of all team members: Yes.      Post Procedure:  Patient tolerated the procedure without incident..    Post Pain Assessment: 0 out of 10.    Discharge Plan:   Departure Mode: Ambulatory.    Roseline Jimenez [facility]        Cystoscopy procedure     Pre procedural diagnosis gross hematuria  Post procedural diagnosis bladder tumor     Indication 77 year old man with history of gross hematuria with negative CT urogram for any filling defect, here for cystoscopy        Procedure:      After sterile preparation and draping of the patient,  a 16-Bengali flexible cystoscope was introduced via the urethra.  It was passed without difficulty into the bladder.  The urethra was open without evidence of stricture.  The ureteral orifices were orthotopic.  The bladder mucosa was evaluated using both antegrade and retroflex views and this showed a carpet of papillary lesions on the trigone between the 2 ureteral orifices.  Ureteral orifices did not seem to be involved. 60 ml syringe was used to obtain urine for  cytology. Scope was then removed and patient tolerated the procedure well.       Assessment  77-year-old male with papillary lesions in the bladder concerning for urothelial carcinoma    I told Tejas and his wife that the next step in management should be transurethral resection of the tumors in order to obtain histology and determine the depth of invasion. We will instill Cysview in the pre-operative area through a catheter and use the blue light/PDD cystoscope for better visualization of the papillary tumors. The risks of the operation were discussed which include but are not limited to  bleeding, infection, damage to the urinary tract, bladder perforation with need for open bladder repair, heart attack, stroke and death.  I also explained to him that we will use intravesical chemotherapy at the end of the procedure to decrease the risk of recurrence.    Plan  Schedule for bluelight TURBT and intravesical chemotherapy instillation  Scheduled PAC visit      Sydney Weaver MD   Department of Urology   [facility]

## 2023-08-17 NOTE — LETTER
8/17/2023         RE: Alex Stanley  [address]  [facility] [address]        Dear Colleague,    Thank you for referring your patient, Alex Stanley, to the [facility]. Please see a copy of my visit note below.    Nursing Note:  Alex Stanley presents today for Cystoscope.  Patient seen by provider today: Yes: .   present during visit today: Not Applicable.    Note: N/A.    Labs:  Urine Cytology    Procedure:  Urology Procedure: Cystoscopy.    Images:                       Verified:  Time out included verbal and active participation of all team members: Yes.      Post Procedure:  Patient tolerated the procedure without incident..    Post Pain Assessment: 0 out of 10.    Discharge Plan:   Departure Mode: Ambulatory.    Roseline Jimenez CMA        Cystoscopy procedure     Pre procedural diagnosis gross hematuria  Post procedural diagnosis bladder tumor     Indication 77 year old man with history of gross hematuria with negative CT urogram for any filling defect, here for cystoscopy        Procedure:      After sterile preparation and draping of the patient,  a 16-Andorran flexible cystoscope was introduced via the urethra.  It was passed without difficulty into the bladder.  The urethra was open without evidence of stricture.  The ureteral orifices were orthotopic.  The bladder mucosa was evaluated using both antegrade and retroflex views and this showed a carpet of papillary lesions on the trigone between the 2 ureteral orifices.  Ureteral orifices did not seem to be involved. 60 ml syringe was used to obtain urine for  cytology. Scope was then removed and patient tolerated the procedure well.       Assessment  77-year-old male with papillary lesions in the bladder concerning for urothelial carcinoma    I told Tejas and his wife that the next step in management should be transurethral resection of the tumors in order to obtain histology and determine  the depth of invasion. We will instill Cysview in the pre-operative area through a catheter and use the blue light/PDD cystoscope for better visualization of the papillary tumors. The risks of the operation were discussed which include but are not limited to bleeding, infection, damage to the urinary tract, bladder perforation with need for open bladder repair, heart attack, stroke and death.  I also explained to him that we will use intravesical chemotherapy at the end of the procedure to decrease the risk of recurrence.    Plan  Schedule for bluelight TURBT and intravesical chemotherapy instillation  Scheduled PAC visit      Sydney Weaver MD   Department of Urology   [facility]          Again, thank you for allowing me to participate in the care of your patient.        Sincerely,        Sydney eWaver MD

## 2023-08-18 ENCOUNTER — TELEPHONE (OUTPATIENT)
Dept: UROLOGY | Facility: CLINIC | Age: 78
End: 2023-08-18
Payer: MEDICARE

## 2023-08-18 ENCOUNTER — PATIENT OUTREACH (OUTPATIENT)
Dept: UROLOGY | Facility: CLINIC | Age: 78
End: 2023-08-18
Payer: MEDICARE

## 2023-08-18 DIAGNOSIS — D49.4 BLADDER TUMOR: Primary | ICD-10-CM

## 2023-08-18 NOTE — TELEPHONE ENCOUNTER
Patient was contacted & scheduled surgery with Dr. Weaver on 9/6    Surgery is located at [facility]    Patient will be seen for their H&P by:    PAC on 8/25 at 10am - patient was provided address & floor number. Advised patient that they will receive their start time for surgery at this appointment    Anesthesia type: general      Requested Imaging required for surgery: NA    Patient needs scheduled for their 7 day virtual post op    Patient will receive their packet via [employer] per their preference    Additional comments: LORENZO Mix on 8/18/2023 at 4:31 PM

## 2023-08-18 NOTE — TELEPHONE ENCOUNTER
Pts wife Angela reached out to writing wanting to schedule pts TURBT.     Writer noted orders in chart. Writer informed Angela that there is a surgery scheduler that will be reaching out to get pt scheduled. Angela expressed understanding. Angela wanting to have procedure scheduled ASAP.     Writer informed Angela that surgery scheduler will be reaching out in the coming days to get pt scheduled.     Angela noting that they would like an in-person PAC appt with labs after.     Will continue to follow as needed.

## 2023-08-21 LAB
PATH REPORT.COMMENTS IMP SPEC: ABNORMAL
PATH REPORT.COMMENTS IMP SPEC: ABNORMAL
PATH REPORT.COMMENTS IMP SPEC: YES
PATH REPORT.FINAL DX SPEC: ABNORMAL
PATH REPORT.GROSS SPEC: ABNORMAL
PATH REPORT.MICROSCOPIC SPEC OTHER STN: ABNORMAL
PATH REPORT.RELEVANT HX SPEC: ABNORMAL

## 2023-08-21 PROCEDURE — 88112 CYTOPATH CELL ENHANCE TECH: CPT | Mod: 26 | Performed by: PATHOLOGY

## 2023-08-21 NOTE — TELEPHONE ENCOUNTER
FUTURE VISIT INFORMATION      SURGERY INFORMATION:  Date: 23  [facility]  Surgeon:  Sydney Weaver MD   Anesthesia Type:  general  Procedure: Blue light CYSTOSCOPY, WITH TRANSURETHRAL RESECTION BLADDER TUMOR and intravesical instillation of chemotherapy medication     RECORDS REQUESTED FROM:       Primary Care Provider: Estuardo Manning MD - [facility]    Pertinent Medical History: hypertension, chronic atrial fibrillation    Most recent EKG+ Tracin22    Most recent ECHO: 10/20/22    Most recent Cardiac Stress Test: 10/20/22

## 2023-08-23 NOTE — PHARMACY - PREOPERATIVE ASSESSMENT CENTER
Preoperative Assessment Center Medication History Note  Medication history completed on August 23, 2023 by this writer prior to patient's PAC appointment. See Epic admission navigator for prior to admission medications. Operating room staff will still need to confirm medications and last dose information on day of surgery.     Medication history interview sources  Patient via phone    Pertinent Information: none    Changes made to PTA medication list  Added: none  Deleted: none  Changed:   PreserVision dosing updated    Allergies reviewed with patient and updates made in EHR: no    -- No recent (within 30 days) course of antibiotics  -- No recent (within 30 days) course of systemic steroids  -- Reports being on blood thinning medications apixaban   -- Declines being on any other prescription or over-the-counter medications    Prior to Admission medications    Medication Sig Last Dose Taking? Auth Provider Long Term End Date   amLODIPine (NORVASC) 10 MG tablet TAKE 1 TABLET DAILY Taking Yes Estuardo Manning MD Yes    apixaban ANTICOAGULANT (ELIQUIS ANTICOAGULANT) 5 MG tablet Take 1 tablet (5 mg) by mouth 2 times daily Taking Yes Estuardo Manning MD     isosorbide mononitrate (IMDUR) 30 MG 24 hr tablet Take 1 tablet (30 mg) by mouth daily Taking Yes Lamont Dawson MD Yes    Multiple Vitamins-Minerals (PRESERVISION AREDS 2) CAPS Take 2 capsules by mouth 2 times daily Taking Yes Martin Odonnell MD     pravastatin (PRAVACHOL) 20 MG tablet Take 1 tablet (20 mg) by mouth daily Taking Yes Estuardo Manning MD Yes    ASPIRIN NOT PRESCRIBED, INTENTIONAL, Antiplatelet medication not prescribed intentionally due to history  of gastrointestinal bleeding, and patient on warfarin.   Martin Odonnell MD         Medication History Completed By: Edgar Montaño RPH 8/23/2023 3:24 PM

## 2023-08-24 NOTE — PHARMACY - PREOPERATIVE ASSESSMENT CENTER
Anticoagulation Note - Preoperative Assessment Center (PAC) Pharmacist     Patient was interviewed on August 24, 2023 prior to scheduled PAC clinic appointment. The purpose of this note is to document the perioperative anticoagulation plan outlined by the providers caring for Alex Stanley.     Current Regimen  Anticoagulation Regimen as of August 24, 2023: Apixaban 5 mg by mouth twice daily  Indication: A-fib  Prescriber:  Dr Manning  Expected Duration of therapy: Indefinite  Current medications that may interact with this include: None    Creatinine   Date Value Ref Range Status   07/27/2023 1.18 (H) 0.67 - 1.17 mg/dL Final   10/01/2020 1.26 (H) 0.66 - 1.25 mg/dL Final       Perioperative plan  Alex Stanley is scheduled for Blue light CYSTOSCOPY, WITH TRANSURETHRAL RESECTION BLADDER TUMOR and intravesical instillation of chemotherapy medication  on 9/6/23 with Dr Weaver and the perioperative anticoagulation plan outlined by PAC is to hold apixaban for 48 hours prior to procedure last dose the evening of 9/3/2023.     Resumption of anticoagulation after procedure will be based on surgery team assessment of bleeding risks and complications.  This plan may require re-assessment and modification by his primary team in the perioperative setting depending on patients clinical situation.        Edgar Montaño RPH  August 24, 2023  7:16 AM

## 2023-08-25 ENCOUNTER — PRE VISIT (OUTPATIENT)
Dept: SURGERY | Facility: CLINIC | Age: 78
End: 2023-08-25

## 2023-08-25 ENCOUNTER — APPOINTMENT (OUTPATIENT)
Dept: LAB | Facility: CLINIC | Age: 78
End: 2023-08-25
Payer: MEDICARE

## 2023-08-25 ENCOUNTER — OFFICE VISIT (OUTPATIENT)
Dept: SURGERY | Facility: CLINIC | Age: 78
End: 2023-08-25
Payer: MEDICARE

## 2023-08-25 ENCOUNTER — ANESTHESIA EVENT (OUTPATIENT)
Dept: SURGERY | Facility: AMBULATORY SURGERY CENTER | Age: 78
End: 2023-08-25
Payer: MEDICARE

## 2023-08-25 VITALS
DIASTOLIC BLOOD PRESSURE: 80 MMHG | RESPIRATION RATE: 16 BRPM | SYSTOLIC BLOOD PRESSURE: 122 MMHG | WEIGHT: 168 LBS | HEART RATE: 70 BPM | BODY MASS INDEX: 26.37 KG/M2 | OXYGEN SATURATION: 98 % | TEMPERATURE: 98 F | HEIGHT: 67 IN

## 2023-08-25 DIAGNOSIS — Z01.818 PRE-OP EVALUATION: Primary | ICD-10-CM

## 2023-08-25 LAB
ALBUMIN SERPL BCG-MCNC: 4.6 G/DL (ref 3.5–5.2)
ALP SERPL-CCNC: 88 U/L (ref 40–129)
ALT SERPL W P-5'-P-CCNC: 8 U/L (ref 0–70)
ANION GAP SERPL CALCULATED.3IONS-SCNC: 9 MMOL/L (ref 7–15)
AST SERPL W P-5'-P-CCNC: 25 U/L (ref 0–45)
BASOPHILS # BLD AUTO: 0.1 10E3/UL (ref 0–0.2)
BASOPHILS NFR BLD AUTO: 1 %
BILIRUB SERPL-MCNC: 0.6 MG/DL
BUN SERPL-MCNC: 17.4 MG/DL (ref 8–23)
CALCIUM SERPL-MCNC: 9.1 MG/DL (ref 8.8–10.2)
CHLORIDE SERPL-SCNC: 105 MMOL/L (ref 98–107)
CREAT SERPL-MCNC: 1.21 MG/DL (ref 0.67–1.17)
DEPRECATED HCO3 PLAS-SCNC: 25 MMOL/L (ref 22–29)
EOSINOPHIL # BLD AUTO: 0.1 10E3/UL (ref 0–0.7)
EOSINOPHIL NFR BLD AUTO: 1 %
ERYTHROCYTE [DISTWIDTH] IN BLOOD BY AUTOMATED COUNT: 14.5 % (ref 10–15)
GFR SERPL CREATININE-BSD FRML MDRD: 62 ML/MIN/1.73M2
GLUCOSE SERPL-MCNC: 102 MG/DL (ref 70–99)
HCT VFR BLD AUTO: 43.9 % (ref 40–53)
HGB BLD-MCNC: 14.5 G/DL (ref 13.3–17.7)
IMM GRANULOCYTES # BLD: 0 10E3/UL
IMM GRANULOCYTES NFR BLD: 0 %
LYMPHOCYTES # BLD AUTO: 1.3 10E3/UL (ref 0.8–5.3)
LYMPHOCYTES NFR BLD AUTO: 15 %
MCH RBC QN AUTO: 31.7 PG (ref 26.5–33)
MCHC RBC AUTO-ENTMCNC: 33 G/DL (ref 31.5–36.5)
MCV RBC AUTO: 96 FL (ref 78–100)
MONOCYTES # BLD AUTO: 1 10E3/UL (ref 0–1.3)
MONOCYTES NFR BLD AUTO: 11 %
NEUTROPHILS # BLD AUTO: 6.5 10E3/UL (ref 1.6–8.3)
NEUTROPHILS NFR BLD AUTO: 72 %
NRBC # BLD AUTO: 0 10E3/UL
NRBC BLD AUTO-RTO: 0 /100
PLATELET # BLD AUTO: 272 10E3/UL (ref 150–450)
POTASSIUM SERPL-SCNC: 4.4 MMOL/L (ref 3.4–5.3)
PROT SERPL-MCNC: 7 G/DL (ref 6.4–8.3)
RBC # BLD AUTO: 4.57 10E6/UL (ref 4.4–5.9)
SODIUM SERPL-SCNC: 139 MMOL/L (ref 136–145)
WBC # BLD AUTO: 9.1 10E3/UL (ref 4–11)

## 2023-08-25 PROCEDURE — 80053 COMPREHEN METABOLIC PANEL: CPT | Performed by: PATHOLOGY

## 2023-08-25 PROCEDURE — 85025 COMPLETE CBC W/AUTO DIFF WBC: CPT | Performed by: PATHOLOGY

## 2023-08-25 PROCEDURE — 36415 COLL VENOUS BLD VENIPUNCTURE: CPT | Performed by: PATHOLOGY

## 2023-08-25 PROCEDURE — 99204 OFFICE O/P NEW MOD 45 MIN: CPT | Performed by: NURSE PRACTITIONER

## 2023-08-25 ASSESSMENT — ENCOUNTER SYMPTOMS
DYSRHYTHMIAS: 1
ORTHOPNEA: 0

## 2023-08-25 ASSESSMENT — PAIN SCALES - GENERAL: PAINLEVEL: NO PAIN (0)

## 2023-08-25 ASSESSMENT — LIFESTYLE VARIABLES: TOBACCO_USE: 1

## 2023-08-25 NOTE — H&P
Pre-Operative H & P     CC:  Preoperative exam to assess for increased cardiopulmonary risk while undergoing surgery and anesthesia.    Date of Encounter: 8/25/2023  Primary Care Physician:  Estuardo Manning     Reason for visit: Pre-operative evaluation      HPI  Alex Stanley is a 77 year old male who presents for pre-operative H & P in preparation for  Procedure Information       Case: [id number] Date/Time: 09/06/23 0715    Procedure: Blue light CYSTOSCOPY, WITH TRANSURETHRAL RESECTION BLADDER TUMOR and intravesical instillation of chemotherapy medication (Urethra)    Anesthesia type: General    Diagnosis: Bladder tumor [D49.4]    Pre-op diagnosis: Bladder tumor [D49.4]    Location: [place] [address] / [facility]    Providers: Sydney Weaver MD            Alex Stanley is a 77 year old male with PMH significant for HTN, HL, PAF on eliquis, tobacco dependence,CKD 3,  CVA, anemia, hx of ETOH abuse, fatty liver,  diverticulosis, erosive gastrisit and bladder tumor. He was recently evaluated by Dr. Weaver in the ER in regards to occurrence of gross hematuria. Underwent cystoscopy 8/17/2023 findings showed papillary lesions in the bladder concerning for urothelial carcinoma. He presents today in preparation for the above recommended procedure.      History is obtained from the patient and chart review    Hx of abnormal bleeding or anti-platelet use: no      Past Medical History  Past Medical History:   Diagnosis Date    Alcohol abuse     Alcoholism (H)     admission 11/09    Atrial fibrillation, paroxysmal 11/07, 6/09, 11/09    Benign essential hypertension     Chronic atrial fibrillation (H)     Diverticulosis of colon 6/2/2011    Erosive gastritis 11/09    Gastritis     Hyperlipidemia LDL goal <100     Hypertension, with LVH     Iron deficiency anemia 11/09    Paroxysmal atrial fibrillation (H)     Stroke (H)     Tobacco abuse        Past Surgical History  Past  Surgical History:   Procedure Laterality Date    COLONOSCOPY N/A 10/26/2020    Procedure: COLONOSCOPY, WITH POLYPECTOMY AND BIOPSY;  Surgeon: Shon Bond MD;  Location:  GI    ENDOSCOPIC ULTRASOUND UPPER GASTROINTESTINAL TRACT (GI) N/A 1/24/2022    Procedure: ENDOSCOPIC ULTRASOUND, ESOPHAGOSCOPY / UPPER GASTROINTESTINAL TRACT (GI);  Surgeon: Shon Bond MD;  Location:  GI    LAPAROSCOPIC CHOLECYSTECTOMY N/A 2/8/2022    Procedure: LAPAROSCOPIC CHOLECYSTECTOMY;  Surgeon: Mazin Bains MD;  Location:  OR    SURGICAL HISTORY OF -   4/11    R Dupuytren's contracture release   Dr. Scruggs    [facility] NONSPECIFIC PROCEDURE  2002    Rt foot reconstructive    [facility] NONSPECIFIC PROCEDURE  10/12    R hand procedure.  Dr. Deluna       Prior to Admission Medications  Current Outpatient Medications   Medication Sig Dispense Refill    amLODIPine (NORVASC) 10 MG tablet TAKE 1 TABLET DAILY 90 tablet 3    apixaban ANTICOAGULANT (ELIQUIS ANTICOAGULANT) 5 MG tablet Take 1 tablet (5 mg) by mouth 2 times daily 180 tablet 3    isosorbide mononitrate (IMDUR) 30 MG 24 hr tablet Take 1 tablet (30 mg) by mouth daily 90 tablet 3    Multiple Vitamins-Minerals (PRESERVISION AREDS 2) CAPS Take 2 capsules by mouth 2 times daily      pravastatin (PRAVACHOL) 20 MG tablet Take 1 tablet (20 mg) by mouth daily 90 tablet 3    ASPIRIN NOT PRESCRIBED, INTENTIONAL, Antiplatelet medication not prescribed intentionally due to history  of gastrointestinal bleeding, and patient on warfarin. 0 each 0       Allergies  No Known Allergies    Social History  Social History     Socioeconomic History    Marital status:      Spouse name: Not on file    Number of children: Not on file    Years of education: Not on file    Highest education level: Not on file   Occupational History    Not on file   Tobacco Use    Smoking status: Every Day     Packs/day: 0.50     Types: Cigarettes     Start date: 1965    Smokeless tobacco: Never    Tobacco  comments:     ppd since age 16 - now 1/2-1 ppd 04/05/21.  10 cigarettes daily   Vaping Use    Vaping Use: Never used   Substance and Sexual Activity    Alcohol use: Not Currently     Comment: last drank at [facility] 8/2010.    Drug use: Never    Sexual activity: Yes     Partners: Female   Other Topics Concern    Parent/sibling w/ CABG, MI or angioplasty before 65F 55M? Not Asked   Social History Narrative    ** Merged History Encounter **          Social Determinants of Health     Financial Resource Strain: Not on file   Food Insecurity: Not on file   Transportation Needs: Not on file   Physical Activity: Not on file   Stress: Not on file   Social Connections: Not on file   Intimate Partner Violence: Not on file   Housing Stability: Not on file       Family History  Family History   Problem Relation Age of Onset    Lung Cancer Mother     Emphysema Father     Cancer Mother         small cell lung with mets to brain    Diabetes Maternal Grandmother     Respiratory Father         emphysema       Review of Systems  The complete review of systems is negative other than noted in the HPI or here.   Anesthesia Evaluation   Pt has had prior anesthetic. Type: General.    No history of anesthetic complications       ROS/MED HX  ENT/Pulmonary:     (+)     REGGIE risk factors, snores loudly, hypertension,         tobacco use, Current use,  40  Pack-Year Hx,                  (-) asthma   Neurologic:     (+)          CVA, date: 2011, without deficits,                    Cardiovascular:     (+) Dyslipidemia hypertension- -   -  - -   Taking blood thinners  Instructions Given to patient: holding 48 hours prior to DOS.                   dysrhythmias, a-fib,        Previous cardiac testing   Echo: Date: 10/2022 Results:  Interpretation Summary     Left ventricular systolic function is low normal.  The visual ejection fraction is 50-55%.  The left atrium is moderate to severely dilated.  No hemodynamically significant valvular abnormalities  "on 2D or color flow  imaging.    Stress Test:  Date: 10/2022 Results:        The nuclear stress test is abnormal.     There is a small area of mild ischemia in the apical segment(s) of the left ventricle.     Left ventricular function is normal.     The left ventricular ejection fraction at stress is 58%.     There is no prior study for comparison    ECG Reviewed:  Date: Results:    Cath:  Date: Results:   (-) PIERRE, orthopnea/PND and irregular heartbeat/palpitations   METS/Exercise Tolerance: >4 METS    Hematologic:     (+)      anemia,          Musculoskeletal:  - neg musculoskeletal ROS     GI/Hepatic: Comment: Fatty Liver   Hx of Diverticulosis  Hx of Erosive gastritis  Gall stones    (+)          cholecystitis/cholelithiasis,   liver disease,       Renal/Genitourinary: Comment: Bladder tumor  CKD 3    (+) renal disease, type: CRI,            Endo:  - neg endo ROS     Psychiatric/Substance Use: Comment: ETOH abuse in remission since 2010    (+)   alcohol abuse      Infectious Disease:  - neg infectious disease ROS     Malignancy:  - neg malignancy ROS     Other:            /80 (BP Location: Right arm, Patient Position: Sitting, Cuff Size: Adult Large)   Pulse 70   Temp 98  F (36.7  C) (Oral)   Resp 16   Ht 1.702 m (5' 7\")   Wt 76.2 kg (168 lb)   SpO2 98%   BMI 26.31 kg/m      Physical Exam   Constitutional: Awake, alert, cooperative, no apparent distress, and appears stated age.  Eyes: Pupils equal, round and reactive to light, extra ocular muscles intact, sclera clear, conjunctiva normal.  HENT: Normocephalic, oral pharynx with moist mucus membranes, good dentition. No goiter appreciated.   Respiratory: Clear to auscultation bilaterally, no crackles or wheezing.  Cardiovascular: Regular rate and Irregular rhythm, normal S1 and S2, and no murmur noted.  Carotids +2, no bruits. No edema. Palpable pulses to radial  DP and PT arteries.   GI: Normal bowel sounds, soft, non-distended, non-tender, no masses " palpated, no hepatosplenomegaly.    Lymph/Hematologic: No cervical lymphadenopathy and no supraclavicular lymphadenopathy.  Genitourinary:  deferred  Skin: Warm and dry.  No rashes at anticipated surgical site.   Musculoskeletal: Full ROM of neck. There is no redness, warmth, or swelling of the joints. Gross motor strength is normal.    Neurologic: Awake, alert, oriented to name, place and time. Cranial nerves II-XII are grossly intact. Gait is normal.   Neuropsychiatric: Calm, cooperative. Normal affect.     Prior Labs/Diagnostic Studies   All labs and imaging personally reviewed         Cystoscopy 8/17/2023   Procedure:        After sterile preparation and draping of the patient,  a 16-Australian flexible cystoscope was introduced via the urethra.  It was passed without difficulty into the bladder.  The urethra was open without evidence of stricture.  The ureteral orifices were orthotopic.  The bladder mucosa was evaluated using both antegrade and retroflex views and this showed a carpet of papillary lesions on the trigone between the 2 ureteral orifices.  Ureteral orifices did not seem to be involved. 60 ml syringe was used to obtain urine for  cytology. Scope was then removed and patient tolerated the procedure well.      CT abdomen 7/27/2023   IMPRESSION: 1. Cystitis superimposed on changes of chronic bladder outlet obstruction. 2. Indeterminate 0.8 cm nodule along the right posterior bladder wall, incompletely evaluated on this noncontrast exam. Although this might represent a small adherent blood clot given the reported hematuria, a urothelial neoplasm is not excluded and correlation with cystoscopy is recommended. 3. No urinary calculi or hydronephrosis. 4. Pancolonic diverticulosis. No inflamed colonic diverticuli. [Recommend Follow Up: Bladder wall nodule] This report will be copied to the [facility] to ensure a provider acknowledges the finding.    Lab Results   Component Value Date    WBC 9.6  07/27/2023    WBC 8.3 10/08/2012     Lab Results   Component Value Date    RBC 5.09 07/27/2023    RBC 4.80 10/08/2012     Lab Results   Component Value Date    HGB 15.7 07/27/2023    HGB 14.1 06/14/2018     Lab Results   Component Value Date    HCT 48.1 07/27/2023    HCT 44.3 10/08/2012     No components found for: MCT  Lab Results   Component Value Date    MCV 95 07/27/2023    MCV 92 10/08/2012     Lab Results   Component Value Date    MCH 30.8 07/27/2023    MCH 30.2 10/08/2012     Lab Results   Component Value Date    MCHC 32.6 07/27/2023    MCHC 32.7 10/08/2012     Lab Results   Component Value Date    RDW 14.0 07/27/2023    RDW 14.1 10/08/2012     Lab Results   Component Value Date     07/27/2023     10/08/2012     Last Comprehensive Metabolic Panel:  Lab Results   Component Value Date     07/27/2023    POTASSIUM 4.2 07/27/2023    CHLORIDE 101 07/27/2023    CO2 22 07/27/2023    ANIONGAP 13 07/27/2023    GLC 96 07/27/2023    BUN 14.8 07/27/2023    CR 1.18 (H) 07/27/2023    GFRESTIMATED 64 07/27/2023    MARY 9.2 07/27/2023       Lab Results   Component Value Date    AST 31 02/27/2023    AST 23 10/01/2020     Lab Results   Component Value Date    ALT 22 02/27/2023    ALT 24 10/01/2020     Lab Results   Component Value Date    BILICONJ 0.0 10/08/2012      Lab Results   Component Value Date    BILITOTAL 0.4 02/27/2023    BILITOTAL 0.7 10/01/2020     Lab Results   Component Value Date    ALBUMIN 4.3 02/27/2023    ALBUMIN 3.2 01/14/2022    ALBUMIN 3.8 10/01/2020     Lab Results   Component Value Date    PROTTOTAL 6.9 02/27/2023    PROTTOTAL 7.2 10/01/2020      Lab Results   Component Value Date    ALKPHOS 93 02/27/2023    ALKPHOS 74 10/01/2020             EKG/ stress test - if available please see in ROS above   2022  Echo result w/o MOPS: Interpretation Summary Left ventricular systolic function is low normal.The visual ejection fraction is 50-55%.The left atrium is moderate to severely dilated.No  hemodynamically significant valvular abnormalities on 2D or color flowimaging.      The patient's records and results personally reviewed by this provider.     Outside records reviewed from: Care Everywhere    LAB/DIAGNOSTIC STUDIES TODAY:    Lab Results   Component Value Date    WBC 9.1 08/25/2023    WBC 8.3 10/08/2012     Lab Results   Component Value Date    RBC 4.57 08/25/2023    RBC 4.80 10/08/2012     Lab Results   Component Value Date    HGB 14.5 08/25/2023    HGB 14.1 06/14/2018     Lab Results   Component Value Date    HCT 43.9 08/25/2023    HCT 44.3 10/08/2012     No components found for: MCT  Lab Results   Component Value Date    MCV 96 08/25/2023    MCV 92 10/08/2012     Lab Results   Component Value Date    MCH 31.7 08/25/2023    MCH 30.2 10/08/2012     Lab Results   Component Value Date    MCHC 33.0 08/25/2023    MCHC 32.7 10/08/2012     Lab Results   Component Value Date    RDW 14.5 08/25/2023    RDW 14.1 10/08/2012     Lab Results   Component Value Date     08/25/2023     10/08/2012     Last Comprehensive Metabolic Panel:  Lab Results   Component Value Date     08/25/2023    POTASSIUM 4.4 08/25/2023    CHLORIDE 105 08/25/2023    CO2 25 08/25/2023    ANIONGAP 9 08/25/2023     (H) 08/25/2023    BUN 17.4 08/25/2023    CR 1.21 (H) 08/25/2023    GFRESTIMATED 62 08/25/2023    MARY 9.1 08/25/2023           Assessment    Alex Stanley is a 77 year old male seen as a PAC referral for risk assessment and optimization for anesthesia.    Plan/Recommendations  Pt will be optimized for the proposed procedure.  See below for details on the assessment, risk, and preoperative recommendations    NEUROLOGY  - History of CVA 2011  Without residual deficits   -Post Op delirium risk factors:  No risk identified    ENT  - No current airway concerns.  Will need to be reassessed day of surgery.  Mallampati: II  TM: > 3    CARDIAC  - No history of CAD  No anginal symptoms, Denies palpitations,  "PND, dizziness or syncope.   - Lexiscan for atypical CP ( 2022) -there is a small area of mild ischemia in the apical segment(s) of the left ventricle.  was also started on Imdur at that time. Has had no further symptoms.   - Hypertension - managed on amlodipine   Well controlled  - Hyperlipidemia  Well controlled on home regimen  - METS (Metabolic Equivalents)  walks 2 miles daily    Patient performs 4 or more METS exercise without symptoms            Total Score: 0      RCRI-Low risk: Class 2 0.9% complication rate            Total Score: 1    RCRI: Cerebrovascular Disease         PULMONARY    REGGIE Medium Risk            Total Score: 4    REGGIE: Snores loudly    REGGIE: Hypertension    REGGIE: Over 50 ys old    REGGIE: Male      - Denies asthma or inhaler use  - Tobacco History    History   Smoking Status    Every Day    Packs/day: 0.50    Types: Cigarettes    Start date: 1965   Smokeless Tobacco    Never       GI  Fatty Liver   Hx of Diverticulosis  Hx of Erosive gastritis  Gall stones s/p cholecystectomy       PONV Low Risk  Total Score: 0        /RENAL  CKD 3   Creatinine  1.21  Bladder tumor- Procedure scheduled as above.       ENDOCRINE    - BMI: Estimated body mass index is 26.31 kg/m  as calculated from the following:    Height as of this encounter: 1.702 m (5' 7\").    Weight as of this encounter: 76.2 kg (168 lb).  Overweight (BMI 25.0-29.9)  - No history of Diabetes Mellitus    HEME  VTE Low Risk 0.5%            Total Score: 3    VTE: Greater than 59 yrs old    VTE: Male      - Coagulopathy second to Apixaban (Eliquis)  Plan to hold apixaban for 48 hours prior to procedure last dose the evening of 9/3/2023.        - Chronic iron deficient anemia  Recommend perioperative use of blood conservation techniques intraoperatively and close monitoring for postoperative bleeding.  Remote history of anemia  Recent Hgb 15.7    MSK  Patient is NOT Frail            Total Score: 0          Different anesthesia methods/types have " been discussed with the patient, but they are aware that the final plan will be decided by the assigned anesthesia provider on the date of service.      The patient is optimized for their procedure. AVS with information on surgery time/arrival time, meds and NPO status given by nursing staff. No further diagnostic testing indicated.      On the day of service:     Prep time: 15 minutes  Visit time: 20 minutes  Documentation time: 12 minutes  ------------------------------------------  Total time: 47 minutes      LAST Malik CNP  Preoperative Assessment Center  [facility]  Clinic and Surgery Center  Phone: [phone]  Fax: [phone]

## 2023-08-25 NOTE — PATIENT INSTRUCTIONS
Preparing for Your Surgery      Name:  Alex Stanley   MRN:  [id number]   :  1945   Today's Date:  2023         Arriving for surgery:  Surgery date:  23  Arrival time:  5:45AM    Restrictions due to COVID 19:    Please maintain social distance.  Masking is optional.      parking is available for anyone with mobility limitations or disabilities. (Monday- Friday 7 am- 5 pm)    Please come to:    [facility]  [address] [place] [id number]    Please check in on the 5th floor at the Ambulatory Surgery Center.      What can I eat or drink?    -  You may eat and drink normally until 8 hours prior to arrival  time. (Until 23, 9:45PM)  -  You may have clear liquids until 2 hours prior to arrival  time. (Until 23, 3:45AM)    Examples of clear liquids:  Water  Clear broth  Juices (apple, white grape, white cranberry  and cider) without pulp  Noncarbonated, powder based beverages  (lemonade and Tam-Aid)  Sodas (Sprite, 7-Up, ginger ale and seltzer)  Coffee or tea (without milk or cream)  Gatorade      Which medicines can I take?    Hold Apixaban(Eliquis) for 48 hours prior to surgery, last dose 9/3/23 with evening dose.   Hold Multivitamins for 7 days before surgery.  Hold Supplements for 7 days before surgery.  Hold Ibuprofen (Advil, Motrin) for 1 day before surgery--unless otherwise directed by surgeon.  Hold Naproxen (Aleve) for 4 days before surgery.    No alcohol or cannabis products for 24 hours prior to procedure.        -  PLEASE TAKE the following medications the day of surgery:     Amlodipine(Norvasc)    Isosorbide(Imdur)    Pravastatin(Pravachol)        How do I prepare myself?  - Please take 2 showers (one the night prior to surgery and one the morning of surgery) using Scrubcare or Hibiclens soap.    Use this soap only from the neck to your toes.     Leave the soap on your skin for one minute--then rinse thoroughly.      You may use your own  shampoo and conditioner. No other hair products.   - Please remove all jewelry and body piercings.  - No lotions, deodorants or fragrance.  - Bring your ID and insurance card.    -If you have a Deep Brain Stimulator, a Spinal Cord Stimulator, or any implanted Neuro Device, you must bring the remote to the Surgery Center.         ALL PATIENTS ARE REQUIRED TO HAVE A RESPONSIBLE ADULT TO DRIVE AND BE IN ATTENDANCE WITH THEM FOR 24 HOURS FOLLOWING SURGERY.     Covid testing policy as of 12/06/2022  Your surgeon will notify and schedule you for a COVID test if one is needed before surgery--please direct any questions or COVID symptoms to your surgeon      Questions or Concerns:    -For questions regarding the day of surgery, please contact the Ambulatory Surgery Center at [phone].    -If you have health changes between today and your surgery, please contact your surgeon.     - For questions after surgery, please contact your surgeon's office.

## 2023-09-06 ENCOUNTER — HOSPITAL ENCOUNTER (OUTPATIENT)
Facility: AMBULATORY SURGERY CENTER | Age: 78
Discharge: HOME | End: 2023-09-06
Attending: UROLOGY
Payer: MEDICARE

## 2023-09-06 ENCOUNTER — ANESTHESIA (OUTPATIENT)
Dept: SURGERY | Facility: AMBULATORY SURGERY CENTER | Age: 78
End: 2023-09-06
Payer: MEDICARE

## 2023-09-06 VITALS
WEIGHT: 165 LBS | OXYGEN SATURATION: 96 % | BODY MASS INDEX: 25.9 KG/M2 | HEIGHT: 67 IN | DIASTOLIC BLOOD PRESSURE: 60 MMHG | TEMPERATURE: 97 F | RESPIRATION RATE: 20 BRPM | HEART RATE: 76 BPM | SYSTOLIC BLOOD PRESSURE: 107 MMHG

## 2023-09-06 DIAGNOSIS — D49.4 BLADDER TUMOR: Primary | ICD-10-CM

## 2023-09-06 PROCEDURE — 52235 CYSTOSCOPY AND TREATMENT: CPT

## 2023-09-06 PROCEDURE — 88307 TISSUE EXAM BY PATHOLOGIST: CPT | Mod: 26 | Performed by: PATHOLOGY

## 2023-09-06 PROCEDURE — 88307 TISSUE EXAM BY PATHOLOGIST: CPT | Mod: TC | Performed by: UROLOGY

## 2023-09-06 RX ORDER — ACETAMINOPHEN 325 MG/1
975 TABLET ORAL ONCE
Status: DISCONTINUED | OUTPATIENT
Start: 2023-09-06 | End: 2023-09-06 | Stop reason: HOSPADM

## 2023-09-06 RX ORDER — CEFAZOLIN SODIUM 2 G/50ML
2 SOLUTION INTRAVENOUS SEE ADMIN INSTRUCTIONS
Status: DISCONTINUED | OUTPATIENT
Start: 2023-09-06 | End: 2023-09-06 | Stop reason: HOSPADM

## 2023-09-06 RX ORDER — ACETAMINOPHEN 325 MG/1
975 TABLET ORAL ONCE
Status: DISCONTINUED | OUTPATIENT
Start: 2023-09-06 | End: 2023-09-07 | Stop reason: HOSPADM

## 2023-09-06 RX ORDER — CEFAZOLIN SODIUM 2 G/50ML
2 SOLUTION INTRAVENOUS
Status: COMPLETED | OUTPATIENT
Start: 2023-09-06 | End: 2023-09-06

## 2023-09-06 RX ORDER — ONDANSETRON 2 MG/ML
INJECTION INTRAMUSCULAR; INTRAVENOUS PRN
Status: DISCONTINUED | OUTPATIENT
Start: 2023-09-06 | End: 2023-09-06

## 2023-09-06 RX ORDER — PROPOFOL 10 MG/ML
INJECTION, EMULSION INTRAVENOUS PRN
Status: DISCONTINUED | OUTPATIENT
Start: 2023-09-06 | End: 2023-09-06

## 2023-09-06 RX ORDER — PROPOFOL 10 MG/ML
INJECTION, EMULSION INTRAVENOUS CONTINUOUS PRN
Status: DISCONTINUED | OUTPATIENT
Start: 2023-09-06 | End: 2023-09-06

## 2023-09-06 RX ORDER — FENTANYL CITRATE 50 UG/ML
50 INJECTION, SOLUTION INTRAMUSCULAR; INTRAVENOUS EVERY 5 MIN PRN
Status: DISCONTINUED | OUTPATIENT
Start: 2023-09-06 | End: 2023-09-06 | Stop reason: HOSPADM

## 2023-09-06 RX ORDER — LIDOCAINE HYDROCHLORIDE 20 MG/ML
INJECTION, SOLUTION INFILTRATION; PERINEURAL PRN
Status: DISCONTINUED | OUTPATIENT
Start: 2023-09-06 | End: 2023-09-06

## 2023-09-06 RX ORDER — SODIUM CHLORIDE, SODIUM LACTATE, POTASSIUM CHLORIDE, CALCIUM CHLORIDE 600; 310; 30; 20 MG/100ML; MG/100ML; MG/100ML; MG/100ML
INJECTION, SOLUTION INTRAVENOUS CONTINUOUS PRN
Status: DISCONTINUED | OUTPATIENT
Start: 2023-09-06 | End: 2023-09-06

## 2023-09-06 RX ORDER — OXYCODONE HYDROCHLORIDE 5 MG/1
10 TABLET ORAL
Status: DISCONTINUED | OUTPATIENT
Start: 2023-09-06 | End: 2023-09-07 | Stop reason: HOSPADM

## 2023-09-06 RX ORDER — ACETAMINOPHEN 325 MG/1
975 TABLET ORAL ONCE
Status: COMPLETED | OUTPATIENT
Start: 2023-09-06 | End: 2023-09-06

## 2023-09-06 RX ORDER — ONDANSETRON 2 MG/ML
4 INJECTION INTRAMUSCULAR; INTRAVENOUS EVERY 30 MIN PRN
Status: DISCONTINUED | OUTPATIENT
Start: 2023-09-06 | End: 2023-09-07 | Stop reason: HOSPADM

## 2023-09-06 RX ORDER — OXYCODONE HYDROCHLORIDE 5 MG/1
5 TABLET ORAL
Status: COMPLETED | OUTPATIENT
Start: 2023-09-06 | End: 2023-09-06

## 2023-09-06 RX ORDER — ONDANSETRON 4 MG/1
4 TABLET, ORALLY DISINTEGRATING ORAL EVERY 30 MIN PRN
Status: DISCONTINUED | OUTPATIENT
Start: 2023-09-06 | End: 2023-09-06 | Stop reason: HOSPADM

## 2023-09-06 RX ORDER — SODIUM CHLORIDE, SODIUM LACTATE, POTASSIUM CHLORIDE, CALCIUM CHLORIDE 600; 310; 30; 20 MG/100ML; MG/100ML; MG/100ML; MG/100ML
INJECTION, SOLUTION INTRAVENOUS CONTINUOUS
Status: DISCONTINUED | OUTPATIENT
Start: 2023-09-06 | End: 2023-09-06 | Stop reason: HOSPADM

## 2023-09-06 RX ORDER — EPHEDRINE SULFATE 50 MG/ML
INJECTION, SOLUTION INTRAMUSCULAR; INTRAVENOUS; SUBCUTANEOUS PRN
Status: DISCONTINUED | OUTPATIENT
Start: 2023-09-06 | End: 2023-09-06

## 2023-09-06 RX ORDER — FENTANYL CITRATE 50 UG/ML
25 INJECTION, SOLUTION INTRAMUSCULAR; INTRAVENOUS EVERY 5 MIN PRN
Status: DISCONTINUED | OUTPATIENT
Start: 2023-09-06 | End: 2023-09-06 | Stop reason: HOSPADM

## 2023-09-06 RX ORDER — FENTANYL CITRATE 50 UG/ML
INJECTION, SOLUTION INTRAMUSCULAR; INTRAVENOUS PRN
Status: DISCONTINUED | OUTPATIENT
Start: 2023-09-06 | End: 2023-09-06

## 2023-09-06 RX ORDER — HYDROMORPHONE HYDROCHLORIDE 1 MG/ML
0.2 INJECTION, SOLUTION INTRAMUSCULAR; INTRAVENOUS; SUBCUTANEOUS EVERY 5 MIN PRN
Status: DISCONTINUED | OUTPATIENT
Start: 2023-09-06 | End: 2023-09-06 | Stop reason: HOSPADM

## 2023-09-06 RX ORDER — HYDROMORPHONE HYDROCHLORIDE 1 MG/ML
0.4 INJECTION, SOLUTION INTRAMUSCULAR; INTRAVENOUS; SUBCUTANEOUS EVERY 5 MIN PRN
Status: DISCONTINUED | OUTPATIENT
Start: 2023-09-06 | End: 2023-09-06 | Stop reason: HOSPADM

## 2023-09-06 RX ORDER — LIDOCAINE 40 MG/G
CREAM TOPICAL
Status: DISCONTINUED | OUTPATIENT
Start: 2023-09-06 | End: 2023-09-06 | Stop reason: HOSPADM

## 2023-09-06 RX ORDER — ONDANSETRON 4 MG/1
4 TABLET, ORALLY DISINTEGRATING ORAL EVERY 30 MIN PRN
Status: DISCONTINUED | OUTPATIENT
Start: 2023-09-06 | End: 2023-09-07 | Stop reason: HOSPADM

## 2023-09-06 RX ORDER — GLYCOPYRROLATE 0.2 MG/ML
INJECTION, SOLUTION INTRAMUSCULAR; INTRAVENOUS PRN
Status: DISCONTINUED | OUTPATIENT
Start: 2023-09-06 | End: 2023-09-06

## 2023-09-06 RX ORDER — ONDANSETRON 2 MG/ML
4 INJECTION INTRAMUSCULAR; INTRAVENOUS EVERY 30 MIN PRN
Status: DISCONTINUED | OUTPATIENT
Start: 2023-09-06 | End: 2023-09-06 | Stop reason: HOSPADM

## 2023-09-06 RX ORDER — OXYBUTYNIN CHLORIDE 5 MG/1
5 TABLET, EXTENDED RELEASE ORAL DAILY
Qty: 5 TABLET | Refills: 0 | Status: SHIPPED | OUTPATIENT
Start: 2023-09-06 | End: 2024-05-16

## 2023-09-06 RX ORDER — DEXAMETHASONE SODIUM PHOSPHATE 4 MG/ML
INJECTION, SOLUTION INTRA-ARTICULAR; INTRALESIONAL; INTRAMUSCULAR; INTRAVENOUS; SOFT TISSUE PRN
Status: DISCONTINUED | OUTPATIENT
Start: 2023-09-06 | End: 2023-09-06

## 2023-09-06 RX ADMIN — DEXAMETHASONE SODIUM PHOSPHATE 4 MG: 4 INJECTION, SOLUTION INTRA-ARTICULAR; INTRALESIONAL; INTRAMUSCULAR; INTRAVENOUS; SOFT TISSUE at 07:33

## 2023-09-06 RX ADMIN — FENTANYL CITRATE 50 MCG: 50 INJECTION, SOLUTION INTRAMUSCULAR; INTRAVENOUS at 07:27

## 2023-09-06 RX ADMIN — PROPOFOL 200 MCG/KG/MIN: 10 INJECTION, EMULSION INTRAVENOUS at 07:33

## 2023-09-06 RX ADMIN — OXYCODONE HYDROCHLORIDE 5 MG: 5 TABLET ORAL at 08:39

## 2023-09-06 RX ADMIN — PROPOFOL 50 MG: 10 INJECTION, EMULSION INTRAVENOUS at 07:56

## 2023-09-06 RX ADMIN — CEFAZOLIN SODIUM 2 G: 2 SOLUTION INTRAVENOUS at 07:27

## 2023-09-06 RX ADMIN — SODIUM CHLORIDE, SODIUM LACTATE, POTASSIUM CHLORIDE, CALCIUM CHLORIDE: 600; 310; 30; 20 INJECTION, SOLUTION INTRAVENOUS at 07:27

## 2023-09-06 RX ADMIN — ACETAMINOPHEN 975 MG: 325 TABLET ORAL at 06:45

## 2023-09-06 RX ADMIN — ONDANSETRON 4 MG: 2 INJECTION INTRAMUSCULAR; INTRAVENOUS at 07:33

## 2023-09-06 RX ADMIN — SODIUM CHLORIDE, SODIUM LACTATE, POTASSIUM CHLORIDE, CALCIUM CHLORIDE: 600; 310; 30; 20 INJECTION, SOLUTION INTRAVENOUS at 06:44

## 2023-09-06 RX ADMIN — LIDOCAINE HYDROCHLORIDE 100 MG: 20 INJECTION, SOLUTION INFILTRATION; PERINEURAL at 07:33

## 2023-09-06 RX ADMIN — GLYCOPYRROLATE 0.2 MG: 0.2 INJECTION, SOLUTION INTRAMUSCULAR; INTRAVENOUS at 07:43

## 2023-09-06 RX ADMIN — FENTANYL CITRATE 25 MCG: 50 INJECTION, SOLUTION INTRAMUSCULAR; INTRAVENOUS at 08:37

## 2023-09-06 RX ADMIN — EPHEDRINE SULFATE 10 MG: 50 INJECTION, SOLUTION INTRAMUSCULAR; INTRAVENOUS; SUBCUTANEOUS at 07:42

## 2023-09-06 RX ADMIN — PROPOFOL 200 MG: 10 INJECTION, EMULSION INTRAVENOUS at 07:33

## 2023-09-06 RX ADMIN — EPHEDRINE SULFATE 10 MG: 50 INJECTION, SOLUTION INTRAMUSCULAR; INTRAVENOUS; SUBCUTANEOUS at 07:50

## 2023-09-06 RX ADMIN — FENTANYL CITRATE 50 MCG: 50 INJECTION, SOLUTION INTRAMUSCULAR; INTRAVENOUS at 07:33

## 2023-09-06 RX ADMIN — FENTANYL CITRATE 25 MCG: 50 INJECTION, SOLUTION INTRAMUSCULAR; INTRAVENOUS at 08:44

## 2023-09-06 ASSESSMENT — ENCOUNTER SYMPTOMS: DYSRHYTHMIAS: 1

## 2023-09-06 ASSESSMENT — LIFESTYLE VARIABLES: TOBACCO_USE: 1

## 2023-09-06 NOTE — ANESTHESIA CARE TRANSFER NOTE
Patient: Alex Stanley    Procedure: Procedure(s):  Blue light CYSTOSCOPY, WITH TRANSURETHRAL RESECTION BLADDER TUMOR, urethral dilation       Diagnosis: Bladder tumor [D49.4]  Diagnosis Additional Information: No value filed.    Anesthesia Type:   General     Note:    Oropharynx: spontaneously breathing  Level of Consciousness: awake  Oxygen Supplementation: face mask  Level of Supplemental Oxygen (L/min / FiO2): 7  Independent Airway: airway patency satisfactory and stable  Dentition: dentition unchanged  Vital Signs Stable: post-procedure vital signs reviewed and stable  Report to RN Given: handoff report given  Patient transferred to: PACU    Handoff Report: Identifed the Patient, Identified the Reponsible Provider, Reviewed the pertinent medical history, Discussed the surgical course, Reviewed Intra-OP anesthesia mangement and issues during anesthesia, Set expectations for post-procedure period and Allowed opportunity for questions and acknowledgement of understanding      Vitals:  Vitals Value Taken Time   BP 92/78 09/06/23 0826   Temp 96.8    Pulse 116 09/06/23 0829   Resp 15 09/06/23 0829   SpO2 97 % 09/06/23 0829   Vitals shown include unvalidated device data.    Electronically Signed By: LAST Hebert CRNA  September 6, 2023  8:30 AM

## 2023-09-06 NOTE — OP NOTE
PATIENT NAME:Alex Stanley   MEDICAL RECORD NUMBER: [id number]   SURGEON: Sydney Weaver MD  ASSISTANT: LORENZO    PREOPERATIVE DIAGNOSIS: Bladder Tumor (Size 2-5 cm)  POSTOPERATIVE DIAGNOSIS: Same    PROCEDURE PERFORMED: Blue light cystoscopy, transurethral resection of bladder tumor and cauterization, intravesical instillation of gentamicin   ANESTHESIA: LMA  COMPLICATIONS: None.   OPERATIVE FINDINGS: carpet of papillary lesion covering the trigone and posterior bladder wall extending towards the right and left ureteral orifices but not involved. Also, a collection of papillary lesion on the edge of a bladder diverticulum on the right lateral wall which was resected. All the lesion were blue light positive.   SPECIMEN: Bladder tumor WL + BL +   EBL (mL): Minimal   INDICATIONS FOR PROCEDURE: Alex Stanley is a 78 year old male with a recently diagnosed bladder lesions who is here for the above mentioned procedure.     DETAILS OF PROCEDURE: The risks and benefits of the procedure were explained in detail to patient and informed consent was obtained. The patient was brought to the operating room and placed supine on the operating table where he underwent general anesthesia. he was then positioned in dorsal lithotomy position.  The patient was prepped and draped in standard sterile fashion.   After an appropriate timeout, I introduced the 26-Cymraes rigid resectoscope through the urethra and into the bladder and performed a complete cystoscopy. Findings were described above. We then completely resected the described areas down to muscle, assuring some muscularis propria would be in the section. We evacuated the bladder chips after eah resection and sent them to pathology separately for evaluation. There was excellent hemostasis. We then placed a 16 Fr regular catheter and inflated the balloon with 10 ml water. Patient was then awakened and transported to PACU in stable condition. There was no complications.       Plan    Catheter to be removed on 9/11/2023  Follow-up next week to discuss pathology  Apixaban to be restarted on Friday, 9/8/2023 if urine is clear otherwise restart on Monday, 9/11/2023    Sydney Weaver MD   Department of Urology   [facility]

## 2023-09-06 NOTE — ANESTHESIA PREPROCEDURE EVALUATION
Anesthesia Pre-Procedure Evaluation    Patient: Alex Stanley   MRN: [id number] : 1945        Procedure : Procedure(s):  Blue light CYSTOSCOPY, WITH TRANSURETHRAL RESECTION BLADDER TUMOR and intravesical instillation of chemotherapy medication          Past Medical History:   Diagnosis Date    Alcohol abuse     Alcoholism (H)     admission     Atrial fibrillation, paroxysmal , ,     Benign essential hypertension     Chronic atrial fibrillation (H)     Diverticulosis of colon 2011    Erosive gastritis     Gastritis     Hyperlipidemia LDL goal <100     Hypertension, with LVH     Iron deficiency anemia     Paroxysmal atrial fibrillation (H)     Stroke (H)     Tobacco abuse       Past Surgical History:   Procedure Laterality Date    COLONOSCOPY N/A 10/26/2020    Procedure: COLONOSCOPY, WITH POLYPECTOMY AND BIOPSY;  Surgeon: Shon Bond MD;  Location:  GI    ENDOSCOPIC ULTRASOUND UPPER GASTROINTESTINAL TRACT (GI) N/A 2022    Procedure: ENDOSCOPIC ULTRASOUND, ESOPHAGOSCOPY / UPPER GASTROINTESTINAL TRACT (GI);  Surgeon: Shon Bond MD;  Location:  GI    LAPAROSCOPIC CHOLECYSTECTOMY N/A 2022    Procedure: LAPAROSCOPIC CHOLECYSTECTOMY;  Surgeon: Mazin Bains MD;  Location:  OR    SURGICAL HISTORY OF -       R Dupuytren's contracture release   Dr. Scruggs    [facility] NONSPECIFIC PROCEDURE      Rt foot reconstructive    [facility] NONSPECIFIC PROCEDURE  10/12    R hand procedure.  Dr. Deluna      No Known Allergies   Social History     Tobacco Use    Smoking status: Every Day     Packs/day: 0.50     Types: Cigarettes     Start date:     Smokeless tobacco: Never    Tobacco comments:     ppd since age 16 - now /2-1 ppd 23.  10 cigarettes daily   Substance Use Topics    Alcohol use: Not Currently     Comment: last drank at [facility] 2010.      Wt Readings from Last 1 Encounters:   23 74.8 kg (165 lb)        Anesthesia Evaluation   Pt  has had prior anesthetic.     No history of anesthetic complications       ROS/MED HX  ENT/Pulmonary:     (+)                tobacco use, Current use,                      Neurologic:     (+)          CVA,  without deficits,                    Cardiovascular:     (+) Dyslipidemia hypertension- -   -  - -   Taking blood thinners Pt has received instructions: Instructions Given to patient: last eliquis Chon 9/3/2023.                   dysrhythmias, a-fib and a-flutter,             METS/Exercise Tolerance:     Hematologic:  - neg hematologic  ROS     Musculoskeletal:  - neg musculoskeletal ROS     GI/Hepatic:     (+)             liver disease,       Renal/Genitourinary:     (+) renal disease (Creat 1.19), type: CRI,            Endo:  - neg endo ROS     Psychiatric/Substance Use:  - neg psychiatric ROS     Infectious Disease:       Malignancy:       Other:  - neg other ROS          Physical Exam    Airway        Mallampati: I   TM distance: > 3 FB   Neck ROM: full   Mouth opening: > 3 cm    Respiratory Devices and Support         Dental       (+) Modest Abnormalities - crowns, retainers, 1 or 2 missing teeth      Cardiovascular          Rhythm and rate: irregular and normal     Pulmonary           breath sounds clear to auscultation           OUTSIDE LABS:  CBC:   Lab Results   Component Value Date    WBC 9.1 08/25/2023    WBC 9.6 07/27/2023    HGB 14.5 08/25/2023    HGB 15.7 07/27/2023    HCT 43.9 08/25/2023    HCT 48.1 07/27/2023     08/25/2023     07/27/2023     BMP:   Lab Results   Component Value Date     08/25/2023     07/27/2023    POTASSIUM 4.4 08/25/2023    POTASSIUM 4.2 07/27/2023    CHLORIDE 105 08/25/2023    CHLORIDE 101 07/27/2023    CO2 25 08/25/2023    CO2 22 07/27/2023    BUN 17.4 08/25/2023    BUN 14.8 07/27/2023    CR 1.21 (H) 08/25/2023    CR 1.18 (H) 07/27/2023     (H) 08/25/2023    GLC 96 07/27/2023     COAGS:   Lab Results   Component Value Date    PTT 57 (H)  06/01/2011    INR 2.8 (H) 02/10/2023     POC:   Lab Results   Component Value Date     (H) 12/03/2009     HEPATIC:   Lab Results   Component Value Date    ALBUMIN 4.6 08/25/2023    PROTTOTAL 7.0 08/25/2023    ALT 8 08/25/2023    AST 25 08/25/2023    GGT 61 07/06/2005    ALKPHOS 88 08/25/2023    BILITOTAL 0.6 08/25/2023     OTHER:   Lab Results   Component Value Date    MARY 9.1 08/25/2023    MAG 1.9 06/01/2011    LIPASE 820 (H) 01/13/2022    AMYLASE 65 07/06/2005    TSH 1.68 06/01/2011    CRP <2.9 01/14/2022    SED 9 06/01/2011       Anesthesia Plan    ASA Status:  3       Anesthesia Type: General.     - Airway: LMA              Consents    Anesthesia Plan(s) and associated risks, benefits, and realistic alternatives discussed. Questions answered and patient/representative(s) expressed understanding.     - Discussed: Risks, Benefits and Alternatives for BOTH SEDATION and the PROCEDURE were discussed     - Discussed with:  Patient            Postoperative Care            Comments:                Santos Pérez MD

## 2023-09-06 NOTE — ANESTHESIA POSTPROCEDURE EVALUATION
Patient: Alex Stanley    Procedure: Procedure(s):  Blue light CYSTOSCOPY, WITH TRANSURETHRAL RESECTION BLADDER TUMOR, urethral dilation       Anesthesia Type:  General    Note:  Disposition: Outpatient   Postop Pain Control: Uneventful            Sign Out: Well controlled pain   PONV: No   Neuro/Psych: Uneventful            Sign Out: Acceptable/Baseline neuro status   Airway/Respiratory: Uneventful            Sign Out: Acceptable/Baseline resp. status   CV/Hemodynamics: Uneventful            Sign Out: Acceptable CV status; No obvious hypovolemia; No obvious fluid overload   Other NRE: NONE   DID A NON-ROUTINE EVENT OCCUR? No           Last vitals:  Vitals Value Taken Time   BP 85/63 09/06/23 0849   Temp 36  C (96.8  F) 09/06/23 0824   Pulse 69 09/06/23 0849   Resp 17 09/06/23 0849   SpO2 95 % 09/06/23 0849   Vitals shown include unvalidated device data.    Electronically Signed By: Santos Pérez MD  September 6, 2023  1:24 PM

## 2023-09-06 NOTE — DISCHARGE INSTRUCTIONS
apixaban (Eliquis) (your blood thinner) to be restarted on Saturday morning 9/9/2023 if the urine is clear otherwise Monday morning 9/11/2023  May remove Urinary Watkins Catheter at home on Monday 9/11/2023.    [facility]  Home Care Following Anesthesia  For 24 hours after surgery:  Get plenty of rest.  A responsible adult must stay with you for at least 24 hours after you leave the surgery center.  Do not drive or use heavy equipment.  If you have weakness or tingling, don't drive or use heavy equipment until this feeling goes away.   Do not drink alcohol.   Avoid strenuous or risky activities.  Ask for help when climbing stairs.  You may feel lightheaded.  IF so, sit for a few minutes before standing.  Have someone help you get up.   If you have nausea (feel sick to your stomach): Drink only clear liquids such as apple juice, ginger ale, broth or 7-Up.  Rest may also help.  Be sure to drink enough fluids.  Move to a regular diet as you feel able.   You may have a slight fever.  Call the doctor if your fever is over 100 F (37.7 C) (taken under the tongue) or lasts longer than 24 hours.  You may have a dry mouth, a sore throat, muscle aches or trouble sleeping. These should go away after 24 hours.  Do not make important or legal decisions.   It is recommended to avoid smoking.               Tips for taking pain medications  To get the best pain relief possible, remember these points:  Take pain medications as directed, before pain becomes severe.  Pain medication can upset your stomach: taking it with food may help.  Constipation is a common side effect of pain medication. Drink plenty of  fluids.  Eat foods high in fiber. Take a stool softener if recommended by your doctor or pharmacist.  Do not drink alcohol, drive or operate machinery while taking pain medications.  Ask about other ways to control pain, such as with heat, ice or relaxation.    Tylenol/Acetaminophen  Consumption    If you feel your pain relief is insufficient, you may take Tylenol/Acetaminophen in addition to your narcotic pain medication.   Be careful not to exceed 4,000 mg of Tylenol/Acetaminophen in a 24 hour period from all sources.  If you are taking extra strength Tylenol/acetaminophen (500 mg), the maximum dose is 8 tablets in 24 hours.  If you are taking regular strength acetaminophen (325 mg), the maximum dose is 12 tablets in 24 hours.  You last received Tylenol 975mg at 6:45am. Next available dose is at 12:45p, then follow bottle instructions.     Call a doctor for any of the following:  Signs of infection (fever, growing tenderness at the surgery site, a large amount of drainage or bleeding, severe pain, foul-smelling drainage, redness, swelling).  It has been over 8 to 10 hours since surgery and you are still not able to urinate (pass water).  Headache for over 24 hours.  Numbness, tingling or weakness the day after surgery (if you had spinal anesthesia).  Signs of Covid-19 infection (temperature over 100 degrees, shortness of breath, cough, loss of taste/smell, generalized body aches, persistent headache, chills, sore throat, nausea/vomiting/diarrhea)  Your doctor is:  Dr. Sydney Weaver, Prostate and Urology: [phone]; [facility]: [phone]                    Or dial [phone] and ask for the resident on call for:  Prostate Urology  For emergency care, call the:  [facility]:  [phone] (TTY for hearing impaired: [phone])

## 2023-09-07 ENCOUNTER — PATIENT OUTREACH (OUTPATIENT)
Dept: UROLOGY | Facility: CLINIC | Age: 78
End: 2023-09-07
Payer: MEDICARE

## 2023-09-07 DIAGNOSIS — Z85.51 PERSONAL HISTORY OF MALIGNANT NEOPLASM OF BLADDER: Primary | ICD-10-CM

## 2023-09-07 LAB
PATH REPORT.COMMENTS IMP SPEC: ABNORMAL
PATH REPORT.COMMENTS IMP SPEC: ABNORMAL
PATH REPORT.COMMENTS IMP SPEC: YES
PATH REPORT.FINAL DX SPEC: ABNORMAL
PATH REPORT.GROSS SPEC: ABNORMAL
PATH REPORT.MICROSCOPIC SPEC OTHER STN: ABNORMAL
PATH REPORT.RELEVANT HX SPEC: ABNORMAL
PHOTO IMAGE: ABNORMAL

## 2023-09-07 RX ORDER — OXYCODONE HYDROCHLORIDE 5 MG/1
5 TABLET ORAL EVERY 6 HOURS PRN
Qty: 6 TABLET | Refills: 0 | Status: SHIPPED | OUTPATIENT
Start: 2023-09-07 | End: 2023-09-10

## 2023-09-07 NOTE — TELEPHONE ENCOUNTER
"Writer reached out to pt to inquire about his report of discomfort post prostate bx.     Pt notes that he has been taking tylenol with no relief. Pt reports that he has also been taking Oxybutynin with very little relief.     Writer reminded pt that when he is sitting, that he should make an attempt to offload pressure from his bottom. Additionally, writer informs pt to make attempts to ambulate throughout the day vs sitting for long periods. Pt expressed understanding.       Pt requests for \"something stronger\" to assist with his pain. Writer informed pt that Dr Weaver will be notified and medication sent to pharmacy per provider. Pt expressed understanding.     Will continue to follow as needed  "

## 2023-09-08 ENCOUNTER — PATIENT OUTREACH (OUTPATIENT)
Dept: UROLOGY | Facility: CLINIC | Age: 78
End: 2023-09-08
Payer: MEDICARE

## 2023-09-08 NOTE — TELEPHONE ENCOUNTER
Writer reached out to pt to offer a sooner appointment for catheter removal. Pt is scheduled 9/11 for a NV.     Pt is also scheduled for follow up for pathology results with Dr. Weaver. Pt agreeable to both appts.     Will continue to follow as needed.

## 2023-09-11 ENCOUNTER — OFFICE VISIT (OUTPATIENT)
Dept: UROLOGY | Facility: CLINIC | Age: 78
End: 2023-09-11
Payer: MEDICARE

## 2023-09-11 DIAGNOSIS — D49.4 BLADDER TUMOR: Primary | ICD-10-CM

## 2023-09-11 PROCEDURE — 99211 OFF/OP EST MAY X REQ PHY/QHP: CPT

## 2023-09-11 NOTE — PROGRESS NOTES
Alex ASHELY Williamjacinta comes into clinic today at the request of Dr. Weaver with the diagnosis of bladder tumor for a catheter removal.      Removal:  16 Fr straight tipped silicone landeros catheter removed from urethral meatus without difficulty after removing 9 mL of fluid from the balloon.    Patient did tolerate procedure well.     Patient instructed as to where to call or go for pain, fever, leakage, or decreased urine flow.     This service provided today was under the direct supervision of Dr. Rosario, who was available if needed.    Jenise Pierre RN   9/11/2023  11:03 AM

## 2023-09-13 ENCOUNTER — VIRTUAL VISIT (OUTPATIENT)
Dept: UROLOGY | Facility: CLINIC | Age: 78
End: 2023-09-13
Payer: MEDICARE

## 2023-09-13 DIAGNOSIS — C67.9 UROTHELIAL CARCINOMA OF BLADDER WITHOUT INVASION OF MUSCLE (H): Primary | ICD-10-CM

## 2023-09-13 PROCEDURE — 99443 PR PHYSICIAN TELEPHONE EVALUATION 21-30 MIN: CPT | Mod: 95 | Performed by: UROLOGY

## 2023-09-13 ASSESSMENT — PAIN SCALES - GENERAL: PAINLEVEL: NO PAIN (0)

## 2023-09-13 NOTE — NURSING NOTE
Is the patient currently in the state of [place]? YES    Visit mode:TELEPHONE    If the visit is dropped, the patient can be reconnected by: TELEPHONE VISIT: Phone number:   Telephone Information:   Mobile [phone]       Will anyone else be joining the visit? NO  (If patient encounters technical issues they should call [phone]    How would you like to obtain your AVS? MyChart    Are changes needed to the allergy or medication list? Pt stated no med changes    Reason for visit: RECHECK (Pathology report )    Heidi DIEGO

## 2023-09-13 NOTE — LETTER
9/13/2023       RE: Alex Stanley  [address]  [place] [place] [address]     Dear Colleague,    Thank you for referring your patient, Alex Stanley, to the [facility]OLOG[facility] [place] at [facility]. Please see a copy of my visit note below.    Virtual Visit Details    Type of service:  Telephone Visit   Phone call duration: 20 minutes     Urology Clinic     HPI  Alex Stanley is a 78 year old male with recently diagnosed nonmuscle invasive bladder cancer, here for follow-up after his recent TURBT.      The patient denies any urination problems or hematuria.  No changes to health, hospitalizations or new diagnoses in the interim    PHYSICAL EXAM  Vitals:  No vitals were obtained today due to virtual visit.    Physical Exam   GENERAL: Healthy, alert and no distress  EYES: Eyes grossly normal to inspection.  No discharge or erythema, or obvious scleral/conjunctival abnormalities.  RESP: No audible wheeze, cough, or visible cyanosis.  No visible retractions or increased work of breathing.    SKIN: Visible skin clear. No significant rash, abnormal pigmentation or lesions.  NEURO: Cranial nerves grossly intact.  Mentation and speech appropriate for age.  PSYCH: Mentation appears normal, affect normal/bright, judgement and insight intact, normal speech and appearance well-groomed.    Labs  Lab Results   Component Value Date    WBC 9.1 08/25/2023    WBC 8.3 10/08/2012     Lab Results   Component Value Date    RBC 4.57 08/25/2023    RBC 4.80 10/08/2012     Lab Results   Component Value Date    HGB 14.5 08/25/2023    HGB 14.1 06/14/2018     Lab Results   Component Value Date    HCT 43.9 08/25/2023    HCT 44.3 10/08/2012     No components found for: MCT  Lab Results   Component Value Date    MCV 96 08/25/2023    MCV 92 10/08/2012     Lab Results   Component Value Date    MCH 31.7 08/25/2023    MCH 30.2 10/08/2012     Lab Results   Component Value Date     MCHC 33.0 08/25/2023    MCHC 32.7 10/08/2012     Lab Results   Component Value Date    RDW 14.5 08/25/2023    RDW 14.1 10/08/2012     Lab Results   Component Value Date     08/25/2023     10/08/2012        Last Comprehensive Metabolic Panel:  Sodium   Date Value Ref Range Status   08/25/2023 139 136 - 145 mmol/L Final   10/01/2020 140 133 - 144 mmol/L Final     Potassium   Date Value Ref Range Status   08/25/2023 4.4 3.4 - 5.3 mmol/L Final   02/08/2022 3.8 3.4 - 5.3 mmol/L Final   10/01/2020 3.9 3.4 - 5.3 mmol/L Final     Chloride   Date Value Ref Range Status   08/25/2023 105 98 - 107 mmol/L Final   01/14/2022 113 (H) 94 - 109 mmol/L Final   10/01/2020 108 94 - 109 mmol/L Final     Carbon Dioxide   Date Value Ref Range Status   10/01/2020 28 20 - 32 mmol/L Final     Carbon Dioxide (CO2)   Date Value Ref Range Status   08/25/2023 25 22 - 29 mmol/L Final   01/14/2022 23 20 - 32 mmol/L Final     Anion Gap   Date Value Ref Range Status   08/25/2023 9 7 - 15 mmol/L Final   01/14/2022 4 3 - 14 mmol/L Final   10/01/2020 4 3 - 14 mmol/L Final     Glucose   Date Value Ref Range Status   08/25/2023 102 (H) 70 - 99 mg/dL Final   01/14/2022 88 70 - 99 mg/dL Final   10/01/2020 70 70 - 99 mg/dL Final     Comment:     Fasting specimen     Urea Nitrogen   Date Value Ref Range Status   08/25/2023 17.4 8.0 - 23.0 mg/dL Final   01/14/2022 11 7 - 30 mg/dL Final   10/01/2020 14 7 - 30 mg/dL Final     Creatinine   Date Value Ref Range Status   08/25/2023 1.21 (H) 0.67 - 1.17 mg/dL Final   10/01/2020 1.26 (H) 0.66 - 1.25 mg/dL Final     GFR Estimate   Date Value Ref Range Status   08/25/2023 62 >60 mL/min/1.73m2 Final   10/01/2020 56 (L) >60 mL/min/[1.73_m2] Final     Comment:     Non  GFR Calc  Starting 12/18/2018, serum creatinine based estimated GFR (eGFR) will be   calculated using the Chronic Kidney Disease Epidemiology Collaboration   (CKD-EPI) equation.       Calcium   Date Value Ref Range Status    08/25/2023 9.1 8.8 - 10.2 mg/dL Final   10/01/2020 8.8 8.5 - 10.1 mg/dL Final     Bilirubin Total   Date Value Ref Range Status   08/25/2023 0.6 <=1.2 mg/dL Final   10/01/2020 0.7 0.2 - 1.3 mg/dL Final     Alkaline Phosphatase   Date Value Ref Range Status   08/25/2023 88 40 - 129 U/L Final   10/01/2020 74 40 - 150 U/L Final     ALT   Date Value Ref Range Status   08/25/2023 8 0 - 70 U/L Final     Comment:     Reference intervals for this test were updated on 6/12/2023 to more accurately reflect our healthy population. There may be differences in the flagging of prior results with similar values performed with this method. Interpretation of those prior results can be made in the context of the updated reference intervals.     10/01/2020 24 0 - 70 U/L Final     AST   Date Value Ref Range Status   08/25/2023 25 0 - 45 U/L Final     Comment:     Reference intervals for this test were updated on 6/12/2023 to more accurately reflect our healthy population. There may be differences in the flagging of prior results with similar values performed with this method. Interpretation of those prior results can be made in the context of the updated reference intervals.   10/01/2020 23 0 - 45 U/L Final       PSA   Date Value Ref Range Status   06/01/2011 0.12 0 - 4 ug/L Final   05/15/2009 0.43 0 - 4 ug/L Final     Prostate Specific Antigen Screen   Date Value Ref Range Status   02/27/2023 0.26 0.00 - 6.50 ng/mL Final        Surgical pathology    Final Diagnosis   A(1). Bladder tumor-white light positive, blue light positive:  -Low-grade papillary urothelial carcinoma, noninvasive  -Muscularis propria identified without tumor involvement   Electronically signed by Alphonso Shields MD on 9/7/2023 at  2:32 PM       Imaging   No new imaging to review    ASSESSMENT AND PLAN  78 year old male with low to intermediate risk nonmuscle invasive bladder cancer    I explained to Tejas that given the size and other features of his tumor, I do  not recommend any intravesical treatment at this point and would just do a surveillance cystoscopy in 3 months.  If there is evidence of recurrence at that point, we will discuss intravesical treatment.  He is agreeable      Plan   Follow-up in 3 months for surveillance cystoscopy    30 total minutes spent on the date of the encounter including direct interaction with the patient, performing chart review, documentation and further activities as noted above    Sydney Weaver MD   Department of Urology   [facility]

## 2023-09-13 NOTE — PROGRESS NOTES
Virtual Visit Details    Type of service:  Telephone Visit   Phone call duration: 20 minutes     Urology Clinic     HPI  Alex Stanley is a 78 year old male with recently diagnosed nonmuscle invasive bladder cancer, here for follow-up after his recent TURBT.      The patient denies any urination problems or hematuria.  No changes to health, hospitalizations or new diagnoses in the interim    PHYSICAL EXAM  Vitals:  No vitals were obtained today due to virtual visit.    Physical Exam   GENERAL: Healthy, alert and no distress  EYES: Eyes grossly normal to inspection.  No discharge or erythema, or obvious scleral/conjunctival abnormalities.  RESP: No audible wheeze, cough, or visible cyanosis.  No visible retractions or increased work of breathing.    SKIN: Visible skin clear. No significant rash, abnormal pigmentation or lesions.  NEURO: Cranial nerves grossly intact.  Mentation and speech appropriate for age.  PSYCH: Mentation appears normal, affect normal/bright, judgement and insight intact, normal speech and appearance well-groomed.    Labs  Lab Results   Component Value Date    WBC 9.1 08/25/2023    WBC 8.3 10/08/2012     Lab Results   Component Value Date    RBC 4.57 08/25/2023    RBC 4.80 10/08/2012     Lab Results   Component Value Date    HGB 14.5 08/25/2023    HGB 14.1 06/14/2018     Lab Results   Component Value Date    HCT 43.9 08/25/2023    HCT 44.3 10/08/2012     No components found for: MCT  Lab Results   Component Value Date    MCV 96 08/25/2023    MCV 92 10/08/2012     Lab Results   Component Value Date    MCH 31.7 08/25/2023    MCH 30.2 10/08/2012     Lab Results   Component Value Date    MCHC 33.0 08/25/2023    MCHC 32.7 10/08/2012     Lab Results   Component Value Date    RDW 14.5 08/25/2023    RDW 14.1 10/08/2012     Lab Results   Component Value Date     08/25/2023     10/08/2012        Last Comprehensive Metabolic Panel:  Sodium   Date Value Ref Range Status   08/25/2023 139 136  - 145 mmol/L Final   10/01/2020 140 133 - 144 mmol/L Final     Potassium   Date Value Ref Range Status   08/25/2023 4.4 3.4 - 5.3 mmol/L Final   02/08/2022 3.8 3.4 - 5.3 mmol/L Final   10/01/2020 3.9 3.4 - 5.3 mmol/L Final     Chloride   Date Value Ref Range Status   08/25/2023 105 98 - 107 mmol/L Final   01/14/2022 113 (H) 94 - 109 mmol/L Final   10/01/2020 108 94 - 109 mmol/L Final     Carbon Dioxide   Date Value Ref Range Status   10/01/2020 28 20 - 32 mmol/L Final     Carbon Dioxide (CO2)   Date Value Ref Range Status   08/25/2023 25 22 - 29 mmol/L Final   01/14/2022 23 20 - 32 mmol/L Final     Anion Gap   Date Value Ref Range Status   08/25/2023 9 7 - 15 mmol/L Final   01/14/2022 4 3 - 14 mmol/L Final   10/01/2020 4 3 - 14 mmol/L Final     Glucose   Date Value Ref Range Status   08/25/2023 102 (H) 70 - 99 mg/dL Final   01/14/2022 88 70 - 99 mg/dL Final   10/01/2020 70 70 - 99 mg/dL Final     Comment:     Fasting specimen     Urea Nitrogen   Date Value Ref Range Status   08/25/2023 17.4 8.0 - 23.0 mg/dL Final   01/14/2022 11 7 - 30 mg/dL Final   10/01/2020 14 7 - 30 mg/dL Final     Creatinine   Date Value Ref Range Status   08/25/2023 1.21 (H) 0.67 - 1.17 mg/dL Final   10/01/2020 1.26 (H) 0.66 - 1.25 mg/dL Final     GFR Estimate   Date Value Ref Range Status   08/25/2023 62 >60 mL/min/1.73m2 Final   10/01/2020 56 (L) >60 mL/min/[1.73_m2] Final     Comment:     Non  GFR Calc  Starting 12/18/2018, serum creatinine based estimated GFR (eGFR) will be   calculated using the Chronic Kidney Disease Epidemiology Collaboration   (CKD-EPI) equation.       Calcium   Date Value Ref Range Status   08/25/2023 9.1 8.8 - 10.2 mg/dL Final   10/01/2020 8.8 8.5 - 10.1 mg/dL Final     Bilirubin Total   Date Value Ref Range Status   08/25/2023 0.6 <=1.2 mg/dL Final   10/01/2020 0.7 0.2 - 1.3 mg/dL Final     Alkaline Phosphatase   Date Value Ref Range Status   08/25/2023 88 40 - 129 U/L Final   10/01/2020 74 40 - 150  U/L Final     ALT   Date Value Ref Range Status   08/25/2023 8 0 - 70 U/L Final     Comment:     Reference intervals for this test were updated on 6/12/2023 to more accurately reflect our healthy population. There may be differences in the flagging of prior results with similar values performed with this method. Interpretation of those prior results can be made in the context of the updated reference intervals.     10/01/2020 24 0 - 70 U/L Final     AST   Date Value Ref Range Status   08/25/2023 25 0 - 45 U/L Final     Comment:     Reference intervals for this test were updated on 6/12/2023 to more accurately reflect our healthy population. There may be differences in the flagging of prior results with similar values performed with this method. Interpretation of those prior results can be made in the context of the updated reference intervals.   10/01/2020 23 0 - 45 U/L Final       PSA   Date Value Ref Range Status   06/01/2011 0.12 0 - 4 ug/L Final   05/15/2009 0.43 0 - 4 ug/L Final     Prostate Specific Antigen Screen   Date Value Ref Range Status   02/27/2023 0.26 0.00 - 6.50 ng/mL Final        Surgical pathology    Final Diagnosis   A(1). Bladder tumor-white light positive, blue light positive:  -Low-grade papillary urothelial carcinoma, noninvasive  -Muscularis propria identified without tumor involvement   Electronically signed by Alphonso Shields MD on 9/7/2023 at  2:32 PM       Imaging   No new imaging to review    ASSESSMENT AND PLAN  78 year old male with low to intermediate risk nonmuscle invasive bladder cancer    I explained to Tejas that given the size and other features of his tumor, I do not recommend any intravesical treatment at this point and would just do a surveillance cystoscopy in 3 months.  If there is evidence of recurrence at that point, we will discuss intravesical treatment.  He is agreeable      Plan   Follow-up in 3 months for surveillance cystoscopy    30 total minutes spent on the date  of the encounter including direct interaction with the patient, performing chart review, documentation and further activities as noted above    Sydney Weaver MD   Department of Urology   [facility]

## 2023-10-02 ENCOUNTER — OFFICE VISIT (OUTPATIENT)
Dept: CARDIOLOGY | Facility: CLINIC | Age: 78
End: 2023-10-02
Attending: INTERNAL MEDICINE
Payer: MEDICARE

## 2023-10-02 VITALS
HEIGHT: 67 IN | WEIGHT: 164.8 LBS | DIASTOLIC BLOOD PRESSURE: 70 MMHG | OXYGEN SATURATION: 96 % | HEART RATE: 87 BPM | SYSTOLIC BLOOD PRESSURE: 112 MMHG | BODY MASS INDEX: 25.87 KG/M2

## 2023-10-02 DIAGNOSIS — R07.89 ATYPICAL CHEST PAIN: ICD-10-CM

## 2023-10-02 DIAGNOSIS — I48.20 CHRONIC ATRIAL FIBRILLATION (H): ICD-10-CM

## 2023-10-02 DIAGNOSIS — I10 ESSENTIAL HYPERTENSION: ICD-10-CM

## 2023-10-02 DIAGNOSIS — E78.5 HYPERLIPIDEMIA LDL GOAL <100: ICD-10-CM

## 2023-10-02 PROCEDURE — 99214 OFFICE O/P EST MOD 30 MIN: CPT | Performed by: PHYSICIAN ASSISTANT

## 2023-10-02 RX ORDER — PRAVASTATIN SODIUM 20 MG
20 TABLET ORAL DAILY
Qty: 90 TABLET | Refills: 3 | Status: SHIPPED | OUTPATIENT
Start: 2023-10-02 | End: 2024-09-26

## 2023-10-02 RX ORDER — ISOSORBIDE MONONITRATE 30 MG/1
30 TABLET, EXTENDED RELEASE ORAL DAILY
Qty: 90 TABLET | Refills: 3 | Status: SHIPPED | OUTPATIENT
Start: 2023-10-02 | End: 2024-09-26

## 2023-10-02 RX ORDER — AMLODIPINE BESYLATE 10 MG/1
10 TABLET ORAL DAILY
Qty: 90 TABLET | Refills: 3 | Status: SHIPPED | OUTPATIENT
Start: 2023-10-02 | End: 2024-09-26

## 2023-10-02 NOTE — PATIENT INSTRUCTIONS
Thank you for visiting with me today.    We discussed: continue current medications.      Follow up: with Dr. Dawson in 6 months.        Please call my nurse, Chayito, at [phone] with any questions or concerns.    Scheduling phone number: [phone]  Reminder: Please bring in all current medications, over the counter supplements and vitamin bottles to your next appointment.

## 2023-10-02 NOTE — PROGRESS NOTES
"  Cardiology Clinic Progress Note    Service Date: 2023    Primary Cardiologist: Dr. Dawson      Reason for Visit: A-fib, abnormal stress test    HPI:   Alex Stanley is a delightful 78-year-old gentleman with past medical history significant for the followin.  Hypertension  2.  Hyperlipidemia  3.  Chronic A-fib on anticoagulation with rate control approach  4.  CVA  5.  Recent diagnosis bladder cancer  6.  Abnormal stress test with medically managed likely coronary artery disease.    Tejas comes in today for routine follow-up.  He had developed atypical chest discomfort and underwent stress test that was reviewed today.  This showed a small area of mild ischemia in the apical segment and EF of 58%.  Given this he was started on Imdur and plan of medical management.    Since then he has done well.  He has successfully undergone a cystoscopy with resection of a bladder tumor without complication.  He denies shortness of breath, orthopnea, PND.  He can carry groceries in the house and climb a set of stairs without difficulty.  He did have chest discomfort yesterday.  He describes this in the epigastric area that is low and radiating up into his chest it was not pain but just discomfort.  He took Tums and belched and that helped and then it reoccurred and he took Tums again and that resolved.  He has never had these episodes with exercise and they almost always happen with rest and sitting down.  He otherwise occasionally gets discomfort and can push on his sternum and it resolves.  There was no shortness of breath or diaphoresis associated.        Physical Exam:  /70   Pulse 87   Ht 1.702 m (5' 7\")   Wt 74.8 kg (164 lb 12.8 oz)   SpO2 96%   BMI 25.81 kg/m     Well-developed well-nourished gentleman in no acute distress.  Normocephalic atraumatic.  Heart is regular in rate and rhythm without murmur rub or gallop.  Lungs are clear without wheezes rales or rhonchi.  Carotids are quiet.  Skin is " warm and dry.      Assessment and Plan:  1.  Abnormal stress test with a small area of mild ischemia in the apical segment.  The patient had chest discomfort yesterday but this is atypical and relieved with Tums and belching.  He otherwise can easily do 4 METS and does not have chest discomfort with exertion.  He is appropriately on statin and will use Eliquis for anticoagulation no aspirin required.  We will continue him on Imdur 30 mg daily.    2.  Hypertension well-controlled continue current medications    3.  Dyslipidemia with excellent control last LDL 57 HDL 42 total cholesterol 116    4.  Chronic A-fib with a rate control approach on Eliquis 5 mg twice daily appropriately    6.  Bladder cancer with recent resection with no plans for chemo or radiation.    This patient is overall doing well.  He can follow-up with Dr. Dawson in 6 months, he was originally scheduled to see him in November but we will push that out as he is doing so well.  Thank you for allowing me to participate in this delightful patient's care.    Jimena Ribera PA-C  [facility] Cardiology     This note was written using Dragon voice recognition system, please excuse any misspelled names, or nonsensical words and call with any questions.        Orders this visit:  No orders of the defined types were placed in this encounter.    Orders Placed This Encounter   Medications    DISCONTD: apixaban ANTICOAGULANT (ELIQUIS) 5 MG tablet     Sig: Take 5 mg by mouth 2 times daily    amLODIPine (NORVASC) 10 MG tablet     Sig: Take 1 tablet (10 mg) by mouth daily     Dispense:  90 tablet     Refill:  3    isosorbide mononitrate (IMDUR) 30 MG 24 hr tablet     Sig: Take 1 tablet (30 mg) by mouth daily     Dispense:  90 tablet     Refill:  3    pravastatin (PRAVACHOL) 20 MG tablet     Sig: Take 1 tablet (20 mg) by mouth daily     Dispense:  90 tablet     Refill:  3    apixaban ANTICOAGULANT (ELIQUIS) 5 MG tablet     Sig: Take 1 tablet (5 mg) by mouth 2  times daily     Dispense:  180 tablet     Refill:  3     Medications Discontinued During This Encounter   Medication Reason    pravastatin (PRAVACHOL) 20 MG tablet Reorder (No AVS)    isosorbide mononitrate (IMDUR) 30 MG 24 hr tablet Reorder (No AVS)    amLODIPine (NORVASC) 10 MG tablet Reorder (No AVS)    apixaban ANTICOAGULANT (ELIQUIS) 5 MG tablet Reorder (No AVS)     Encounter Diagnoses   Name Primary?    Chronic atrial fibrillation/Flutter     Essential hypertension     Atypical chest pain     Hyperlipidemia LDL goal <100        Current Medications:  Current Outpatient Medications   Medication Sig Dispense Refill    amLODIPine (NORVASC) 10 MG tablet Take 1 tablet (10 mg) by mouth daily 90 tablet 3    apixaban ANTICOAGULANT (ELIQUIS) 5 MG tablet Take 1 tablet (5 mg) by mouth 2 times daily 180 tablet 3    isosorbide mononitrate (IMDUR) 30 MG 24 hr tablet Take 1 tablet (30 mg) by mouth daily 90 tablet 3    Multiple Vitamins-Minerals (PRESERVISION AREDS 2) CAPS Take 2 capsules by mouth 2 times daily      pravastatin (PRAVACHOL) 20 MG tablet Take 1 tablet (20 mg) by mouth daily 90 tablet 3    ASPIRIN NOT PRESCRIBED, INTENTIONAL, Antiplatelet medication not prescribed intentionally due to history  of gastrointestinal bleeding, and patient on warfarin. 0 each 0    oxyBUTYnin ER (DITROPAN XL) 5 MG 24 hr tablet Take 1 tablet (5 mg) by mouth daily (Patient not taking: Reported on 10/2/2023) 5 tablet 0       Allergies:  No Known Allergies    Past Medical, Surgical and Family History:  Past Medical History:   Diagnosis Date    Alcohol abuse     Alcoholism (H)     admission 11/09    Atrial fibrillation, paroxysmal 11/07, 6/09, 11/09    Benign essential hypertension     Chronic atrial fibrillation (H)     Diverticulosis of colon 6/2/2011    Erosive gastritis 11/09    Gastritis     Hyperlipidemia LDL goal <100     Hypertension, with LVH     Iron deficiency anemia 11/09    Paroxysmal atrial fibrillation (H)     Stroke (H)      Tobacco abuse      Past Surgical History:   Procedure Laterality Date    COLONOSCOPY N/A 10/26/2020    Procedure: COLONOSCOPY, WITH POLYPECTOMY AND BIOPSY;  Surgeon: Shon Bond MD;  Location:  GI    CYSTOSCOPY, TRANSURETHRAL RESECTION (TUR) TUMOR BLADDER, COMBINED N/A 9/6/2023    Procedure: Blue light CYSTOSCOPY, WITH TRANSURETHRAL RESECTION BLADDER TUMOR, urethral dilation;  Surgeon: Sydney Weaver MD;  Location: [facility] [place]    ENDOSCOPIC ULTRASOUND UPPER GASTROINTESTINAL TRACT (GI) N/A 1/24/2022    Procedure: ENDOSCOPIC ULTRASOUND, ESOPHAGOSCOPY / UPPER GASTROINTESTINAL TRACT (GI);  Surgeon: Shon Bond MD;  Location:  GI    LAPAROSCOPIC CHOLECYSTECTOMY N/A 2/8/2022    Procedure: LAPAROSCOPIC CHOLECYSTECTOMY;  Surgeon: Mazin Bains MD;  Location:  OR    SURGICAL HISTORY OF -   4/11    R Dupuytren's contracture release   Dr. Scruggs    [facility] NONSPECIFIC PROCEDURE  2002    Rt foot reconstructive    Z NONSPECIFIC PROCEDURE  10/12    R hand procedure.  Dr. Deluna     Family History   Problem Relation Age of Onset    Lung Cancer Mother     Emphysema Father     Cancer Mother         small cell lung with mets to brain    Diabetes Maternal Grandmother     Respiratory Father         emphysema        Review of Systems:  Skin:        Eyes:       ENT:       Respiratory:  Positive for shortness of breath;dyspnea on exertion  Cardiovascular:  Negative;chest pain;palpitations;edema;lightheadedness Positive for;fatigue;dizziness  Gastroenterology:      Genitourinary:  not assessed    Musculoskeletal:       Neurologic:       Psychiatric:       Heme/Lymph/Imm:  Negative    Endocrine:  Negative for thyroid disorder;diabetes       CC  Lamont Dawson MD  [address]  [place] [id number]

## 2023-10-02 NOTE — LETTER
"10/2/2023    Estuardo Manning MD  [address] [place] [id number]    RE: Alex Stanley       Dear Colleague,     I had the pleasure of seeing Alex Stanley in the [facility].    Cardiology Clinic Progress Note    Service Date: 2023    Primary Cardiologist: Dr. Dawson      Reason for Visit: A-fib, abnormal stress test    HPI:   Alex Stanley is a delightful 78-year-old gentleman with past medical history significant for the followin.  Hypertension  2.  Hyperlipidemia  3.  Chronic A-fib on anticoagulation with rate control approach  4.  CVA  5.  Recent diagnosis bladder cancer  6.  Abnormal stress test with medically managed likely coronary artery disease.    Tejas comes in today for routine follow-up.  He had developed atypical chest discomfort and underwent stress test that was reviewed today.  This showed a small area of mild ischemia in the apical segment and EF of 58%.  Given this he was started on Imdur and plan of medical management.    Since then he has done well.  He has successfully undergone a cystoscopy with resection of a bladder tumor without complication.  He denies shortness of breath, orthopnea, PND.  He can carry groceries in the house and climb a set of stairs without difficulty.  He did have chest discomfort yesterday.  He describes this in the epigastric area that is low and radiating up into his chest it was not pain but just discomfort.  He took Tums and belched and that helped and then it reoccurred and he took Tums again and that resolved.  He has never had these episodes with exercise and they almost always happen with rest and sitting down.  He otherwise occasionally gets discomfort and can push on his sternum and it resolves.  There was no shortness of breath or diaphoresis associated.        Physical Exam:  /70   Pulse 87   Ht 1.702 m (5' 7\")   Wt 74.8 kg (164 lb 12.8 oz)   SpO2 96%   BMI 25.81 kg/m     Well-developed " well-nourished gentleman in no acute distress.  Normocephalic atraumatic.  Heart is regular in rate and rhythm without murmur rub or gallop.  Lungs are clear without wheezes rales or rhonchi.  Carotids are quiet.  Skin is warm and dry.      Assessment and Plan:  1.  Abnormal stress test with a small area of mild ischemia in the apical segment.  The patient had chest discomfort yesterday but this is atypical and relieved with Tums and belching.  He otherwise can easily do 4 METS and does not have chest discomfort with exertion.  He is appropriately on statin and will use Eliquis for anticoagulation no aspirin required.  We will continue him on Imdur 30 mg daily.    2.  Hypertension well-controlled continue current medications    3.  Dyslipidemia with excellent control last LDL 57 HDL 42 total cholesterol 116    4.  Chronic A-fib with a rate control approach on Eliquis 5 mg twice daily appropriately    6.  Bladder cancer with recent resection with no plans for chemo or radiation.    This patient is overall doing well.  He can follow-up with Dr. Dawson in 6 months, he was originally scheduled to see him in November but we will push that out as he is doing so well.  Thank you for allowing me to participate in this delightful patient's care.    Jimena Ribera PA-C  [facility] Cardiology     This note was written using Dragon voice recognition system, please excuse any misspelled names, or nonsensical words and call with any questions.        Orders this visit:  No orders of the defined types were placed in this encounter.    Orders Placed This Encounter   Medications    DISCONTD: apixaban ANTICOAGULANT (ELIQUIS) 5 MG tablet     Sig: Take 5 mg by mouth 2 times daily    amLODIPine (NORVASC) 10 MG tablet     Sig: Take 1 tablet (10 mg) by mouth daily     Dispense:  90 tablet     Refill:  3    isosorbide mononitrate (IMDUR) 30 MG 24 hr tablet     Sig: Take 1 tablet (30 mg) by mouth daily     Dispense:  90 tablet     Refill:   3    pravastatin (PRAVACHOL) 20 MG tablet     Sig: Take 1 tablet (20 mg) by mouth daily     Dispense:  90 tablet     Refill:  3    apixaban ANTICOAGULANT (ELIQUIS) 5 MG tablet     Sig: Take 1 tablet (5 mg) by mouth 2 times daily     Dispense:  180 tablet     Refill:  3     Medications Discontinued During This Encounter   Medication Reason    pravastatin (PRAVACHOL) 20 MG tablet Reorder (No AVS)    isosorbide mononitrate (IMDUR) 30 MG 24 hr tablet Reorder (No AVS)    amLODIPine (NORVASC) 10 MG tablet Reorder (No AVS)    apixaban ANTICOAGULANT (ELIQUIS) 5 MG tablet Reorder (No AVS)     Encounter Diagnoses   Name Primary?    Chronic atrial fibrillation/Flutter     Essential hypertension     Atypical chest pain     Hyperlipidemia LDL goal <100        Current Medications:  Current Outpatient Medications   Medication Sig Dispense Refill    amLODIPine (NORVASC) 10 MG tablet Take 1 tablet (10 mg) by mouth daily 90 tablet 3    apixaban ANTICOAGULANT (ELIQUIS) 5 MG tablet Take 1 tablet (5 mg) by mouth 2 times daily 180 tablet 3    isosorbide mononitrate (IMDUR) 30 MG 24 hr tablet Take 1 tablet (30 mg) by mouth daily 90 tablet 3    Multiple Vitamins-Minerals (PRESERVISION AREDS 2) CAPS Take 2 capsules by mouth 2 times daily      pravastatin (PRAVACHOL) 20 MG tablet Take 1 tablet (20 mg) by mouth daily 90 tablet 3    ASPIRIN NOT PRESCRIBED, INTENTIONAL, Antiplatelet medication not prescribed intentionally due to history  of gastrointestinal bleeding, and patient on warfarin. 0 each 0    oxyBUTYnin ER (DITROPAN XL) 5 MG 24 hr tablet Take 1 tablet (5 mg) by mouth daily (Patient not taking: Reported on 10/2/2023) 5 tablet 0       Allergies:  No Known Allergies    Past Medical, Surgical and Family History:  Past Medical History:   Diagnosis Date    Alcohol abuse     Alcoholism (H)     admission 11/09    Atrial fibrillation, paroxysmal 11/07, 6/09, 11/09    Benign essential hypertension     Chronic atrial fibrillation (H)      Diverticulosis of colon 6/2/2011    Erosive gastritis 11/09    Gastritis     Hyperlipidemia LDL goal <100     Hypertension, with LVH     Iron deficiency anemia 11/09    Paroxysmal atrial fibrillation (H)     Stroke (H)     Tobacco abuse      Past Surgical History:   Procedure Laterality Date    COLONOSCOPY N/A 10/26/2020    Procedure: COLONOSCOPY, WITH POLYPECTOMY AND BIOPSY;  Surgeon: Shon Bond MD;  Location:  GI    CYSTOSCOPY, TRANSURETHRAL RESECTION (TUR) TUMOR BLADDER, COMBINED N/A 9/6/2023    Procedure: Blue light CYSTOSCOPY, WITH TRANSURETHRAL RESECTION BLADDER TUMOR, urethral dilation;  Surgeon: Sydney Weaver MD;  [place] [facility] [place]    ENDOSCOPIC ULTRASOUND UPPER GASTROINTESTINAL TRACT (GI) N/A 1/24/2022    Procedure: ENDOSCOPIC ULTRASOUND, ESOPHAGOSCOPY / UPPER GASTROINTESTINAL TRACT (GI);  Surgeon: Shon Bond MD;  Location:  GI    LAPAROSCOPIC CHOLECYSTECTOMY N/A 2/8/2022    Procedure: LAPAROSCOPIC CHOLECYSTECTOMY;  Surgeon: Mazin Bains MD;  Location:  OR    SURGICAL HISTORY OF -   4/11    R Dupuytren's contracture release   Dr. Scruggs    [facility] NONSPECIFIC PROCEDURE  2002    Rt foot reconstructive    [facility] NONSPECIFIC PROCEDURE  10/12    R hand procedure.  Dr. Deluna     Family History   Problem Relation Age of Onset    Lung Cancer Mother     Emphysema Father     Cancer Mother         small cell lung with mets to brain    Diabetes Maternal Grandmother     Respiratory Father         emphysema        Review of Systems:  Skin:        Eyes:       ENT:       Respiratory:  Positive for shortness of breath;dyspnea on exertion  Cardiovascular:  Negative;chest pain;palpitations;edema;lightheadedness Positive for;fatigue;dizziness  Gastroenterology:      Genitourinary:  not assessed    Musculoskeletal:       Neurologic:       Psychiatric:       Heme/Lymph/Imm:  Negative    Endocrine:  Negative for thyroid disorder;diabetes       CC  Lamont Dawson MD  [address]  [place]   [place] [id number]    Thank you for allowing me to participate in the care of your patient.      Sincerely,     RONNI Roach [facility]

## 2023-11-28 ENCOUNTER — OFFICE VISIT (OUTPATIENT)
Dept: FAMILY MEDICINE | Facility: CLINIC | Age: 78
End: 2023-11-28
Payer: MEDICARE

## 2023-11-28 DIAGNOSIS — L57.0 ACTINIC KERATOSIS: ICD-10-CM

## 2023-11-28 DIAGNOSIS — L81.4 LENTIGINES: ICD-10-CM

## 2023-11-28 DIAGNOSIS — L82.1 SEBORRHEIC KERATOSIS: ICD-10-CM

## 2023-11-28 DIAGNOSIS — D49.2 NEOPLASM OF SKIN: Primary | ICD-10-CM

## 2023-11-28 DIAGNOSIS — D22.9 MULTIPLE BENIGN NEVI: ICD-10-CM

## 2023-11-28 DIAGNOSIS — D18.01 CHERRY ANGIOMA: ICD-10-CM

## 2023-11-28 DIAGNOSIS — L82.0 SEBORRHEIC KERATOSIS, INFLAMED: ICD-10-CM

## 2023-11-28 PROCEDURE — 17000 DESTRUCT PREMALG LESION: CPT | Mod: XS | Performed by: DERMATOLOGY

## 2023-11-28 PROCEDURE — 11103 TANGNTL BX SKIN EA SEP/ADDL: CPT | Mod: XS | Performed by: DERMATOLOGY

## 2023-11-28 PROCEDURE — 17110 DESTRUCTION B9 LES UP TO 14: CPT | Performed by: DERMATOLOGY

## 2023-11-28 PROCEDURE — 88305 TISSUE EXAM BY PATHOLOGIST: CPT | Performed by: DERMATOLOGY

## 2023-11-28 PROCEDURE — 99214 OFFICE O/P EST MOD 30 MIN: CPT | Mod: 25 | Performed by: DERMATOLOGY

## 2023-11-28 PROCEDURE — 11102 TANGNTL BX SKIN SINGLE LES: CPT | Mod: XS | Performed by: DERMATOLOGY

## 2023-11-28 NOTE — LETTER
11/28/2023         RE: Alex Stanley  [address]  [facility] [address]        Dear Colleague,    Thank you for referring your patient, Alex Stanley, to the [facility]. Please see a copy of my visit note below.    [facility] Dermatology Note  Encounter Date: Nov 28, 2023  Office Visit     Dermatology Problem List:  Last skin check 11/28/23  0. NUB's  - right frontal scalp, s/p bx 11/28/23  - right temple, s/p bx 11/28/23  # Benign bx:  - ISK, sternum, s/p bx 04/05/21  - Clonal type SK, Occipital scalp, s.p bx 04/05/21  - Clonal SK, left frontal scalp, s/p bx 2017  3. AK's, sideburns and forehead  - cryo, consider field therapy  ____________________________________________    Assessment & Plan:    # Neoplasm of unspecified behavior of the skin (D49.2) on the right frontal scalp. The differential diagnosis includes BCC .   - Shave biopsy performed today (see procedure note(s) below).    # Neoplasm of unspecified behavior of the skin (D49.2) on the right temple. The differential diagnosis includes BCC vs. AK.   - Shave biopsy performed today (see procedure note(s) below).    # Actinic Keratosis, sideburns and forehead  - discussed cryo v field therapy v monitoring at length today  - Cryotherapy performed today for select AK on right cheek. See procedure note below.  - Plan for future cryotherapy v field therapy for other AK's; he will hold off for now and would like to consider prescription for 5FU/C in January when wife has an appt; offered to send script today and he can consider, but he would like to rediscuss    # ISK, left frontal scalp  - see cryo    # Benign lesions - SKs, cherry angiomas, lentigenes.  - No treatment required    # Multiple benign nevi.   - Monitor for ABCDEs of melanoma   - Continue sun protection - recommend SPF 30 or higher with frequent application   - Return sooner if noticing changing or symptomatic lesions    Procedures  Performed:   - Cryotherapy procedure note, location(s): see above. After verbal consent and discussion of risks and benefits including, but not limited to, dyspigmentation/scar, blister, and pain, 1 AK and 1 ISK lesion(s) was(were) treated with 1-2 mm freeze border for 1-2 cycles with liquid nitrogen. Post cryotherapy instructions were provided.    - Shave biopsy procedure note, location(s): see above. After discussion of benefits and risks including but not limited to bleeding, infection, scar, incomplete removal, recurrence, and non-diagnostic biopsy, written consent and photographs were obtained. The area was cleaned with isopropyl alcohol. 0.5mL of 1% lidocaine with epinephrine was injected to obtain adequate anesthesia of lesion(s). Shave biopsy at site(s) performed. Hemostasis was achieved with aluminium chloride. Petrolatum ointment and a sterile dressing were applied. The patient tolerated the procedure and no complications were noted. The patient was provided with verbal and written post care instructions.     Follow-up: 2 month(s) in-person, when wife has appt, to discuss field therapy, or earlier for new or changing lesions    Staff and Scribe:     Scribe Disclosure:   I, JOHANN YEPEZ, am serving as a scribe; to document services personally performed by Rosa Toledo MD -based on data collection and the provider's statements to me.    Provider Disclosure:   The documentation recorded by the scribe accurately reflects the services I personally performed and the decisions made by me.    Rosa Toledo MD    Department of Dermatology  [facility]: Phone: [phone], Fax: [phone]  11/28/2023     ____________________________________________    CC: Derm Problem (Skin check )    HPI:  Mr. Alex Stanley is a(n) 78 year old male who presents today as a return patient for [facility]SE.    The patient reports  of bumpy spots.    He also has a spot on his cheek that he would like to be checked.    Patient is otherwise feeling well, without additional skin concerns.    Labs Reviewed:  N/A    Physical Exam:  Vitals: There were no vitals taken for this visit.  SKIN: Total skin excluding the undergarment areas was performed. The exam included the head/face, neck, both arms, chest, back, abdomen, both legs, digits and/or nails.   - right frontal scalp, pearly papule.   - right temple, gritty pearly papule,   - There are erythematous macules with overyling adherent scale on the sideburns and forehead and right cheek.  - There are dome shaped bright red papules on the trunk and extremities .   - Multiple regular brown pigmented macules and papules are identified on the trunk and extremities. .   - Scattered brown macules on sun exposed areas.  - Waxy stuck on papules and plaques on trunk and extremities.   - No other lesions of concern on areas examined.     Medications:  Current Outpatient Medications   Medication     amLODIPine (NORVASC) 10 MG tablet     apixaban ANTICOAGULANT (ELIQUIS) 5 MG tablet     ASPIRIN NOT PRESCRIBED, INTENTIONAL,     isosorbide mononitrate (IMDUR) 30 MG 24 hr tablet     Multiple Vitamins-Minerals (PRESERVISION AREDS 2) CAPS     pravastatin (PRAVACHOL) 20 MG tablet     oxyBUTYnin ER (DITROPAN XL) 5 MG 24 hr tablet     No current facility-administered medications for this visit.      Past Medical History:   Patient Active Problem List   Diagnosis     Essential hypertension     Chronic atrial fibrillation/Flutter     Tobacco abuse     Iron deficiency anemia     History of alcohol dependence in remission sober since 2010 (H)     Erosive gastritis     Hyperlipidemia LDL goal <100     History of CVA (cerebrovascular accident)     Fatty liver, alcoholic     Diverticulosis of colon     CKD (chronic kidney disease) stage 3, GFR 30-59 ml/min (H)     Long-term (current) use of anticoagulants [Z79.01]     Macular  degeneration (senile) of retina, left eye     Gallstone pancreatitis     Bladder tumor     Past Medical History:   Diagnosis Date     Alcohol abuse      Alcoholism (H)     admission 11/09     Atrial fibrillation, paroxysmal 11/07, 6/09, 11/09     Benign essential hypertension      Chronic atrial fibrillation (H)      Diverticulosis of colon 6/2/2011     Erosive gastritis 11/09     Gastritis      Hyperlipidemia LDL goal <100      Hypertension, with LVH      Iron deficiency anemia 11/09     Paroxysmal atrial fibrillation (H)      Stroke (H)      Tobacco abuse         CC Referred Self, MD  No address on file on close of this encounter.      Again, thank you for allowing me to participate in the care of your patient.        Sincerely,        Rosa Toledo MD

## 2023-11-28 NOTE — PATIENT INSTRUCTIONS
Wound Care After a Biopsy    What is a skin biopsy?  A skin biopsy allows the doctor to examine a very small piece of tissue under the microscope to determine the diagnosis and the best treatment for the skin condition. A local anesthetic (numbing medicine) is injected with a very small needle into the skin area to be tested. A small piece of skin is taken from the area. Sometimes a suture (stitch) is used.     What are the risks of a skin biopsy?  I will experience scar, bleeding, swelling, pain, crusting and redness. I may experience incomplete removal or recurrence. Risks of this procedure are excessive bleeding, bruising, infection, nerve damage, numbness, thick (hypertrophic or keloidal) scar and non-diagnostic biopsy.    How should I care for my wound for the first 24 hours?  Keep the wound dry and covered for 24 hours  If it bleeds, hold direct pressure on the area for 15 minutes. If bleeding does not stop, call us or go to the emergency room  Avoid strenuous exercise the first 1-2 days or as your doctor instructs you    How should I care for the wound after 24 hours?  After 24 hours, remove the bandage  You may bathe or shower as normal  If you had a scalp biopsy, you can shampoo as usual and can use shower water to clean the biopsy site daily  Clean the wound once a day with gentle soap and water  Do not scrub, be gentle  Apply white petroleum/Vaseline after cleaning the wound with a cotton swab or a clean finger, and keep the site covered with a Bandaid /bandage. Bandages are not necessary with a scalp biopsy  If you are unable to cover the site with a Bandaid /bandage, re-apply ointment 2-3 times a day to keep the site moist. Moisture will help with healing  Avoid strenuous activity for first 1-2 days  Avoid lakes, rivers, pools, and oceans until the stitches are removed or the site is healed    How do I clean my wound?  Wash hands thoroughly with soap or use hand  before all wound care  Clean  the wound with gentle soap and water  Apply white petroleum/Vaseline  to wound after it is clean  Replace the Bandaid /bandage to keep the wound covered for the first few days or as instructed by your doctor  If you had a scalp biopsy, warm shower water to the area on a daily basis should suffice    What should I use to clean my wound?   Cotton-tipped applicators (Qtips )  White petroleum jelly (Vaseline ). Use a clean new container and use Q-tips to apply.  Bandaids  as needed  Gentle soap     How should I care for my wound long term?  Do not get your wound dirty  Keep up with wound care for one week or until the area is healed.  A small scab will form and fall off by itself when the area is completely healed. The area will be red and will become pink in color as it heals. Sun protection is very important for how your scar will turn out. Sunscreen with an SPF 30 or greater is recommended once the area is healed.  You should have some soreness but it should be mild and slowly go away over several days. Talk to your doctor about using tylenol for pain,    When should I call my doctor?  If you have increased:   Pain or swelling  Pus or drainage (clear or slightly yellow drainage is ok)  Temperature over 100F  Spreading redness or warmth around wound    When will I hear about my results?  The biopsy results can take 2 weeks to come back.  Your results will automatically release to Tail before your provider has even reviewed them.  The clinic will call you with the results, send you a Tail message, or have you schedule a follow-up clinic or phone time to discuss the results.  Contact our clinics if you do not hear from us in 2 weeks.    Who should I call with questions?  [facility]: [phone]  [facility]: [phone]  For urgent needs outside of business hours call the [facility] at [phone] and ask for the dermatology resident on call  WOUND CARE INSTRUCTIONS  FOR CRYOSURGERY  For questions please call [phone]        This area treated with liquid nitrogen will form a blister. You do not need to bandage the area until after the blister forms and breaks (which may be a few days).  When the blister breaks, begin daily dressing changes as follows:    1) Clean and dry the area with tap water using clean Q-tip or sterile gauze pad.    2) Apply Vaseline or Aquaphor over entire wound. Other options include Polysporin ointment or Bacitracin ointment over entire wound.  Do NOT use Neosporin ointment.    3) Cover the wound with a band-aid or sterile non-stick gauze pad and micropore paper tape.      REPEAT THESE INSTRUCTIONS AT LEAST ONCE A DAY UNTIL THE WOUND HAS COMPLETELY HEALED.        It is an old wives tale that a wound heals better when it is exposed to air and allowed to dry out. The wound will heal faster with a better cosmetic result if it is kept moist with ointment and covered with a bandage.  Do not let the wound dry out.      Supplies Needed:     *Cotton tipped applicators (Q-tips)   *Polysporin ointment or Bacitracin ointment (NOT NEOSPORIN)   *Band-aids, or non stick gauze pads and micropore paper tape    PATIENT INFORMATION    During the healing process you will notice a number of changes. All wounds develop a small halo of redness surrounding the wound.  This means healing is occurring. Severe itching with extensive redness usually indicates sensitivity to the ointment or bandage tape used to dress the wound.  You should call our office if this develops.      Swelling and/or discoloration around your surgical site is common, particularly when performed around the eye.    All wounds normally drain.  The larger the wound the more drainage there will be.  After 7-10 days, you will notice the wound beginning to shrink and new skin will begin to grow.  The wound is healed when you can see skin has formed over the entire area.  A healed  wound has a healthy, shiny look to the surface and is red to dark pink in color to normalize.  Wounds may take approximately 4-6 weeks to heal.  Larger wounds may take 6-8 weeks.  After the wound is healed you may discontinue dressing changes.    You may experience a sensation of tightness as your wound heals. This is normal and will gradually subside.    Your healed wound may be sensitive to temperature changes. This sensitivity improves with time, but if you re having a lot of discomfort, try to avoid temperature extremes.    Patients frequently experience itching after their wound appears to have healed because of the continue healing under the skin.  Plain Vaseline will help relieve the itching.      Patient Education       Proper skin care from [facility]:    -Eliminate harsh soaps as they strip the natural oils from the skin, often resulting in dry itchy skin ( i.e. Dial, Zest, Thai Spring)  -Use mild soaps such as Cetaphil or Dove Sensitive Skin in the shower. You do not need to use soap on arms, legs, and trunk every time you shower unless visibly soiled.   -Avoid hot or cold showers.  -After showering, lightly dry off and apply moisturizing within 2-3 minutes. This will help trap moisture in the skin.   -Aggressive use of a moisturizer at least 1-2 times a day to the entire body (including -Vanicream, Cetaphil, Aquaphor or Cerave) and moisturize hands after every washing.  -We recommend using moisturizers that come in a tub that needs to be scooped out, not a pump. This has more of an oil base. It will hold moisture in your skin much better than a water base moisturizer. The above recommended are non-pore clogging.      Wear a sunscreen with at least SPF 30 on your face, ears, neck and V of the chest daily. Wear sunscreen on other areas of the body if those areas are exposed to the sun throughout the day. Sunscreens can contain physical and/or chemical blockers. Physical blockers are less likely  to clog pores, these include zinc oxide and titanium dioxide. Reapply every two hour and after swimming.     Sunscreen examples: https://www.ewg.org/sunscreen/    UV radiation  UVA radiation remains constant throughout the day and throughout the year. It is a longer wavelength than UVB and therefore penetrates deeper into the skin leading to immediate and delayed tanning, photoaging, and skin cancer. 70-80% of UVA and UVB radiation occurs between the hours of 10am-2pm.  UVB radiation  UVB radiation causes the most harmful effects and is more significant during the summer months. However, snow and ice can reflect UVB radiation leading to skin damage during the winter months as well. UVB radiation is responsible for tanning, burning, inflammation, delayed erythema (pinkness), pigmentation (brown spots), and skin cancer.     I recommend self monthly full body exams and yearly full body exams with a dermatology provider. If you develop a new or changing lesion please follow up for examination. Most skin cancers are pink and scaly or pink and pearly. However, we do see blue/brown/black skin cancers.  Consider the ABCDEs of melanoma when giving yourself your monthly full body exam ( don't forget the groin, buttocks, feet, toes, etc). A-asymmetry, B-borders, C-color, D-diameter, E-elevation or evolving. If you see any of these changes please follow up in clinic. If you cannot see your back I recommend purchasing a hand held mirror to use with a larger wall mirror.       Checking for Skin Cancer  You can find cancer early by checking your skin each month. There are 3 kinds of skin cancer. They are melanoma, basal cell carcinoma, and squamous cell carcinoma. Doing monthly skin checks is the best way to find new marks or skin changes. Follow the instructions below for checking your skin.   The ABCDEs of checking moles for melanoma   Check your moles or growths for signs of melanoma using ABCDE:   Asymmetry: the sides of the  mole or growth don t match  Border: the edges are ragged, notched, or blurred  Color: the color within the mole or growth varies  Diameter: the mole or growth is larger than 6 mm (size of a pencil eraser)  Evolving: the size, shape, or color of the mole or growth is changing (evolving is not shown in the images below)    Checking for other types of skin cancer  Basal cell carcinoma or squamous cell carcinoma have symptoms such as:     A spot or mole that looks different from all other marks on your skin  Changes in how an area feels, such as itching, tenderness, or pain  Changes in the skin's surface, such as oozing, bleeding, or scaliness  A sore that does not heal  New swelling or redness beyond the border of a mole    Who s at risk?  Anyone can get skin cancer. But you are at greater risk if you have:   Fair skin, light-colored hair, or light-colored eyes  Many moles or abnormal moles on your skin  A history of sunburns from sunlight or tanning beds  A family history of skin cancer  A history of exposure to radiation or chemicals  A weakened immune system  If you have had skin cancer in the past, you are at risk for recurring skin cancer.   How to check your skin  Do your monthly skin checkups in front of a full-length mirror. Check all parts of your body, including your:   Head (ears, face, neck, and scalp)  Torso (front, back, and sides)  Arms (tops, undersides, upper, and lower armpits)  Hands (palms, backs, and fingers, including under the nails)  Buttocks and genitals  Legs (front, back, and sides)  Feet (tops, soles, toes, including under the nails, and between toes)  If you have a lot of moles, take digital photos of them each month. Make sure to take photos both up close and from a distance. These can help you see if any moles change over time.   Most skin changes are not cancer. But if you see any changes in your skin, call your doctor right away. Only he or she can diagnose a problem. If you have skin  cancer, seeing your doctor can be the first step toward getting the treatment that could save your life.   [employer] last reviewed this educational content on 4/1/2019 2000-2020 [employer]. [address] [place] [address], [place] [address]. All rights reserved. This information is not intended as a substitute for professional medical care. Always follow your healthcare professional's instructions.       When should I call my doctor?  If you are worsening or not improving, please, contact us or seek urgent care as noted below.     Who should I call with questions (adults)?  [facility] (adult and pediatric): [phone]  [facility] (adult): [phone]  [facility] ([facility] [place]) [phone]  For urgent needs outside of business hours call the [facility] at [phone] and ask for the dermatology resident on call to be paged  If this is a medical emergency and you are unable to reach an ER, Call [phone]      If you need a prescription refill, please contact your pharmacy. Refills are approved or denied by our Physicians during normal business hours, Monday through Fridays  Per office policy, refills will not be granted if you have not been seen within the past year (or sooner depending on your child's condition)

## 2023-11-28 NOTE — PROGRESS NOTES
[facility] Dermatology Note  Encounter Date: Nov 28, 2023  Office Visit     Dermatology Problem List:  Last skin check 11/28/23  0. NUB's  - right frontal scalp, s/p bx 11/28/23  - right temple, s/p bx 11/28/23  # Benign bx:  - ISK, sternum, s/p bx 04/05/21  - Clonal type SK, Occipital scalp, s.p bx 04/05/21  - Clonal SK, left frontal scalp, s/p bx 2017  3. AK's, sideburns and forehead  - cryo, consider field therapy  ____________________________________________    Assessment & Plan:    # Neoplasm of unspecified behavior of the skin (D49.2) on the right frontal scalp. The differential diagnosis includes BCC .   - Shave biopsy performed today (see procedure note(s) below).    # Neoplasm of unspecified behavior of the skin (D49.2) on the right temple. The differential diagnosis includes BCC vs. AK.   - Shave biopsy performed today (see procedure note(s) below).    # Actinic Keratosis, sideburns and forehead  - discussed cryo v field therapy v monitoring at length today  - Cryotherapy performed today for select AK on right cheek. See procedure note below.  - Plan for future cryotherapy v field therapy for other AK's; he will hold off for now and would like to consider prescription for 5FU/C in January when wife has an appt; offered to send script today and he can consider, but he would like to rediscuss    # ISK, left frontal scalp  - see cryo    # Benign lesions - SKs, cherry angiomas, lentigenes.  - No treatment required    # Multiple benign nevi.   - Monitor for ABCDEs of melanoma   - Continue sun protection - recommend SPF 30 or higher with frequent application   - Return sooner if noticing changing or symptomatic lesions    Procedures Performed:   - Cryotherapy procedure note, location(s): see above. After verbal consent and discussion of risks and benefits including, but not limited to, dyspigmentation/scar, blister, and pain, 1 AK and 1 ISK lesion(s) was(were) treated with 1-2 mm freeze  border for 1-2 cycles with liquid nitrogen. Post cryotherapy instructions were provided.    - Shave biopsy procedure note, location(s): see above. After discussion of benefits and risks including but not limited to bleeding, infection, scar, incomplete removal, recurrence, and non-diagnostic biopsy, written consent and photographs were obtained. The area was cleaned with isopropyl alcohol. 0.5mL of 1% lidocaine with epinephrine was injected to obtain adequate anesthesia of lesion(s). Shave biopsy at site(s) performed. Hemostasis was achieved with aluminium chloride. Petrolatum ointment and a sterile dressing were applied. The patient tolerated the procedure and no complications were noted. The patient was provided with verbal and written post care instructions.     Follow-up: 2 month(s) in-person, when wife has appt, to discuss field therapy, or earlier for new or changing lesions    Staff and Scribe:     Scribe Disclosure:   I, JOHANN YEPEZ, am serving as a scribe; to document services personally performed by Rosa Toledo MD -based on data collection and the provider's statements to me.    Provider Disclosure:   The documentation recorded by the scribe accurately reflects the services I personally performed and the decisions made by me.    Rosa Toledo MD    Department of Dermatology  [facility]: Phone: [phone], Fax: [phone]  11/28/2023     ____________________________________________    CC: Derm Problem (Skin check )    HPI:  Mr. Alex Stanley is a(n) 78 year old male who presents today as a return patient for FBSE.    The patient reports of bumpy spots.    He also has a spot on his cheek that he would like to be checked.    Patient is otherwise feeling well, without additional skin concerns.    Labs Reviewed:  N/A    Physical Exam:  Vitals: There were no vitals taken for this visit.  SKIN:  Total skin excluding the undergarment areas was performed. The exam included the head/face, neck, both arms, chest, back, abdomen, both legs, digits and/or nails.   - right frontal scalp, pearly papule.   - right temple, gritty pearly papule,   - There are erythematous macules with overyling adherent scale on the sideburns and forehead and right cheek.  - There are dome shaped bright red papules on the trunk and extremities .   - Multiple regular brown pigmented macules and papules are identified on the trunk and extremities. .   - Scattered brown macules on sun exposed areas.  - Waxy stuck on papules and plaques on trunk and extremities.   - No other lesions of concern on areas examined.     Medications:  Current Outpatient Medications   Medication    amLODIPine (NORVASC) 10 MG tablet    apixaban ANTICOAGULANT (ELIQUIS) 5 MG tablet    ASPIRIN NOT PRESCRIBED, INTENTIONAL,    isosorbide mononitrate (IMDUR) 30 MG 24 hr tablet    Multiple Vitamins-Minerals (PRESERVISION AREDS 2) CAPS    pravastatin (PRAVACHOL) 20 MG tablet    oxyBUTYnin ER (DITROPAN XL) 5 MG 24 hr tablet     No current facility-administered medications for this visit.      Past Medical History:   Patient Active Problem List   Diagnosis    Essential hypertension    Chronic atrial fibrillation/Flutter    Tobacco abuse    Iron deficiency anemia    History of alcohol dependence in remission sober since 2010 (H)    Erosive gastritis    Hyperlipidemia LDL goal <100    History of CVA (cerebrovascular accident)    Fatty liver, alcoholic    Diverticulosis of colon    CKD (chronic kidney disease) stage 3, GFR 30-59 ml/min (H)    Long-term (current) use of anticoagulants [Z79.01]    Macular degeneration (senile) of retina, left eye    Gallstone pancreatitis    Bladder tumor     Past Medical History:   Diagnosis Date    Alcohol abuse     Alcoholism (H)     admission 11/09    Atrial fibrillation, paroxysmal 11/07, 6/09, 11/09    Benign essential hypertension      Chronic atrial fibrillation (H)     Diverticulosis of colon 6/2/2011    Erosive gastritis 11/09    Gastritis     Hyperlipidemia LDL goal <100     Hypertension, with LVH     Iron deficiency anemia 11/09    Paroxysmal atrial fibrillation (H)     Stroke (H)     Tobacco abuse         CC Referred Self, MD  No address on file on close of this encounter.

## 2023-12-03 LAB
PATH REPORT.COMMENTS IMP SPEC: ABNORMAL
PATH REPORT.COMMENTS IMP SPEC: YES
PATH REPORT.FINAL DX SPEC: ABNORMAL
PATH REPORT.GROSS SPEC: ABNORMAL
PATH REPORT.MICROSCOPIC SPEC OTHER STN: ABNORMAL
PATH REPORT.RELEVANT HX SPEC: ABNORMAL

## 2023-12-05 ENCOUNTER — TELEPHONE (OUTPATIENT)
Dept: FAMILY MEDICINE | Facility: CLINIC | Age: 78
End: 2023-12-05
Payer: MEDICARE

## 2023-12-05 DIAGNOSIS — C44.41 BASAL CELL CARCINOMA (BCC) OF SCALP: Primary | ICD-10-CM

## 2023-12-05 NOTE — LETTER
[facility]  [address]  [address]  [phone]        12/5/2023       Alex Stanley  [address] [facility] [address]      Dear Alex:    You are scheduled for Mohs Surgery on: Thursday February 1, 2024 at 8:30 am.    Please check in at [facility], [address].     You don't need to arrive more than 5-10 minutes prior to your appointment time.     Be sure to eat a good breakfast and bathe and wash your hair prior to surgery.     If you are taking any anti-coagulants that are prescribed by your Doctor (such as Coumadin/Warfarin, Plavix, Aspirin, Ibuprofen), please continue taking them.     However, if you are taking anti-coagulants over the counter without a Doctor's order for a medical condition, please discontinue them 10 days prior to surgery.     Please wear loose comfortable clothing as it could possibly be 4-6 hours until your surgery is completed depending upon how many layers of tissue need to be removed.      Thank you,    Jah Mendieta MD

## 2023-12-05 NOTE — TELEPHONE ENCOUNTER
----- Message from Rosa Toledo MD sent at 12/4/2023  8:36 PM CST -----  Please refer for Mohs of A Right frontal scalp:  - Basal cell carcinoma, nodular type     Your biopsy result returned as a low risk basal cell skin cancer as we suspected. The good news is that this is a very low risk skin cancer that usually only lives on the top of the skin and does not spread to other organs unless left untreated for years and years.     To treat this, I will refer you for Mohs surgery. As we discussed, Mohs surgery is a tissue-sparing surgery. During Mohs surgery, they cut out only as much as they think they need, have you wait with a band-aid on the skin cancer site, and check it in the lab immediately to make sure they get it all out. If they didn't get it out the first time, they will take a second layer and try to get it all out the second time, etc. The whole process can take about a half-day (morning or afternoon). With this technique, they only take out as much skin as they need to. It is also very effective and has very low rates of recurrence.     I will ask our scheduling team to call you to set this up.     Even better news that the spot on the right temple returned as low risk pre-cancer called an actinic keratosis. Thankfully no signs of skin cancer. Sometimes the biopsy itself can take care of these areas but other times there can be some pre-cancer cells left behind that we should treat with liquid nitrogen.     I would recommend letting this area heal up and then rechecking it again at your visit with Neyda Torres in a few months. If there's anything left behind, we can treat it with liquid nitrogen at that time.     Please let me know any questions or concerns you have.     Sincerely,  Rosa Toledo MD    Department of Dermatology  [facility]: Phone: [phone], Fax: [phone]  12/4/2023   Written by  Rosa Toledo MD on 12/4/2023  8:36 PM CST  Seen by patient Tejas Stanley on 12/5/2023  6:47 AM  Seen by proxy Angela Stanley on 12/4/2023  9:33 PM

## 2023-12-05 NOTE — TELEPHONE ENCOUNTER
S/w pt and went over Dr. Toledo's message below about biopsy results.  Explained and answered all questions about the mohs procedure.  Scheduled with Dr. Mendieta on Thursday 2/1/24 at 8:30 am.  Pt states understanding.    Mohs letter and information sent via my chart and mailed home.    Arlet RODRÍGUEZ RN  [facility] [place]  [phone]

## 2023-12-14 ENCOUNTER — OFFICE VISIT (OUTPATIENT)
Dept: ONCOLOGY | Facility: CLINIC | Age: 78
End: 2023-12-14
Attending: UROLOGY
Payer: MEDICARE

## 2023-12-14 VITALS
HEIGHT: 67 IN | DIASTOLIC BLOOD PRESSURE: 73 MMHG | BODY MASS INDEX: 27.04 KG/M2 | TEMPERATURE: 97.7 F | RESPIRATION RATE: 24 BRPM | WEIGHT: 172.3 LBS | OXYGEN SATURATION: 94 % | HEART RATE: 82 BPM | SYSTOLIC BLOOD PRESSURE: 119 MMHG

## 2023-12-14 DIAGNOSIS — C67.9 UROTHELIAL CARCINOMA OF BLADDER WITHOUT INVASION OF MUSCLE (H): ICD-10-CM

## 2023-12-14 DIAGNOSIS — C67.9 UROTHELIAL CARCINOMA OF BLADDER WITHOUT INVASION OF MUSCLE (H): Primary | ICD-10-CM

## 2023-12-14 DIAGNOSIS — N32.9 LESION OF BLADDER: Primary | ICD-10-CM

## 2023-12-14 PROCEDURE — 88112 CYTOPATH CELL ENHANCE TECH: CPT | Mod: TC | Performed by: UROLOGY

## 2023-12-14 PROCEDURE — 99207 PR DROP WITH A PROCEDURE: CPT | Performed by: UROLOGY

## 2023-12-14 PROCEDURE — 52000 CYSTOURETHROSCOPY: CPT

## 2023-12-14 PROCEDURE — 52000 CYSTOURETHROSCOPY: CPT | Performed by: UROLOGY

## 2023-12-14 RX ORDER — CEFAZOLIN SODIUM 2 G/50ML
2 SOLUTION INTRAVENOUS SEE ADMIN INSTRUCTIONS
Status: CANCELLED | OUTPATIENT
Start: 2023-12-14

## 2023-12-14 RX ORDER — CEFAZOLIN SODIUM 2 G/50ML
2 SOLUTION INTRAVENOUS
Status: CANCELLED | OUTPATIENT
Start: 2023-12-14

## 2023-12-14 ASSESSMENT — PAIN SCALES - GENERAL: PAINLEVEL: NO PAIN (0)

## 2023-12-14 NOTE — LETTER
12/14/2023         RE: Alex Stanley  [address] [facility] [address]        Dear Colleague,    Thank you for referring your patient, Alex Stanley, to the [facility]. Please see a copy of my visit note below.    Cystoscopy procedure     Pre procedural diagnosis nonmuscle invasive bladder cancer  Post procedural diagnosis Same     Indication 78 year old male with history of low-grade Ta urothelial carcinoma of the bladder status post TURBT here for first surveillance cystoscopy      Procedure:      After sterile preparation and draping of the patient,  a 16-Tamazight flexible cystoscope was introduced via the urethra.  It was passed without difficulty into the bladder.  The urethra was open without evidence of stricture.  The ureteral orifices were orthotopic.  The bladder mucosa was evaluated using both antegrade and retroflex views and this showed 3 small papillary lesions at the prior resection site as well as an erythematous patch on the right lateral wall posterior to the opening of the diverticulum.  60 ml syringe was used to obtain urine for  cytology. Scope was then removed and patient tolerated the procedure well.      Plan  Schedule for bluelight TURBT  Scheduled for PAC visit      Sydney Weaver MD   Department of Urology   [facility]          Nursing Note:  Alex Stanley presents today for Cystoscopy.  Patient seen by provider today: Yes: Dr. Weaver.   present during visit today: Not Applicable.    Note: N/A.    Labs:  Urine cytology    Procedure:  Urology Procedure: Cystoscopy.    Verified:  Not Applicable.       Post Procedure:  Patient tolerated the procedure without incident..    Post Pain Assessment: 0 out of 10.    Discharge Plan:   Departure Mode: Ambulatory.    Natali Bonilla MA      Again, thank you for allowing me to participate in the care of your patient.        Sincerely,        Sydney Weaver MD

## 2023-12-14 NOTE — PROGRESS NOTES
Nursing Note:  Alex Stanley presents today for Cystoscopy.  Patient seen by provider today: Yes: Dr. Weaver.   present during visit today: Not Applicable.    Note: N/A.    Pictures:                       Labs:  Urine cytology    Procedure:  Urology Procedure: Cystoscopy.    Verified:  Not Applicable.       Post Procedure:  Patient tolerated the procedure without incident..    Post Pain Assessment: 0 out of 10.    Discharge Plan:   Departure Mode: Ambulatory.    Natali Bonilla MA

## 2023-12-14 NOTE — PROGRESS NOTES
Cystoscopy procedure     Pre procedural diagnosis nonmuscle invasive bladder cancer  Post procedural diagnosis Same     Indication 78 year old male with history of low-grade Ta urothelial carcinoma of the bladder status post TURBT here for first surveillance cystoscopy      Procedure:      After sterile preparation and draping of the patient,  a 16-Estonian flexible cystoscope was introduced via the urethra.  It was passed without difficulty into the bladder.  The urethra was open without evidence of stricture.  The ureteral orifices were orthotopic.  The bladder mucosa was evaluated using both antegrade and retroflex views and this showed 3 small papillary lesions at the prior resection site as well as an erythematous patch on the right lateral wall posterior to the opening of the diverticulum.  60 ml syringe was used to obtain urine for  cytology. Scope was then removed and patient tolerated the procedure well.      Plan  Schedule for bluelight TURBT  Scheduled for PAC visit      Sydney Weaver MD   Department of Urology   [facility]

## 2023-12-15 ENCOUNTER — TELEPHONE (OUTPATIENT)
Dept: UROLOGY | Facility: CLINIC | Age: 78
End: 2023-12-15
Payer: MEDICARE

## 2023-12-15 PROBLEM — C67.9 UROTHELIAL CARCINOMA OF BLADDER WITHOUT INVASION OF MUSCLE (H): Status: ACTIVE | Noted: 2023-12-14

## 2023-12-15 NOTE — TELEPHONE ENCOUNTER
Patient calling back to reschedule, January 3rd does not work for him.     Thierry Miguel on 12/15/2023 at 4:18 PM

## 2023-12-15 NOTE — TELEPHONE ENCOUNTER
Called and spoke with the patient to schedule surgery. Surgery scheduled  for 1/3/24 in [facility] with Dr. Rutledge.    H&P with PAC scheduled for 12/26/23. Patient verbalized understanding H&P is needed prior to surgery. Patient is aware he will get his arrival time and surgery start time at PAC appointment.    Provider would like a 7 day virtual visit post-op to discuss pathology results however no open post-op spots on template. Will ask clinic to please assist with scheduling a post-op within the asked timeframe.    Writer will mail surgery packet.    No further questions/concerns at this time.    Marisela Berger on 12/15/2023 at 4:15 PM

## 2023-12-18 DIAGNOSIS — D49.4 BLADDER TUMOR: Primary | ICD-10-CM

## 2023-12-18 PROCEDURE — 88112 CYTOPATH CELL ENHANCE TECH: CPT | Mod: 26 | Performed by: PATHOLOGY

## 2023-12-18 NOTE — TELEPHONE ENCOUNTER
Called patient and rescheduled surgery from 1/3/24 to 1/10/24 per patient request. H&P with PAC remains the same as that date works for the patient.    Provider would like a 7 day virtual visit post-op to discuss pathology results however no open post-op spots on template. Will ask clinic to please assist with scheduling a post-op within the asked timeframe as surgery date moved.    No further questions or concerns at this time.     Marisela Berger on 12/18/2023 at 9:17 AM

## 2023-12-18 NOTE — TELEPHONE ENCOUNTER
FUTURE VISIT INFORMATION      SURGERY INFORMATION:  Date: 1/10/24  [place] [facility] [place]  Surgeon:  Sydney Weaver MD  Anesthesia Type:  General  Procedure: BLUE LIGHT, CYSTOSCOPY, WITH TRANSURETHRAL RESECTION BLADDER TUMOR  Consult: 23    RECORDS REQUESTED FROM:       Primary Care Provider: Estuardo Manning MD     Pertinent Medical History: Erosive gastritis; Long term use of anticoagulants; CKD stage 3; hx of CVA; Hyperlipidemia; Essential Hypertension; Chronic atrial fibrillation/flutter    Most recent EKG+ Tracin22    Most recent ECHO: 10/20/22    Most recent Cardiac Stress Test: 10/20/22    Most recent PFT's: 1/10/11

## 2023-12-20 ENCOUNTER — TELEPHONE (OUTPATIENT)
Dept: SURGERY | Facility: CLINIC | Age: 78
End: 2023-12-20
Payer: MEDICARE

## 2023-12-21 NOTE — PROGRESS NOTES
Writer sent message to WHITNEY Peña from cardiology requesting plan for Eliquis hold on upcoming surgery.

## 2023-12-26 ENCOUNTER — PRE VISIT (OUTPATIENT)
Dept: SURGERY | Facility: CLINIC | Age: 78
End: 2023-12-26

## 2023-12-27 NOTE — TELEPHONE ENCOUNTER
FUTURE VISIT INFORMATION        SURGERY INFORMATION:  Date: 1/10/24  [place] [facility] [place]  Surgeon:  Sydney Weaver MD  Anesthesia Type:  General  Procedure: BLUE LIGHT, CYSTOSCOPY, WITH TRANSURETHRAL RESECTION BLADDER TUMOR  Consult: 23     RECORDS REQUESTED FROM:         Primary Care Provider: Estuardo Manning MD      Pertinent Medical History: Erosive gastritis; Long term use of anticoagulants; CKD stage 3; hx of CVA; Hyperlipidemia; Essential Hypertension; Chronic atrial fibrillation/flutter     Most recent EKG+ Tracin22     Most recent ECHO: 10/20/22     Most recent Cardiac Stress Test: 10/20/22     Most recent PFT's: 1/10/11

## 2024-01-02 ENCOUNTER — ANESTHESIA EVENT (OUTPATIENT)
Dept: SURGERY | Facility: AMBULATORY SURGERY CENTER | Age: 79
End: 2024-01-02
Payer: MEDICARE

## 2024-01-02 ENCOUNTER — PRE VISIT (OUTPATIENT)
Dept: SURGERY | Facility: CLINIC | Age: 79
End: 2024-01-02

## 2024-01-02 ENCOUNTER — APPOINTMENT (OUTPATIENT)
Dept: LAB | Facility: CLINIC | Age: 79
End: 2024-01-02
Attending: NURSE PRACTITIONER
Payer: MEDICARE

## 2024-01-02 ENCOUNTER — OFFICE VISIT (OUTPATIENT)
Dept: SURGERY | Facility: CLINIC | Age: 79
End: 2024-01-02
Payer: MEDICARE

## 2024-01-02 VITALS
HEART RATE: 70 BPM | SYSTOLIC BLOOD PRESSURE: 119 MMHG | DIASTOLIC BLOOD PRESSURE: 79 MMHG | TEMPERATURE: 98.3 F | OXYGEN SATURATION: 97 % | BODY MASS INDEX: 26.93 KG/M2 | WEIGHT: 172 LBS

## 2024-01-02 DIAGNOSIS — Z01.818 PRE-OP EVALUATION: Primary | ICD-10-CM

## 2024-01-02 LAB
ALBUMIN SERPL BCG-MCNC: 4.2 G/DL (ref 3.5–5.2)
ALP SERPL-CCNC: 76 U/L (ref 40–150)
ALT SERPL W P-5'-P-CCNC: 10 U/L (ref 0–70)
ANION GAP SERPL CALCULATED.3IONS-SCNC: 8 MMOL/L (ref 7–15)
AST SERPL W P-5'-P-CCNC: 24 U/L (ref 0–45)
BASOPHILS # BLD AUTO: 0.1 10E3/UL (ref 0–0.2)
BASOPHILS NFR BLD AUTO: 1 %
BILIRUB SERPL-MCNC: 0.8 MG/DL
BUN SERPL-MCNC: 15.9 MG/DL (ref 8–23)
CALCIUM SERPL-MCNC: 8.7 MG/DL (ref 8.8–10.2)
CHLORIDE SERPL-SCNC: 107 MMOL/L (ref 98–107)
CREAT SERPL-MCNC: 1.12 MG/DL (ref 0.67–1.17)
DEPRECATED HCO3 PLAS-SCNC: 25 MMOL/L (ref 22–29)
EGFRCR SERPLBLD CKD-EPI 2021: 68 ML/MIN/1.73M2
EOSINOPHIL # BLD AUTO: 0.1 10E3/UL (ref 0–0.7)
EOSINOPHIL NFR BLD AUTO: 1 %
ERYTHROCYTE [DISTWIDTH] IN BLOOD BY AUTOMATED COUNT: 14.8 % (ref 10–15)
GLUCOSE SERPL-MCNC: 97 MG/DL (ref 70–99)
HCT VFR BLD AUTO: 44 % (ref 40–53)
HGB BLD-MCNC: 14.7 G/DL (ref 13.3–17.7)
IMM GRANULOCYTES # BLD: 0.1 10E3/UL
IMM GRANULOCYTES NFR BLD: 1 %
LYMPHOCYTES # BLD AUTO: 1.3 10E3/UL (ref 0.8–5.3)
LYMPHOCYTES NFR BLD AUTO: 12 %
MCH RBC QN AUTO: 30.9 PG (ref 26.5–33)
MCHC RBC AUTO-ENTMCNC: 33.4 G/DL (ref 31.5–36.5)
MCV RBC AUTO: 93 FL (ref 78–100)
MONOCYTES # BLD AUTO: 1 10E3/UL (ref 0–1.3)
MONOCYTES NFR BLD AUTO: 9 %
NEUTROPHILS # BLD AUTO: 8.4 10E3/UL (ref 1.6–8.3)
NEUTROPHILS NFR BLD AUTO: 76 %
NRBC # BLD AUTO: 0 10E3/UL
NRBC BLD AUTO-RTO: 0 /100
PLATELET # BLD AUTO: 268 10E3/UL (ref 150–450)
POTASSIUM SERPL-SCNC: 4.1 MMOL/L (ref 3.4–5.3)
PROT SERPL-MCNC: 6.5 G/DL (ref 6.4–8.3)
RBC # BLD AUTO: 4.75 10E6/UL (ref 4.4–5.9)
SODIUM SERPL-SCNC: 140 MMOL/L (ref 135–145)
WBC # BLD AUTO: 10.9 10E3/UL (ref 4–11)

## 2024-01-02 PROCEDURE — 36415 COLL VENOUS BLD VENIPUNCTURE: CPT | Performed by: PATHOLOGY

## 2024-01-02 PROCEDURE — 99215 OFFICE O/P EST HI 40 MIN: CPT | Performed by: NURSE PRACTITIONER

## 2024-01-02 PROCEDURE — 80053 COMPREHEN METABOLIC PANEL: CPT | Performed by: PATHOLOGY

## 2024-01-02 PROCEDURE — 85025 COMPLETE CBC W/AUTO DIFF WBC: CPT | Performed by: PATHOLOGY

## 2024-01-02 ASSESSMENT — LIFESTYLE VARIABLES: TOBACCO_USE: 1

## 2024-01-02 ASSESSMENT — PAIN SCALES - GENERAL: PAINLEVEL: NO PAIN (0)

## 2024-01-02 ASSESSMENT — ENCOUNTER SYMPTOMS
DYSRHYTHMIAS: 1
ORTHOPNEA: 0

## 2024-01-02 NOTE — H&P
Pre-Operative H & P     CC:  Preoperative exam to assess for increased cardiopulmonary risk while undergoing surgery and anesthesia.    Date of Encounter: 1/2/2024  Primary Care Physician:  Estuardo Manning     Reason for visit: Procedure scheduled as above.     HPI  Alex Stanley is a 78 year old male who presents for pre-operative H & P in preparation for  Procedure Information       Case: [id number] Date/Time: 01/10/24 1110    Procedure: BLUE LIGHT, CYSTOSCOPY, WITH TRANSURETHRAL RESECTION BLADDER TUMOR (Urethra)    Anesthesia type: General    Diagnosis: Urothelial carcinoma of bladder without invasion of muscle (H) [C67.9]    Pre-op diagnosis: Urothelial carcinoma of bladder without invasion of muscle (H) [C67.9]    Location: [place] [address] / [facility]    Providers: Sydney Weaver MD             Alex Stanley is a 78 year old male with PMH significant for HTN, HL, PAF on eliquis, tobacco dependence, CKD 3,  CVA, anemia, hx of ETOH abuse, fatty liver,  diverticulosis, erosive gastrisit and bladder tumor. He was initially evaluated by Dr. Weaver in the ER in regard to occurrence of gross hematuria. Underwent cystoscopy 8/17/2023 findings showed papillary lesions in the bladder concerning for urothelial carcinoma. He underwent Cystoscopy with TURBT and intravesical installation of chemotherapy medication on 9/6/2023. Underwent his 1st surveillance cystoscopy 12/14/2023 with Dr. Weaver and patient presents today in preparation for the above recommended procedure.      History is obtained from the patient and chart review    Hx of abnormal bleeding or anti-platelet use: Eliquis      Past Medical History  Past Medical History:   Diagnosis Date    Alcohol abuse     Alcoholism (H)     admission 11/09    Atrial fibrillation, paroxysmal 11/07, 6/09, 11/09    Benign essential hypertension     Chronic atrial fibrillation (H)     Diverticulosis of colon 6/2/2011     Erosive gastritis 11/09    Gastritis     Hyperlipidemia LDL goal <100     Hypertension, with LVH     Iron deficiency anemia 11/09    Paroxysmal atrial fibrillation (H)     Stroke (H)     Tobacco abuse        Past Surgical History  Past Surgical History:   Procedure Laterality Date    COLONOSCOPY N/A 10/26/2020    Procedure: COLONOSCOPY, WITH POLYPECTOMY AND BIOPSY;  Surgeon: Shon Bond MD;  Location:  GI    CYSTOSCOPY, TRANSURETHRAL RESECTION (TUR) TUMOR BLADDER, COMBINED N/A 9/6/2023    Procedure: Blue light CYSTOSCOPY, WITH TRANSURETHRAL RESECTION BLADDER TUMOR, urethral dilation;  Surgeon: Sydney Weaver MD;  [place] [facility] [place]    ENDOSCOPIC ULTRASOUND UPPER GASTROINTESTINAL TRACT (GI) N/A 1/24/2022    Procedure: ENDOSCOPIC ULTRASOUND, ESOPHAGOSCOPY / UPPER GASTROINTESTINAL TRACT (GI);  Surgeon: Shon Bond MD;  Location:  GI    LAPAROSCOPIC CHOLECYSTECTOMY N/A 2/8/2022    Procedure: LAPAROSCOPIC CHOLECYSTECTOMY;  Surgeon: Mazin Bains MD;  Location:  OR    SURGICAL HISTORY OF -   4/11    R Dupuytren's contracture release   Dr. Scruggs    [facility] NONSPECIFIC PROCEDURE  2002    Rt foot reconstructive    [facility] NONSPECIFIC PROCEDURE  10/12    R hand procedure.  Dr. Deluna       Prior to Admission Medications  Current Outpatient Medications   Medication Sig Dispense Refill    amLODIPine (NORVASC) 10 MG tablet Take 1 tablet (10 mg) by mouth daily (Patient taking differently: Take 10 mg by mouth every morning) 90 tablet 3    apixaban ANTICOAGULANT (ELIQUIS) 5 MG tablet Take 1 tablet (5 mg) by mouth 2 times daily 180 tablet 3    isosorbide mononitrate (IMDUR) 30 MG 24 hr tablet Take 1 tablet (30 mg) by mouth daily (Patient taking differently: Take 30 mg by mouth every morning) 90 tablet 3    Multiple Vitamins-Minerals (PRESERVISION AREDS 2) CAPS Take 2 capsules by mouth 2 times daily      pravastatin (PRAVACHOL) 20 MG tablet Take 1 tablet (20 mg) by mouth daily 90 tablet 3    ASPIRIN NOT  PRESCRIBED, INTENTIONAL, Antiplatelet medication not prescribed intentionally due to history  of gastrointestinal bleeding, and patient on warfarin. (Patient not taking: Reported on 1/2/2024) 0 each 0    oxyBUTYnin ER (DITROPAN XL) 5 MG 24 hr tablet Take 1 tablet (5 mg) by mouth daily (Patient not taking: Reported on 1/2/2024) 5 tablet 0       Allergies  No Known Allergies    Social History  Social History     Socioeconomic History    Marital status:      Spouse name: Not on file    Number of children: Not on file    Years of education: Not on file    Highest education level: Not on file   Occupational History    Not on file   Tobacco Use    Smoking status: Every Day     Packs/day: .5     Types: Cigarettes     Start date: 1965    Smokeless tobacco: Never    Tobacco comments:     ppd since age 16 - now 1/2-1 ppd 04/05/21.  10 cigarettes daily   Vaping Use    Vaping Use: Never used   Substance and Sexual Activity    Alcohol use: Not Currently     Comment: last drank at [facility] 8/2010.    Drug use: Never    Sexual activity: Yes     Partners: Female   Other Topics Concern    Parent/sibling w/ CABG, MI or angioplasty before 65F 55M? Not Asked   Social History Narrative    ** Merged History Encounter **          Social Determinants of Health     Financial Resource Strain: Not on file   Food Insecurity: Not on file   Transportation Needs: Not on file   Physical Activity: Not on file   Stress: Not on file   Social Connections: Not on file   Interpersonal Safety: Not on file   Housing Stability: Not on file       Family History  Family History   Problem Relation Age of Onset    Lung Cancer Mother     Emphysema Father     Cancer Mother         small cell lung with mets to brain    Diabetes Maternal Grandmother     Respiratory Father         emphysema       Review of Systems  The complete review of systems is negative other than noted in the HPI or here.   Anesthesia Evaluation   Pt has had prior anesthetic. Type:  General.    No history of anesthetic complications       ROS/MED HX  ENT/Pulmonary:     (+)     REGGIE risk factors, snores loudly, hypertension,         tobacco use, Current use, 0.5 packs/day, 40  Pack-Year Hx,  patient smoked within 24 hours,                 (-) asthma   Neurologic:     (+)          CVA, date: 2011, without deficits,                    Cardiovascular:     (+) Dyslipidemia hypertension- -   -  - -   Taking blood thinners  Instructions Given to patient: holding 48 hours prior to DOS.                   dysrhythmias, a-fib,        Previous cardiac testing   Echo: Date: 10/2022 Results:  Interpretation Summary     Left ventricular systolic function is low normal.  The visual ejection fraction is 50-55%.  The left atrium is moderate to severely dilated.  No hemodynamically significant valvular abnormalities on 2D or color flow  imaging.    Stress Test:  Date: 10/2022 Results:        The nuclear stress test is abnormal.     There is a small area of mild ischemia in the apical segment(s) of the left ventricle.     Left ventricular function is normal.     The left ventricular ejection fraction at stress is 58%.     There is no prior study for comparison    ECG Reviewed:  Date: Results:    Cath:  Date: Results:   (-) PIERRE, orthopnea/PND and irregular heartbeat/palpitations   METS/Exercise Tolerance: >4 METS    Hematologic:     (+)      anemia,          Musculoskeletal:  - neg musculoskeletal ROS     GI/Hepatic: Comment: Fatty Liver   Hx of Diverticulosis  Hx of Erosive gastritis  Gall stones    (+)          cholecystitis/cholelithiasis,   liver disease,       Renal/Genitourinary: Comment: Bladder tumor  CKD 3    (+) renal disease, type: CRI,            Endo:  - neg endo ROS     Psychiatric/Substance Use: Comment: ETOH abuse in remission since 2010    (+)   alcohol abuse      Infectious Disease:  - neg infectious disease ROS     Malignancy:  - neg malignancy ROS (+) Malignancy, History of Other.Other CA  "urothelial carcinoma Active status post Surgery and Chemo.    Other:            /79 (BP Location: Right arm, Patient Position: Sitting, Cuff Size: Adult Regular)   Pulse 70   Temp 98.3  F (36.8  C) (Oral)   Wt 78 kg (172 lb)   SpO2 97%   BMI 26.93 kg/m      Physical Exam   Constitutional: Awake, alert, cooperative, no apparent distress, and appears stated age.  Eyes: Pupils equal, round and reactive to light, extra ocular muscles intact, sclera clear, conjunctiva normal.  HENT: Normocephalic, oral pharynx with moist mucus membranes, good dentition. No goiter appreciated.   Respiratory: Clear to auscultation bilaterally, no crackles or wheezing.  Cardiovascular: Regular rate and rhythm, normal S1 and S2, and no murmur noted.  Carotids +2, no bruits. No edema. Palpable pulses to radial  DP and PT arteries.   GI: Normal bowel sounds, soft, non-distended, non-tender, no masses palpated, no hepatosplenomegaly.    Lymph/Hematologic: No cervical lymphadenopathy and no supraclavicular lymphadenopathy.  Genitourinary:  deferred  Skin: Warm and dry.  No rashes at anticipated surgical site.   Musculoskeletal: Full ROM of neck. There is no redness, warmth, or swelling of the joints. Gross motor strength is normal.    Neurologic: Awake, alert, oriented to name, place and time. Cranial nerves II-XII are grossly intact. Gait is normal.   Neuropsychiatric: Calm, cooperative. Normal affect.     Prior Labs/Diagnostic Studies   All labs and imaging personally reviewed   Lab Results   Component Value Date    WBC 10.9 01/02/2024    WBC 8.3 10/08/2012     Lab Results   Component Value Date    RBC 4.75 01/02/2024    RBC 4.80 10/08/2012     Lab Results   Component Value Date    HGB 14.7 01/02/2024    HGB 14.1 06/14/2018     Lab Results   Component Value Date    HCT 44.0 01/02/2024    HCT 44.3 10/08/2012     No components found for: \"MCT\"  Lab Results   Component Value Date    MCV 93 01/02/2024    MCV 92 10/08/2012     Lab " Results   Component Value Date    MCH 30.9 01/02/2024    MCH 30.2 10/08/2012     Lab Results   Component Value Date    MCHC 33.4 01/02/2024    MCHC 32.7 10/08/2012     Lab Results   Component Value Date    RDW 14.8 01/02/2024    RDW 14.1 10/08/2012     Lab Results   Component Value Date     01/02/2024     10/08/2012     Last Comprehensive Metabolic Panel:  Lab Results   Component Value Date     01/02/2024    POTASSIUM 4.1 01/02/2024    CHLORIDE 107 01/02/2024    CO2 25 01/02/2024    ANIONGAP 8 01/02/2024    GLC 97 01/02/2024    BUN 15.9 01/02/2024    CR 1.12 01/02/2024    GFRESTIMATED 68 01/02/2024    MARY 8.7 (L) 01/02/2024     Lab Results   Component Value Date    AST 24 01/02/2024    AST 23 10/01/2020     Lab Results   Component Value Date    ALT 10 01/02/2024    ALT 24 10/01/2020     Lab Results   Component Value Date    BILICONJ 0.0 10/08/2012      Lab Results   Component Value Date    BILITOTAL 0.8 01/02/2024    BILITOTAL 0.7 10/01/2020     Lab Results   Component Value Date    ALBUMIN 4.2 01/02/2024    ALBUMIN 3.2 01/14/2022    ALBUMIN 3.8 10/01/2020     Lab Results   Component Value Date    PROTTOTAL 6.5 01/02/2024    PROTTOTAL 7.2 10/01/2020      Lab Results   Component Value Date    ALKPHOS 76 01/02/2024    ALKPHOS 74 10/01/2020         12/14/2023  Pre procedural diagnosis nonmuscle invasive bladder cancer  Post procedural diagnosis Same     Indication 78 year old male with history of low-grade Ta urothelial carcinoma of the bladder status post TURBT here for first surveillance cystoscopy       Procedure:       After sterile preparation and draping of the patient,  a 16-British flexible cystoscope was introduced via the urethra.  It was passed without difficulty into the bladder.  The urethra was open without evidence of stricture.  The ureteral orifices were orthotopic.  The bladder mucosa was evaluated using both antegrade and retroflex views and this showed 3 small papillary lesions at  the prior resection site as well as an erythematous patch on the right lateral wall posterior to the opening of the diverticulum.  60 ml syringe was used to obtain urine for  cytology. Scope was then removed and patient tolerated the procedure well.        Plan  Schedule for bluelight TURBT      Cystoscopy 8/17/2023   Procedure:         After sterile preparation and draping of the patient,  a 16-Kinyarwanda flexible cystoscope was introduced via the urethra.  It was passed without difficulty into the bladder.  The urethra was open without evidence of stricture.  The ureteral orifices were orthotopic.  The bladder mucosa was evaluated using both antegrade and retroflex views and this showed a carpet of papillary lesions on the trigone between the 2 ureteral orifices.  Ureteral orifices did not seem to be involved. 60 ml syringe was used to obtain urine for  cytology. Scope was then removed and patient tolerated the procedure well.       CT abdomen 7/27/2023   IMPRESSION: 1. Cystitis superimposed on changes of chronic bladder outlet obstruction. 2. Indeterminate 0.8 cm nodule along the right posterior bladder wall, incompletely evaluated on this noncontrast exam. Although this might represent a small adherent blood clot given the reported hematuria, a urothelial neoplasm is not excluded and correlation with cystoscopy is recommended. 3. No urinary calculi or hydronephrosis. 4. Pancolonic diverticulosis. No inflamed colonic diverticuli. [Recommend Follow Up: Bladder wall nodule] This report will be copied to the [facility] to ensure a provider acknowledges the finding.      EKG/ stress test - if available please see in ROS above   Echo result w/o MOPS: Interpretation Summary Left ventricular systolic function is low normal.The visual ejection fraction is 50-55%.The left atrium is moderate to severely dilated.No hemodynamically significant valvular abnormalities on 2D or color flowimaging.      The patient's  records and results personally reviewed by this provider.     Outside records reviewed from: Care Everywhere    LAB/DIAGNOSTIC STUDIES TODAY:    CBC and Comprehensive panel Ordered by Dr. Palomo      Assessment    Alex Stanley is a 78 year old male seen as a PAC referral for risk assessment and optimization for anesthesia.    Plan/Recommendations  Pt will be optimized for the proposed procedure.  See below for details on the assessment, risk, and preoperative recommendations    NEUROLOGY   History of CVA 2011  Without residual deficits   -Post Op delirium risk factors:  No risk identified    ENT  - No current airway concerns.  Will need to be reassessed day of surgery.  Mallampati: II  TM: > 3    CARDIAC  - No history of CAD  No anginal symptoms, Denies palpitations, PND, dizziness or syncope.   PAF - on Eliquis  - Lexiscan for atypical CP ( 2022) -there is a small area of mild ischemia in the apical segment(s) of the left ventricle.  was also started on Imdur at that time. Has had no further symptoms.   - Hypertension - managed on amlodipine   Well controlled  - Hyperlipidemia  Well controlled on home regimen  - METS (Metabolic Equivalents)  Patient performs 4 or more METS exercise without symptoms            Total Score: 0      RCRI-Very low risk: Class 1 0.4% complication rate            Total Score: 0        PULMONARY  Current smoker     REGGIE Medium Risk            Total Score: 3    REGGIE: Hypertension    REGGIE: Over 50 ys old    REGGIE: Male      - Denies asthma or inhaler use  - Tobacco History    History   Smoking Status    Every Day    Packs/day: 0.50    Types: Cigarettes    Start date: 1965   Smokeless Tobacco    Never       GI    PONV Low Risk  Total Score: 0      Fatty Liver   Hx of Diverticulosis  Hx of Erosive gastritis  Gall stones s/p cholecystectomy   /RENAL  - Baseline Creatinine  WNL    ENDOCRINE    - BMI: Estimated body mass index is 26.93 kg/m  as calculated from the following:    Height as of  "12/14/23: 1.702 m (5' 7.01\").    Weight as of this encounter: 78 kg (172 lb).  Overweight (BMI 25.0-29.9)  - No history of Diabetes Mellitus    HEME/ONC  VTE High Risk 3%            Total Score: 8    VTE: Greater than 59 yrs old    VTE: Male    VTE: Current cancer      - Coagulopathy second to Apixaban (Eliquis)  Will hold 48 hours prior to procedure.     Urothelial carcinoma of bladder without invasion of muscle Procedure scheduled as above.     Chronic iron deficient anemia  Recommend perioperative use of blood conservation techniques intraoperatively and close monitoring for postoperative bleeding.  Remote history of anemia  Most Recent Hgb 14.7    MSK  Patient is NOT Frail            Total Score: 0            Different anesthesia methods/types have been discussed with the patient, but they are aware that the final plan will be decided by the assigned anesthesia provider on the date of service.      The patient is optimized for their procedure. AVS with information on surgery time/arrival time, meds and NPO status given by nursing staff. No further diagnostic testing indicated.      On the day of service:     Prep time: 12 minutes  Visit time: 18 minutes  Documentation time: 10 minutes  ------------------------------------------  Total time: 40 minutes      LAST Malik CNP  Preoperative Assessment Center  [facility]  Clinic and Surgery Center  Phone: [phone]  Fax: [phone]    "

## 2024-01-02 NOTE — PATIENT INSTRUCTIONS
Preparing for Your Surgery      Name:  Alex Stanley   MRN:  [id number]   :  1945   Today's Date:  2024         Arriving for surgery:  Surgery date:  1/10/2024  Arrival time:  9:40 AM    Restrictions due to COVID 19:    Please maintain social distance.  Masking is optional        parking is available for anyone with mobility limitations or disabilities. (Monday- Friday 7 am- 5 pm)    Please come to:    [facility]  [address] [place] [id number]    Check in on the 5th floor, Ambulatory Surgery Center.    What can I eat or drink?    -  You may eat and drink normally until 8 hours before arrival time  (Until 1:40 AM)  -  You may have clear liquids until 2 hours before arrival time  (Until 7:40 AM)    Examples of clear liquids:  Water  Clear broth  Juices (apple, white grape, white cranberry  and cider) without pulp  Noncarbonated, powder based beverages  (lemonade and Tam-Aid)  Sodas (Sprite, 7-Up, ginger ale and seltzer)  Coffee or tea (without milk or cream)  Gatorade    --No alcohol or cannabis products for at least 24 hours before surgery    Which medicines can I take?    Hold Aspirin for 7 days before surgery.   **Hold Multivitamins for 7 days before surgery.  Hold Supplements for 7 days before surgery.  Hold Ibuprofen (Advil, Motrin) for 1 day before surgery--unless otherwise directed by surgeon.  Hold Naproxen (Aleve) for 4 days before surgery.  **Hold Eliquis for 48 hours before surgery. Last dose  evening.      -  PLEASE TAKE the following medications the day of surgery   Imdur  Pravastatin  Amlodipine      How do I prepare myself?  - Please take 2 showers (one the night prior to surgery and one the morning of surgery) using Scrubcare or Hibiclens soap.    Use this soap only from the neck to your toes.     Leave the soap on your skin for one minute--then rinse thoroughly.      You may use your own shampoo and conditioner; no other hair products.    - Please remove all jewelry and body piercings.  - No lotions, deodorants or fragrance.  - No makeup or fingernail polish.   - Bring your ID and insurance card.    -If you have a Deep Brain Stimulator, a Spinal Cord Stimulator or any implanted Neuro device you must bring the remote to the Surgery Center          ALL PATIENTS ARE REQUIRED TO HAVE A RESPONSIBLE ADULT TO DRIVE AND BE IN ATTENDANCE WITH THEM FOR 24 HOURS FOLLOWING SURGERY       Covid testing policy as of 12/06/2022  Your surgeon will notify and schedule you for a COVID test if one is needed before surgery--please direct any questions or COVID symptoms to your surgeon      Questions or Concerns:    -For questions regarding the day of surgery please contact the Ambulatory Surgery Center at [phone].    -If you have health changes between today and your surgery please contact your surgeon.     For questions after surgery please call your surgeons office.

## 2024-01-03 ENCOUNTER — OFFICE VISIT (OUTPATIENT)
Dept: FAMILY MEDICINE | Facility: CLINIC | Age: 79
End: 2024-01-03
Payer: MEDICARE

## 2024-01-03 DIAGNOSIS — L57.0 AK (ACTINIC KERATOSIS): Primary | ICD-10-CM

## 2024-01-03 LAB
PATH REPORT.COMMENTS IMP SPEC: NORMAL
PATH REPORT.COMMENTS IMP SPEC: NORMAL
PATH REPORT.FINAL DX SPEC: NORMAL
PATH REPORT.GROSS SPEC: NORMAL
PATH REPORT.MICROSCOPIC SPEC OTHER STN: NORMAL
PATH REPORT.RELEVANT HX SPEC: NORMAL

## 2024-01-03 PROCEDURE — 17003 DESTRUCT PREMALG LES 2-14: CPT | Performed by: NURSE PRACTITIONER

## 2024-01-03 PROCEDURE — 17000 DESTRUCT PREMALG LESION: CPT | Performed by: NURSE PRACTITIONER

## 2024-01-03 PROCEDURE — 88112 CYTOPATH CELL ENHANCE TECH: CPT | Mod: TC | Performed by: UROLOGY

## 2024-01-03 PROCEDURE — 88112 CYTOPATH CELL ENHANCE TECH: CPT | Mod: 26 | Performed by: PATHOLOGY

## 2024-01-03 ASSESSMENT — PAIN SCALES - GENERAL: PAINLEVEL: NO PAIN (0)

## 2024-01-03 NOTE — PROGRESS NOTES
[facility] Dermatology Note  Encounter Date: Cody 3, 2024  Office Visit     Reviewed patients past medical history and pertinent chart review prior to patients visit today.     Dermatology Problem List:  Last skin check 11/28/23  # NMSC  -BCC, right frontal scalp, s/p bx 11/28/23, scheduled for Mohs 2/1/2024  # Benign bx:  - ISK, sternum, s/p bx 04/05/21  - Clonal type SK, Occipital scalp, s.p bx 04/05/21  - Clonal SK, left frontal scalp, s/p bx 2017  3. AK's, sideburns and forehead  - cryo, consider field therapy  -AK right temple, s/p bx 11/28/23, cryo 1/3/2024    Currently undergoing bladder cancer treatment  ____________________________________________    Assessment & Plan:     # Actinic keratosis, right temple x 2 (including site of biopsy from 11/28/2023), right forehead. Premalignant nature discussed with patient. Treatment options discussed with patient today including no treatment, topical treatment, and cryotherapy. Patient elects to treat visible lesions today with cryotherapy. After verbal consent and discussion of risks and benefits including but no limited to dyspigmentation/scar, blister, and pain. A total of 3 actinic keratoses were treated with 1-2mm freeze border for 2 cycle with liquid nitrogen. Post cryotherapy instructions were provided.     -Patient would like to defer treatment with fluorouracil and consider doing it next year after he has done with his bladder cancer treatment.    Full-body skin cancer screening November 2024, sooner as needed if new or worsening concerns  Neyda Torres CNP  Dermatology    _______________________________________    CC: Actinic Keratosis    HPI:  Mr. Alex Stanley is a(n) 78 year old male who presents today for follow-up  for biopsy-proven actinic keratosis on the right temple.  He says he was to consider a field treatment and would like to think about it now.  He is going through bladder cancer treatment right now and is not sure he wants  to do it but would like to discuss.    Patient is otherwise feeling well, without additional skin concerns.      Physical Exam:  SKIN: Focused examination of face and scalp was performed.  -Pink scaly papule on the right temple x 2 in the right forehead  -Healing scar with some scale on the right frontal scalp at site of biopsy-proven BCC.    - No other lesions of concern on areas examined.     Medications:  Current Outpatient Medications   Medication    amLODIPine (NORVASC) 10 MG tablet    apixaban ANTICOAGULANT (ELIQUIS) 5 MG tablet    ASPIRIN NOT PRESCRIBED, INTENTIONAL,    isosorbide mononitrate (IMDUR) 30 MG 24 hr tablet    Multiple Vitamins-Minerals (PRESERVISION AREDS 2) CAPS    oxyBUTYnin ER (DITROPAN XL) 5 MG 24 hr tablet    pravastatin (PRAVACHOL) 20 MG tablet     No current facility-administered medications for this visit.      Past Medical History:   Patient Active Problem List   Diagnosis    Essential hypertension    Chronic atrial fibrillation/Flutter    Tobacco abuse    Iron deficiency anemia    History of alcohol dependence in remission sober since 2010 (H)    Erosive gastritis    Hyperlipidemia LDL goal <100    History of CVA (cerebrovascular accident)    Fatty liver, alcoholic    Diverticulosis of colon    CKD (chronic kidney disease) stage 3, GFR 30-59 ml/min (H)    Long-term (current) use of anticoagulants [Z79.01]    Macular degeneration (senile) of retina, left eye    Gallstone pancreatitis    Bladder tumor    Urothelial carcinoma of bladder without invasion of muscle (H)     Past Medical History:   Diagnosis Date    Alcohol abuse     Alcoholism (H)     admission 11/09    Atrial fibrillation, paroxysmal 11/07, 6/09, 11/09    Basal cell carcinoma     Benign essential hypertension     Chronic atrial fibrillation (H)     Diverticulosis of colon 06/02/2011    Erosive gastritis 11/2009    Gastritis     Hyperlipidemia LDL goal <100     Hypertension, with LVH     Iron deficiency anemia 11/2009     Paroxysmal atrial fibrillation (H)     Stroke (H)     Tobacco abuse        CC No referring provider defined for this encounter. on close of this encounter.

## 2024-01-03 NOTE — LETTER
1/3/2024         RE: Alex Stanley  [address]  [place] [address]        Dear Colleague,    Thank you for referring your patient, Alex Stanley, to the [facility]. Please see a copy of my visit note below.    [facility] Dermatology Note  Encounter Date: Cody 3, 2024  Office Visit     Reviewed patients past medical history and pertinent chart review prior to patients visit today.     Dermatology Problem List:  Last skin check 11/28/23  # NMSC  -BCC, right frontal scalp, s/p bx 11/28/23, scheduled for Mohs 2/1/2024  # Benign bx:  - ISK, sternum, s/p bx 04/05/21  - Clonal type SK, Occipital scalp, s.p bx 04/05/21  - Clonal SK, left frontal scalp, s/p bx 2017  3. AK's, sideburns and forehead  - cryo, consider field therapy  -AK right temple, s/p bx 11/28/23, cryo 1/3/2024    Currently undergoing bladder cancer treatment  ____________________________________________    Assessment & Plan:     # Actinic keratosis, right temple x 2 (including site of biopsy from 11/28/2023), right forehead. Premalignant nature discussed with patient. Treatment options discussed with patient today including no treatment, topical treatment, and cryotherapy. Patient elects to treat visible lesions today with cryotherapy. After verbal consent and discussion of risks and benefits including but no limited to dyspigmentation/scar, blister, and pain. A total of 3 actinic keratoses were treated with 1-2mm freeze border for 2 cycle with liquid nitrogen. Post cryotherapy instructions were provided.     -Patient would like to defer treatment with fluorouracil and consider doing it next year after he has done with his bladder cancer treatment.    Full-body skin cancer screening November 2024, sooner as needed if new or worsening concerns  Neyda Torres CNP  Dermatology    _______________________________________    CC: Actinic Keratosis    HPI:  Mr. Alex Stanley is a(n) 78 year old male who  presents today for follow-up  for biopsy-proven actinic keratosis on the right temple.  He says he was to consider a field treatment and would like to think about it now.  He is going through bladder cancer treatment right now and is not sure he wants to do it but would like to discuss.    Patient is otherwise feeling well, without additional skin concerns.      Physical Exam:  SKIN: Focused examination of face and scalp was performed.  -Pink scaly papule on the right temple x 2 in the right forehead  -Healing scar with some scale on the right frontal scalp at site of biopsy-proven BCC.    - No other lesions of concern on areas examined.     Medications:  Current Outpatient Medications   Medication     amLODIPine (NORVASC) 10 MG tablet     apixaban ANTICOAGULANT (ELIQUIS) 5 MG tablet     ASPIRIN NOT PRESCRIBED, INTENTIONAL,     isosorbide mononitrate (IMDUR) 30 MG 24 hr tablet     Multiple Vitamins-Minerals (PRESERVISION AREDS 2) CAPS     oxyBUTYnin ER (DITROPAN XL) 5 MG 24 hr tablet     pravastatin (PRAVACHOL) 20 MG tablet     No current facility-administered medications for this visit.      Past Medical History:   Patient Active Problem List   Diagnosis     Essential hypertension     Chronic atrial fibrillation/Flutter     Tobacco abuse     Iron deficiency anemia     History of alcohol dependence in remission sober since 2010 (H)     Erosive gastritis     Hyperlipidemia LDL goal <100     History of CVA (cerebrovascular accident)     Fatty liver, alcoholic     Diverticulosis of colon     CKD (chronic kidney disease) stage 3, GFR 30-59 ml/min (H)     Long-term (current) use of anticoagulants [Z79.01]     Macular degeneration (senile) of retina, left eye     Gallstone pancreatitis     Bladder tumor     Urothelial carcinoma of bladder without invasion of muscle (H)     Past Medical History:   Diagnosis Date     Alcohol abuse      Alcoholism (H)     admission 11/09     Atrial fibrillation, paroxysmal 11/07, 6/09, 11/09      Basal cell carcinoma      Benign essential hypertension      Chronic atrial fibrillation (H)      Diverticulosis of colon 06/02/2011     Erosive gastritis 11/2009     Gastritis      Hyperlipidemia LDL goal <100      Hypertension, with LVH      Iron deficiency anemia 11/2009     Paroxysmal atrial fibrillation (H)      Stroke (H)      Tobacco abuse        CC No referring provider defined for this encounter. on close of this encounter.       Again, thank you for allowing me to participate in the care of your patient.        Sincerely,        LAST Mir CNP

## 2024-01-08 ENCOUNTER — TELEPHONE (OUTPATIENT)
Dept: UROLOGY | Facility: CLINIC | Age: 79
End: 2024-01-08
Payer: MEDICARE

## 2024-01-08 NOTE — TELEPHONE ENCOUNTER
Patient called and [facility] for writer asking for a call back. Called patient back and he wanted to confirm the time pre-op nurses informed patient to come. This writer checked the pre-op flowsheet and saw the arrival time of 9:40 AM was voiced to patient. Patient confirmed this was the time told him to him as well. Advised patient if pre-op noted to come at 9:40 AM, to follow their direction and plan on that arrival time. Patient was appreciative of call back and had no further questions/concerns at this time.     Marisela Berger on 1/8/2024 at 1:11 PM

## 2024-01-09 RX ORDER — OXYCODONE HYDROCHLORIDE 5 MG/1
10 TABLET ORAL
Status: CANCELLED | OUTPATIENT
Start: 2024-01-09

## 2024-01-09 RX ORDER — OXYCODONE HYDROCHLORIDE 5 MG/1
5 TABLET ORAL
Status: CANCELLED | OUTPATIENT
Start: 2024-01-09

## 2024-01-09 RX ORDER — ONDANSETRON 2 MG/ML
4 INJECTION INTRAMUSCULAR; INTRAVENOUS EVERY 30 MIN PRN
Status: CANCELLED | OUTPATIENT
Start: 2024-01-09

## 2024-01-09 RX ORDER — ONDANSETRON 4 MG/1
4 TABLET, ORALLY DISINTEGRATING ORAL EVERY 30 MIN PRN
Status: CANCELLED | OUTPATIENT
Start: 2024-01-09

## 2024-01-09 RX ORDER — FENTANYL CITRATE 50 UG/ML
25 INJECTION, SOLUTION INTRAMUSCULAR; INTRAVENOUS
Status: CANCELLED | OUTPATIENT
Start: 2024-01-09

## 2024-01-09 ASSESSMENT — ENCOUNTER SYMPTOMS
ORTHOPNEA: 0
DYSRHYTHMIAS: 1

## 2024-01-09 ASSESSMENT — LIFESTYLE VARIABLES: TOBACCO_USE: 1

## 2024-01-09 NOTE — ANESTHESIA PREPROCEDURE EVALUATION
Anesthesia Pre-Procedure Evaluation    Patient: Alex Stanley   MRN: [id number] : 1945        Procedure : Procedure(s):  BLUE LIGHT, CYSTOSCOPY, WITH TRANSURETHRAL RESECTION BLADDER TUMOR          Past Medical History:   Diagnosis Date     Alcohol abuse      Alcoholism (H)     admission      Atrial fibrillation, paroxysmal , ,      Basal cell carcinoma      Benign essential hypertension      Chronic atrial fibrillation (H)      Diverticulosis of colon 2011     Erosive gastritis 2009     Gastritis      Hyperlipidemia LDL goal <100      Hypertension, with LVH      Iron deficiency anemia 2009     Paroxysmal atrial fibrillation (H)      Stroke (H)      Tobacco abuse       Past Surgical History:   Procedure Laterality Date     COLONOSCOPY N/A 10/26/2020    Procedure: COLONOSCOPY, WITH POLYPECTOMY AND BIOPSY;  Surgeon: Shon Bond MD;  Location:  GI     CYSTOSCOPY, TRANSURETHRAL RESECTION (TUR) TUMOR BLADDER, COMBINED N/A 2023    Procedure: Blue light CYSTOSCOPY, WITH TRANSURETHRAL RESECTION BLADDER TUMOR, urethral dilation;  Surgeon: Sydney Weaver MD;  Location: [facility] [place]     ENDOSCOPIC ULTRASOUND UPPER GASTROINTESTINAL TRACT (GI) N/A 2022    Procedure: ENDOSCOPIC ULTRASOUND, ESOPHAGOSCOPY / UPPER GASTROINTESTINAL TRACT (GI);  Surgeon: Shon Bond MD;  Location:  GI     LAPAROSCOPIC CHOLECYSTECTOMY N/A 2022    Procedure: LAPAROSCOPIC CHOLECYSTECTOMY;  Surgeon: Mazin Bains MD;  Location:  OR     SURGICAL HISTORY OF -       R Dupuytren's contracture release   Dr. Scruggs     [facility] NONSPECIFIC PROCEDURE      Rt foot reconstructive     [facility] NONSPECIFIC PROCEDURE  10/12    R hand procedure.  Dr. Deluna      No Known Allergies   Social History     Tobacco Use     Smoking status: Every Day     Packs/day: .5     Types: Cigarettes     Start date:      Smokeless tobacco: Never     Tobacco comments:     ppd since age 16 - now 1/2-1 ppd  04/05/21.  10 cigarettes daily   Substance Use Topics     Alcohol use: Not Currently     Comment: last drank at [facility] 8/2010.      Wt Readings from Last 1 Encounters:   01/02/24 78 kg (172 lb)        Anesthesia Evaluation   Pt has had prior anesthetic. Type: General.    No history of anesthetic complications       ROS/MED HX  ENT/Pulmonary:     (+)     REGGIE risk factors, snores loudly, hypertension,         tobacco use, Current use, 0.5 packs/day, 40  Pack-Year Hx,  patient smoked within 24 hours,                 (-) asthma   Neurologic:     (+)          CVA, date: 2011, without deficits,                    Cardiovascular:     (+) Dyslipidemia hypertension- -   -  - -   Taking blood thinners  Instructions Given to patient: holding 48 hours prior to DOS.                   dysrhythmias, a-fib,        Previous cardiac testing   Echo: Date: 10/2022 Results:  Interpretation Summary     Left ventricular systolic function is low normal.  The visual ejection fraction is 50-55%.  The left atrium is moderate to severely dilated.  No hemodynamically significant valvular abnormalities on 2D or color flow  imaging.    Stress Test:  Date: 10/2022 Results:        The nuclear stress test is abnormal.     There is a small area of mild ischemia in the apical segment(s) of the left ventricle.     Left ventricular function is normal.     The left ventricular ejection fraction at stress is 58%.     There is no prior study for comparison    ECG Reviewed:  Date: Results:    Cath:  Date: Results:   (-) PIERRE, orthopnea/PND and irregular heartbeat/palpitations   METS/Exercise Tolerance: >4 METS    Hematologic:     (+)      anemia,          Musculoskeletal:  - neg musculoskeletal ROS     GI/Hepatic: Comment: Fatty Liver   Hx of Diverticulosis  Hx of Erosive gastritis  Gall stones    (+)          cholecystitis/cholelithiasis,   liver disease,       Renal/Genitourinary: Comment: Bladder tumor  CKD 3    (+) renal disease, type: CRI,             Endo:  - neg endo ROS     Psychiatric/Substance Use: Comment: ETOH abuse in remission since 2010    (+)   alcohol abuse      Infectious Disease:  - neg infectious disease ROS     Malignancy:  - neg malignancy ROS (+) Malignancy, History of Other.Other CA urothelial carcinoma Active status post Surgery and Chemo.    Other:          Physical Exam    Airway        Mallampati: I   TM distance: > 3 FB   Neck ROM: full   Mouth opening: > 3 cm    Respiratory Devices and Support         Dental       (+) Modest Abnormalities - crowns, retainers, 1 or 2 missing teeth      Cardiovascular             Pulmonary               OUTSIDE LABS:  CBC:   Lab Results   Component Value Date    WBC 10.9 01/02/2024    WBC 9.1 08/25/2023    HGB 14.7 01/02/2024    HGB 14.5 08/25/2023    HCT 44.0 01/02/2024    HCT 43.9 08/25/2023     01/02/2024     08/25/2023     BMP:   Lab Results   Component Value Date     01/02/2024     08/25/2023    POTASSIUM 4.1 01/02/2024    POTASSIUM 4.4 08/25/2023    CHLORIDE 107 01/02/2024    CHLORIDE 105 08/25/2023    CO2 25 01/02/2024    CO2 25 08/25/2023    BUN 15.9 01/02/2024    BUN 17.4 08/25/2023    CR 1.12 01/02/2024    CR 1.21 (H) 08/25/2023    GLC 97 01/02/2024     (H) 08/25/2023     COAGS:   Lab Results   Component Value Date    PTT 57 (H) 06/01/2011    INR 2.8 (H) 02/10/2023     POC:   Lab Results   Component Value Date     (H) 12/03/2009     HEPATIC:   Lab Results   Component Value Date    ALBUMIN 4.2 01/02/2024    PROTTOTAL 6.5 01/02/2024    ALT 10 01/02/2024    AST 24 01/02/2024    GGT 61 07/06/2005    ALKPHOS 76 01/02/2024    BILITOTAL 0.8 01/02/2024     OTHER:   Lab Results   Component Value Date    MARY 8.7 (L) 01/02/2024    MAG 1.9 06/01/2011    LIPASE 820 (H) 01/13/2022    AMYLASE 65 07/06/2005    TSH 1.68 06/01/2011    CRP <2.9 01/14/2022    SED 9 06/01/2011       Anesthesia Plan    ASA Status:  3                     Consents            Postoperative  "Care            Comments:             Matilda Valadez MD    I have reviewed the pertinent notes and labs in the chart from the past 30 days and (re)examined the patient.  Any updates or changes from those notes are reflected in this note.              # Overweight: Estimated body mass index is 26.93 kg/m  as calculated from the following:    Height as of 12/14/23: 1.702 m (5' 7.01\").    Weight as of 1/2/24: 78 kg (172 lb).      "

## 2024-01-09 NOTE — TELEPHONE ENCOUNTER
Surgery was rescheduled to the middle of the providers clinic on 1/10. Surgery was moved back to original start time of 7:15 AM. Called patient and informed him of arrival time of 5:45 AM, and that the procedure was moved back to 7:15 start time. Patient understood and had no further questions at this time.    Marisela Berger on 1/9/2024 at 8:30 AM

## 2024-01-10 ENCOUNTER — ANESTHESIA (OUTPATIENT)
Dept: SURGERY | Facility: AMBULATORY SURGERY CENTER | Age: 79
End: 2024-01-10
Payer: MEDICARE

## 2024-01-10 ENCOUNTER — HOSPITAL ENCOUNTER (OUTPATIENT)
Facility: AMBULATORY SURGERY CENTER | Age: 79
Discharge: HOME | End: 2024-01-10
Attending: UROLOGY
Payer: MEDICARE

## 2024-01-10 VITALS
SYSTOLIC BLOOD PRESSURE: 113 MMHG | TEMPERATURE: 97.8 F | WEIGHT: 165 LBS | DIASTOLIC BLOOD PRESSURE: 78 MMHG | HEART RATE: 92 BPM | RESPIRATION RATE: 15 BRPM | HEIGHT: 67 IN | BODY MASS INDEX: 25.9 KG/M2 | OXYGEN SATURATION: 96 %

## 2024-01-10 PROCEDURE — 52234 CYSTOSCOPY AND TREATMENT: CPT

## 2024-01-10 PROCEDURE — 88305 TISSUE EXAM BY PATHOLOGIST: CPT | Mod: TC | Performed by: UROLOGY

## 2024-01-10 PROCEDURE — 88305 TISSUE EXAM BY PATHOLOGIST: CPT | Mod: 26 | Performed by: PATHOLOGY

## 2024-01-10 RX ORDER — PROPOFOL 10 MG/ML
INJECTION, EMULSION INTRAVENOUS PRN
Status: DISCONTINUED | OUTPATIENT
Start: 2024-01-10 | End: 2024-01-10

## 2024-01-10 RX ORDER — LIDOCAINE HYDROCHLORIDE 20 MG/ML
JELLY TOPICAL PRN
Status: DISCONTINUED | OUTPATIENT
Start: 2024-01-10 | End: 2024-01-10 | Stop reason: HOSPADM

## 2024-01-10 RX ORDER — ONDANSETRON 4 MG/1
4 TABLET, ORALLY DISINTEGRATING ORAL EVERY 30 MIN PRN
Status: DISCONTINUED | OUTPATIENT
Start: 2024-01-10 | End: 2024-01-11 | Stop reason: HOSPADM

## 2024-01-10 RX ORDER — SODIUM CHLORIDE, SODIUM LACTATE, POTASSIUM CHLORIDE, CALCIUM CHLORIDE 600; 310; 30; 20 MG/100ML; MG/100ML; MG/100ML; MG/100ML
INJECTION, SOLUTION INTRAVENOUS CONTINUOUS
Status: DISCONTINUED | OUTPATIENT
Start: 2024-01-10 | End: 2024-01-10 | Stop reason: HOSPADM

## 2024-01-10 RX ORDER — CEFAZOLIN SODIUM 2 G/50ML
2 SOLUTION INTRAVENOUS SEE ADMIN INSTRUCTIONS
Status: DISCONTINUED | OUTPATIENT
Start: 2024-01-10 | End: 2024-01-10 | Stop reason: HOSPADM

## 2024-01-10 RX ORDER — ONDANSETRON 2 MG/ML
INJECTION INTRAMUSCULAR; INTRAVENOUS PRN
Status: DISCONTINUED | OUTPATIENT
Start: 2024-01-10 | End: 2024-01-10

## 2024-01-10 RX ORDER — LIDOCAINE 40 MG/G
CREAM TOPICAL
Status: DISCONTINUED | OUTPATIENT
Start: 2024-01-10 | End: 2024-01-10 | Stop reason: HOSPADM

## 2024-01-10 RX ORDER — HYDROXYZINE HYDROCHLORIDE 10 MG/1
10 TABLET, FILM COATED ORAL EVERY 6 HOURS PRN
Status: DISCONTINUED | OUTPATIENT
Start: 2024-01-10 | End: 2024-01-11 | Stop reason: HOSPADM

## 2024-01-10 RX ORDER — LABETALOL HYDROCHLORIDE 5 MG/ML
10 INJECTION, SOLUTION INTRAVENOUS
Status: DISCONTINUED | OUTPATIENT
Start: 2024-01-10 | End: 2024-01-11 | Stop reason: HOSPADM

## 2024-01-10 RX ORDER — ACETAMINOPHEN 325 MG/1
975 TABLET ORAL ONCE
Status: COMPLETED | OUTPATIENT
Start: 2024-01-10 | End: 2024-01-10

## 2024-01-10 RX ORDER — PROPOFOL 10 MG/ML
INJECTION, EMULSION INTRAVENOUS CONTINUOUS PRN
Status: DISCONTINUED | OUTPATIENT
Start: 2024-01-10 | End: 2024-01-10

## 2024-01-10 RX ORDER — EPHEDRINE SULFATE 50 MG/ML
INJECTION, SOLUTION INTRAMUSCULAR; INTRAVENOUS; SUBCUTANEOUS PRN
Status: DISCONTINUED | OUTPATIENT
Start: 2024-01-10 | End: 2024-01-10

## 2024-01-10 RX ORDER — FENTANYL CITRATE 50 UG/ML
INJECTION, SOLUTION INTRAMUSCULAR; INTRAVENOUS PRN
Status: DISCONTINUED | OUTPATIENT
Start: 2024-01-10 | End: 2024-01-10

## 2024-01-10 RX ORDER — HYDROMORPHONE HYDROCHLORIDE 1 MG/ML
0.4 INJECTION, SOLUTION INTRAMUSCULAR; INTRAVENOUS; SUBCUTANEOUS EVERY 5 MIN PRN
Status: DISCONTINUED | OUTPATIENT
Start: 2024-01-10 | End: 2024-01-11 | Stop reason: HOSPADM

## 2024-01-10 RX ORDER — ONDANSETRON 2 MG/ML
4 INJECTION INTRAMUSCULAR; INTRAVENOUS EVERY 30 MIN PRN
Status: DISCONTINUED | OUTPATIENT
Start: 2024-01-10 | End: 2024-01-11 | Stop reason: HOSPADM

## 2024-01-10 RX ORDER — MEPERIDINE HYDROCHLORIDE 25 MG/ML
12.5 INJECTION INTRAMUSCULAR; INTRAVENOUS; SUBCUTANEOUS EVERY 5 MIN PRN
Status: DISCONTINUED | OUTPATIENT
Start: 2024-01-10 | End: 2024-01-11 | Stop reason: HOSPADM

## 2024-01-10 RX ORDER — CEFAZOLIN SODIUM 2 G/50ML
2 SOLUTION INTRAVENOUS
Status: COMPLETED | OUTPATIENT
Start: 2024-01-10 | End: 2024-01-10

## 2024-01-10 RX ORDER — FENTANYL CITRATE 50 UG/ML
50 INJECTION, SOLUTION INTRAMUSCULAR; INTRAVENOUS EVERY 5 MIN PRN
Status: DISCONTINUED | OUTPATIENT
Start: 2024-01-10 | End: 2024-01-11 | Stop reason: HOSPADM

## 2024-01-10 RX ORDER — DEXAMETHASONE SODIUM PHOSPHATE 4 MG/ML
INJECTION, SOLUTION INTRA-ARTICULAR; INTRALESIONAL; INTRAMUSCULAR; INTRAVENOUS; SOFT TISSUE PRN
Status: DISCONTINUED | OUTPATIENT
Start: 2024-01-10 | End: 2024-01-10

## 2024-01-10 RX ORDER — SODIUM CHLORIDE, SODIUM LACTATE, POTASSIUM CHLORIDE, CALCIUM CHLORIDE 600; 310; 30; 20 MG/100ML; MG/100ML; MG/100ML; MG/100ML
INJECTION, SOLUTION INTRAVENOUS CONTINUOUS
Status: DISCONTINUED | OUTPATIENT
Start: 2024-01-10 | End: 2024-01-11 | Stop reason: HOSPADM

## 2024-01-10 RX ORDER — FENTANYL CITRATE 50 UG/ML
25 INJECTION, SOLUTION INTRAMUSCULAR; INTRAVENOUS EVERY 5 MIN PRN
Status: DISCONTINUED | OUTPATIENT
Start: 2024-01-10 | End: 2024-01-11 | Stop reason: HOSPADM

## 2024-01-10 RX ORDER — LIDOCAINE HYDROCHLORIDE 20 MG/ML
INJECTION, SOLUTION INFILTRATION; PERINEURAL PRN
Status: DISCONTINUED | OUTPATIENT
Start: 2024-01-10 | End: 2024-01-10

## 2024-01-10 RX ORDER — HYDROMORPHONE HYDROCHLORIDE 1 MG/ML
0.2 INJECTION, SOLUTION INTRAMUSCULAR; INTRAVENOUS; SUBCUTANEOUS EVERY 5 MIN PRN
Status: DISCONTINUED | OUTPATIENT
Start: 2024-01-10 | End: 2024-01-11 | Stop reason: HOSPADM

## 2024-01-10 RX ADMIN — Medication 100 MCG: at 07:55

## 2024-01-10 RX ADMIN — Medication 150 MCG: at 07:52

## 2024-01-10 RX ADMIN — LIDOCAINE HYDROCHLORIDE 100 MG: 20 INJECTION, SOLUTION INFILTRATION; PERINEURAL at 07:19

## 2024-01-10 RX ADMIN — Medication 150 MCG: at 07:40

## 2024-01-10 RX ADMIN — ONDANSETRON 4 MG: 2 INJECTION INTRAMUSCULAR; INTRAVENOUS at 07:23

## 2024-01-10 RX ADMIN — ACETAMINOPHEN 975 MG: 325 TABLET ORAL at 06:27

## 2024-01-10 RX ADMIN — Medication 150 MCG: at 07:33

## 2024-01-10 RX ADMIN — Medication 150 MCG: at 07:22

## 2024-01-10 RX ADMIN — PROPOFOL 150 MCG/KG/MIN: 10 INJECTION, EMULSION INTRAVENOUS at 07:19

## 2024-01-10 RX ADMIN — SODIUM CHLORIDE, SODIUM LACTATE, POTASSIUM CHLORIDE, CALCIUM CHLORIDE: 600; 310; 30; 20 INJECTION, SOLUTION INTRAVENOUS at 06:25

## 2024-01-10 RX ADMIN — CEFAZOLIN SODIUM 2 G: 2 SOLUTION INTRAVENOUS at 07:13

## 2024-01-10 RX ADMIN — EPHEDRINE SULFATE 5 MG: 50 INJECTION, SOLUTION INTRAMUSCULAR; INTRAVENOUS; SUBCUTANEOUS at 07:55

## 2024-01-10 RX ADMIN — FENTANYL CITRATE 50 MCG: 50 INJECTION, SOLUTION INTRAMUSCULAR; INTRAVENOUS at 07:37

## 2024-01-10 RX ADMIN — DEXAMETHASONE SODIUM PHOSPHATE 4 MG: 4 INJECTION, SOLUTION INTRA-ARTICULAR; INTRALESIONAL; INTRAMUSCULAR; INTRAVENOUS; SOFT TISSUE at 07:23

## 2024-01-10 RX ADMIN — PROPOFOL 200 MG: 10 INJECTION, EMULSION INTRAVENOUS at 07:19

## 2024-01-10 RX ADMIN — FENTANYL CITRATE 50 MCG: 50 INJECTION, SOLUTION INTRAMUSCULAR; INTRAVENOUS at 07:33

## 2024-01-10 NOTE — DISCHARGE INSTRUCTIONS
[facility]  Home Care Following Anesthesia  For 24 hours after surgery:  Get plenty of rest.  A responsible adult must stay with you for at least 24 hours after you leave the surgery center.  Do not drive or use heavy equipment.  If you have weakness or tingling, don't drive or use heavy equipment until this feeling goes away.   Do not drink alcohol.   Avoid strenuous or risky activities.  Ask for help when climbing stairs.  You may feel lightheaded.  IF so, sit for a few minutes before standing.  Have someone help you get up.   If you have nausea (feel sick to your stomach): Drink only clear liquids such as apple juice, ginger ale, broth or 7-Up.  Rest may also help.  Be sure to drink enough fluids.  Move to a regular diet as you feel able.   You may have a slight fever.  Call the doctor if your fever is over 100 F (37.7 C) (taken under the tongue) or lasts longer than 24 hours.  You may have a dry mouth, a sore throat, muscle aches or trouble sleeping. These should go away after 24 hours.  Do not make important or legal decisions.   It is recommended to avoid smoking.               Tips for taking pain medications  To get the best pain relief possible, remember these points:  Take pain medications as directed, before pain becomes severe.  Pain medication can upset your stomach: taking it with food may help.  Constipation is a common side effect of pain medication. Drink plenty of  fluids.  Eat foods high in fiber. Take a stool softener if recommended by your doctor or pharmacist.  Do not drink alcohol, drive or operate machinery while taking pain medications.  Ask about other ways to control pain, such as with heat, ice or relaxation.    Tylenol/Acetaminophen Consumption    If you feel your pain relief is insufficient, you may take Tylenol/Acetaminophen in addition to your narcotic pain medication.   Be careful not to exceed 4,000 mg of Tylenol/Acetaminophen in a 24 hour  period from all sources.  If you are taking extra strength Tylenol/acetaminophen (500 mg), the maximum dose is 8 tablets in 24 hours.  If you are taking regular strength acetaminophen (325 mg), the maximum dose is 12 tablets in 24 hours.    Call a doctor for any of the following:  Signs of infection (fever, growing tenderness at the surgery site, a large amount of drainage or bleeding, severe pain, foul-smelling drainage, redness, swelling).  It has been over 8 to 10 hours since surgery and you are still not able to urinate (pass water).  Headache for over 24 hours.  Numbness, tingling or weakness the day after surgery (if you had spinal anesthesia).  Signs of Covid-19 infection (temperature over 100 degrees, shortness of breath, cough, loss of taste/smell, generalized body aches, persistent headache, chills, sore throat, nausea/vomiting/diarrhea)  Your doctor is:  Dr. Sydney Weaver, Prostate and Urology: [phone]                    Or dial [phone] and ask for the resident on call for:  Prostate Urology  For emergency care, call the:  [facility]:  [phone] (TTY for hearing impaired: [phone])

## 2024-01-10 NOTE — ANESTHESIA PROCEDURE NOTES
Airway       Patient location during procedure: OR  Staff -        Performed By: CRNAIndications and Patient Condition       Indications for airway management: eze-procedural       Induction type:intravenous       Mask difficulty assessment: 1 - vent by mask    Final Airway Details       Final airway type: supraglottic airway    Supraglottic Airway Details        Type: LMA       Brand: I-Gel       LMA size: 4    Post intubation assessment        Placement verified by: capnometry and equal breath sounds        Number of attempts at approach: 1       Number of other approaches attempted: 0       Secured with: tape       Ease of procedure: easy       Dentition: Intact and Lips/oral mucosa injury (small ut on upper lip)

## 2024-01-10 NOTE — ANESTHESIA CARE TRANSFER NOTE
Patient: Alex Stanley    Procedure: Procedure(s):  BLUE LIGHT, CYSTOSCOPY, WITH TRANSURETHRAL RESECTION BLADDER TUMOR       Diagnosis: Urothelial carcinoma of bladder without invasion of muscle (H) [C67.9]  Diagnosis Additional Information: No value filed.    Anesthesia Type:   No value filed.     Note:    Oropharynx: oropharynx clear of all foreign objects  Level of Consciousness: awake  Oxygen Supplementation: room air    Independent Airway: airway patency satisfactory and stable  Dentition: dentition unchanged  Vital Signs Stable: post-procedure vital signs reviewed and stable  Report to RN Given: handoff report given  Patient transferred to: PACU  Comments: VSS and WNL, comfortable, no PONV, report to Micaela KAY  Handoff Report: Identifed the Patient, Identified the Reponsible Provider, Reviewed the pertinent medical history, Discussed the surgical course, Reviewed Intra-OP anesthesia mangement and issues during anesthesia, Set expectations for post-procedure period and Allowed opportunity for questions and acknowledgement of understanding      Vitals:  Vitals Value Taken Time   /67 01/10/24 0758   Temp     Pulse 98 01/10/24 0800   Resp 11 01/10/24 0800   SpO2 96 % 01/10/24 0800   Vitals shown include unfiled device data.    Electronically Signed By: LAST Stephens CRNA  January 10, 2024  8:01 AM

## 2024-01-10 NOTE — ANESTHESIA POSTPROCEDURE EVALUATION
Patient: Alex Stanley    Procedure: Procedure(s):  BLUE LIGHT, CYSTOSCOPY, WITH TRANSURETHRAL RESECTION BLADDER TUMOR       Anesthesia Type:  No value filed.    Note:  Disposition: Outpatient   Postop Pain Control: Uneventful            Sign Out: Well controlled pain   PONV: No   Neuro/Psych: Uneventful            Sign Out: Acceptable/Baseline neuro status   Airway/Respiratory: Uneventful            Sign Out: Acceptable/Baseline resp. status   CV/Hemodynamics: Uneventful            Sign Out: Acceptable CV status; No obvious hypovolemia; No obvious fluid overload   Other NRE: NONE   DID A NON-ROUTINE EVENT OCCUR? No       Last vitals:  Vitals Value Taken Time   /67 01/10/24 0800   Temp 36.6  C (97.8  F) 01/10/24 0800   Pulse 96 01/10/24 0806   Resp 8 01/10/24 0806   SpO2 95 % 01/10/24 0806   Vitals shown include unfiled device data.    Electronically Signed By: Matilda Valadez MD  January 10, 2024  9:19 AM

## 2024-01-10 NOTE — OP NOTE
PATIENT NAME:Alex Stanley   MEDICAL RECORD NUMBER: [id number]   SURGEON: Sydney Weaver MD  ASSISTANT: LORENZO      PREOPERATIVE DIAGNOSIS: Bladder Tumor (Size 2-5 cm)  POSTOPERATIVE DIAGNOSIS: Same    PROCEDURE PERFORMED: Blue light cystoscopy, transurethral resection of bladder tumor and cauterization   ANESTHESIA: LMA  COMPLICATIONS: None.   OPERATIVE FINDINGS: Small patch of erythema on the posterior edge of the diverticulum on the right lateral bladder wall, small papillary lesions on the edges of the prior resection site     SPECIMEN: #1 right lateral wall white light positive bluelight negative #2 prior resection site white light positive bluelight negative  EBL (mL): Minimal   INDICATIONS FOR PROCEDURE: Alex Stanley is a 78 year old male with history of NMIBC who was found to have evidence of recurrence of surveillance cystoscopy and therefore he is here for further evaluation   DETAILS OF PROCEDURE: The risks and benefits of the procedure were explained in detail to patient and informed consent was obtained. The patient was brought to the operating room and placed supine on the operating table where he underwent general anesthesia. he was then positioned in dorsal lithotomy position.  The patient was prepped and draped in standard sterile fashion.   After an appropriate timeout, I introduced the 26-Lao rigid resectoscope through the urethra and into the bladder and performed a complete cystoscopy. Findings were described above. We then completely resected the described areas down to muscle, assuring some muscularis propria would be in the section. We evacuated the bladder chips after eah resection and sent them to pathology separately for evaluation. There was excellent hemostasis.  A vial of lidocaine gel was administered transurethrally.  Patient was then awakened and transported to PACU in stable condition. There was no complications.       Sydney Weaver MD   Department of Urology   [facility]  [place]

## 2024-01-11 LAB
PATH REPORT.COMMENTS IMP SPEC: NORMAL
PATH REPORT.COMMENTS IMP SPEC: NORMAL
PATH REPORT.FINAL DX SPEC: NORMAL
PATH REPORT.GROSS SPEC: NORMAL
PATH REPORT.RELEVANT HX SPEC: NORMAL
PHOTO IMAGE: NORMAL

## 2024-01-12 ENCOUNTER — PRE VISIT (OUTPATIENT)
Dept: UROLOGY | Facility: CLINIC | Age: 79
End: 2024-01-12
Payer: MEDICARE

## 2024-01-12 NOTE — CONFIDENTIAL NOTE
Reason for visit: post-op blue light cystoscopy TURBT     Relevant information: hx urothelial carcinoma    Records/imaging/labs/orders: path in epic    At Rooming: khloe Dawkins  1/12/2024  7:01 AM

## 2024-01-17 ENCOUNTER — TELEPHONE (OUTPATIENT)
Dept: UROLOGY | Facility: CLINIC | Age: 79
End: 2024-01-17

## 2024-01-17 ENCOUNTER — VIRTUAL VISIT (OUTPATIENT)
Dept: UROLOGY | Facility: CLINIC | Age: 79
End: 2024-01-17
Payer: MEDICARE

## 2024-01-17 VITALS — BODY MASS INDEX: 25.84 KG/M2 | WEIGHT: 165 LBS

## 2024-01-17 DIAGNOSIS — C67.9 UROTHELIAL CARCINOMA OF BLADDER WITHOUT INVASION OF MUSCLE (H): Primary | ICD-10-CM

## 2024-01-17 PROCEDURE — 99214 OFFICE O/P EST MOD 30 MIN: CPT | Mod: 95 | Performed by: UROLOGY

## 2024-01-17 ASSESSMENT — PAIN SCALES - GENERAL: PAINLEVEL: NO PAIN (0)

## 2024-01-17 NOTE — PROGRESS NOTES
"Virtual Visit Details    Type of service:  Video Visit   Video Start Time: 11 AM  Video End Time: 11:15 AM    Originating Location (pt. Location): Home    Distant Location (provider location):  On-site  Platform used for Video Visit: [facility]    Urology Clinic     HPI  Alex Stanley is a 78 year old male with history of nonmuscle invasive bladder cancer, here for follow-up after his recent TURBT.      The patient denies any major recovery issues.  He has had a few hematuria episodes.    No changes to health, hospitalizations or new diagnoses in the interim    PHYSICAL EXAM  Vitals:  No vitals were obtained today due to virtual visit.    Physical Exam   EYES: Eyes grossly normal to inspection.  No discharge or erythema, or obvious scleral/conjunctival abnormalities.  SKIN: Visible skin clear. No significant rash, abnormal pigmentation or lesions.  NEURO: Cranial nerves grossly intact.  Mentation and speech appropriate for age.  GENERAL: Healthy, alert and no distress  RESP: No audible wheeze, cough, or visible cyanosis.  No visible retractions or increased work of breathing.    PSYCH: Mentation appears normal, affect normal/bright, judgement and insight intact, normal speech and appearance well-groomed.      Labs  Lab Results   Component Value Date    WBC 10.9 01/02/2024    WBC 8.3 10/08/2012     Lab Results   Component Value Date    RBC 4.75 01/02/2024    RBC 4.80 10/08/2012     Lab Results   Component Value Date    HGB 14.7 01/02/2024    HGB 14.1 06/14/2018     Lab Results   Component Value Date    HCT 44.0 01/02/2024    HCT 44.3 10/08/2012     No components found for: \"MCT\"  Lab Results   Component Value Date    MCV 93 01/02/2024    MCV 92 10/08/2012     Lab Results   Component Value Date    MCH 30.9 01/02/2024    MCH 30.2 10/08/2012     Lab Results   Component Value Date    MCHC 33.4 01/02/2024    MCHC 32.7 10/08/2012     Lab Results   Component Value Date    RDW 14.8 01/02/2024    RDW 14.1 10/08/2012     Lab " Results   Component Value Date     01/02/2024     10/08/2012        Last Comprehensive Metabolic Panel:  Sodium   Date Value Ref Range Status   01/02/2024 140 135 - 145 mmol/L Final     Comment:     Reference intervals for this test were updated on 09/26/2023 to more accurately reflect our healthy population. There may be differences in the flagging of prior results with similar values performed with this method. Interpretation of those prior results can be made in the context of the updated reference intervals.    10/01/2020 140 133 - 144 mmol/L Final     Potassium   Date Value Ref Range Status   01/02/2024 4.1 3.4 - 5.3 mmol/L Final   02/08/2022 3.8 3.4 - 5.3 mmol/L Final   10/01/2020 3.9 3.4 - 5.3 mmol/L Final     Chloride   Date Value Ref Range Status   01/02/2024 107 98 - 107 mmol/L Final   01/14/2022 113 (H) 94 - 109 mmol/L Final   10/01/2020 108 94 - 109 mmol/L Final     Carbon Dioxide   Date Value Ref Range Status   10/01/2020 28 20 - 32 mmol/L Final     Carbon Dioxide (CO2)   Date Value Ref Range Status   01/02/2024 25 22 - 29 mmol/L Final   01/14/2022 23 20 - 32 mmol/L Final     Anion Gap   Date Value Ref Range Status   01/02/2024 8 7 - 15 mmol/L Final   01/14/2022 4 3 - 14 mmol/L Final   10/01/2020 4 3 - 14 mmol/L Final     Glucose   Date Value Ref Range Status   01/02/2024 97 70 - 99 mg/dL Final   01/14/2022 88 70 - 99 mg/dL Final   10/01/2020 70 70 - 99 mg/dL Final     Comment:     Fasting specimen     Urea Nitrogen   Date Value Ref Range Status   01/02/2024 15.9 8.0 - 23.0 mg/dL Final   01/14/2022 11 7 - 30 mg/dL Final   10/01/2020 14 7 - 30 mg/dL Final     Creatinine   Date Value Ref Range Status   01/02/2024 1.12 0.67 - 1.17 mg/dL Final   10/01/2020 1.26 (H) 0.66 - 1.25 mg/dL Final     GFR Estimate   Date Value Ref Range Status   01/02/2024 68 >60 mL/min/1.73m2 Final   10/01/2020 56 (L) >60 mL/min/[1.73_m2] Final     Comment:     Non  GFR Calc  Starting 12/18/2018, serum  creatinine based estimated GFR (eGFR) will be   calculated using the Chronic Kidney Disease Epidemiology Collaboration   (CKD-EPI) equation.       Calcium   Date Value Ref Range Status   01/02/2024 8.7 (L) 8.8 - 10.2 mg/dL Final   10/01/2020 8.8 8.5 - 10.1 mg/dL Final     Bilirubin Total   Date Value Ref Range Status   01/02/2024 0.8 <=1.2 mg/dL Final   10/01/2020 0.7 0.2 - 1.3 mg/dL Final     Alkaline Phosphatase   Date Value Ref Range Status   01/02/2024 76 40 - 150 U/L Final     Comment:     Reference intervals for this test were updated on 11/14/2023 to more accurately reflect our healthy population. There may be differences in the flagging of prior results with similar values performed with this method. Interpretation of those prior results can be made in the context of the updated reference intervals.   10/01/2020 74 40 - 150 U/L Final     ALT   Date Value Ref Range Status   01/02/2024 10 0 - 70 U/L Final     Comment:     Reference intervals for this test were updated on 6/12/2023 to more accurately reflect our healthy population. There may be differences in the flagging of prior results with similar values performed with this method. Interpretation of those prior results can be made in the context of the updated reference intervals.     10/01/2020 24 0 - 70 U/L Final     AST   Date Value Ref Range Status   01/02/2024 24 0 - 45 U/L Final     Comment:     Reference intervals for this test were updated on 6/12/2023 to more accurately reflect our healthy population. There may be differences in the flagging of prior results with similar values performed with this method. Interpretation of those prior results can be made in the context of the updated reference intervals.   10/01/2020 23 0 - 45 U/L Final       PSA   Date Value Ref Range Status   06/01/2011 0.12 0 - 4 ug/L Final   05/15/2009 0.43 0 - 4 ug/L Final     Prostate Specific Antigen Screen   Date Value Ref Range Status   02/27/2023 0.26 0.00 - 6.50 ng/mL  Final     Surgical pathology  Final Diagnosis   A. right lateral bladder wall white light positive blue light negative:  -Bladder mucosa with chronic and acute inflammation, negative for dysplasia or carcinoma  -No muscularis propria identified     B. 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

## 2024-01-17 NOTE — NURSING NOTE
Is the patient currently in the state of [place]? YES    Visit mode:VIDEO    If the visit is dropped, the patient can be reconnected by: VIDEO VISIT: Text to cell phone:   Telephone Information:   Mobile [phone]       Will anyone else be joining the visit? NO  (If patient encounters technical issues they should call [phone]    How would you like to obtain your AVS? MyChart    Are changes needed to the allergy or medication list? No    Reason for visit: RECHECK    Mitra DIEGO

## 2024-01-17 NOTE — LETTER
"1/17/2024       RE: Alex Stanley  [address]  [facility] [address]     Dear Colleague,    Thank you for referring your patient, Alex Stanley, to the [facility] UROLOGY [facility] [place] [facility]. Please see a copy of my visit note below.    Virtual Visit Details    Type of service:  Video Visit   Video Start Time: 11 AM  Video End Time: 11:15 AM    Originating Location (pt. Location): Home    Distant Location (provider location):  On-site  Platform used for Video Visit: [facility]    Urology Clinic     [facility]  Alex Stanley is a 78 year old male with history of nonmuscle invasive bladder cancer, here for follow-up after his recent TURBT.      The patient denies any major recovery issues.  He has had a few hematuria episodes.    No changes to health, hospitalizations or new diagnoses in the interim    PHYSICAL EXAM  Vitals:  No vitals were obtained today due to virtual visit.    Physical Exam   EYES: Eyes grossly normal to inspection.  No discharge or erythema, or obvious scleral/conjunctival abnormalities.  SKIN: Visible skin clear. No significant rash, abnormal pigmentation or lesions.  NEURO: Cranial nerves grossly intact.  Mentation and speech appropriate for age.  GENERAL: Healthy, alert and no distress  RESP: No audible wheeze, cough, or visible cyanosis.  No visible retractions or increased work of breathing.    PSYCH: Mentation appears normal, affect normal/bright, judgement and insight intact, normal speech and appearance well-groomed.      Labs  Lab Results   Component Value Date    WBC 10.9 01/02/2024    WBC 8.3 10/08/2012     Lab Results   Component Value Date    RBC 4.75 01/02/2024    RBC 4.80 10/08/2012     Lab Results   Component Value Date    HGB 14.7 01/02/2024    HGB 14.1 06/14/2018     Lab Results   Component Value Date    HCT 44.0 01/02/2024    HCT 44.3 10/08/2012     No components found for: \"MCT\"  Lab Results   Component Value " Date    MCV 93 01/02/2024    MCV 92 10/08/2012     Lab Results   Component Value Date    MCH 30.9 01/02/2024    MCH 30.2 10/08/2012     Lab Results   Component Value Date    MCHC 33.4 01/02/2024    MCHC 32.7 10/08/2012     Lab Results   Component Value Date    RDW 14.8 01/02/2024    RDW 14.1 10/08/2012     Lab Results   Component Value Date     01/02/2024     10/08/2012        Last Comprehensive Metabolic Panel:  Sodium   Date Value Ref Range Status   01/02/2024 140 135 - 145 mmol/L Final     Comment:     Reference intervals for this test were updated on 09/26/2023 to more accurately reflect our healthy population. There may be differences in the flagging of prior results with similar values performed with this method. Interpretation of those prior results can be made in the context of the updated reference intervals.    10/01/2020 140 133 - 144 mmol/L Final     Potassium   Date Value Ref Range Status   01/02/2024 4.1 3.4 - 5.3 mmol/L Final   02/08/2022 3.8 3.4 - 5.3 mmol/L Final   10/01/2020 3.9 3.4 - 5.3 mmol/L Final     Chloride   Date Value Ref Range Status   01/02/2024 107 98 - 107 mmol/L Final   01/14/2022 113 (H) 94 - 109 mmol/L Final   10/01/2020 108 94 - 109 mmol/L Final     Carbon Dioxide   Date Value Ref Range Status   10/01/2020 28 20 - 32 mmol/L Final     Carbon Dioxide (CO2)   Date Value Ref Range Status   01/02/2024 25 22 - 29 mmol/L Final   01/14/2022 23 20 - 32 mmol/L Final     Anion Gap   Date Value Ref Range Status   01/02/2024 8 7 - 15 mmol/L Final   01/14/2022 4 3 - 14 mmol/L Final   10/01/2020 4 3 - 14 mmol/L Final     Glucose   Date Value Ref Range Status   01/02/2024 97 70 - 99 mg/dL Final   01/14/2022 88 70 - 99 mg/dL Final   10/01/2020 70 70 - 99 mg/dL Final     Comment:     Fasting specimen     Urea Nitrogen   Date Value Ref Range Status   01/02/2024 15.9 8.0 - 23.0 mg/dL Final   01/14/2022 11 7 - 30 mg/dL Final   10/01/2020 14 7 - 30 mg/dL Final     Creatinine   Date Value  Ref Range Status   01/02/2024 1.12 0.67 - 1.17 mg/dL Final   10/01/2020 1.26 (H) 0.66 - 1.25 mg/dL Final     GFR Estimate   Date Value Ref Range Status   01/02/2024 68 >60 mL/min/1.73m2 Final   10/01/2020 56 (L) >60 mL/min/[1.73_m2] Final     Comment:     Non  GFR Calc  Starting 12/18/2018, serum creatinine based estimated GFR (eGFR) will be   calculated using the Chronic Kidney Disease Epidemiology Collaboration   (CKD-EPI) equation.       Calcium   Date Value Ref Range Status   01/02/2024 8.7 (L) 8.8 - 10.2 mg/dL Final   10/01/2020 8.8 8.5 - 10.1 mg/dL Final     Bilirubin Total   Date Value Ref Range Status   01/02/2024 0.8 <=1.2 mg/dL Final   10/01/2020 0.7 0.2 - 1.3 mg/dL Final     Alkaline Phosphatase   Date Value Ref Range Status   01/02/2024 76 40 - 150 U/L Final     Comment:     Reference intervals for this test were updated on 11/14/2023 to more accurately reflect our healthy population. There may be differences in the flagging of prior results with similar values performed with this method. Interpretation of those prior results can be made in the context of the updated reference intervals.   10/01/2020 74 40 - 150 U/L Final     ALT   Date Value Ref Range Status   01/02/2024 10 0 - 70 U/L Final     Comment:     Reference intervals for this test were updated on 6/12/2023 to more accurately reflect our healthy population. There may be differences in the flagging of prior results with similar values performed with this method. Interpretation of those prior results can be made in the context of the updated reference intervals.     10/01/2020 24 0 - 70 U/L Final     AST   Date Value Ref Range Status   01/02/2024 24 0 - 45 U/L Final     Comment:     Reference intervals for this test were updated on 6/12/2023 to more accurately reflect our healthy population. There may be differences in the flagging of prior results with similar values performed with this method. Interpretation of those prior  results can be made in the context of the updated reference intervals.   10/01/2020 23 0 - 45 U/L Final       PSA   Date Value Ref Range Status   06/01/2011 0.12 0 - 4 ug/L Final   05/15/2009 0.43 0 - 4 ug/L Final     Prostate Specific Antigen Screen   Date Value Ref Range Status   02/27/2023 0.26 0.00 - 6.50 ng/mL Final     Surgical pathology  Final Diagnosis   A. right lateral bladder wall white light positive blue light negative:  -Bladder mucosa with chronic and acute inflammation, negative for dysplasia or carcinoma  -No muscularis propria identified     B. prior resection white light positive blue light negative:  -Bladder mucosa with chronic and acute inflammation, negative for dysplasia or carcinoma  -No muscularis propria identified    Electronically signed by Alphonso Shields MD on 1/11/2024 at 11:01 AM       ASSESSMENT AND PLAN  78 year old male with low-grade Ta urothelial carcinoma of the bladder status post TURBT with negative pathology for recurrent disease    Plan   Follow-up in 3 months for surveillance cystoscopy    30 total minutes spent on the date of the encounter including direct interaction with the patient, performing chart review, documentation and further activities as noted above    Sydney Weaver MD   Department of Urology   [facility]

## 2024-01-31 NOTE — PROGRESS NOTES
Surgical Office Location:  [facility]  [address] [place] [id number]

## 2024-02-01 ENCOUNTER — OFFICE VISIT (OUTPATIENT)
Dept: DERMATOLOGY | Facility: CLINIC | Age: 79
End: 2024-02-01
Payer: MEDICARE

## 2024-02-01 DIAGNOSIS — D18.01 ANGIOMA OF SKIN: ICD-10-CM

## 2024-02-01 DIAGNOSIS — L81.4 LENTIGO: ICD-10-CM

## 2024-02-01 DIAGNOSIS — Z85.828 HISTORY OF SKIN CANCER: ICD-10-CM

## 2024-02-01 DIAGNOSIS — L82.1 SEBORRHEIC KERATOSES: ICD-10-CM

## 2024-02-01 DIAGNOSIS — C44.41 BASAL CELL CARCINOMA (BCC) OF SCALP: ICD-10-CM

## 2024-02-01 DIAGNOSIS — D23.9 DERMAL NEVUS: Primary | ICD-10-CM

## 2024-02-01 PROCEDURE — 17311 MOHS 1 STAGE H/N/HF/G: CPT | Performed by: DERMATOLOGY

## 2024-02-01 PROCEDURE — 99213 OFFICE O/P EST LOW 20 MIN: CPT | Mod: 25 | Performed by: DERMATOLOGY

## 2024-02-01 NOTE — LETTER
2/1/2024         RE: Alex Stanley  [address]  [facility] [address]        Dear Colleague,    Thank you for referring your patient, Alex Stanley, to the [facility]. Please see a copy of my visit note below.    Surgical Office Location:  [facility]  [address] [place] [id number]      Alex Stanley , a 78 year old year old male patient, I was asked to see by CHON Toledo for basal cell carcinoma on right scalp.  Patient has no other skin complaints today.  Remainder of the HPI, Meds, PMH, Allergies, FH, and SH was reviewed in chart.      Past Medical History:   Diagnosis Date     Alcohol abuse      Alcoholism (H)     admission 11/09     Atrial fibrillation, paroxysmal 11/07, 6/09, 11/09     Basal cell carcinoma      Benign essential hypertension      Chronic atrial fibrillation (H)      Diverticulosis of colon 06/02/2011     Erosive gastritis 11/2009     Gastritis      Hyperlipidemia LDL goal <100      Hypertension, with LVH      Iron deficiency anemia 11/2009     Paroxysmal atrial fibrillation (H)      Stroke (H)      Tobacco abuse        Past Surgical History:   Procedure Laterality Date     COLONOSCOPY N/A 10/26/2020    Procedure: COLONOSCOPY, WITH POLYPECTOMY AND BIOPSY;  Surgeon: Shon Bond MD;  Location:  GI     CYSTOSCOPY, TRANSURETHRAL RESECTION (TUR) TUMOR BLADDER, COMBINED N/A 9/6/2023    Procedure: Blue light CYSTOSCOPY, WITH TRANSURETHRAL RESECTION BLADDER TUMOR, urethral dilation;  Surgeon: Sydney Weaver MD;  Location: [facility] [place]     ENDOSCOPIC ULTRASOUND UPPER GASTROINTESTINAL TRACT (GI) N/A 1/24/2022    Procedure: ENDOSCOPIC ULTRASOUND, ESOPHAGOSCOPY / UPPER GASTROINTESTINAL TRACT (GI);  Surgeon: Shon Bond MD;  Location:  GI     LAPAROSCOPIC CHOLECYSTECTOMY N/A 2/8/2022    Procedure: LAPAROSCOPIC CHOLECYSTECTOMY;  Surgeon: Mazin Bains MD;  Location:  OR     SURGICAL HISTORY OF -   4/11    R  Dupuytren's contracture release   Dr. Scruggs     TRANSURETHRAL RESECTION (TUR) TUMOR BLADDER INSTILL OPTICAL AGENT N/A 1/10/2024    Procedure: BLUE LIGHT, CYSTOSCOPY, WITH TRANSURETHRAL RESECTION BLADDER TUMOR;  Surgeon: Sydney Weaver MD;  Location: [place] OR     [facility] NONSPECIFIC PROCEDURE  2002    Rt foot reconstructive     ZZC NONSPECIFIC PROCEDURE  10/12    R hand procedure.  Dr. Deluna        Family History   Problem Relation Age of Onset     Lung Cancer Mother      Emphysema Father      Cancer Mother         small cell lung with mets to brain     Diabetes Maternal Grandmother      Respiratory Father         emphysema       Social History     Socioeconomic History     Marital status:      Spouse name: Not on file     Number of children: Not on file     Years of education: Not on file     Highest education level: Not on file   Occupational History     Not on file   Tobacco Use     Smoking status: Every Day     Packs/day: .5     Types: Cigarettes     Start date: 1965     Smokeless tobacco: Never     Tobacco comments:     ppd since age 16 - now 1/2-1 ppd 04/05/21.  10 cigarettes daily   Vaping Use     Vaping Use: Never used   Substance and Sexual Activity     Alcohol use: Not Currently     [facility]     Drug use: Never     Sexual activity: Yes     Partners: Female   Other Topics Concern     Parent/sibling w/ CABG, MI or angioplasty before 65F 55M? Not Asked   Social History Narrative    ** Merged History Encounter **          Social Determinants of Health     Financial Resource Strain: Not on file   Food Insecurity: Not on file   Transportation Needs: Not on file   Physical Activity: Not on file   Stress: Not on file   Social Connections: Not on file   Interpersonal Safety: Not on file   Housing Stability: Not on file       Outpatient Encounter Medications as of 2/1/2024   Medication Sig Dispense Refill     amLODIPine (NORVASC) 10 MG tablet Take 1 tablet (10 mg) by mouth daily  (Patient taking differently: Take 10 mg by mouth every morning) 90 tablet 3     apixaban ANTICOAGULANT (ELIQUIS) 5 MG tablet Take 1 tablet (5 mg) by mouth 2 times daily 180 tablet 3     ASPIRIN NOT PRESCRIBED, INTENTIONAL, Antiplatelet medication not prescribed intentionally due to history  of gastrointestinal bleeding, and patient on warfarin. 0 each 0     isosorbide mononitrate (IMDUR) 30 MG 24 hr tablet Take 1 tablet (30 mg) by mouth daily (Patient taking differently: Take 30 mg by mouth every morning) 90 tablet 3     Multiple Vitamins-Minerals (PRESERVISION AREDS 2) CAPS Take 2 capsules by mouth 2 times daily       oxyBUTYnin ER (DITROPAN XL) 5 MG 24 hr tablet Take 1 tablet (5 mg) by mouth daily 5 tablet 0     pravastatin (PRAVACHOL) 20 MG tablet Take 1 tablet (20 mg) by mouth daily 90 tablet 3     No facility-administered encounter medications on file as of 2/1/2024.             Review Of Systems  Skin: As above  Eyes: negative  Ears/Nose/Throat: negative  Respiratory: No shortness of breath, dyspnea on exertion, cough, or hemoptysis  Cardiovascular: negative  Gastrointestinal: negative  Genitourinary: negative  Musculoskeletal: negative  Neurologic: negative  Psychiatric: negative  Hematologic/Lymphatic/Immunologic: negative  Endocrine: negative      O:   NAD, WDWN, Alert & Oriented, Mood & Affect wnl, Vitals stable   General appearance nash ii   Vitals stable   Alert, oriented and in no acute distress   R frontal scalp 4mm red macule   Stuck on papules and brown macules on trunk and ext    Red papules on trunk   Flesh colored papules on trunk          Eyes: Conjunctivae/lids:Normal     ENT: Lips, buccal mucosa, tongue: normal    MSK:Normal    Cardiovascular: peripheral edema none    Pulm: Breathing Normal    Lymph Nodes: No Head and Neck Lymphadenopathy     Neuro/Psych: Orientation:Normal; Mood/Affect:Normal      A/P:  1. Seborrheic keratosis, lentigo, angioma, dermal nevus, hx of non-melanoma skin cancer    2. R frontal scalp basal cell carcinoma   It was a pleasure speaking to Alex Stanley today.  Previous clinic  notes and pertinent laboratory tests were reviewed prior to Alex Stanley's visit.  Signs and Symptoms of skin cancer discussed with patient.  Patient encouraged to perform monthly skin exams.  UV precautions reviewed with patient.  Risks of non-melanoma skin cancer discussed with patient   Return to clinic 6 months  PROCEDURE NOTE  R frontal scalp basal cell carcinoma   MOHS:   Location    The rationale for Mohs surgery was discussed with the patient and consent was obtained.  The risks and benefits as well as alternatives to therapy were discussed, in detail.  Specifically, the risks of infection, scarring, bleeding, prolonged wound healing, incomplete removal, allergy to anesthesia, nerve injury and recurrence were addressed.  Indication for Mohs was Location. Prior to the procedure, the treatment site was clearly identified and, if available, confirmed with previous photos and confirmed by the patient   All components of the Universal Protocol/PAUSE rule were completed.  The Mohs surgeon operated in two distinct and integrated capacities as the surgeon and pathologist.      The area was prepped with Betasept.  A rim of normal appearing skin was marked circumferentially around the lesion.  The area was infiltrated with local anesthesia.  The tumor was first debulked to remove all clinically apparent tumor.  An incision following the standard Mohs approach was done and the specimen was oriented,mapped and placed in 1 block(s).  Each specimen was then chromacoded and processed in the Mohs laboratory using standard Mohs technique and submitted for frozen section histology.  Frozen section analysis showed no residual tumor but CLEAR MARGINS.      The tumor was excised using standard Mohs technique in 1 stages(s).  CLEAR MARGINS OBTAINED and Final defect size was 0.9 x 0.7 cm.     We discussed the  options for wound management in full with the patient including risks/benefits/ possible outcomes.      REPAIR SECOND INTENT: We discussed the options for wound management in full with the patient including risks/benefits/possible outcomes. Decision made to allow the wound to heal by second intention. Cautery was used for for hemostasis. EBL minimal; complications none; wound care routine.  The patient was discharged in good condition and will return in one month or prn for wound evaluation.      Again, thank you for allowing me to participate in the care of your patient.        Sincerely,        Jah Mendieta MD

## 2024-02-01 NOTE — PROGRESS NOTES
Alex Stanley , a 78 year old year old male patient, I was asked to see by CHON Toledo for basal cell carcinoma on right scalp.  Patient has no other skin complaints today.  Remainder of the HPI, Meds, PMH, Allergies, FH, and SH was reviewed in chart.      Past Medical History:   Diagnosis Date    Alcohol abuse     Alcoholism (H)     admission 11/09    Atrial fibrillation, paroxysmal 11/07, 6/09, 11/09    Basal cell carcinoma     Benign essential hypertension     Chronic atrial fibrillation (H)     Diverticulosis of colon 06/02/2011    Erosive gastritis 11/2009    Gastritis     Hyperlipidemia LDL goal <100     Hypertension, with LVH     Iron deficiency anemia 11/2009    Paroxysmal atrial fibrillation (H)     Stroke (H)     Tobacco abuse        Past Surgical History:   Procedure Laterality Date    COLONOSCOPY N/A 10/26/2020    Procedure: COLONOSCOPY, WITH POLYPECTOMY AND BIOPSY;  Surgeon: Shon Bond MD;  Location:  GI    CYSTOSCOPY, TRANSURETHRAL RESECTION (TUR) TUMOR BLADDER, COMBINED N/A 9/6/2023    Procedure: Blue light CYSTOSCOPY, WITH TRANSURETHRAL RESECTION BLADDER TUMOR, urethral dilation;  Surgeon: Sydney Weaver MD;  [place] [facility] [place]    ENDOSCOPIC ULTRASOUND UPPER GASTROINTESTINAL TRACT (GI) N/A 1/24/2022    Procedure: ENDOSCOPIC ULTRASOUND, ESOPHAGOSCOPY / UPPER GASTROINTESTINAL TRACT (GI);  Surgeon: Shon Bond MD;  Location:  GI    LAPAROSCOPIC CHOLECYSTECTOMY N/A 2/8/2022    Procedure: LAPAROSCOPIC CHOLECYSTECTOMY;  Surgeon: Mazin Bains MD;  Location:  OR    SURGICAL HISTORY OF -   4/11    R Dupuytren's contracture release   Dr. Scruggs    TRANSURETHRAL RESECTION (TUR) TUMOR BLADDER INSTILL OPTICAL AGENT N/A 1/10/2024    Procedure: BLUE LIGHT, CYSTOSCOPY, WITH TRANSURETHRAL RESECTION BLADDER TUMOR;  Surgeon: Sydney Weaver MD;  [place] [facility] [place]    ZZC NONSPECIFIC PROCEDURE  2002    Rt foot reconstructive    ZZC NONSPECIFIC PROCEDURE  10/12    R hand procedure.    Regi        Family History   Problem Relation Age of Onset    Lung Cancer Mother     Emphysema Father     Cancer Mother         small cell lung with mets to brain    Diabetes Maternal Grandmother     Respiratory Father         emphysema       Social History     Socioeconomic History    Marital status:      Spouse name: Not on file    Number of children: Not on file    Years of education: Not on file    Highest education level: Not on file   Occupational History    Not on file   Tobacco Use    Smoking status: Every Day     Packs/day: .5     Types: Cigarettes     Start date: 1965    Smokeless tobacco: Never    Tobacco comments:     ppd since age 16 - now 1/2-1 ppd 04/05/21.  10 cigarettes daily   Vaping Use    Vaping Use: Never used   Substance and Sexual Activity    Alcohol use: Not Currently     Comment: last drank at [facility] 8/2010.    Drug use: Never    Sexual activity: Yes     Partners: Female   Other Topics Concern    Parent/sibling w/ CABG, MI or angioplasty before 65F 55M? Not Asked   Social History Narrative    ** Merged History Encounter **          Social Determinants of Health     Financial Resource Strain: Not on file   Food Insecurity: Not on file   Transportation Needs: Not on file   Physical Activity: Not on file   Stress: Not on file   Social Connections: Not on file   Interpersonal Safety: Not on file   Housing Stability: Not on file       Outpatient Encounter Medications as of 2/1/2024   Medication Sig Dispense Refill    amLODIPine (NORVASC) 10 MG tablet Take 1 tablet (10 mg) by mouth daily (Patient taking differently: Take 10 mg by mouth every morning) 90 tablet 3    apixaban ANTICOAGULANT (ELIQUIS) 5 MG tablet Take 1 tablet (5 mg) by mouth 2 times daily 180 tablet 3    ASPIRIN NOT PRESCRIBED, INTENTIONAL, Antiplatelet medication not prescribed intentionally due to history  of gastrointestinal bleeding, and patient on warfarin. 0 each 0    isosorbide mononitrate (IMDUR) 30 MG 24 hr tablet  Take 1 tablet (30 mg) by mouth daily (Patient taking differently: Take 30 mg by mouth every morning) 90 tablet 3    Multiple Vitamins-Minerals (PRESERVISION AREDS 2) CAPS Take 2 capsules by mouth 2 times daily      oxyBUTYnin ER (DITROPAN XL) 5 MG 24 hr tablet Take 1 tablet (5 mg) by mouth daily 5 tablet 0    pravastatin (PRAVACHOL) 20 MG tablet Take 1 tablet (20 mg) by mouth daily 90 tablet 3     No facility-administered encounter medications on file as of 2/1/2024.             Review Of Systems  Skin: As above  Eyes: negative  Ears/Nose/Throat: negative  Respiratory: No shortness of breath, dyspnea on exertion, cough, or hemoptysis  Cardiovascular: negative  Gastrointestinal: negative  Genitourinary: negative  Musculoskeletal: negative  Neurologic: negative  Psychiatric: negative  Hematologic/Lymphatic/Immunologic: negative  Endocrine: negative      O:   NAD, WDWN, Alert & Oriented, Mood & Affect wnl, Vitals stable   General appearance nash ii   Vitals stable   Alert, oriented and in no acute distress   R frontal scalp 4mm red macule   Stuck on papules and brown macules on trunk and ext    Red papules on trunk   Flesh colored papules on trunk          Eyes: Conjunctivae/lids:Normal     ENT: Lips, buccal mucosa, tongue: normal    MSK:Normal    Cardiovascular: peripheral edema none    Pulm: Breathing Normal    Lymph Nodes: No Head and Neck Lymphadenopathy     Neuro/Psych: Orientation:Normal; Mood/Affect:Normal      A/P:  1. Seborrheic keratosis, lentigo, angioma, dermal nevus, hx of non-melanoma skin cancer   2. R frontal scalp basal cell carcinoma   It was a pleasure speaking to Alex Stanley today.  Previous clinic  notes and pertinent laboratory tests were reviewed prior to Alex Stanley's visit.  Signs and Symptoms of skin cancer discussed with patient.  Patient encouraged to perform monthly skin exams.  UV precautions reviewed with patient.  Risks of non-melanoma skin cancer discussed with patient    Return to clinic 6 months  PROCEDURE NOTE  R frontal scalp basal cell carcinoma   MOHS:   Location    The rationale for Mohs surgery was discussed with the patient and consent was obtained.  The risks and benefits as well as alternatives to therapy were discussed, in detail.  Specifically, the risks of infection, scarring, bleeding, prolonged wound healing, incomplete removal, allergy to anesthesia, nerve injury and recurrence were addressed.  Indication for Mohs was Location. Prior to the procedure, the treatment site was clearly identified and, if available, confirmed with previous photos and confirmed by the patient   All components of the Universal Protocol/PAUSE rule were completed.  The Mohs surgeon operated in two distinct and integrated capacities as the surgeon and pathologist.      The area was prepped with Betasept.  A rim of normal appearing skin was marked circumferentially around the lesion.  The area was infiltrated with local anesthesia.  The tumor was first debulked to remove all clinically apparent tumor.  An incision following the standard Mohs approach was done and the specimen was oriented,mapped and placed in 1 block(s).  Each specimen was then chromacoded and processed in the Mohs laboratory using standard Mohs technique and submitted for frozen section histology.  Frozen section analysis showed no residual tumor but CLEAR MARGINS.      The tumor was excised using standard Mohs technique in 1 stages(s).  CLEAR MARGINS OBTAINED and Final defect size was 0.9 x 0.7 cm.     We discussed the options for wound management in full with the patient including risks/benefits/ possible outcomes.      REPAIR SECOND INTENT: We discussed the options for wound management in full with the patient including risks/benefits/possible outcomes. Decision made to allow the wound to heal by second intention. Cautery was used for for hemostasis. EBL minimal; complications none; wound care routine.  The patient was  discharged in good condition and will return in one month or prn for wound evaluation.

## 2024-02-01 NOTE — PATIENT INSTRUCTIONS
Open Wound Care     for ______________        No strenuous activity for 48 hours    Take Tylenol as needed for discomfort.                                                .         Do not drink alcoholic beverages for 48 hours.    Keep the pressure bandage in place for 24 hours. If the bandage becomes blood tinged or loose, reinforce it with gauze and tape.        (Refer to the reverse side of this page for management of bleeding).    Remove bandage in 24 hours and begin wound care as follows:     Clean area with tap water using a Q tip or gauze pad, (shower / bathe normally)  Dry wound with Q tip or gauze pad  Apply Aquaphor, Vaseline, Polysporin or Bacitracin Ointment with a Q tip  Do NOT use Neosporin Ointment *  Cover the wound with a band-aid or nonstick gauze pad and paper tape.  Repeat wound care once a day until wound is completely healed.    It is an old wives tale that a wound heals better when it is exposed to air and allowed to dry out. The wound will heal faster with a better cosmetic result if it is kept moist with ointment and covered with a bandage.  Do not let the wound dry out.      Supplies Needed:                Qtips or gauze pads                Polysporin or Bacitracin Ointment                Bandaids or nonstick gauze pads and paper tape    Wound care kits and brown paper tape are available for purchase at   the pharmacy.    BLEEDING:    Use tightly rolled up gauze or cloth to apply direct pressure over the bandage for 20   minutes.  Reapply pressure for an additional 20 minutes if necessary  Call the office or go to the nearest emergency room if pressure fails to stop the bleeding.  Use additional gauze and tape to maintain pressure once the bleeding has stopped.  Begin wound care 24 hours after surgery as directed.                  WOUND HEALING    One week after surgery a pink / red halo will form around the outside of the wound.   This is new skin.  The center of the wound will appear  yellowish white and produce some drainage.  The pink halo will slowly migrate in toward the center of the wound until the wound is covered with new shiny pink skin.  There will be no more drainage when the wound is completely healed.  It will take six months to one year for the redness to fade.  The scar may be itchy, tight and sensitive to extreme temperatures for a year after the surgery.  Massaging the area several times a day for several minutes after the wound is completely healed will help the scar soften and normalize faster. Begin massage only after healing is complete.      In case of emergency call: Dr Mendieta: [phone]    [facility]: [phone]    [facility]:[phone]

## 2024-02-29 ENCOUNTER — LAB (OUTPATIENT)
Dept: LAB | Facility: CLINIC | Age: 79
End: 2024-02-29
Payer: MEDICARE

## 2024-02-29 DIAGNOSIS — N18.30 CKD (CHRONIC KIDNEY DISEASE) STAGE 3, GFR 30-59 ML/MIN (H): ICD-10-CM

## 2024-02-29 LAB — HGB BLD-MCNC: 15.1 G/DL (ref 13.3–17.7)

## 2024-02-29 PROCEDURE — 85018 HEMOGLOBIN: CPT

## 2024-02-29 PROCEDURE — 36415 COLL VENOUS BLD VENIPUNCTURE: CPT

## 2024-03-04 ENCOUNTER — OFFICE VISIT (OUTPATIENT)
Dept: INTERNAL MEDICINE | Facility: CLINIC | Age: 79
End: 2024-03-04
Payer: MEDICARE

## 2024-03-04 VITALS
WEIGHT: 169.3 LBS | OXYGEN SATURATION: 96 % | DIASTOLIC BLOOD PRESSURE: 70 MMHG | BODY MASS INDEX: 26.57 KG/M2 | TEMPERATURE: 97.7 F | HEIGHT: 67 IN | SYSTOLIC BLOOD PRESSURE: 100 MMHG | HEART RATE: 79 BPM | RESPIRATION RATE: 16 BRPM

## 2024-03-04 DIAGNOSIS — N18.30 STAGE 3 CHRONIC KIDNEY DISEASE, UNSPECIFIED WHETHER STAGE 3A OR 3B CKD (H): ICD-10-CM

## 2024-03-04 DIAGNOSIS — F10.21 ALCOHOL DEPENDENCE IN REMISSION (H): ICD-10-CM

## 2024-03-04 DIAGNOSIS — Z00.00 MEDICARE ANNUAL WELLNESS VISIT, SUBSEQUENT: Primary | ICD-10-CM

## 2024-03-04 DIAGNOSIS — E78.5 HYPERLIPIDEMIA LDL GOAL <100: ICD-10-CM

## 2024-03-04 DIAGNOSIS — Z87.891 PERSONAL HISTORY OF TOBACCO USE: ICD-10-CM

## 2024-03-04 LAB
ALBUMIN SERPL BCG-MCNC: 4.4 G/DL (ref 3.5–5.2)
ALP SERPL-CCNC: 87 U/L (ref 40–150)
ALT SERPL W P-5'-P-CCNC: 20 U/L (ref 0–70)
ANION GAP SERPL CALCULATED.3IONS-SCNC: 9 MMOL/L (ref 7–15)
AST SERPL W P-5'-P-CCNC: 33 U/L (ref 0–45)
BILIRUB SERPL-MCNC: 0.6 MG/DL
BUN SERPL-MCNC: 15.4 MG/DL (ref 8–23)
CALCIUM SERPL-MCNC: 8.9 MG/DL (ref 8.8–10.2)
CHLORIDE SERPL-SCNC: 107 MMOL/L (ref 98–107)
CHOLEST SERPL-MCNC: 115 MG/DL
CREAT SERPL-MCNC: 1.21 MG/DL (ref 0.67–1.17)
CREAT UR-MCNC: 189 MG/DL
DEPRECATED HCO3 PLAS-SCNC: 24 MMOL/L (ref 22–29)
EGFRCR SERPLBLD CKD-EPI 2021: 62 ML/MIN/1.73M2
FASTING STATUS PATIENT QL REPORTED: YES
GLUCOSE SERPL-MCNC: 99 MG/DL (ref 70–99)
HDLC SERPL-MCNC: 44 MG/DL
LDLC SERPL CALC-MCNC: 59 MG/DL
MICROALBUMIN UR-MCNC: 41.3 MG/L
MICROALBUMIN/CREAT UR: 21.85 MG/G CR (ref 0–17)
NONHDLC SERPL-MCNC: 71 MG/DL
POTASSIUM SERPL-SCNC: 4.6 MMOL/L (ref 3.4–5.3)
PROT SERPL-MCNC: 6.8 G/DL (ref 6.4–8.3)
SODIUM SERPL-SCNC: 140 MMOL/L (ref 135–145)
TRIGL SERPL-MCNC: 62 MG/DL

## 2024-03-04 PROCEDURE — 80061 LIPID PANEL: CPT | Performed by: INTERNAL MEDICINE

## 2024-03-04 PROCEDURE — G0439 PPPS, SUBSEQ VISIT: HCPCS | Performed by: INTERNAL MEDICINE

## 2024-03-04 PROCEDURE — 80053 COMPREHEN METABOLIC PANEL: CPT | Performed by: INTERNAL MEDICINE

## 2024-03-04 PROCEDURE — 36415 COLL VENOUS BLD VENIPUNCTURE: CPT | Performed by: INTERNAL MEDICINE

## 2024-03-04 PROCEDURE — 82043 UR ALBUMIN QUANTITATIVE: CPT | Performed by: INTERNAL MEDICINE

## 2024-03-04 PROCEDURE — 99213 OFFICE O/P EST LOW 20 MIN: CPT | Mod: 25 | Performed by: INTERNAL MEDICINE

## 2024-03-04 PROCEDURE — 82570 ASSAY OF URINE CREATININE: CPT | Performed by: INTERNAL MEDICINE

## 2024-03-04 SDOH — HEALTH STABILITY: PHYSICAL HEALTH: ON AVERAGE, HOW MANY MINUTES DO YOU ENGAGE IN EXERCISE AT THIS LEVEL?: 30 MIN

## 2024-03-04 SDOH — HEALTH STABILITY: PHYSICAL HEALTH: ON AVERAGE, HOW MANY DAYS PER WEEK DO YOU ENGAGE IN MODERATE TO STRENUOUS EXERCISE (LIKE A BRISK WALK)?: 5 DAYS

## 2024-03-04 NOTE — PROGRESS NOTES
Lung Cancer Screening Shared Decision Making Visit     Alex Stanley, a 78 year old male, is eligible for lung cancer screening    History   Smoking Status     Every Day     Packs/day: 0.50     Types: Cigarettes     Start date: 1965   Smokeless Tobacco     Never       I have discussed with patient the risks and benefits of screening for lung cancer with low-dose CT.     The risks include:    radiation exposure: one low dose chest CT has as much ionizing radiation as about 15 chest x-rays, or 6 months of background radiation living in [place]      false positives: most findings/nodules are NOT cancer, but some might still require additional diagnostic evaluation, including biopsy    over-diagnosis: some slow growing cancers that might never have been clinically significant will be detected and treated unnecessarily     The benefit of early detection of lung cancer is contingent upon adherence to annual screening or more frequent follow up if indicated.     Furthermore, to benefit from screening, Alex must be willing and able to undergo diagnostic procedures, if indicated. Although no specific guide is available for determining severity of comorbidities, it is reasonable to withhold screening in patients who have greater mortality risk from other diseases.     We did discuss that the best way to prevent lung cancer is to not smoke.    Some patients may value a numeric estimation of lung cancer risk when evaluating if lung cancer screening is right for them, here is one calculator:    ShouldIScreen

## 2024-03-04 NOTE — PATIENT INSTRUCTIONS
Lung Cancer Screening   Frequently Asked Questions  If you are at high-risk for lung cancer, getting screened with low-dose computed tomography (LDCT) every year can help save your life. This handout offers answers to some of the most common questions about lung cancer screening. If you have other questions, please call [phone] ([phone]).     What is it?  Lung cancer screening uses special X-ray technology to create an image of your lung tissue. The exam is quick and easy and takes less than 10 seconds. We don t give you any medicine or use any needles. You can eat before and after the exam. You don t need to change your clothes as long as the clothing on your chest doesn t contain metal. But, you do need to be able to hold your breath for at least 6 seconds during the exam.    What is the goal of lung cancer screening?  The goal of lung cancer screening is to save lives. Many times, lung cancer is not found until a person starts having physical symptoms. Lung cancer screening can help detect lung cancer in the earliest stages when it may be easier to treat.    Who should be screened for lung cancer?  We suggest lung cancer screening for anyone who is at high-risk for lung cancer. You are in the high-risk group if you:      are between the ages of 55 and 79, and    have smoked at least 1 pack of cigarettes a day for 20 or more years, and    still smoke or have quit within the past 15 years.    However, if you have a new cough or shortness of breath, you should talk to your doctor before being screened.    Why does it matter if I have symptoms?  Certain symptoms can be a sign that you have a condition in your lungs that should be checked and treated by your doctor. These symptoms include fever, chest pain, a new or changing cough, shortness of breath that you have never felt before, coughing up blood or unexplained weight loss. Having any of these symptoms can greatly affect the results of lung  cancer screening.       Should all smokers get an LDCT lung cancer screening exam?  It depends. Lung cancer screening is for a very specific group of men and women who have a history of heavy smoking over a long period of time (see  Who should be screened for lung cancer  above).  I am in the high-risk group, but have been diagnosed with cancer in the past. Is LDCT lung cancer screening right for me?  In some cases, you should not have LDCT lung screening, such as when your doctor is already following your cancer with CT scan studies. Your doctor will help you decide if LDCT lung screening is right for you.  Do I need to have a screening exam every year?  Yes. If you are in the high-risk group described earlier, you should get an LDCT lung cancer screening exam every year until you are 79, or are no longer willing or able to undergo screening and possible procedures to diagnose and treat lung cancer.  How effective is LDCT at preventing death from lung cancer?  Studies have shown that LDCT lung cancer screening can lower the risk of death from lung cancer by 20 percent in people who are at high-risk.  What are the risks?  There are some risks and limitations of LDCT lung cancer screening. We want to make sure you understand the risks and benefits, so please let us know if you have any questions. Your doctor may want to talk with you more about these risks.    Radiation exposure: As with any exam that uses radiation, there is a very small increased risk of cancer. The amount of radiation in LDCT is small--about the same amount a person would get from a mammogram. Your doctor orders the exam when he or she feels the potential benefits outweigh the risks.    False negatives: No test is perfect, including LDCT. It is possible that you may have a medical condition, including lung cancer, that is not found during your exam. This is called a false negative result.    False positives and more testing: LDCT very often finds  something in the lung that could be cancer, but in fact is not. This is called a false positive result. False positive tests often cause anxiety. To make sure these findings are not cancer, you may need to have more tests. These tests will be done only if you give us permission. Sometimes patients need a treatment that can have side effects, such as a biopsy. For more information on false positives, see  What can I expect from the results?     Findings not related to lung cancer: Your LDCT exam also takes pictures of areas of your body next to your lungs. In a very small number of cases, the CT scan will show an abnormal finding in one of these areas, such as your kidneys, adrenal glands, liver or thyroid. This finding may not be serious, but you may need more tests. Your doctor can help you decide what other tests you may need, if any.  What can I expect from the results?  About 1 out of 4 LDCT exams will find something that may need more tests. Most of the time, these findings are lung nodules. Lung nodules are very small collections of tissue in the lung. These nodules are very common, and the vast majority--more than 97 percent--are not cancer (benign). Most are normal lymph nodes or small areas of scarring from past infections.  But, if a small lung nodule is found to be cancer, the cancer can be cured more than 90 percent of the time. To know if the nodule is cancer, we may need to get more images before your next yearly screening exam. If the nodule has suspicious features (for example, it is large, has an odd shape or grows over time), we will refer you to a specialist for further testing.  Will my doctor also get the results?  Yes. Your doctor will get a copy of your results.  Is it okay to keep smoking now that there s a cancer screening exam?  No. Tobacco is one of the strongest cancer-causing agents. It causes not only lung cancer, but other cancers and cardiovascular (heart) diseases as well. The damage  caused by smoking builds over time. This means that the longer you smoke, the higher your risk of disease. While it is never too late to quit, the sooner you quit, the better.  Where can I find help to quit smoking?  The best way to prevent lung cancer is to stop smoking. If you have already quit smoking, congratulations and keep it up! For help on quitting smoking, please call [facility] at 2-548-QUITNOW (1-496.144.8154) or the American [employer] Society at 1-207.894.5766 to find local resources near you.  One-on-one health coaching:  If you d prefer to work individually with a health care provider on tobacco cessation, we offer:      Medication Therapy Management:  Our specially trained pharmacists work closely with you and your doctor to help you quit smoking.  Call [phone] or [phone] (toll free).

## 2024-03-04 NOTE — PROGRESS NOTES
"Preventive Care Visit  [facility]  Estuardo Manning MD, Internal Medicine  Mar 4, 2024      Assessment & Plan     Medicare annual wellness visit, subsequent      Hyperlipidemia LDL goal <100    - Lipid panel reflex to direct LDL Non-fasting; Future  - Comprehensive metabolic panel (BMP + Alb, Alk Phos, ALT, AST, Total. Bili, TP); Future  - Lipid panel reflex to direct LDL Non-fasting  - Comprehensive metabolic panel (BMP + Alb, Alk Phos, ALT, AST, Total. Bili, TP)    Stage 3 chronic kidney disease, unspecified whether stage 3a or 3b CKD (H)  Has been stable, recheck indices today  - Albumin Random Urine Quantitative with Creat Ratio; Future  - Albumin Random Urine Quantitative with Creat Ratio    Personal history of tobacco use  Discussed lung cancer screening, will proceed with low-dose CT  - Prof fee: Shared Decision Making for Lung Cancer Screening  - CT Chest Lung Cancer Scrn Low Dose wo; Future    Alcohol dependence in remission (H)  Is maintained sobriety for a number of years              Nicotine/Tobacco Cessation  He reports that he has been smoking cigarettes. He started smoking about 59 years ago. He has been smoking an average of 0.5 packs per day. He has never used smokeless tobacco.  Nicotine/Tobacco Cessation Plan  Information offered: Patient not interested at this time      BMI  Estimated body mass index is 26.52 kg/m  as calculated from the following:    Height as of this encounter: 1.702 m (5' 7\").    Weight as of this encounter: 76.8 kg (169 lb 4.8 oz).       Counseling  Appropriate preventive services were discussed with this patient, including applicable screening as appropriate for fall prevention, nutrition, physical activity, Tobacco-use cessation, weight loss and cognition.  Checklist reviewing preventive services available has been given to the patient.  Reviewed patient's diet, addressing concerns and/or questions.           Subjective   Tejas is a 78 year old, " presenting for the following:  Physical (fasting)          Health Care Directive  Patient has a Health Care Directive on file  Advance care planning document is on file and is current.    HPI    Pt would like a annual referral on lung screening (smoker).                   3/4/2024   General Health   How would you rate your overall physical health? Excellent   Feel stress (tense, anxious, or unable to sleep) Not at all         3/4/2024   Nutrition   Diet: Regular (no restrictions)         3/4/2024   Exercise   Days per week of moderate/strenous exercise 5 days   Average minutes spent exercising at this level 30 min         3/4/2024   Social Factors   Worry food won't last until get money to buy more No   Food not last or not have enough money for food? No   Do you have housing?  Yes   Are you worried about losing your housing? No   Lack of transportation? No   Unable to get utilities (heat,electricity)? No         3/4/2024   Fall Risk   Fallen 2 or more times in the past year? No    No   Trouble with walking or balance? No    No          3/4/2024   Activities of Daily Living- Home Safety   Needs help with the following daily activites None of the above   Safety concerns in the home None of the above         3/4/2024   Dental   Dentist two times every year? Yes         3/4/2024   Hearing Screening   Hearing concerns? None of the above         3/4/2024   Driving Risk Screening   Patient/family members have concerns about driving No         3/4/2024   General Alertness/Fatigue Screening   Have you been more tired than usual lately? No         3/4/2024   Urinary Incontinence Screening   Bothered by leaking urine in past 6 months No         3/4/2024   TB Screening   Were you born outside of [place]?  No         Today's PHQ-2 Score:       3/4/2024     6:42 AM   PHQ-2 ( 1999 [employer])   Q1: Little interest or pleasure in doing things 0   Q2: Feeling down, depressed or hopeless 0   PHQ-2 Score 0   Q1: Little interest or pleasure in  doing things Not at all   Q2: Feeling down, depressed or hopeless Not at all   PHQ-2 Score 0           3/4/2024   Substance Use   If I could quit smoking, I would Completely agree   I want to quit somking, worry about health affects Completely agree   Willing to make a plan to quit smoking Completely agree   Willing to cut down before quitting Completely agree   Alcohol more than 3/day or more than 7/wk Not Applicable   Do you have a current opioid prescription? No   How severe/bad is pain from 1 to 10? 0/10 (No Pain)   Do you use any other substances recreationally? No     Social History     Tobacco Use    Smoking status: Every Day     Packs/day: .5     Types: Cigarettes     Start date: 1965    Smokeless tobacco: Never    Tobacco comments:     ppd since age 16 - now 1/2-1 ppd 04/05/21.  10 cigarettes daily   Vaping Use    Vaping Use: Never used   Substance Use Topics    Alcohol use: Not Currently     Comment: last drank at [facility] 8/2010.    Drug use: Never       ASCVD Risk   The ASCVD Risk score (Rigo DK, et al., 2019) failed to calculate for the following reasons:    The patient has a prior MI or stroke diagnosis            Reviewed and updated as needed this visit by Provider           Sexual Activity            Current providers sharing in care for this patient include:  Patient Care Team:  Estuardo Manning MD as PCP - General (Internal Medicine)  Estuardo Manning MD as Assigned PCP  Estuardo Manning MD (Internal Medicine)  Lamont Dawson MD as MD (Cardiovascular Disease)  Lamont Dawson MD as Assigned Heart and Vascular Provider  Mely Whitmore NP as Nurse Practitioner (Cardiology)  Jimena Ribera PA-C as Physician Assistant (Cardiovascular Disease)  Trevon Fan MD as MD (Urology)  Sydney Weaver MD as MD (Urology)  Sydney Weaver MD as Assigned Surgical Provider  Mely Torres APRN CNP as Nurse Practitioner (Dermatology)  Saray Rasmussen PAJuan JoséC  "as Physician Assistant (Dermatology)    The following health maintenance items are reviewed in Epic and correct as of today:  Health Maintenance   Topic Date Due    LUNG CANCER SCREENING  01/18/2024    LIPID  02/27/2024    MICROALBUMIN  02/27/2024    ANNUAL REVIEW OF HM ORDERS  02/27/2024    NICOTINE/TOBACCO CESSATION COUNSELING Q 1 YR  02/27/2024    MEDICARE ANNUAL WELLNESS VISIT  02/27/2024    BMP  01/02/2025    HEMOGLOBIN  02/28/2025    FALL RISK ASSESSMENT  03/04/2025    COLORECTAL CANCER SCREENING  10/26/2025    GLUCOSE  01/02/2027    ADVANCE CARE PLANNING  03/04/2029    DTAP/TDAP/TD IMMUNIZATION (3 - Td or Tdap) 09/15/2030    HEPATITIS C SCREENING  Completed    PHQ-2 (once per calendar year)  Completed    Pneumococcal Vaccine: 65+ Years  Completed    URINALYSIS  Completed    ZOSTER IMMUNIZATION  Completed    RSV VACCINE (Pregnancy & 60+)  Completed    COVID-19 Vaccine  Completed    IPV IMMUNIZATION  Aged Out    HPV IMMUNIZATION  Aged Out    MENINGITIS IMMUNIZATION  Aged Out    RSV MONOCLONAL ANTIBODY  Aged Out    DEXA  Discontinued    CYSTIC FIBROSIS ANNUAL STUDY  Discontinued    INFLUENZA VACCINE  Discontinued    CYSTIC FIBROSIS: COLONOSCOPY  Discontinued         Review of Systems  Constitutional, HEENT, cardiovascular, pulmonary, gi and gu systems are negative, except as otherwise noted.     Objective    Exam  /70 (BP Location: Left arm, Patient Position: Sitting, Cuff Size: Adult Regular)   Pulse 79   Temp 97.7  F (36.5  C) (Temporal)   Resp 16   Ht 1.702 m (5' 7\")   Wt 76.8 kg (169 lb 4.8 oz)   SpO2 96%   BMI 26.52 kg/m     Estimated body mass index is 26.52 kg/m  as calculated from the following:    Height as of this encounter: 1.702 m (5' 7\").    Weight as of this encounter: 76.8 kg (169 lb 4.8 oz).    Physical Exam  GENERAL: alert and no distress  NECK: no adenopathy, no asymmetry, masses, or scars  RESP: lungs clear to auscultation - no rales, rhonchi or wheezes  CV: regular rate and " rhythm, normal S1 S2, no S3 or S4, no murmur, click or rub, no peripheral edema  ABDOMEN: soft, nontender, no hepatosplenomegaly, no masses and bowel sounds normal  MS: no gross musculoskeletal defects noted, no edema         3/4/2024   Mini Cog   Clock Draw Score 2 Normal   3 Item Recall 3 objects recalled   Mini Cog Total Score 5            Signed Electronically by: Estuardo Manning MD

## 2024-03-12 ENCOUNTER — HOSPITAL ENCOUNTER (OUTPATIENT)
Dept: CT IMAGING | Facility: CLINIC | Age: 79
Discharge: HOME | End: 2024-03-12
Attending: INTERNAL MEDICINE | Admitting: INTERNAL MEDICINE
Payer: MEDICARE

## 2024-03-12 DIAGNOSIS — Z87.891 PERSONAL HISTORY OF TOBACCO USE: ICD-10-CM

## 2024-03-12 PROCEDURE — 71271 CT THORAX LUNG CANCER SCR C-: CPT

## 2024-04-12 ENCOUNTER — OFFICE VISIT (OUTPATIENT)
Dept: ONCOLOGY | Facility: CLINIC | Age: 79
End: 2024-04-12
Attending: UROLOGY
Payer: MEDICARE

## 2024-04-12 VITALS
BODY MASS INDEX: 26.27 KG/M2 | WEIGHT: 167.4 LBS | SYSTOLIC BLOOD PRESSURE: 110 MMHG | HEIGHT: 67 IN | DIASTOLIC BLOOD PRESSURE: 72 MMHG | TEMPERATURE: 97.7 F | RESPIRATION RATE: 16 BRPM | HEART RATE: 74 BPM | OXYGEN SATURATION: 97 %

## 2024-04-12 DIAGNOSIS — C67.9 UROTHELIAL CARCINOMA OF BLADDER WITHOUT INVASION OF MUSCLE (H): Primary | ICD-10-CM

## 2024-04-12 PROCEDURE — 99207 PR DROP WITH A PROCEDURE: CPT | Performed by: UROLOGY

## 2024-04-12 PROCEDURE — 52000 CYSTOURETHROSCOPY: CPT | Performed by: UROLOGY

## 2024-04-12 ASSESSMENT — PAIN SCALES - GENERAL: PAINLEVEL: NO PAIN (0)

## 2024-04-12 NOTE — PROGRESS NOTES
Nursing Note:  Alex Stanley presents today for Cystoscopy.  Patient seen by provider today: Yes: Dr. Weaver.   present during visit today: Not Applicable.    Note: N/A.    Images:                           Sherwin Gagnon RN, BSN.  RN Care Coordinator     [facility]   [phone]      Labs:  Not Applicable.     Procedure:  Urology Procedure: Cystoscopy.    Verified:  Not Applicable.       Post Procedure:  Patient tolerated the procedure without incident..    Post Pain Assessment: 0 out of 10.    Discharge Plan:   Departure Mode: Ambulatory.    6 pictures taken    Natalikaylen Bonilla MA      Cystoscopy procedure     Pre procedural diagnosis history of nonmuscle invasive bladder cancer  Post procedural diagnosis Same     Indication 78 year old male with history of low-grade TA urothelial carcinoma bladder status post TURBT on 9/6/2023 was placed on surveillance and had a TURBT later on 1/10/2024 for possible recurrence with negative pathology is now here for surveillance cystoscopy.        Procedure:      After sterile preparation and draping of the patient,  a 16-Cameroonian flexible cystoscope was introduced via the urethra.  It was passed without difficulty into the bladder.  The urethra was open without evidence of stricture.  The ureteral orifices were orthotopic.  The bladder mucosa was evaluated using both antegrade and retroflex views and this showed erythematous patches and papular changes along the right lateral opening over bladder diverticulum on that side as well as the bladder dome.  60 ml syringe was used to obtain urine for  cytology. Scope was then removed and patient tolerated the procedure well.       Plan  Schedule for bluelight TURBT possible intravesical chemotherapy  Scheduled for PAC visit    Sydney Weaver MD   Department of Urology   [facility]

## 2024-04-12 NOTE — LETTER
4/12/2024         RE: Alex Stanley  [address]  [facility] [place] [address]        Dear Colleague,    Thank you for referring your patient, Alex Stanley, to the [facility]. Please see a copy of my visit note below.    Nursing Note:  Alex Stanley presents today for Cystoscopy.  Patient seen by provider today: Yes: Dr. Weaver.   present during visit today: Not Applicable.    Note: N/A.    Images:                           Sherwin Gagnon, RN, BSN.  RN Care Coordinator     [facility]   [phone]      Labs:  Not Applicable.     Procedure:  Urology Procedure: Cystoscopy.    Verified:  Not Applicable.       Post Procedure:  Patient tolerated the procedure without incident..    Post Pain Assessment: 0 out of 10.    Discharge Plan:   Departure Mode: Ambulatory.    6 pictures taken    Natali Bonilla MA      Cystoscopy procedure     Pre procedural diagnosis history of nonmuscle invasive bladder cancer  Post procedural diagnosis Same     Indication 78 year old male with history of low-grade TA urothelial carcinoma bladder status post TURBT on 9/6/2023 was placed on surveillance and had a TURBT later on 1/10/2024 for possible recurrence with negative pathology is now here for surveillance cystoscopy.        Procedure:      After sterile preparation and draping of the patient,  a 16-Gibraltarian flexible cystoscope was introduced via the urethra.  It was passed without difficulty into the bladder.  The urethra was open without evidence of stricture.  The ureteral orifices were orthotopic.  The bladder mucosa was evaluated using both antegrade and retroflex views and this showed erythematous patches and papular changes along the right lateral opening over bladder diverticulum on that side as well as the bladder dome.  60 ml syringe was used to obtain urine for  cytology. Scope was then removed and patient tolerated the procedure well.        Plan  Schedule for bluelight TURBT possible intravesical chemotherapy  Scheduled for PAC visit    Sydney Weaver MD   Department of Urology   [facility]            Again, thank you for allowing me to participate in the care of your patient.        Sincerely,        Sydney Weaver MD

## 2024-04-12 NOTE — NURSING NOTE
"Oncology Rooming Note    April 12, 2024 10:55 AM   Alex Stanley is a 78 year old male who presents for:    Chief Complaint   Patient presents with    Oncology Clinic Visit     Urothelial carcinoma of bladder without invasion of muscle   Cystoscopy       Initial Vitals: /72   Pulse 74   Temp 97.7  F (36.5  C) (Temporal)   Resp 16   Ht 1.702 m (5' 7.01\")   Wt 75.9 kg (167 lb 6.4 oz)   SpO2 97%   BMI 26.21 kg/m   Estimated body mass index is 26.21 kg/m  as calculated from the following:    Height as of this encounter: 1.702 m (5' 7.01\").    Weight as of this encounter: 75.9 kg (167 lb 6.4 oz). Body surface area is 1.89 meters squared.  No Pain (0) Comment: Data Unavailable   No LMP for male patient.  Allergies reviewed: Yes  Medications reviewed: Yes    Medications: Medication refills not needed today.  Pharmacy name entered into [employer]:    [facility] [place], [place] - [address] [place] [address]  [facility] - [address]  [facility] - [address]    Frailty Screening:   Is the patient here for a new oncology consult visit in cancer care? 2. No      Clinical concerns: Cystoscopy       Natali Bonilla MA              "

## 2024-04-16 ENCOUNTER — TELEPHONE (OUTPATIENT)
Dept: UROLOGY | Facility: CLINIC | Age: 79
End: 2024-04-16
Payer: MEDICARE

## 2024-04-16 NOTE — TELEPHONE ENCOUNTER
Patient called in asking to schedule surgery. Patient said he saw Dr. Weaver on 4/12 and was supposed to have surgery orders placed. This writer does not see any orders. Will route to clinic to please assist.     Marisela Berger on 4/16/2024 at 12:34 PM

## 2024-05-02 NOTE — TELEPHONE ENCOUNTER
Patient called into surgery scheduling again asking for orders.     Will route to clinic.    Marisela Berger on 5/2/2024 at 3:27 PM

## 2024-05-06 DIAGNOSIS — C67.9 UROTHELIAL CARCINOMA OF BLADDER WITHOUT INVASION OF MUSCLE (H): Primary | ICD-10-CM

## 2024-05-06 RX ORDER — CEFAZOLIN SODIUM 2 G/50ML
2 SOLUTION INTRAVENOUS SEE ADMIN INSTRUCTIONS
Status: CANCELLED | OUTPATIENT
Start: 2024-05-06

## 2024-05-06 RX ORDER — CEFAZOLIN SODIUM 2 G/50ML
2 SOLUTION INTRAVENOUS
Status: CANCELLED | OUTPATIENT
Start: 2024-05-06

## 2024-05-06 NOTE — TELEPHONE ENCOUNTER
Called patient and offered 5/28 with Dr. Weaver. Patient would like to look at his calendar and give writer a call back if he is available. Patient has call back number [phone].    Marisela Berger on 5/6/2024 at 12:39 PM

## 2024-05-06 NOTE — TELEPHONE ENCOUNTER
Patient called back surgery scheduling and scheduled surgery to be on 5/28/24 with Dr. Weaver. Offered earlier date of 5/21/24 but patient did not want to have his procedure in the evening.     Patient would like to be moved up to a sooner date if one becomes available.    H&P scheduled with PAC virtually for 5/16/24. Patient is aware they will get their arrival time for surgery at PAC appointment.    Patient will need a 7 day virtual post-op to discuss path results.    Writer will mail surgery packet via [employer].    The patient has no further questions regarding surgery at this time. Patient has writers call back number [phone].    Marisela Berger on 5/6/2024 at 5:46 PM

## 2024-05-07 NOTE — TELEPHONE ENCOUNTER
FUTURE VISIT INFORMATION      SURGERY INFORMATION:  Date: 24  Location: uu or  Surgeon:  Sydney Weaver MD   Anesthesia Type:  general  Procedure: Blue light CYSTOSCOPY, WITH TRANSURETHRAL RESECTION BLADDER TUMOR   Consult: ov 24    RECORDS REQUESTED FROM:       Primary Care Provider: Eal    Pertinent Medical History: hypertension, chronic atrial fibrillation    Most recent EKG+ Tracin22    Most recent ECHO: 10/20/22

## 2024-05-16 ENCOUNTER — VIRTUAL VISIT (OUTPATIENT)
Dept: SURGERY | Facility: CLINIC | Age: 79
End: 2024-05-16
Payer: MEDICARE

## 2024-05-16 ENCOUNTER — ANESTHESIA EVENT (OUTPATIENT)
Dept: SURGERY | Facility: CLINIC | Age: 79
End: 2024-05-16
Payer: MEDICARE

## 2024-05-16 ENCOUNTER — PRE VISIT (OUTPATIENT)
Dept: SURGERY | Facility: CLINIC | Age: 79
End: 2024-05-16

## 2024-05-16 VITALS — HEIGHT: 67 IN | WEIGHT: 165 LBS | BODY MASS INDEX: 25.9 KG/M2

## 2024-05-16 DIAGNOSIS — C67.9 UROTHELIAL CARCINOMA OF BLADDER WITHOUT INVASION OF MUSCLE (H): ICD-10-CM

## 2024-05-16 DIAGNOSIS — Z01.818 PREOP EXAMINATION: Primary | ICD-10-CM

## 2024-05-16 PROCEDURE — 99213 OFFICE O/P EST LOW 20 MIN: CPT | Mod: 95

## 2024-05-16 ASSESSMENT — PAIN SCALES - GENERAL: PAINLEVEL: NO PAIN (0)

## 2024-05-16 ASSESSMENT — COPD QUESTIONNAIRES: COPD: 0

## 2024-05-16 ASSESSMENT — LIFESTYLE VARIABLES: TOBACCO_USE: 1

## 2024-05-16 ASSESSMENT — ENCOUNTER SYMPTOMS
ORTHOPNEA: 0
DYSRHYTHMIAS: 1
SEIZURES: 0

## 2024-05-16 NOTE — PROGRESS NOTES
Tejas is a 78 year old who is being evaluated via a billable video visit.    How would you like to obtain your AVS? MyChart  If the video visit is dropped, the invitation should be resent by: Text to cell phone: [phone]    Subjective   Tejas is a 78 year old, presenting for the following health issues:  Pre-Op Exam      HPI           Physical Exam

## 2024-05-16 NOTE — H&P
Pre-Operative H & P     CC:  Preoperative exam to assess for increased cardiopulmonary risk while undergoing surgery and anesthesia.    Date of Encounter: 5/16/2024  Primary Care Physician:  Estuardo Manning     Reason for visit:   Encounter Diagnoses   Name Primary?    Preop examination Yes    Urothelial carcinoma of bladder without invasion of muscle (H)        HPI  Alex Stanley is a 78 year old male who presents for pre-operative H & P in preparation for  Procedure Information       Case: [id number] Date/Time: 05/28/24 1730    Procedure: Blue light CYSTOSCOPY, WITH TRANSURETHRAL RESECTION BLADDER TUMOR (Urethra)    Anesthesia type: General    Diagnosis: Urothelial carcinoma of bladder without invasion of muscle (H) [C67.9]    Pre-op diagnosis: Urothelial carcinoma of bladder without invasion of muscle (H) [C67.9]    Location: [facility] OR  /  OR    Providers: Sydney Weaver MD            Alex Stanley is a 78 year old male with PMH significant for HTN, HL, PAF on eliquis, tobacco dependence, CKD 3, CVA, anemia, hx of ETOH abuse, fatty liver, diverticulosis, erosive gastritis, and bladder tumor. He was initially evaluated by Dr. Weaver in the ER in regard to occurrence of gross hematuria. Underwent cystoscopy 8/17/2023 findings showed papillary lesions in the bladder concerning for urothelial carcinoma. He underwent Cystoscopy with TURBT and intravesical installation of chemotherapy medication on 9/6/2023 and most recently a TURBT on 1/10/24. He most recently completed a cystoscopy on 4/12/24 which was concerning for recurrence. He is now scheduled for the above procedure for further management.     History is obtained from the patient and chart review    Hx of abnormal bleeding or anti-platelet use: Eliquis      Past Medical History  Past Medical History:   Diagnosis Date    Alcohol abuse     Alcoholism (H)     admission 11/09    Atrial fibrillation, paroxysmal 11/07, 6/09, 11/09    Basal cell carcinoma      Benign essential hypertension     Chronic atrial fibrillation (H)     Diverticulosis of colon 06/02/2011    Erosive gastritis 11/2009    Gastritis     Hyperlipidemia LDL goal <100     Hypertension, with LVH     Iron deficiency anemia 11/2009    Paroxysmal atrial fibrillation (H)     Stroke (H)     Tobacco abuse        Past Surgical History  Past Surgical History:   Procedure Laterality Date    COLONOSCOPY N/A 10/26/2020    Procedure: COLONOSCOPY, WITH POLYPECTOMY AND BIOPSY;  Surgeon: Shon Bond MD;  Location:  GI    CYSTOSCOPY, TRANSURETHRAL RESECTION (TUR) TUMOR BLADDER, COMBINED N/A 9/6/2023    Procedure: Blue light CYSTOSCOPY, WITH TRANSURETHRAL RESECTION BLADDER TUMOR, urethral dilation;  Surgeon: Sydney Weaver MD;  Location: [facility] [place] OR    ENDOSCOPIC ULTRASOUND UPPER GASTROINTESTINAL TRACT (GI) N/A 1/24/2022    Procedure: ENDOSCOPIC ULTRASOUND, ESOPHAGOSCOPY / UPPER GASTROINTESTINAL TRACT (GI);  Surgeon: Shon Bond MD;  Location:  GI    LAPAROSCOPIC CHOLECYSTECTOMY N/A 2/8/2022    Procedure: LAPAROSCOPIC CHOLECYSTECTOMY;  Surgeon: Mazin Bains MD;  Location:  OR    SURGICAL HISTORY OF -   4/11    R Dupuytren's contracture release   Dr. Scruggs    TRANSURETHRAL RESECTION (TUR) TUMOR BLADDER INSTILL OPTICAL AGENT N/A 1/10/2024    Procedure: BLUE LIGHT, CYSTOSCOPY, WITH TRANSURETHRAL RESECTION BLADDER TUMOR;  Surgeon: Sydney Weaver MD;  Location: [facility] [place] OR    ZZ NONSPECIFIC PROCEDURE  2002    Rt foot reconstructive    ZZC NONSPECIFIC PROCEDURE  10/12    R hand procedure.  Dr. Deluna       Prior to Admission Medications  Current Outpatient Medications   Medication Sig Dispense Refill    amLODIPine (NORVASC) 10 MG tablet Take 1 tablet (10 mg) by mouth daily (Patient taking differently: Take 10 mg by mouth every morning) 90 tablet 3    apixaban ANTICOAGULANT (ELIQUIS) 5 MG tablet Take 1 tablet (5 mg) by mouth 2 times daily 180 tablet 3    ASPIRIN NOT PRESCRIBED, INTENTIONAL, Antiplatelet  medication not prescribed intentionally due to history  of gastrointestinal bleeding, and patient on warfarin. 0 each 0    isosorbide mononitrate (IMDUR) 30 MG 24 hr tablet Take 1 tablet (30 mg) by mouth daily (Patient taking differently: Take 30 mg by mouth every morning) 90 tablet 3    Multiple Vitamins-Minerals (PRESERVISION AREDS 2) CAPS Take 2 capsules by mouth 2 times daily      oxyBUTYnin ER (DITROPAN XL) 5 MG 24 hr tablet Take 1 tablet (5 mg) by mouth daily 5 tablet 0    pravastatin (PRAVACHOL) 20 MG tablet Take 1 tablet (20 mg) by mouth daily 90 tablet 3       Allergies  No Known Allergies    Social History  Social History     Socioeconomic History    Marital status:      Spouse name: Not on file    Number of children: Not on file    Years of education: Not on file    Highest education level: Not on file   Occupational History    Not on file   Tobacco Use    Smoking status: Every Day     Current packs/day: 0.50     Average packs/day: 0.5 packs/day for 59.4 years (29.7 ttl pk-yrs)     Types: Cigarettes     Start date: 1965    Smokeless tobacco: Never    Tobacco comments:     ppd since age 16 - now 1/2-1 ppd 04/05/21.  10 cigarettes daily   Vaping Use    Vaping status: Never Used   Substance and Sexual Activity    Alcohol use: Not Currently     Comment: last drank at [facility] 8/2010.    Drug use: Never    Sexual activity: Yes     Partners: Female   Other Topics Concern    Parent/sibling w/ CABG, MI or angioplasty before 65F 55M? Not Asked   Social History Narrative    ** Merged History Encounter **          Social Determinants of Health     Financial Resource Strain: Low Risk  (3/4/2024)    Financial Resource Strain     Within the past 12 months, have you or your family members you live with been unable to get utilities (heat, electricity) when it was really needed?: No   Food Insecurity: Low Risk  (3/4/2024)    Food Insecurity     Within the past 12 months, did you worry that your food would run out  before you got money to buy more?: No     Within the past 12 months, did the food you bought just not last and you didn t have money to get more?: No   Transportation Needs: Low Risk  (3/4/2024)    Transportation Needs     Within the past 12 months, has lack of transportation kept you from medical appointments, getting your medicines, non-medical meetings or appointments, work, or from getting things that you need?: No   Physical Activity: Sufficiently Active (3/4/2024)    Exercise Vital Sign     Days of Exercise per Week: 5 days     Minutes of Exercise per Session: 30 min   Stress: No Stress Concern Present (3/4/2024)    [employer] - Occupational Stress Questionnaire     Feeling of Stress : Not at all   Social Connections: Not on file   Interpersonal Safety: Low Risk  (3/4/2024)    Interpersonal Safety     Do you feel physically and emotionally safe where you currently live?: Yes     Within the past 12 months, have you been hit, slapped, kicked or otherwise physically hurt by someone?: No     Within the past 12 months, have you been humiliated or emotionally abused in other ways by your partner or ex-partner?: No   Housing Stability: Low Risk  (3/4/2024)    Housing Stability     Do you have housing? : Yes     Are you worried about losing your housing?: No       Family History  Family History   Problem Relation Age of Onset    Lung Cancer Mother     Emphysema Father     Cancer Mother         small cell lung with mets to brain    Diabetes Maternal Grandmother     Respiratory Father         emphysema       Review of Systems  The complete review of systems is negative other than noted in the HPI or here.   Anesthesia Evaluation   Pt has had prior anesthetic. Type: General.    No history of anesthetic complications       ROS/MED HX  ENT/Pulmonary:     (+)     REGGIE risk factors, snores loudly, hypertension,         tobacco use, Current use, 0.5 packs/day, 40  Pack-Year Hx,  patient smoked  within 24 hours,                 (-) asthma, COPD and recent URI   Neurologic:     (+)          CVA, date: 2011, without deficits,                 (-) no seizures   Cardiovascular:     (+) Dyslipidemia hypertension- -   -  - -   Taking blood thinners  Instructions Given to patient: holding 48 hours prior to DOS.                   dysrhythmias, a-fib,        Previous cardiac testing   Echo: Date: 10/2022 Results:  Interpretation Summary     Left ventricular systolic function is low normal.  The visual ejection fraction is 50-55%.  The left atrium is moderate to severely dilated.  No hemodynamically significant valvular abnormalities on 2D or color flow  imaging.    Stress Test:  Date: 10/2022 Results:        The nuclear stress test is abnormal.     There is a small area of mild ischemia in the apical segment(s) of the left ventricle.     Left ventricular function is normal.     The left ventricular ejection fraction at stress is 58%.     There is no prior study for comparison    ECG Reviewed:  Date: Results:    Cath:  Date: Results:   (-) CAD, PIERRE, orthopnea/PND and irregular heartbeat/palpitations   METS/Exercise Tolerance: >4 METS Comment: Walking continuously x 20-30 mins, climbing stairs in home, and yard work without exertional symptoms.    Hematologic:     (+)      anemia,       (-) history of blood clots and history of blood transfusion   Musculoskeletal:  - neg musculoskeletal ROS     GI/Hepatic: Comment: Fatty Liver   Hx of Diverticulosis  Hx of Erosive gastritis  Gall stones    (+) GERD (Rare symptoms 1-2 times per month relieved with Tums), Asymptomatic on medication,        cholecystitis/cholelithiasis,   liver disease,       Renal/Genitourinary: Comment: Bladder tumor  CKD 3    (+) renal disease, type: CRI, Pt does not require dialysis,           Endo:  - neg endo ROS  (-) Type II DM   Psychiatric/Substance Use: Comment: ETOH abuse in remission since 2010    (+)   alcohol abuse (in remission since 2010)       Infectious Disease:  - neg infectious disease ROS  (-) Recent Fever   Malignancy:  - neg malignancy ROS (+) Malignancy, History of Other.Other CA urothelial carcinoma Active status post Surgery and Chemo.    Other:            Virtual visit -  No vitals were obtained    Physical Exam  Constitutional: Awake, alert, cooperative, no apparent distress, and appears stated age.  Eyes: Pupils equal  HENT: Normocephalic  Respiratory: non labored breathing   Neurologic: Awake, alert, oriented to name, place and time.   Neuropsychiatric: Calm, cooperative. Normal affect.      Prior Labs/Diagnostic Studies   All labs and imaging personally reviewed     Component      Latest Ref Rng 3/4/2024  9:39 AM   Sodium      135 - 145 mmol/L 140    Potassium      3.4 - 5.3 mmol/L 4.6    Carbon Dioxide (CO2)      22 - 29 mmol/L 24    Anion Gap      7 - 15 mmol/L 9    Urea Nitrogen      8.0 - 23.0 mg/dL 15.4    Creatinine      0.67 - 1.17 mg/dL 1.21 (H)    GFR Estimate      >60 mL/min/1.73m2 62    Calcium      8.8 - 10.2 mg/dL 8.9    Chloride      98 - 107 mmol/L 107    Glucose      70 - 99 mg/dL 99    Alkaline Phosphatase      40 - 150 U/L 87    AST      0 - 45 U/L 33    ALT      0 - 70 U/L 20    Protein Total      6.4 - 8.3 g/dL 6.8    Albumin      3.5 - 5.2 g/dL 4.4    Bilirubin Total      <=1.2 mg/dL 0.6       Legend:  (H) High    Component      Latest Ref Rng 2/29/2024  10:09 AM   Hemoglobin      13.3 - 17.7 g/dL 15.1        Component      Latest Ref Rng 1/2/2024  12:00 PM   WBC      4.0 - 11.0 10e3/uL 10.9    RBC Count      4.40 - 5.90 10e6/uL 4.75    Hemoglobin      13.3 - 17.7 g/dL 14.7    Hematocrit      40.0 - 53.0 % 44.0    MCV      78 - 100 fL 93    MCH      26.5 - 33.0 pg 30.9    MCHC      31.5 - 36.5 g/dL 33.4    RDW      10.0 - 15.0 % 14.8    Platelet Count      150 - 450 10e3/uL 268    % Neutrophils      % 76    % Lymphocytes      % 12    % Monocytes      % 9    % Eosinophils      % 1    % Basophils      % 1    % Immature  Granulocytes      % 1    NRBCs per 100 WBC      <1 /100 0    Absolute Neutrophils      1.6 - 8.3 10e3/uL 8.4 (H)    Absolute Lymphocytes      0.8 - 5.3 10e3/uL 1.3    Absolute Monocytes      0.0 - 1.3 10e3/uL 1.0    Absolute Eosinophils      0.0 - 0.7 10e3/uL 0.1    Absolute Basophils      0.0 - 0.2 10e3/uL 0.1    Absolute Immature Granulocytes      <=0.4 10e3/uL 0.1    Absolute NRBCs      10e3/uL 0.0       Legend:  (H) High      EKG/ stress test - if available please see in ROS above     The patient's records and results personally reviewed by this provider.     Outside records reviewed from: Care Everywhere      Assessment    Alex Stanley is a 78 year old male seen as a PAC referral for risk assessment and optimization for anesthesia.    Plan/Recommendations  Pt will be optimized for the proposed procedure.  See below for details on the assessment, risk, and preoperative recommendations    NEUROLOGY   History of CVA 2011  Without residual deficits   -Post Op delirium risk factors:  Age    ENT  - No current airway concerns.  Will need to be reassessed day of surgery.  Mallampati: Unable to assess  TM: Unable to assess    CARDIAC  - No history of CAD. METS > 4. No anginal symptoms, Denies palpitations, PND, dizziness or syncope.  - PAF - on Eliquis  - Lexiscan for atypical CP (2022) -there is a small area of mild ischemia in the apical segment(s) of the left ventricle.  was also started on Imdur at that time. Has had no further symptoms. Follows with cardiology, last on 10/2/23.   - Hypertension - managed on amlodipine   Well controlled  - Hyperlipidemia  Well controlled on home regimen    - METS (Metabolic Equivalents) > 4. RCRI 1.  Patient performs 4 or more METS exercise without symptoms             Total Score: 0      RCRI-Very low risk: Class 1 0.4% complication rate             Total Score: 0        PULMONARY  REGGIE Medium Risk             Total Score: 3    REGGIE: Hypertension    REGGIE: Over 50 ys old    REGGIE:  "Male      - Denies asthma or inhaler use  - Current smoker  - Tobacco History    History   Smoking Status    Every Day    Types: Cigarettes   Smokeless Tobacco    Never       GI  PONV Low Risk  Total Score: 0      - Fatty Liver. Normal LFTs 1/2024.   - Hx of Diverticulosis  - Hx of Erosive gastritis  - Gall stones s/p cholecystectomy     /RENAL  - CKD, Baseline Creatinine  1.21    ENDOCRINE    - BMI: Estimated body mass index is 26.21 kg/m  as calculated from the following:    Height as of 4/12/24: 1.702 m (5' 7.01\").    Weight as of 4/12/24: 75.9 kg (167 lb 6.4 oz).  Overweight (BMI 25.0-29.9)  - No history of Diabetes Mellitus    HEME/ONC  VTE High Risk 3%             Total Score: 8    VTE: Greater than 59 yrs old    VTE: Male    VTE: Current cancer      - Coagulopathy second to Apixaban (Eliquis)  Will hold 48 hours prior to procedure.      - Urothelial carcinoma of bladder without invasion of muscle (see HPI) -  Procedure scheduled as above.        MSK  Patient is NOT Frail             Total Score: 0          Different anesthesia methods/types have been discussed with the patient, but they are aware that the final plan will be decided by the assigned anesthesia provider on the date of service.      The patient is optimized for their procedure. AVS with information on surgery time/arrival time, meds and NPO status given by nursing staff. No further diagnostic testing indicated.    Please refer to the physical examination documented by the anesthesiologist in the anesthesia record on the day of surgery.    Video-Visit Details    Type of service:  Video Visit    Provider received verbal consent for a Video Visit from the patient? Yes     Originating Location (pt. Location): Home    Distant Location (provider location):  Off-site  Mode of Communication:  Video Conference via [employer]  On the day of service:     Prep time: 12 minutes  Visit time: 8 minutes  Documentation time: 6 " minutes  ------------------------------------------  Total time: 26 minutes      LAST Sotomayor CNP  Preoperative Assessment Center  [facility]  Clinic and Surgery Center  Phone: [phone]  Fax: [phone]

## 2024-05-16 NOTE — PATIENT INSTRUCTIONS
Preparing for Your Surgery      Name:  Alex Stanley   MRN:  [id number]   :  1945   Today's Date:  2024       Arriving for surgery:  Surgery date:  24  Arrival time:  3:30 pm    Please come to:     [facility] [place] [facility]    [address] [place]  [address]     The [facility] [place] Patient/Visitor Ramp is at [address]. Patients and visitors who self-park will receive the reduced hospital parking rate. If the Patient /Visitor Ramp is full, please follow the signs to the [employer] car park located at the main hospital entrance.       parking is available (24 hours/ 7 days a week)      Discounted parking pass options are available for patients and visitors. They can be purchased at the [employer] desk at the main hospital entrance.     -  Stop at the security desk and they will direct surgery patients to Registration and the Surgery Check in and Family Lounge. [phone]        - If you need directions, a wheelchair or an escort please stop at the Information/security desk in the lobby.     What can I eat or drink?  -  You may eat and drink normally up to 8 hours prior to arrival time. (Until 7:30 am)  -  You may have clear liquids until 2 hours prior to arrival time. (Until 1:30 pm)    Examples of clear liquids:  Water  Clear broth  Juices (apple, white grape, white cranberry  and cider) without pulp  Noncarbonated, powder based beverages  (lemonade and Tam-Aid)  Sodas (Sprite, 7-Up, ginger ale and seltzer)  Coffee or tea (without milk or cream)  Gatorade    -  No Alcohol or cannabis products for at least 24 hours before surgery.     Which medicines can I take?  Hold Aspirin for 7 days before surgery.   Hold Multivitamins for 7 days before surgery. Take the last Multivitamin (Preservision AREDS-2) on 24, and then hold until surgery.  Hold Supplements for 7 days before  surgery.  Hold Ibuprofen (Advil, Motrin) for 1 day before surgery--unless otherwise directed by surgeon.  Hold Naproxen (Aleve) for 4 days before surgery.  Acetaminophen (Tylenol) is okay to take if needed.    Hold Apixaban (Eliquis) for 48 hours prior to surgery. Take the last Apixaban on 5-26-24, AM dose. (Surgery is at 5:30 pm.)    -  PLEASE TAKE these medications the day of surgery:  Amlodipine (Norvasc)  Isosorbide Mononitrate (Imdur)  Pravastatin (Pravachol)  Acetaminophen (Tylenol) if needed    How do I prepare myself?  - Please take 2 showers (one the night prior to surgery and one the morning of surgery) using Scrubcare or Hibiclens soap.    Use this soap only from the neck to your toes.     Leave the soap on your skin for one minute--then rinse thoroughly.      You may use your own shampoo and conditioner. No other hair products.   - Please remove all jewelry and body piercings.  - No lotions, deodorants or fragrance.  - No makeup or fingernail polish.   - Bring your ID and insurance card.    -If you use a CPAP machine, please bring the CPAP machine, tubing, and mask to hospital.    -If you have a Deep Brain Stimulator, Spinal Cord Stimulator, or any Neuro Stimulator device---you must bring the remote control to the hospital.      ALL PATIENTS GOING HOME THE SAME DAY OF SURGERY ARE REQUIRED TO HAVE A RESPONSIBLE ADULT TO DRIVE AND BE IN ATTENDANCE WITH THEM FOR 24 HOURS FOLLOWING SURGERY.    Covid testing policy as of 12/06/2022  Your surgeon will notify and schedule you for a COVID test if one is needed before surgery--please direct any questions or COVID symptoms to your surgeon      Questions or Concerns:    - For any questions regarding the day of surgery or your hospital stay, please contact the Pre Admission Nursing Office at [phone].       - If you have health changes between today and your surgery, please call your surgeon.       - For questions after surgery, please call your surgeons office.            Current Visitor Guidelines    You may have 2 visitors in the pre op area.    Visiting hours: 8 a.m. to 8:30 p.m.    Patients confirmed or suspected to have symptoms of COVID 19 or flu:     No visitors allowed for adult patients.   Children (under age 18) can have 1 named visitor.     People who are sick or showing symptoms of COVID 19 or flu:    Are not allowed to visit patients--we can only make exceptions in special situations.       Please follow these guidelines for your visit:          Please maintain social distance          Masking is optional--however at times you may be asked to wear a mask for the safety of yourself and others     Clean your hands with alcohol hand . Do this when you arrive at and leave the building and patient room,    And again after you touch your mask or anything in the room.     Go directly to and from the room you are visiting.     Stay in the patient s room during your visit. Limit going to other places in the hospital as much as possible     Leave bags and jackets at home or in the car.     For everyone s health, please don t come and go during your visit. That includes for smoking   during your visit.

## 2024-05-21 ENCOUNTER — OFFICE VISIT (OUTPATIENT)
Dept: URGENT CARE | Facility: URGENT CARE | Age: 79
End: 2024-05-21
Payer: MEDICARE

## 2024-05-21 VITALS
TEMPERATURE: 97 F | OXYGEN SATURATION: 97 % | HEART RATE: 71 BPM | DIASTOLIC BLOOD PRESSURE: 74 MMHG | RESPIRATION RATE: 14 BRPM | SYSTOLIC BLOOD PRESSURE: 149 MMHG

## 2024-05-21 DIAGNOSIS — I10 ESSENTIAL HYPERTENSION: ICD-10-CM

## 2024-05-21 DIAGNOSIS — Z79.01 LONG TERM CURRENT USE OF ANTICOAGULANT THERAPY: ICD-10-CM

## 2024-05-21 DIAGNOSIS — I48.20 CHRONIC ATRIAL FIBRILLATION (H): ICD-10-CM

## 2024-05-21 DIAGNOSIS — Z72.0 TOBACCO ABUSE: ICD-10-CM

## 2024-05-21 DIAGNOSIS — K29.00 ACUTE GASTRITIS WITHOUT HEMORRHAGE, UNSPECIFIED GASTRITIS TYPE: Primary | ICD-10-CM

## 2024-05-21 DIAGNOSIS — R10.13 EPIGASTRIC PAIN: ICD-10-CM

## 2024-05-21 LAB
ALBUMIN SERPL-MCNC: 4.1 G/DL (ref 3.4–5)
ALP SERPL-CCNC: 85 U/L (ref 40–150)
ALT SERPL W P-5'-P-CCNC: 25 U/L (ref 0–70)
ANION GAP SERPL CALCULATED.3IONS-SCNC: 6 MMOL/L (ref 3–14)
AST SERPL W P-5'-P-CCNC: 31 U/L (ref 0–45)
BASOPHILS # BLD AUTO: 0 10E3/UL (ref 0–0.2)
BASOPHILS NFR BLD AUTO: 0 %
BILIRUB SERPL-MCNC: 0.8 MG/DL (ref 0.2–1.3)
BUN SERPL-MCNC: 15 MG/DL (ref 7–30)
CALCIUM SERPL-MCNC: 9.8 MG/DL (ref 8.5–10.1)
CHLORIDE BLD-SCNC: 109 MMOL/L (ref 94–109)
CO2 SERPL-SCNC: 30 MMOL/L (ref 20–32)
CREAT SERPL-MCNC: 1.2 MG/DL (ref 0.66–1.25)
EGFRCR SERPLBLD CKD-EPI 2021: 63 ML/MIN/1.73M2
EOSINOPHIL # BLD AUTO: 0.1 10E3/UL (ref 0–0.7)
EOSINOPHIL NFR BLD AUTO: 1 %
ERYTHROCYTE [DISTWIDTH] IN BLOOD BY AUTOMATED COUNT: 14.1 % (ref 10–15)
GLUCOSE BLD-MCNC: 107 MG/DL (ref 70–99)
HCT VFR BLD AUTO: 49 % (ref 40–53)
HGB BLD-MCNC: 16 G/DL (ref 13.3–17.7)
IMM GRANULOCYTES # BLD: 0 10E3/UL
IMM GRANULOCYTES NFR BLD: 0 %
LYMPHOCYTES # BLD AUTO: 1.4 10E3/UL (ref 0.8–5.3)
LYMPHOCYTES NFR BLD AUTO: 14 %
MCH RBC QN AUTO: 30.8 PG (ref 26.5–33)
MCHC RBC AUTO-ENTMCNC: 32.7 G/DL (ref 31.5–36.5)
MCV RBC AUTO: 94 FL (ref 78–100)
MONOCYTES # BLD AUTO: 1 10E3/UL (ref 0–1.3)
MONOCYTES NFR BLD AUTO: 10 %
NEUTROPHILS # BLD AUTO: 7.4 10E3/UL (ref 1.6–8.3)
NEUTROPHILS NFR BLD AUTO: 75 %
PLATELET # BLD AUTO: 312 10E3/UL (ref 150–450)
POTASSIUM BLD-SCNC: 4.7 MMOL/L (ref 3.4–5.3)
PROT SERPL-MCNC: 7.4 G/DL (ref 6.8–8.8)
RBC # BLD AUTO: 5.2 10E6/UL (ref 4.4–5.9)
SODIUM SERPL-SCNC: 145 MMOL/L (ref 135–145)
TROPONIN T SERPL HS-MCNC: 9 NG/L
WBC # BLD AUTO: 9.9 10E3/UL (ref 4–11)

## 2024-05-21 PROCEDURE — 93000 ELECTROCARDIOGRAM COMPLETE: CPT | Performed by: NURSE PRACTITIONER

## 2024-05-21 PROCEDURE — 84484 ASSAY OF TROPONIN QUANT: CPT | Performed by: NURSE PRACTITIONER

## 2024-05-21 PROCEDURE — 99215 OFFICE O/P EST HI 40 MIN: CPT | Performed by: NURSE PRACTITIONER

## 2024-05-21 PROCEDURE — 36415 COLL VENOUS BLD VENIPUNCTURE: CPT | Performed by: NURSE PRACTITIONER

## 2024-05-21 PROCEDURE — 80053 COMPREHEN METABOLIC PANEL: CPT | Performed by: NURSE PRACTITIONER

## 2024-05-21 PROCEDURE — 85025 COMPLETE CBC W/AUTO DIFF WBC: CPT | Performed by: NURSE PRACTITIONER

## 2024-05-21 RX ORDER — PANTOPRAZOLE SODIUM 40 MG/1
40 TABLET, DELAYED RELEASE ORAL DAILY
Qty: 90 TABLET | Refills: 0 | Status: SHIPPED | OUTPATIENT
Start: 2024-05-21 | End: 2024-08-19

## 2024-05-21 NOTE — PROGRESS NOTES
Chief Complaint   Patient presents with    GI Problem     Patient here with concerns of heartburn. States that starting around 2 this morning he has been noticing a slight burning from abdomen up into chest area. States if he presses on sternum he feels a pain. States he has had this in the past but states he's more worried this time.          ICD-10-CM    1. Acute gastritis without hemorrhage, unspecified gastritis type  K29.00 pantoprazole (PROTONIX) 40 MG EC tablet     Helicobacter pylori Antigen Stool     Helicobacter pylori Antigen Stool      2. Epigastric pain  R10.13 EKG 12-lead complete w/read - Clinics     lidocaine (viscous) (XYLOCAINE) 2 % 15 mL, alum & mag hydroxide-simethicone (MAALOX) 15 mL GI Cocktail     Troponin T, High Sensitivity     Comprehensive metabolic panel (BMP + Alb, Alk Phos, ALT, AST, Total. Bili, TP)     CBC with platelets and differential     Helicobacter pylori Antigen Stool     CBC with platelets and differential     Helicobacter pylori Antigen Stool      3. Essential hypertension  I10       4. Long-term (current) use of anticoagulants [Z79.01]  Z79.01       5. Tobacco abuse  Z72.0       6. Chronic atrial fibrillation/Flutter  I48.20       Multiple risk factors for coronary event.  Electrocardiogram here today shows no acute changes.  GI cocktail did completely resolve his symptoms.  Micky troponin which will be sent to the lab for stat testing.  If this comes back positive patient will be sent to the emergency room otherwise he will start pantoprazole and follow-up with primary care.    He is taking all medications as prescribed.  Blood pressure is slightly elevated here today.  He reports when he takes it at home it is usually around 118/70.  Encouraged him to watch his salt intake and continue to take medications as prescribed.  Follow-up with primary care for chronic disease management.    Red flag warning signs and when to go to the emergency room discussed.  Reviewed potential  adverse reactions to medications.    42 minutes spent by me on the date of the encounter doing chart review, history and exam, documentation and further activities per the note    Medical Decision Making    Differential diagnosis includes but is not limited to: Gastritis, gastroenteritis, enteritis, colitis, appendicitis, viral infection, ileus, bowel obstruction, volvulus, intussusception, gas pains, indigestion, GERD, UTI, pyelonephritis, gastric ulcer, duodenal ulcer, viscous perforation, abdominal hernia, internal hernia.    EKG - Reviewed and interpreted by me atrial fibrillation, rate 75, no acute changes to suggest active ischemia or MI.    Results for orders placed or performed in visit on 05/21/24 (from the past 24 hour(s))   CBC with platelets and differential    Narrative    The following orders were created for panel order CBC with platelets and differential.  Procedure                               Abnormality         Status                     ---------                               -----------         ------                     CBC with platelets and d...[[id number]]                      In process                   Please view results for these tests on the individual orders.       Subjective     Alex Stanley is an 78 year old male who presents to clinic today for epigastric pain intermittently for a long time.  He does have a history of gastritis in the past many years ago but has not had any episodes of this since he stopped drinking alcohol about 12 years ago.  Symptoms had been intermittent until around 2 AM this morning when he started having a painful sensation in the epigastric area that radiated up into the chest slightly.  Pain has been continuous since that time.      ROS: 10 point ROS neg other than the symptoms noted above in the HPI.       Objective    BP (!) 149/74 (BP Location: Left arm, Patient Position: Sitting, Cuff Size: Adult Regular)   Pulse 71   Temp 97  F (36.1  C)  (Tympanic)   Resp 14   SpO2 97%   Nurses notes and VS have been reviewed.    Physical Exam       GENERAL APPEARANCE: healthy appearing, alert     EYES: PERRL, EOMI, sclera non-icteric     HENT: oral exam benign, mucus membranes intact, without ulcers or lesions     NECK: no adenopathy or asymmetry, thyroid normal to palpation     RESP: lungs clear to auscultation - no rales, rhonchi or wheezes     CV: regular rates and rhythm, no murmurs, rubs, or gallop     ABDOMEN:  soft, nontender, no HSM or masses and bowel sounds normal     MS: extremities normal- no gross deformities noted; normal muscle tone.     SKIN: no suspicious lesions or rashes     NEURO: Normal strength and tone, mentation intact and speech normal      LAST Dias, CNP  [facility] Provider    The use of Dragon/[employer] dictation services may have been used to construct the content in this note; any grammatical or spelling errors are non-intentional. Please contact the author of this note directly if you are in need of any clarification.

## 2024-05-22 ENCOUNTER — TELEPHONE (OUTPATIENT)
Dept: ONCOLOGY | Facility: CLINIC | Age: 79
End: 2024-05-22
Payer: MEDICARE

## 2024-05-22 PROCEDURE — 87338 HPYLORI STOOL AG IA: CPT | Performed by: NURSE PRACTITIONER

## 2024-05-22 NOTE — TELEPHONE ENCOUNTER
Kalin, Sherwin, RN  You; Sydney Weaver MD29 minutes ago (10:59 AM)     TK  I spoke with Flori Ragsdale APRN CNP  Anesthesiology and she said okay to take day of procedure no  need to hold medication. Kylee Esteban RN     Pt was notified with recommendations.  Patient verbalized understanding and agreement with plan.  Patient was instructed to call the clinic with any questions or concerns.  Jinny Maxwell RN on 5/22/2024 at 11:55 AM

## 2024-05-22 NOTE — TELEPHONE ENCOUNTER
Pt is calling.  He is scheduled for surgery with Dr Weaver on 5/28/2024:  Blue light CYSTOSCOPY, WITH TRANSURETHRAL RESECTION BLADDER TUMOR.    Pt states that he was seen in urgent care yesterday and was prescribed pantoprazole.   He is wondering if OK to take this medication before surgery? Does it need to be held around the time of surgery?    Will route to care team for review and recommendations.    Pt states OK to leave a message if unable to reach him.    Patient verbalized understanding and agreement with plan.     Jinny Maxwell RN on 5/22/2024 at 9:34 AM

## 2024-05-23 ENCOUNTER — PATIENT OUTREACH (OUTPATIENT)
Dept: ONCOLOGY | Facility: CLINIC | Age: 79
End: 2024-05-23
Payer: MEDICARE

## 2024-05-23 LAB — H PYLORI AG STL QL IA: NEGATIVE

## 2024-05-23 NOTE — PROGRESS NOTES
Pre-Op Teaching Flowsheet                               Pre and Post op Patient Education  Relevant Diagnosis: Urothelial Carcinoma of Bladder   Surgical procedure: Blue Light TURBT  Teaching Topic:  Pre/Post Surgery Education  Person Involved in teaching: Tejas Stanley     Motivation Level: Eager  Asks Questions: Yes  Eager to Learn: Yes  Cooperative: Yes  Receptive (willing/able to accept information): Yes     Patient demonstrates understanding of the following:  Date of surgery: 24    Location of surgery: [facility]  History and Physical and any other testing necessary prior to surgery: Yes  Required timeline for completion of History and Physical and any pre-op testin24     Patient demonstrates understanding of the following:  Pre-op bowel prep: NA  Pre-op showering/scrub information with PCMX Soap:   Blood thinner medications discussed and when to stop (if applicable): Yes: Holding per PAC recommendations.        Infection Prevention:   Patient demonstrates understanding of the following:  Surgical procedure site care taught: Yes  Signs and symptoms of infection taught: Yes: Triage number provided.         Post-op follow-up: 24 Review Pathology Dr. Weaver   Discussed how to contact the hospital, nurse, and clinic scheduling staff if necessary. (See packet information)     Instructional materials used/given/mailed:  [facility] Packet, post op teaching sheet, Map, Soap, and with the arrival/location information to come closer to the surgery date.     Surgical instructions packet mailed on 24.      Follow up: Discussed arranging for someone to drive you home. ( No public transportation)    Someone needed to stay the first twenty hours after surgery: Wife       referral: NA      home: Wife     Care Giver: Family/Wife      PCP: Estuardo Gagnon, RN, BSN.  RN Care Coordinator     [facility]   [phone]

## 2024-05-28 ENCOUNTER — HOSPITAL ENCOUNTER (OUTPATIENT)
Facility: CLINIC | Age: 79
Discharge: HOME | End: 2024-05-28
Attending: UROLOGY | Admitting: UROLOGY
Payer: MEDICARE

## 2024-05-28 ENCOUNTER — ANESTHESIA (OUTPATIENT)
Dept: SURGERY | Facility: CLINIC | Age: 79
End: 2024-05-28
Payer: MEDICARE

## 2024-05-28 VITALS
BODY MASS INDEX: 26.85 KG/M2 | HEART RATE: 77 BPM | HEIGHT: 67 IN | DIASTOLIC BLOOD PRESSURE: 75 MMHG | WEIGHT: 171.08 LBS | RESPIRATION RATE: 16 BRPM | OXYGEN SATURATION: 96 % | TEMPERATURE: 97.8 F | SYSTOLIC BLOOD PRESSURE: 114 MMHG

## 2024-05-28 DIAGNOSIS — C67.9 UROTHELIAL CARCINOMA OF BLADDER WITHOUT INVASION OF MUSCLE (H): Primary | ICD-10-CM

## 2024-05-28 PROCEDURE — 250N000009 HC RX 250: Performed by: NURSE ANESTHETIST, CERTIFIED REGISTERED

## 2024-05-28 PROCEDURE — A9589 INSTI HEXAMINOLEVULINATE HCL: HCPCS | Performed by: UROLOGY

## 2024-05-28 PROCEDURE — 710N000012 HC RECOVERY PHASE 2, PER MINUTE: Performed by: UROLOGY

## 2024-05-28 PROCEDURE — 99100 ANES PT EXTEME AGE<1 YR&>70: CPT | Performed by: ANESTHESIOLOGY

## 2024-05-28 PROCEDURE — 360N000077 HC SURGERY LEVEL 4, PER MIN: Performed by: UROLOGY

## 2024-05-28 PROCEDURE — 272N000001 HC OR GENERAL SUPPLY STERILE: Performed by: UROLOGY

## 2024-05-28 PROCEDURE — 88305 TISSUE EXAM BY PATHOLOGIST: CPT | Mod: TC | Performed by: UROLOGY

## 2024-05-28 PROCEDURE — 370N000017 HC ANESTHESIA TECHNICAL FEE, PER MIN: Performed by: UROLOGY

## 2024-05-28 PROCEDURE — C1758 CATHETER, URETERAL: HCPCS | Performed by: UROLOGY

## 2024-05-28 PROCEDURE — 710N000010 HC RECOVERY PHASE 1, LEVEL 2, PER MIN: Performed by: UROLOGY

## 2024-05-28 PROCEDURE — 250N000024 HC ISOFLURANE, PER MIN: Performed by: UROLOGY

## 2024-05-28 PROCEDURE — 52234 CYSTOSCOPY AND TREATMENT: CPT | Performed by: ANESTHESIOLOGY

## 2024-05-28 PROCEDURE — 250N000011 HC RX IP 250 OP 636: Performed by: NURSE ANESTHETIST, CERTIFIED REGISTERED

## 2024-05-28 PROCEDURE — 258N000003 HC RX IP 258 OP 636: Performed by: NURSE ANESTHETIST, CERTIFIED REGISTERED

## 2024-05-28 PROCEDURE — 88305 TISSUE EXAM BY PATHOLOGIST: CPT | Mod: 26 | Performed by: PATHOLOGY

## 2024-05-28 PROCEDURE — 250N000013 HC RX MED GY IP 250 OP 250 PS 637: Performed by: ANESTHESIOLOGY

## 2024-05-28 PROCEDURE — 250N000011 HC RX IP 250 OP 636: Performed by: UROLOGY

## 2024-05-28 PROCEDURE — 99100 ANES PT EXTEME AGE<1 YR&>70: CPT | Performed by: NURSE ANESTHETIST, CERTIFIED REGISTERED

## 2024-05-28 PROCEDURE — 999N000141 HC STATISTIC PRE-PROCEDURE NURSING ASSESSMENT: Performed by: UROLOGY

## 2024-05-28 PROCEDURE — 52234 CYSTOSCOPY AND TREATMENT: CPT | Performed by: NURSE ANESTHETIST, CERTIFIED REGISTERED

## 2024-05-28 PROCEDURE — 250N000009 HC RX 250: Performed by: UROLOGY

## 2024-05-28 RX ORDER — TOLTERODINE TARTRATE 2 MG/1
2 TABLET, EXTENDED RELEASE ORAL EVERY 12 HOURS PRN
Qty: 180 TABLET | Refills: 0 | Status: SHIPPED | OUTPATIENT
Start: 2024-05-28 | End: 2024-05-28

## 2024-05-28 RX ORDER — MEPERIDINE HYDROCHLORIDE 25 MG/ML
12.5 INJECTION INTRAMUSCULAR; INTRAVENOUS; SUBCUTANEOUS EVERY 5 MIN PRN
Status: DISCONTINUED | OUTPATIENT
Start: 2024-05-28 | End: 2024-05-28 | Stop reason: HOSPADM

## 2024-05-28 RX ORDER — PROPOFOL 10 MG/ML
INJECTION, EMULSION INTRAVENOUS PRN
Status: DISCONTINUED | OUTPATIENT
Start: 2024-05-28 | End: 2024-05-28

## 2024-05-28 RX ORDER — CEFAZOLIN SODIUM/WATER 2 G/20 ML
2 SYRINGE (ML) INTRAVENOUS SEE ADMIN INSTRUCTIONS
Status: DISCONTINUED | OUTPATIENT
Start: 2024-05-28 | End: 2024-05-28 | Stop reason: HOSPADM

## 2024-05-28 RX ORDER — ALBUTEROL SULFATE 0.83 MG/ML
2.5 SOLUTION RESPIRATORY (INHALATION) EVERY 4 HOURS PRN
Status: DISCONTINUED | OUTPATIENT
Start: 2024-05-28 | End: 2024-05-28 | Stop reason: HOSPADM

## 2024-05-28 RX ORDER — ACETAMINOPHEN 325 MG/1
975 TABLET ORAL ONCE
Status: COMPLETED | OUTPATIENT
Start: 2024-05-28 | End: 2024-05-28

## 2024-05-28 RX ORDER — NALOXONE HYDROCHLORIDE 0.4 MG/ML
0.1 INJECTION, SOLUTION INTRAMUSCULAR; INTRAVENOUS; SUBCUTANEOUS
Status: DISCONTINUED | OUTPATIENT
Start: 2024-05-28 | End: 2024-05-28 | Stop reason: HOSPADM

## 2024-05-28 RX ORDER — SODIUM CHLORIDE, SODIUM LACTATE, POTASSIUM CHLORIDE, CALCIUM CHLORIDE 600; 310; 30; 20 MG/100ML; MG/100ML; MG/100ML; MG/100ML
INJECTION, SOLUTION INTRAVENOUS CONTINUOUS PRN
Status: DISCONTINUED | OUTPATIENT
Start: 2024-05-28 | End: 2024-05-28

## 2024-05-28 RX ORDER — CEFAZOLIN SODIUM/WATER 2 G/20 ML
2 SYRINGE (ML) INTRAVENOUS
Status: COMPLETED | OUTPATIENT
Start: 2024-05-28 | End: 2024-05-28

## 2024-05-28 RX ORDER — ONDANSETRON 2 MG/ML
4 INJECTION INTRAMUSCULAR; INTRAVENOUS EVERY 30 MIN PRN
Status: DISCONTINUED | OUTPATIENT
Start: 2024-05-28 | End: 2024-05-28 | Stop reason: HOSPADM

## 2024-05-28 RX ORDER — HYDROMORPHONE HCL IN WATER/PF 6 MG/30 ML
0.2 PATIENT CONTROLLED ANALGESIA SYRINGE INTRAVENOUS EVERY 5 MIN PRN
Status: DISCONTINUED | OUTPATIENT
Start: 2024-05-28 | End: 2024-05-28 | Stop reason: HOSPADM

## 2024-05-28 RX ORDER — LABETALOL HYDROCHLORIDE 5 MG/ML
5 INJECTION, SOLUTION INTRAVENOUS EVERY 5 MIN PRN
Status: DISCONTINUED | OUTPATIENT
Start: 2024-05-28 | End: 2024-05-28 | Stop reason: HOSPADM

## 2024-05-28 RX ORDER — FENTANYL CITRATE 50 UG/ML
25 INJECTION, SOLUTION INTRAMUSCULAR; INTRAVENOUS EVERY 5 MIN PRN
Status: DISCONTINUED | OUTPATIENT
Start: 2024-05-28 | End: 2024-05-28 | Stop reason: HOSPADM

## 2024-05-28 RX ORDER — ONDANSETRON 4 MG/1
4 TABLET, ORALLY DISINTEGRATING ORAL EVERY 30 MIN PRN
Status: DISCONTINUED | OUTPATIENT
Start: 2024-05-28 | End: 2024-05-28 | Stop reason: HOSPADM

## 2024-05-28 RX ORDER — LIDOCAINE HYDROCHLORIDE 20 MG/ML
JELLY TOPICAL PRN
Status: DISCONTINUED | OUTPATIENT
Start: 2024-05-28 | End: 2024-05-28 | Stop reason: HOSPADM

## 2024-05-28 RX ORDER — HYDROMORPHONE HCL IN WATER/PF 6 MG/30 ML
0.4 PATIENT CONTROLLED ANALGESIA SYRINGE INTRAVENOUS EVERY 5 MIN PRN
Status: DISCONTINUED | OUTPATIENT
Start: 2024-05-28 | End: 2024-05-28 | Stop reason: HOSPADM

## 2024-05-28 RX ORDER — OXYBUTYNIN CHLORIDE 5 MG/1
5 TABLET, EXTENDED RELEASE ORAL DAILY
Qty: 12 TABLET | Refills: 0 | Status: SHIPPED | OUTPATIENT
Start: 2024-05-28 | End: 2024-09-06

## 2024-05-28 RX ORDER — FENTANYL CITRATE 50 UG/ML
25 INJECTION, SOLUTION INTRAMUSCULAR; INTRAVENOUS
Status: DISCONTINUED | OUTPATIENT
Start: 2024-05-28 | End: 2024-05-28 | Stop reason: HOSPADM

## 2024-05-28 RX ORDER — OXYCODONE HYDROCHLORIDE 5 MG/1
5 TABLET ORAL EVERY 4 HOURS PRN
Status: DISCONTINUED | OUTPATIENT
Start: 2024-05-28 | End: 2024-05-28 | Stop reason: HOSPADM

## 2024-05-28 RX ORDER — FENTANYL CITRATE 50 UG/ML
50 INJECTION, SOLUTION INTRAMUSCULAR; INTRAVENOUS EVERY 5 MIN PRN
Status: DISCONTINUED | OUTPATIENT
Start: 2024-05-28 | End: 2024-05-28 | Stop reason: HOSPADM

## 2024-05-28 RX ORDER — DEXAMETHASONE SODIUM PHOSPHATE 4 MG/ML
4 INJECTION, SOLUTION INTRA-ARTICULAR; INTRALESIONAL; INTRAMUSCULAR; INTRAVENOUS; SOFT TISSUE
Status: DISCONTINUED | OUTPATIENT
Start: 2024-05-28 | End: 2024-05-28 | Stop reason: HOSPADM

## 2024-05-28 RX ORDER — HYDRALAZINE HYDROCHLORIDE 20 MG/ML
2.5-5 INJECTION INTRAMUSCULAR; INTRAVENOUS EVERY 10 MIN PRN
Status: DISCONTINUED | OUTPATIENT
Start: 2024-05-28 | End: 2024-05-28 | Stop reason: HOSPADM

## 2024-05-28 RX ORDER — OXYCODONE HYDROCHLORIDE 10 MG/1
10 TABLET ORAL EVERY 4 HOURS PRN
Status: DISCONTINUED | OUTPATIENT
Start: 2024-05-28 | End: 2024-05-28 | Stop reason: HOSPADM

## 2024-05-28 RX ORDER — LIDOCAINE HYDROCHLORIDE 20 MG/ML
INJECTION, SOLUTION INFILTRATION; PERINEURAL PRN
Status: DISCONTINUED | OUTPATIENT
Start: 2024-05-28 | End: 2024-05-28

## 2024-05-28 RX ORDER — FENTANYL CITRATE 50 UG/ML
INJECTION, SOLUTION INTRAMUSCULAR; INTRAVENOUS PRN
Status: DISCONTINUED | OUTPATIENT
Start: 2024-05-28 | End: 2024-05-28

## 2024-05-28 RX ORDER — ONDANSETRON 2 MG/ML
INJECTION INTRAMUSCULAR; INTRAVENOUS PRN
Status: DISCONTINUED | OUTPATIENT
Start: 2024-05-28 | End: 2024-05-28

## 2024-05-28 RX ORDER — SODIUM CHLORIDE, SODIUM LACTATE, POTASSIUM CHLORIDE, CALCIUM CHLORIDE 600; 310; 30; 20 MG/100ML; MG/100ML; MG/100ML; MG/100ML
INJECTION, SOLUTION INTRAVENOUS CONTINUOUS
Status: DISCONTINUED | OUTPATIENT
Start: 2024-05-28 | End: 2024-05-28 | Stop reason: HOSPADM

## 2024-05-28 RX ADMIN — HEXAMINOLEVULINATE HYDROCHLORIDE 100 MG: KIT at 12:39

## 2024-05-28 RX ADMIN — FENTANYL CITRATE 100 MCG: 50 INJECTION INTRAMUSCULAR; INTRAVENOUS at 14:05

## 2024-05-28 RX ADMIN — ACETAMINOPHEN 975 MG: 325 TABLET, FILM COATED ORAL at 12:09

## 2024-05-28 RX ADMIN — ONDANSETRON 4 MG: 2 INJECTION INTRAMUSCULAR; INTRAVENOUS at 15:00

## 2024-05-28 RX ADMIN — SODIUM CHLORIDE, POTASSIUM CHLORIDE, SODIUM LACTATE AND CALCIUM CHLORIDE: 600; 310; 30; 20 INJECTION, SOLUTION INTRAVENOUS at 13:57

## 2024-05-28 RX ADMIN — Medication 50 MG: at 14:06

## 2024-05-28 RX ADMIN — PHENYLEPHRINE HYDROCHLORIDE 200 MCG: 10 INJECTION INTRAVENOUS at 14:43

## 2024-05-28 RX ADMIN — Medication 2 G: at 14:00

## 2024-05-28 RX ADMIN — PHENYLEPHRINE HYDROCHLORIDE 100 MCG: 10 INJECTION INTRAVENOUS at 14:37

## 2024-05-28 RX ADMIN — SUGAMMADEX 200 MG: 100 INJECTION, SOLUTION INTRAVENOUS at 14:59

## 2024-05-28 RX ADMIN — LIDOCAINE HYDROCHLORIDE 80 MG: 20 INJECTION, SOLUTION INFILTRATION; PERINEURAL at 14:05

## 2024-05-28 RX ADMIN — PROPOFOL 140 MG: 10 INJECTION, EMULSION INTRAVENOUS at 14:05

## 2024-05-28 RX ADMIN — PHENYLEPHRINE HYDROCHLORIDE 100 MCG: 10 INJECTION INTRAVENOUS at 14:28

## 2024-05-28 ASSESSMENT — LIFESTYLE VARIABLES: TOBACCO_USE: 1

## 2024-05-28 ASSESSMENT — ACTIVITIES OF DAILY LIVING (ADL)
ADLS_ACUITY_SCORE: 30
ADLS_ACUITY_SCORE: 29
ADLS_ACUITY_SCORE: 30
ADLS_ACUITY_SCORE: 29
ADLS_ACUITY_SCORE: 30
ADLS_ACUITY_SCORE: 29
ADLS_ACUITY_SCORE: 29

## 2024-05-28 ASSESSMENT — ENCOUNTER SYMPTOMS
SEIZURES: 0
ORTHOPNEA: 0
DYSRHYTHMIAS: 1

## 2024-05-28 ASSESSMENT — COPD QUESTIONNAIRES: COPD: 0

## 2024-05-28 NOTE — DISCHARGE INSTRUCTIONS
Contacting your Doctor -   To contact a doctor, call [facility] @ [phone] or:  [phone] and ask for the resident on call for Urology (answered 24 hours a day)   Emergency Department:  [facility]: [phone]  [facility]: [phone] 911 if you are in need of immediate or emergent help

## 2024-05-28 NOTE — ANESTHESIA CARE TRANSFER NOTE
Patient: Alex Stanley    Procedure: Procedure(s):  Blue light CYSTOSCOPY, WITH TRANSURETHRAL RESECTION BLADDER TUMOR       Diagnosis: Urothelial carcinoma of bladder without invasion of muscle (H) [C67.9]  Diagnosis Additional Information: No value filed.    Anesthesia Type:   General     Note:    Oropharynx: oropharynx clear of all foreign objects  Level of Consciousness: awake  Oxygen Supplementation: face mask    Independent Airway: airway patency satisfactory and stable  Dentition: dentition unchanged  Vital Signs Stable: post-procedure vital signs reviewed and stable  Report to RN Given: handoff report given  Patient transferred to: PACU    Handoff Report: Identifed the Patient, Identified the Reponsible Provider, Reviewed the pertinent medical history, Discussed the surgical course, Reviewed Intra-OP anesthesia mangement and issues during anesthesia, Set expectations for post-procedure period and Allowed opportunity for questions and acknowledgement of understanding  Vitals:  Vitals Value Taken Time   /63 05/28/24 1515   Temp     Pulse 82 05/28/24 1517   Resp 16 05/28/24 1517   SpO2 97 % 05/28/24 1517   Vitals shown include unfiled device data.    Electronically Signed By: LAST Gordon CRNA  May 28, 2024  3:18 PM

## 2024-05-28 NOTE — ANESTHESIA PROCEDURE NOTES
Airway       Patient location during procedure: OR       Procedure Start/Stop Times: 5/28/2024 2:09 PM  Staff -        CRNA: Micaela Easley APRN CRNA       Performed By: CRNA  Consent for Airway        Urgency: elective  Indications and Patient Condition       Indications for airway management: eze-procedural       Induction type:intravenous       Mask difficulty assessment: 2 - vent by mask + OA or adjuvant +/- NMBA    Final Airway Details       Final airway type: endotracheal airway       Successful airway: ETT - single  Endotracheal Airway Details        ETT size (mm): 8.0       Cuffed: yes       Successful intubation technique: direct laryngoscopy       DL Blade Type: MAC 3       Grade View of Cords: 1       Adjucts: stylet       Position: Right       Measured from: gums/teeth       Secured at (cm): 23       Bite block used: None    Post intubation assessment        Placement verified by: capnometry, equal breath sounds and chest rise        Number of attempts at approach: 1       Number of other approaches attempted: 0       Secured with: tape       Ease of procedure: easy       Dentition: Intact and Unchanged    Medication(s) Administered   Medication Administration Time: 5/28/2024 2:09 PM

## 2024-05-28 NOTE — OP NOTE
PATIENT NAME:Alex Stanley   MEDICAL RECORD NUMBER: [id number]   SURGEON: Sydney Weaver MD  ASSISTANT: LORENZO   PREOPERATIVE DIAGNOSIS: Bladder Tumor (Size 2-5 cm)  POSTOPERATIVE DIAGNOSIS: Same    PROCEDURE PERFORMED: Blue light cystoscopy, transurethral resection of bladder tumor and cauterization   ANESTHESIA: LMA  COMPLICATIONS: None.   OPERATIVE FINDINGS: Blue light positive papillary changes along the edges of the diverticulum on the right lateral wall, also blue light positive lesion on the prior resection site on the right lateral wall posterior to right ureteral orifice and also posterior dome      SPECIMEN: Right lateral wall diverticulum BL+ WL+; posterior dome WL- BL+ and right lateral wall WL-BL+   EBL (mL): Minimal   INDICATIONS FOR PROCEDURE: Alex Stanley is 78 year old male with history of NMIBC found to have suspicious lesions during office cystoscopy who is here for the above mentioned procedure  DETAILS OF PROCEDURE: The risks and benefits of the procedure were explained in detail to patient and informed consent was obtained. The patient was brought to the operating room and placed supine on the operating table where he underwent general anesthesia. he was then positioned in dorsal lithotomy position.  The patient was prepped and draped in standard sterile fashion.   After an appropriate timeout, I introduced the 26-French rigid resectoscope through the urethra and into the bladder and performed a complete cystoscopy. Findings were described above. We then completely resected the described areas down to muscle, assuring some muscularis propria would be in the section. We evacuated the bladder chips after eah resection and sent them to pathology separately for evaluation. There was excellent hemostasis. Patient was then awakened and transported to PACU in stable condition. There was no complications.     Plan  Patient needs to void prior to discharge   Follow-up as needed     Sydney Weaver MD    Department of Urology   [facility]

## 2024-05-28 NOTE — ANESTHESIA PREPROCEDURE EVALUATION
Pre-Operative H & P     CC:  Preoperative exam to assess for increased cardiopulmonary risk while undergoing surgery and anesthesia.    Date of Encounter: 5/16/2024  Primary Care Physician:  Estuardo Manning     Reason for visit:   No diagnosis found.      HPI  Alex Stanley is a 78 year old male who presents for pre-operative H & P in preparation for  Procedure Information       Case: [id number] Date/Time: 05/28/24 1320    Procedure: Blue light CYSTOSCOPY, WITH TRANSURETHRAL RESECTION BLADDER TUMOR (Urethra)    Anesthesia type: General    Diagnosis: Urothelial carcinoma of bladder without invasion of muscle (H) [C67.9]    Pre-op diagnosis: Urothelial carcinoma of bladder without invasion of muscle (H) [C67.9]    Location: [facility]  /  OR    Providers: Sydney Weaver MD            Alex Stanley is a 78 year old male with PMH significant for HTN, HL, PAF on eliquis, tobacco dependence, CKD 3, CVA, anemia, hx of ETOH abuse, fatty liver, diverticulosis, erosive gastritis, and bladder tumor. He was initially evaluated by Dr. Weaver in the ER in regard to occurrence of gross hematuria. Underwent cystoscopy 8/17/2023 findings showed papillary lesions in the bladder concerning for urothelial carcinoma. He underwent Cystoscopy with TURBT and intravesical installation of chemotherapy medication on 9/6/2023 and most recently a TURBT on 1/10/24. He most recently completed a cystoscopy on 4/12/24 which was concerning for recurrence. He is now scheduled for the above procedure for further management.     History is obtained from the patient and chart review    Hx of abnormal bleeding or anti-platelet use: Eliquis      Past Medical History  Past Medical History:   Diagnosis Date    Alcohol abuse     Alcoholism (H)     admission 11/09    Atrial fibrillation, paroxysmal 11/07, 6/09, 11/09    Basal cell carcinoma     Benign essential hypertension     Chronic atrial fibrillation (H)     Diverticulosis of colon 06/02/2011     Erosive gastritis 11/2009    Gastritis     Hyperlipidemia LDL goal <100     Hypertension, with LVH     Iron deficiency anemia 11/2009    Paroxysmal atrial fibrillation (H)     Stroke (H)     Tobacco abuse        Past Surgical History  Past Surgical History:   Procedure Laterality Date    COLONOSCOPY N/A 10/26/2020    Procedure: COLONOSCOPY, WITH POLYPECTOMY AND BIOPSY;  Surgeon: Shon Bond MD;  Location:  GI    CYSTOSCOPY, TRANSURETHRAL RESECTION (TUR) TUMOR BLADDER, COMBINED N/A 9/6/2023    Procedure: Blue light CYSTOSCOPY, WITH TRANSURETHRAL RESECTION BLADDER TUMOR, urethral dilation;  Surgeon: Sydney Weaver MD;  [place] [facility] [place]    ENDOSCOPIC ULTRASOUND UPPER GASTROINTESTINAL TRACT (GI) N/A 1/24/2022    Procedure: ENDOSCOPIC ULTRASOUND, ESOPHAGOSCOPY / UPPER GASTROINTESTINAL TRACT (GI);  Surgeon: Shon Bond MD;  Location:  GI    LAPAROSCOPIC CHOLECYSTECTOMY N/A 2/8/2022    Procedure: LAPAROSCOPIC CHOLECYSTECTOMY;  Surgeon: Mazin Bains MD;  Location:  OR    SURGICAL HISTORY OF -   4/11    R Dupuytren's contracture release   Dr. Scruggs    TRANSURETHRAL RESECTION (TUR) TUMOR BLADDER INSTILL OPTICAL AGENT N/A 1/10/2024    Procedure: BLUE LIGHT, CYSTOSCOPY, WITH TRANSURETHRAL RESECTION BLADDER TUMOR;  Surgeon: Sydney Weaver MD;  [place] [facility] [place]    ZZC NONSPECIFIC PROCEDURE  2002    Rt foot reconstructive    ZZC NONSPECIFIC PROCEDURE  10/12    R hand procedure.  Dr. Deluna       Prior to Admission Medications  No current outpatient medications on file.       Allergies  No Known Allergies    Social History  Social History     Socioeconomic History    Marital status:      Spouse name: Not on file    Number of children: Not on file    Years of education: Not on file    Highest education level: Not on file   Occupational History    Not on file   Tobacco Use    Smoking status: Every Day     Current packs/day: 0.50     Average packs/day: 0.5 packs/day for 59.4 years (29.7 ttl  pk-yrs)     Types: Cigarettes     Start date: 1965    Smokeless tobacco: Never    Tobacco comments:     ppd since age 16 - now 1/2-1 ppd 04/05/21.  8-10 cigarettes daily   Vaping Use    Vaping status: Never Used   Substance and Sexual Activity    Alcohol use: Not Currently     Comment: last drank at [facility] 8/2010.    Drug use: Never    Sexual activity: Yes     Partners: Female   Other Topics Concern    Parent/sibling w/ CABG, MI or angioplasty before 65F 55M? Not Asked   Social History Narrative    ** Merged History Encounter **          Social Determinants of Health     Financial Resource Strain: Low Risk  (3/4/2024)    Financial Resource Strain     Within the past 12 months, have you or your family members you live with been unable to get utilities (heat, electricity) when it was really needed?: No   Food Insecurity: Low Risk  (3/4/2024)    Food Insecurity     Within the past 12 months, did you worry that your food would run out before you got money to buy more?: No     Within the past 12 months, did the food you bought just not last and you didn t have money to get more?: No   Transportation Needs: Low Risk  (3/4/2024)    Transportation Needs     Within the past 12 months, has lack of transportation kept you from medical appointments, getting your medicines, non-medical meetings or appointments, work, or from getting things that you need?: No   Physical Activity: Sufficiently Active (3/4/2024)    Exercise Vital Sign     Days of Exercise per Week: 5 days     Minutes of Exercise per Session: 30 min   Stress: No Stress Concern Present (3/4/2024)    [employer] - Occupational Stress Questionnaire     Feeling of Stress : Not at all   Social Connections: Not on file   Interpersonal Safety: Low Risk  (3/4/2024)    Interpersonal Safety     Do you feel physically and emotionally safe where you currently live?: Yes     Within the past 12 months, have you been hit, slapped, kicked or otherwise  physically hurt by someone?: No     Within the past 12 months, have you been humiliated or emotionally abused in other ways by your partner or ex-partner?: No   Housing Stability: Low Risk  (3/4/2024)    Housing Stability     Do you have housing? : Yes     Are you worried about losing your housing?: No       Family History  Family History   Problem Relation Age of Onset    Lung Cancer Mother     Cancer Mother         small cell lung with mets to brain    Emphysema Father     Respiratory Father         emphysema    Diabetes Maternal Grandmother     Anesthesia Reaction No family hx of     Venous thrombosis No family hx of        Review of Systems  The complete review of systems is negative other than noted in the HPI or here.   Anesthesia Evaluation   Pt has had prior anesthetic. Type: General.    No history of anesthetic complications       ROS/MED HX  ENT/Pulmonary:     (+)     REGGIE risk factors, snores loudly, hypertension,         tobacco use, Current use, 0.5 packs/day, 40  Pack-Year Hx,  patient smoked within 24 hours,                 (-) asthma, COPD and recent URI   Neurologic:     (+)          CVA, date: 2011, without deficits,                 (-) no seizures   Cardiovascular:     (+) Dyslipidemia hypertension- -   -  - -   Taking blood thinners  Instructions Given to patient: holding 48 hours prior to DOS.                   dysrhythmias, a-fib,        Previous cardiac testing   Echo: Date: 10/2022 Results:  Interpretation Summary     Left ventricular systolic function is low normal.  The visual ejection fraction is 50-55%.  The left atrium is moderate to severely dilated.  No hemodynamically significant valvular abnormalities on 2D or color flow  imaging.    Stress Test:  Date: 10/2022 Results:        The nuclear stress test is abnormal.     There is a small area of mild ischemia in the apical segment(s) of the left ventricle.     Left ventricular function is normal.     The left ventricular ejection fraction  at stress is 58%.     There is no prior study for comparison    ECG Reviewed:  Date: Results:    Cath:  Date: Results:   (-) CAD, PIERRE, orthopnea/PND and irregular heartbeat/palpitations   METS/Exercise Tolerance: >4 METS Comment: Walking continuously x 20-30 mins, climbing stairs in home, and yard work without exertional symptoms.    Hematologic:     (+)      anemia,       (-) history of blood clots and history of blood transfusion   Musculoskeletal:  - neg musculoskeletal ROS     GI/Hepatic: Comment: Fatty Liver   Hx of Diverticulosis  Hx of Erosive gastritis  Gall stones    (+) GERD (Rare symptoms 1-2 times per month relieved with Tums), Asymptomatic on medication,        cholecystitis/cholelithiasis,   liver disease,       Renal/Genitourinary: Comment: Bladder tumor  CKD 3    (+) renal disease, type: CRI, Pt does not require dialysis,           Endo:  - neg endo ROS  (-) Type II DM   Psychiatric/Substance Use: Comment: ETOH abuse in remission since 2010    (+)   alcohol abuse (in remission since 2010)      Infectious Disease:  - neg infectious disease ROS  (-) Recent Fever   Malignancy:  - neg malignancy ROS (+) Malignancy, History of Other.Other CA urothelial carcinoma Active status post Surgery and Chemo.    Other:            Virtual visit -  No vitals were obtained    Physical Exam  Constitutional: Awake, alert, cooperative, no apparent distress, and appears stated age.  Eyes: Pupils equal  HENT: Normocephalic  Respiratory: non labored breathing   Neurologic: Awake, alert, oriented to name, place and time.   Neuropsychiatric: Calm, cooperative. Normal affect.      Prior Labs/Diagnostic Studies   All labs and imaging personally reviewed     Component      Latest Ref Rng 3/4/2024  9:39 AM   Sodium      135 - 145 mmol/L 140    Potassium      3.4 - 5.3 mmol/L 4.6    Carbon Dioxide (CO2)      22 - 29 mmol/L 24    Anion Gap      7 - 15 mmol/L 9    Urea Nitrogen      8.0 - 23.0 mg/dL 15.4    Creatinine      0.67 -  1.17 mg/dL 1.21 (H)    GFR Estimate      >60 mL/min/1.73m2 62    Calcium      8.8 - 10.2 mg/dL 8.9    Chloride      98 - 107 mmol/L 107    Glucose      70 - 99 mg/dL 99    Alkaline Phosphatase      40 - 150 U/L 87    AST      0 - 45 U/L 33    ALT      0 - 70 U/L 20    Protein Total      6.4 - 8.3 g/dL 6.8    Albumin      3.5 - 5.2 g/dL 4.4    Bilirubin Total      <=1.2 mg/dL 0.6       Legend:  (H) High    Component      Latest Ref Rng 2/29/2024  10:09 AM   Hemoglobin      13.3 - 17.7 g/dL 15.1        Component      Latest Ref Rng 1/2/2024  12:00 PM   WBC      4.0 - 11.0 10e3/uL 10.9    RBC Count      4.40 - 5.90 10e6/uL 4.75    Hemoglobin      13.3 - 17.7 g/dL 14.7    Hematocrit      40.0 - 53.0 % 44.0    MCV      78 - 100 fL 93    MCH      26.5 - 33.0 pg 30.9    MCHC      31.5 - 36.5 g/dL 33.4    RDW      10.0 - 15.0 % 14.8    Platelet Count      150 - 450 10e3/uL 268    % Neutrophils      % 76    % Lymphocytes      % 12    % Monocytes      % 9    % Eosinophils      % 1    % Basophils      % 1    % Immature Granulocytes      % 1    NRBCs per 100 WBC      <1 /100 0    Absolute Neutrophils      1.6 - 8.3 10e3/uL 8.4 (H)    Absolute Lymphocytes      0.8 - 5.3 10e3/uL 1.3    Absolute Monocytes      0.0 - 1.3 10e3/uL 1.0    Absolute Eosinophils      0.0 - 0.7 10e3/uL 0.1    Absolute Basophils      0.0 - 0.2 10e3/uL 0.1    Absolute Immature Granulocytes      <=0.4 10e3/uL 0.1    Absolute NRBCs      10e3/uL 0.0       Legend:  (H) High      EKG/ stress test - if available please see in ROS above     The patient's records and results personally reviewed by this provider.     Outside records reviewed from: Care Everywhere      Assessment    Alex Stanley is a 78 year old male seen as a PAC referral for risk assessment and optimization for anesthesia.    Plan/Recommendations  Pt will be optimized for the proposed procedure.  See below for details on the assessment, risk, and preoperative recommendations    NEUROLOGY    "History of CVA 2011  Without residual deficits   -Post Op delirium risk factors:  Age    ENT  - No current airway concerns.  Will need to be reassessed day of surgery.  Mallampati: Unable to assess  TM: Unable to assess    CARDIAC  - No history of CAD. METS > 4. No anginal symptoms, Denies palpitations, PND, dizziness or syncope.  - PAF - on Eliquis  - Lexiscan for atypical CP (2022) -there is a small area of mild ischemia in the apical segment(s) of the left ventricle.  was also started on Imdur at that time. Has had no further symptoms. Follows with cardiology, last on 10/2/23.   - Hypertension - managed on amlodipine   Well controlled  - Hyperlipidemia  Well controlled on home regimen    - METS (Metabolic Equivalents) > 4. RCRI 1.  Patient performs 4 or more METS exercise without symptoms             Total Score: 0      RCRI-Very low risk: Class 1 0.4% complication rate             Total Score: 0        PULMONARY  REGGIE Medium Risk             Total Score: 3    REGGIE: Hypertension    REGGIE: Over 50 ys old    REGGIE: Male      - Denies asthma or inhaler use  - Current smoker  - Tobacco History    History   Smoking Status    Every Day    Types: Cigarettes   Smokeless Tobacco    Never       GI  PONV Low Risk  Total Score: 0      - Fatty Liver. Normal LFTs 1/2024.   - Hx of Diverticulosis  - Hx of Erosive gastritis  - Gall stones s/p cholecystectomy     /RENAL  - CKD, Baseline Creatinine  1.21    ENDOCRINE    - BMI: Estimated body mass index is 26.79 kg/m  as calculated from the following:    Height as of this encounter: 1.702 m (5' 7\").    Weight as of this encounter: 77.6 kg (171 lb 1.2 oz).  Overweight (BMI 25.0-29.9)  - No history of Diabetes Mellitus    HEME/ONC  VTE High Risk 3%             Total Score: 8    VTE: Greater than 59 yrs old    VTE: Male    VTE: Current cancer      - Coagulopathy second to Apixaban (Eliquis)  Will hold 48 hours prior to procedure.      - Urothelial carcinoma of bladder without invasion of " muscle (see HPI) -  Procedure scheduled as above.        MSK  Patient is NOT Frail             Total Score: 0          Different anesthesia methods/types have been discussed with the patient, but they are aware that the final plan will be decided by the assigned anesthesia provider on the date of service.      The patient is optimized for their procedure. AVS with information on surgery time/arrival time, meds and NPO status given by nursing staff. No further diagnostic testing indicated.    Please refer to the physical examination documented by the anesthesiologist in the anesthesia record on the day of surgery.    Video-Visit Details    Type of service:  Video Visit    Provider received verbal consent for a Video Visit from the patient? Yes     Originating Location (pt. Location): Home    Distant Location (provider location):  Off-site  Mode of Communication:  Video Conference via [employer]  On the day of service:     Prep time: 12 minutes  Visit time: 8 minutes  Documentation time: 6 minutes  ------------------------------------------  Total time: 26 minutes      LAST Sotomayor CNP  Preoperative Assessment Center  [facility]  Phone: [phone]  Fax: [phone]    Physical Exam    Airway        Mallampati: II   TM distance: > 3 FB   Neck ROM: full   Mouth opening: > 3 cm    Respiratory Devices and Support         Dental       (+) Modest Abnormalities - crowns, retainers, 1 or 2 missing teeth      Cardiovascular             Pulmonary                   Anesthesia Plan    ASA Status:  3    NPO Status:  NPO Appropriate    Anesthesia Type: General.     - Airway: ETT   Induction: Intravenous, Propofol.   Maintenance: Balanced.        Consents    Anesthesia Plan(s) and associated risks, benefits, and realistic alternatives discussed. Questions answered and patient/representative(s) expressed understanding.     - Discussed:     - Discussed with:  Patient      - Extended  Intubation/Ventilatory Support Discussed: No.      - Patient is DNR/DNI Status: No          Postoperative Care    Pain management: IV analgesics, Oral pain medications, Multi-modal analgesia.   PONV prophylaxis: Ondansetron (or other 5HT-3), Dexamethasone or Solumedrol     Comments:

## 2024-06-07 ENCOUNTER — OFFICE VISIT (OUTPATIENT)
Dept: ONCOLOGY | Facility: CLINIC | Age: 79
End: 2024-06-07
Attending: UROLOGY
Payer: MEDICARE

## 2024-06-07 VITALS
RESPIRATION RATE: 16 BRPM | WEIGHT: 169.4 LBS | TEMPERATURE: 97 F | BODY MASS INDEX: 26.53 KG/M2 | HEART RATE: 71 BPM | OXYGEN SATURATION: 98 % | SYSTOLIC BLOOD PRESSURE: 113 MMHG | DIASTOLIC BLOOD PRESSURE: 72 MMHG

## 2024-06-07 DIAGNOSIS — C67.9 UROTHELIAL CARCINOMA OF BLADDER WITHOUT INVASION OF MUSCLE (H): Primary | ICD-10-CM

## 2024-06-07 PROCEDURE — 99214 OFFICE O/P EST MOD 30 MIN: CPT | Performed by: UROLOGY

## 2024-06-07 PROCEDURE — G0463 HOSPITAL OUTPT CLINIC VISIT: HCPCS | Performed by: UROLOGY

## 2024-06-07 ASSESSMENT — PAIN SCALES - GENERAL: PAINLEVEL: NO PAIN (0)

## 2024-06-07 NOTE — PROGRESS NOTES
"Urology Clinic     HPI  Alex Stanley is a 78 year old male with history of low-grade Ta urothelial carcinoma the bladder, here for follow-up.      The patient denies any significant recovery issues after surgery  No changes to health, hospitalizations or new diagnoses in the interim    PHYSICAL EXAM  /72   Pulse 71   Temp 97  F (36.1  C) (Oral)   Resp 16   Wt 76.8 kg (169 lb 6.4 oz)   SpO2 98%   BMI 26.53 kg/m     Constitutional: AO, pleasant, NAD  Resp: Non-labored breathing on room air  Abd: Soft, NT, ND  DAE: Deferred     Labs  Lab Results   Component Value Date    WBC 9.9 05/21/2024    WBC 8.3 10/08/2012     Lab Results   Component Value Date    RBC 5.20 05/21/2024    RBC 4.80 10/08/2012     Lab Results   Component Value Date    HGB 16.0 05/21/2024    HGB 14.1 06/14/2018     Lab Results   Component Value Date    HCT 49.0 05/21/2024    HCT 44.3 10/08/2012     No components found for: \"MCT\"  Lab Results   Component Value Date    MCV 94 05/21/2024    MCV 92 10/08/2012     Lab Results   Component Value Date    MCH 30.8 05/21/2024    MCH 30.2 10/08/2012     Lab Results   Component Value Date    MCHC 32.7 05/21/2024    MCHC 32.7 10/08/2012     Lab Results   Component Value Date    RDW 14.1 05/21/2024    RDW 14.1 10/08/2012     Lab Results   Component Value Date     05/21/2024     10/08/2012        Last Comprehensive Metabolic Panel:  Sodium   Date Value Ref Range Status   05/21/2024 145 135 - 145 mmol/L Final   10/01/2020 140 133 - 144 mmol/L Final     Potassium   Date Value Ref Range Status   05/21/2024 4.7 3.4 - 5.3 mmol/L Final   10/01/2020 3.9 3.4 - 5.3 mmol/L Final     Chloride   Date Value Ref Range Status   05/21/2024 109 94 - 109 mmol/L Final   10/01/2020 108 94 - 109 mmol/L Final     Carbon Dioxide   Date Value Ref Range Status   10/01/2020 28 20 - 32 mmol/L Final     Carbon Dioxide (CO2)   Date Value Ref Range Status   05/21/2024 30 20 - 32 mmol/L Final     Anion Gap   Date " Value Ref Range Status   05/21/2024 6 3 - 14 mmol/L Final   10/01/2020 4 3 - 14 mmol/L Final     Glucose   Date Value Ref Range Status   05/21/2024 107 (H) 70 - 99 mg/dL Final   10/01/2020 70 70 - 99 mg/dL Final     Comment:     Fasting specimen     Urea Nitrogen   Date Value Ref Range Status   05/21/2024 15 7 - 30 mg/dL Final   10/01/2020 14 7 - 30 mg/dL Final     Creatinine   Date Value Ref Range Status   05/21/2024 1.20 0.66 - 1.25 mg/dL Final   10/01/2020 1.26 (H) 0.66 - 1.25 mg/dL Final     GFR Estimate   Date Value Ref Range Status   05/21/2024 63 >60 mL/min/1.73m2 Final   10/01/2020 56 (L) >60 mL/min/[1.73_m2] Final     Comment:     Non  GFR Calc  Starting 12/18/2018, serum creatinine based estimated GFR (eGFR) will be   calculated using the Chronic Kidney Disease Epidemiology Collaboration   (CKD-EPI) equation.       Calcium   Date Value Ref Range Status   05/21/2024 9.8 8.5 - 10.1 mg/dL Final   10/01/2020 8.8 8.5 - 10.1 mg/dL Final     Bilirubin Total   Date Value Ref Range Status   05/21/2024 0.8 0.2 - 1.3 mg/dL Final   10/01/2020 0.7 0.2 - 1.3 mg/dL Final     Alkaline Phosphatase   Date Value Ref Range Status   05/21/2024 85 40 - 150 U/L Final   10/01/2020 74 40 - 150 U/L Final     ALT   Date Value Ref Range Status   05/21/2024 25 0 - 70 U/L Final   10/01/2020 24 0 - 70 U/L Final     AST   Date Value Ref Range Status   05/21/2024 31 0 - 45 U/L Final   10/01/2020 23 0 - 45 U/L Final       PSA   Date Value Ref Range Status   06/01/2011 0.12 0 - 4 ug/L Final   05/15/2009 0.43 0 - 4 ug/L Final     Prostate Specific Antigen Screen   Date Value Ref Range Status   02/27/2023 0.26 0.00 - 6.50 ng/mL Final     Surgical pathology  Final Diagnosis   A. right bladder wall diverticulum - white light positive, blue light positive:  -Bladder mucosa with chronic inflammation, von Brunn nests, negative for dysplasia or malignancy  -Muscularis propria identified     B. posterior dome - white light negative,  blue light positive:  -Bladder mucosa with chronic inflammation,  negative for dysplasia or malignancy  -Muscularis propria identified     C. right lateral baldder wall tumor - white light negative, blue light positive:  -Bladder mucosa with focal denudation granulation tissue formation, negative for dysplasia or malignancy  -No muscularis propria identified   Electronically signed by Alphonso Shields MD on 5/31/2024 at  9:53 AM       ASSESSMENT AND PLAN  78 year old male for followup of low-grade Ta   Urothelial carcinoma the bladder status post TURBT with benign pathology    I explained to Tejas and his wife that it is safe to continue with surveillance cystoscopy in 6 months but he feels more comfortable with a closer interval at least for the first surveillance cystoscopy.    Plan   Follow-up in 3 months for surveillance cystoscopy    30 total minutes spent on the date of the encounter including direct interaction with the patient, performing chart review, documentation and further activities as noted above    Sydney Weaver MD   Department of Urology   [facility]

## 2024-06-07 NOTE — NURSING NOTE
"Oncology Rooming Note    June 7, 2024 9:34 AM   Alex Stanley is a 78 year old male who presents for:    Chief Complaint   Patient presents with    Oncology Clinic Visit     Initial Vitals: /72   Pulse 71   Temp 97  F (36.1  C) (Oral)   Resp 16   Wt 76.8 kg (169 lb 6.4 oz)   SpO2 98%   BMI 26.53 kg/m   Estimated body mass index is 26.53 kg/m  as calculated from the following:    Height as of 5/28/24: 1.702 m (5' 7\").    Weight as of this encounter: 76.8 kg (169 lb 6.4 oz). Body surface area is 1.91 meters squared.  No Pain (0) Comment: Data Unavailable   No LMP for male patient.  Allergies reviewed: Yes  Medications reviewed: Yes    Medications: Medication refills not needed today.  Pharmacy name entered into [employer]:    [facility] [place], [place] - [address] [place] [address]  [facility] - [address]  [facility] - [address]    Frailty Screening:   Is the patient here for a new oncology consult visit in cancer care? 2. No      Clinical concerns: follow up        Val Barney            "

## 2024-06-07 NOTE — LETTER
"6/7/2024      Alex Stanley  [address] [facility] [address]      Dear Colleague,    Thank you for referring your patient, Alex Stanley, to the [facility]. Please see a copy of my visit note below.    Urology Clinic     HPI  Alex Stanley is a 78 year old male with history of low-grade Ta urothelial carcinoma the bladder, here for follow-up.      The patient denies any significant recovery issues after surgery  No changes to health, hospitalizations or new diagnoses in the interim    PHYSICAL EXAM  /72   Pulse 71   Temp 97  F (36.1  C) (Oral)   Resp 16   Wt 76.8 kg (169 lb 6.4 oz)   SpO2 98%   BMI 26.53 kg/m     Constitutional: AO, pleasant, NAD  Resp: Non-labored breathing on room air  Abd: Soft, NT, ND  DAE: Deferred     Labs  Lab Results   Component Value Date    WBC 9.9 05/21/2024    WBC 8.3 10/08/2012     Lab Results   Component Value Date    RBC 5.20 05/21/2024    RBC 4.80 10/08/2012     Lab Results   Component Value Date    HGB 16.0 05/21/2024    HGB 14.1 06/14/2018     Lab Results   Component Value Date    HCT 49.0 05/21/2024    HCT 44.3 10/08/2012     No components found for: \"MCT\"  Lab Results   Component Value Date    MCV 94 05/21/2024    MCV 92 10/08/2012     Lab Results   Component Value Date    MCH 30.8 05/21/2024    MCH 30.2 10/08/2012     Lab Results   Component Value Date    MCHC 32.7 05/21/2024    MCHC 32.7 10/08/2012     Lab Results   Component Value Date    RDW 14.1 05/21/2024    RDW 14.1 10/08/2012     Lab Results   Component Value Date     05/21/2024     10/08/2012        Last Comprehensive Metabolic Panel:  Sodium   Date Value Ref Range Status   05/21/2024 145 135 - 145 mmol/L Final   10/01/2020 140 133 - 144 mmol/L Final     Potassium   Date Value Ref Range Status   05/21/2024 4.7 3.4 - 5.3 mmol/L Final   10/01/2020 3.9 3.4 - 5.3 mmol/L Final     Chloride   Date Value Ref Range Status   05/21/2024 109 94 - 109 mmol/L " Final   10/01/2020 108 94 - 109 mmol/L Final     Carbon Dioxide   Date Value Ref Range Status   10/01/2020 28 20 - 32 mmol/L Final     Carbon Dioxide (CO2)   Date Value Ref Range Status   05/21/2024 30 20 - 32 mmol/L Final     Anion Gap   Date Value Ref Range Status   05/21/2024 6 3 - 14 mmol/L Final   10/01/2020 4 3 - 14 mmol/L Final     Glucose   Date Value Ref Range Status   05/21/2024 107 (H) 70 - 99 mg/dL Final   10/01/2020 70 70 - 99 mg/dL Final     Comment:     Fasting specimen     Urea Nitrogen   Date Value Ref Range Status   05/21/2024 15 7 - 30 mg/dL Final   10/01/2020 14 7 - 30 mg/dL Final     Creatinine   Date Value Ref Range Status   05/21/2024 1.20 0.66 - 1.25 mg/dL Final   10/01/2020 1.26 (H) 0.66 - 1.25 mg/dL Final     GFR Estimate   Date Value Ref Range Status   05/21/2024 63 >60 mL/min/1.73m2 Final   10/01/2020 56 (L) >60 mL/min/[1.73_m2] Final     Comment:     Non  GFR Calc  Starting 12/18/2018, serum creatinine based estimated GFR (eGFR) will be   calculated using the Chronic Kidney Disease Epidemiology Collaboration   (CKD-EPI) equation.       Calcium   Date Value Ref Range Status   05/21/2024 9.8 8.5 - 10.1 mg/dL Final   10/01/2020 8.8 8.5 - 10.1 mg/dL Final     Bilirubin Total   Date Value Ref Range Status   05/21/2024 0.8 0.2 - 1.3 mg/dL Final   10/01/2020 0.7 0.2 - 1.3 mg/dL Final     Alkaline Phosphatase   Date Value Ref Range Status   05/21/2024 85 40 - 150 U/L Final   10/01/2020 74 40 - 150 U/L Final     ALT   Date Value Ref Range Status   05/21/2024 25 0 - 70 U/L Final   10/01/2020 24 0 - 70 U/L Final     AST   Date Value Ref Range Status   05/21/2024 31 0 - 45 U/L Final   10/01/2020 23 0 - 45 U/L Final       PSA   Date Value Ref Range Status   06/01/2011 0.12 0 - 4 ug/L Final   05/15/2009 0.43 0 - 4 ug/L Final     Prostate Specific Antigen Screen   Date Value Ref Range Status   02/27/2023 0.26 0.00 - 6.50 ng/mL Final     Surgical pathology  Final Diagnosis   A. right  bladder wall diverticulum - white light positive, blue light positive:  -Bladder mucosa with chronic inflammation, von Brunn nests, negative for dysplasia or malignancy  -Muscularis propria identified     B. posterior dome - white light negative, blue light positive:  -Bladder mucosa with chronic inflammation,  negative for dysplasia or malignancy  -Muscularis propria identified     C. right lateral baldder wall tumor - white light negative, blue light positive:  -Bladder mucosa with focal denudation granulation tissue formation, negative for dysplasia or malignancy  -No muscularis propria identified   Electronically signed by Alphonso Shields MD on 5/31/2024 at  9:53 AM       ASSESSMENT AND PLAN  78 year old male for followup of low-grade Ta   Urothelial carcinoma the bladder status post TURBT with benign pathology    I explained to Tejas and his wife that it is safe to continue with surveillance cystoscopy in 6 months but he feels more comfortable with a closer interval at least for the first surveillance cystoscopy.    Plan   Follow-up in 3 months for surveillance cystoscopy    30 total minutes spent on the date of the encounter including direct interaction with the patient, performing chart review, documentation and further activities as noted above    Sydney Weaver MD   Department of Urology   [facility]                   Again, thank you for allowing me to participate in the care of your patient.        Sincerely,        Sydney Weaver MD

## 2024-06-28 ENCOUNTER — TELEPHONE (OUTPATIENT)
Dept: INTERNAL MEDICINE | Facility: CLINIC | Age: 79
End: 2024-06-28
Payer: MEDICARE

## 2024-06-28 NOTE — TELEPHONE ENCOUNTER
Reason for Call:  Appointment Request    Patient requesting this type of appt:  feeling ill    Requested provider: Estuardo Manning    Reason patient unable to be scheduled: Not within requested timeframe    When does patient want to be seen/preferred time:  ASAP for PCP    Comments: Patient reports that he has been feeling ill for over a week now and he said he doesn't have any specific symptoms but he gets tired easily and overall just feels kind of sick. He would like to see is Dr. Manning if he is able to fit him in to see if they can figure out what's going on.    Could we send this information to you in [employer] or would you prefer to receive a phone call?:   Patient would prefer a phone call   Okay to leave a detailed message?: Yes at Cell number on file:    Telephone Information:   Mobile [phone]       Call taken on 6/28/2024 at 3:23 PM by Alyssa Kay

## 2024-07-01 NOTE — TELEPHONE ENCOUNTER
Patient was called. He said that all his symptoms have improved and he is feeling good now. Triage not needed and patient said he has nothing more to discuss with nurse. Reminded patient to keep hydrated especially in the summer time when time gets busy and temperatures get higher. He will call back as needed. Pt agreed with this plan.

## 2024-07-11 ENCOUNTER — OFFICE VISIT (OUTPATIENT)
Dept: CARDIOLOGY | Facility: CLINIC | Age: 79
End: 2024-07-11
Payer: MEDICARE

## 2024-07-11 VITALS
OXYGEN SATURATION: 97 % | SYSTOLIC BLOOD PRESSURE: 119 MMHG | HEART RATE: 77 BPM | DIASTOLIC BLOOD PRESSURE: 76 MMHG | BODY MASS INDEX: 25.51 KG/M2 | WEIGHT: 162.5 LBS | HEIGHT: 67 IN

## 2024-07-11 DIAGNOSIS — I10 ESSENTIAL HYPERTENSION: ICD-10-CM

## 2024-07-11 DIAGNOSIS — R94.39 ABNORMAL CARDIOVASCULAR STRESS TEST: ICD-10-CM

## 2024-07-11 DIAGNOSIS — I25.118 CORONARY ARTERY DISEASE OF NATIVE ARTERY OF NATIVE HEART WITH STABLE ANGINA PECTORIS (H): Primary | ICD-10-CM

## 2024-07-11 DIAGNOSIS — I48.20 CHRONIC ATRIAL FIBRILLATION (H): ICD-10-CM

## 2024-07-11 DIAGNOSIS — Z72.0 TOBACCO ABUSE: ICD-10-CM

## 2024-07-11 DIAGNOSIS — E78.5 DYSLIPIDEMIA: ICD-10-CM

## 2024-07-11 DIAGNOSIS — C67.9 MALIGNANT NEOPLASM OF URINARY BLADDER, UNSPECIFIED SITE (H): ICD-10-CM

## 2024-07-11 PROCEDURE — 99215 OFFICE O/P EST HI 40 MIN: CPT | Performed by: NURSE PRACTITIONER

## 2024-07-11 PROCEDURE — 93000 ELECTROCARDIOGRAM COMPLETE: CPT | Performed by: NURSE PRACTITIONER

## 2024-07-11 RX ORDER — NITROGLYCERIN 0.4 MG/1
TABLET SUBLINGUAL
Qty: 30 TABLET | Refills: 0 | Status: SHIPPED | OUTPATIENT
Start: 2024-07-11 | End: 2024-09-26

## 2024-07-11 NOTE — LETTER
7/11/2024    Estuardo Manning MD  [address] [place] [id number]    RE: Alex Stanley       Dear Colleague,     I had the pleasure of seeing Alex Stanley in the [facility].    Cardiology Clinic Progress Note    Service Date: July 11, 2024  Primary Cardiology Team: Dr. Dawson    HISTORY OF PRESENT ILLNESS:  I had the pleasure of meeting Mr. Tejas Stanley in the clinic today. He is a very pleasant 78 year old male with a past medical history notable for:   1.  Hypertension  2.  Hyperlipidemia, managed with pravastatin 20 mg once daily  3.  Chronic atrial fibrillation on anticoagulation with rate control approach  4.  CVA  5.  Recent diagnosis bladder cancer  6.  Abnormal stress test in October 2022 in the setting of some atypical chest discomfort at that time, which has been medically managed with Imdur 30 mg daily.  7.  Likely coronary artery disease based on the stress test as noted above.  Not on aspirin due to anticoagulation with Eliquis for his atrial fibrillation.   8.  Bladder cancer  9.  Ongoing longstanding history of smoking, currently around 10 cigarettes/day.  Previously around 1 pack/day since around 1966.    The patient last met with Dr. Dawson in November 2022 and noted that if he has recurrent symptoms of chest pain while on Imdur he will let us know and we will consider angiography.  He was last seen in follow-up by my colleague, Jimena Ribera PA-C in October 2023 and was doing quite well at that time without any recurrent issues with chest discomfort.      More recently, the patient called into the clinic earlier this week reporting recurrent symptoms of chest discomfort with some features typical for angina, though some atypical features as well.  He was added onto my schedule today for further evaluation and management of the symptoms and for consideration of pursuing possible coronary angiography and possible PCI. The patient tells me that about a month ago he  started having issues with mid substernal chest pain with symptom onset mostly occurring when exerting himself doing things like walking, yard work, or chores around the house. The patient notes that symptoms while typically resolve after taking some time to rest, but he has had a few more prolonged episodes lasting up to a few hours.He notes that he will also feel some tenderness and soreness if he pushes on his sternum when he is having the symptoms.    His wife states that she feels that his symptoms are similar to what he described experiencing back in 2022 leading up to the stress test which was abnormal, but symptoms are now occurring more frequently up to around 2-3 times per week.  He was evaluated in urgent care around 3-4 weeks ago for the symptoms and was instructed to start on Protonix. An EKG was completed which the patient and his wife state looked okay, but I do not have access to these records at this time.  Symptoms have not improved at all following the addition of Protonix.    Given his history of an abnormal stress test and suspected coronary disease as well as risk factors of hypertension, hyperlipidemia, and longstanding smoking, reviewed that definitive evaluation with coronary angiography and possible PCI is recommended.  Risks and benefits of coronary angiogram discussed today including, bleeding, bruising, infection, allergic reaction, kidney damage (including need for dialysis), stroke, heart attack, vascular damage, emergency open heart surgery, up to and including death.  Patient indicates understanding and is agreeable to proceed.  He has no known history of an allergy to contrast dye.  We reviewed that he would need to hold Eliquis for 48 hours prior to the procedure.  He does not use any phosphodiesterase type 5 inhibitor.  We reviewed to plan a ride home for following the procedure and his wife will plan to pick him up.    An EKG was completed in the clinic today which I personally  reviewed with the patient and his wife showing atrial fibrillation with rate controlled at 74 bpm and no acute ischemic changes noted.    ASSESSMENT:  1. Chest pain with history of presumed coronary disease on the basis of an abnormal nuclear stress test in October 2022  2. Chronic atrial fibrillation with heart rates well-controlled primarily around the 70s.  Chronically anticoagulated on Eliquis 5 mg twice daily.  3. Dyslipidemia, managed with pravastatin 20 mg once daily with most recent LDL cholesterol 59 in March 2024.  4. Essential hypertension.  Blood pressure well-controlled on amlodipine 10 mg once daily.  5. Urothelial carcinoma the bladder status post TURBT with benign pathology.  Follows closely with urology.  Denies any recent issues with hematuria.  6. Longstanding tobacco dependence with ongoing smoking around 10 cigarettes/day.  Counseled on cessation.    PLAN:  - Proceed with the coronary angiogram and possible PCI given recent recurrence of chest pain symptoms increasing in frequency over the past month concerning for possible angina despite medical management on Imdur 30 mg once daily. Possible that his symptoms may be secondary to costochondritis or a musculoskeletal issue as there are some atypical features as well, but given his history of an abnormal stress test and significant risk factors as outlined above I think is reasonable to pursue definitive evaluation.  Reviewed symptoms concerning for unstable angina and when to call 911 or seek evaluation in the ER in the meantime should he have any worsening or severe symptoms.  - Recommend holding Eliquis for 48 hours prior to the angiogram.  - Continue current regimen with Imdur, amlodipine, and pravastatin for management of blood pressure and cholesterol.  - Counseled on the importance of smoking cessation.  -  Follow-up with me or another MILI in the clinic in about 2 to 4 weeks following the angiogram.    Thank you for the opportunity to  participate in this pleasant patient's care. We would be happy to see him sooner if needed for any concerns in the meantime.    40 total minutes was spent today including chart review, precharting, history and exam, post visit documentation, and reviewing studies as outlined above.     LAST Aleman, CNP   Nurse Practitioner  [facility]  Pager: [phone]  Text Page  (8am - 5pm, M-F)    Orders this Visit:  Orders Placed This Encounter   Procedures    Follow-Up with Cardiology    Follow-Up with Cardiology    EKG 12-lead complete w/read - Clinics (performed today)    Case Request Cath Lab: Coronary Angiogram, Percutaneous Coronary Intervention, Left Heart Catheterization     Orders Placed This Encounter   Medications    nitroGLYcerin (NITROSTAT) 0.4 MG sublingual tablet     Sig: For chest pain place 1 tablet under the tongue every 5 minutes for 3 doses. If symptoms persist 5 minutes after 1st dose call 911.     Dispense:  30 tablet     Refill:  0     There are no discontinued medications.  Encounter Diagnoses   Name Primary?    Coronary artery disease of native artery of native heart with stable angina pectoris (H24) Yes    Abnormal cardiovascular stress test     Chronic atrial fibrillation (H)     Dyslipidemia     Essential hypertension     Malignant neoplasm of urinary bladder, unspecified site (H)     Tobacco abuse        CURRENT MEDICATIONS:  Current Outpatient Medications   Medication Sig Dispense Refill    amLODIPine (NORVASC) 10 MG tablet Take 1 tablet (10 mg) by mouth daily (Patient taking differently: Take 10 mg by mouth every morning) 90 tablet 3    apixaban ANTICOAGULANT (ELIQUIS) 5 MG tablet Take 1 tablet (5 mg) by mouth 2 times daily 180 tablet 3    isosorbide mononitrate (IMDUR) 30 MG 24 hr tablet Take 1 tablet (30 mg) by mouth daily (Patient taking differently: Take 30 mg by mouth every morning) 90 tablet 3    Multiple Vitamins-Minerals (PRESERVISION AREDS 2) CAPS Take 1 capsule  by mouth 2 times daily      nitroGLYcerin (NITROSTAT) 0.4 MG sublingual tablet For chest pain place 1 tablet under the tongue every 5 minutes for 3 doses. If symptoms persist 5 minutes after 1st dose call 911. 30 tablet 0    pantoprazole (PROTONIX) 40 MG EC tablet Take 1 tablet (40 mg) by mouth daily for 90 days 90 tablet 0    pravastatin (PRAVACHOL) 20 MG tablet Take 1 tablet (20 mg) by mouth daily (Patient taking differently: Take 20 mg by mouth every morning) 90 tablet 3    oxyBUTYnin ER (DITROPAN XL) 5 MG 24 hr tablet Take 1 tablet (5 mg) by mouth daily (Patient not taking: Reported on 7/11/2024) 12 tablet 0     ALLERGIES  No Known Allergies    PAST MEDICAL, SURGICAL, FAMILY HISTORY:  History was reviewed and updated as needed, see medical record.    SOCIAL HISTORY:  Social History     Socioeconomic History    Marital status:      Spouse name: Not on file    Number of children: Not on file    Years of education: Not on file    Highest education level: Not on file   Occupational History    Not on file   Tobacco Use    Smoking status: Every Day     Current packs/day: 0.50     Average packs/day: 0.5 packs/day for 59.5 years (29.8 ttl pk-yrs)     Types: Cigarettes     Start date: 1965    Smokeless tobacco: Never    Tobacco comments:     ppd since age 16 - now 1/2-1 ppd 04/05/21.  8-10 cigarettes daily   Vaping Use    Vaping status: Never Used   Substance and Sexual Activity    Alcohol use: Not Currently     Comment: last drank at [facility] 8/2010.    Drug use: Never    Sexual activity: Yes     Partners: Female   Other Topics Concern    Parent/sibling w/ CABG, MI or angioplasty before 65F 55M? Not Asked   Social History Narrative    ** Merged History Encounter **          Social Determinants of Health     Financial Resource Strain: Low Risk  (3/4/2024)    Financial Resource Strain     Within the past 12 months, have you or your family members you live with been unable to get utilities (heat, electricity) when it  "was really needed?: No   Food Insecurity: Low Risk  (3/4/2024)    Food Insecurity     Within the past 12 months, did you worry that your food would run out before you got money to buy more?: No     Within the past 12 months, did the food you bought just not last and you didn t have money to get more?: No   Transportation Needs: Low Risk  (3/4/2024)    Transportation Needs     Within the past 12 months, has lack of transportation kept you from medical appointments, getting your medicines, non-medical meetings or appointments, work, or from getting things that you need?: No   Physical Activity: Sufficiently Active (3/4/2024)    Exercise Vital Sign     Days of Exercise per Week: 5 days     Minutes of Exercise per Session: 30 min   Stress: No Stress Concern Present (3/4/2024)    [employer] - Occupational Stress Questionnaire     Feeling of Stress : Not at all   Social Connections: Not on file   Interpersonal Safety: Low Risk  (3/4/2024)    Interpersonal Safety     Do you feel physically and emotionally safe where you currently live?: Yes     Within the past 12 months, have you been hit, slapped, kicked or otherwise physically hurt by someone?: No     Within the past 12 months, have you been humiliated or emotionally abused in other ways by your partner or ex-partner?: No   Housing Stability: Low Risk  (3/4/2024)    Housing Stability     Do you have housing? : Yes     Are you worried about losing your housing?: No     Review of Systems:  Focused cardiovascular and respiratory review of systems is negative other than the symptoms noted above in the HPI.     Physical Exam:  Vitals: /76 (BP Location: Right arm, Patient Position: Sitting)   Pulse 77   Ht 1.702 m (5' 7\")   Wt 73.7 kg (162 lb 8 oz)   SpO2 97%   BMI 25.45 kg/m     Wt Readings from Last 4 Encounters:   07/11/24 73.7 kg (162 lb 8 oz)   06/07/24 76.8 kg (169 lb 6.4 oz)   05/28/24 77.6 kg (171 lb 1.2 oz)   05/16/24 74.8 kg " (165 lb)     Constitutional: Alert and in no acute distress.  Neck: No JVD.  Cardiovascular:  Regular rate and rhythm, no murmur, rub or gallop.   Respiratory: Breathing non-labored. Lungs are clear to auscultation with no wheezes or crackles bilaterally.  Gastrointestinal: Abdomen non-distended.  Extremities: Trace non-pitting ankle edema bilaterally.   Neuropsychiatric: No gross focal deficits. Affect appropriate. Mentation normal.     Recent Lab Results:  LIPID RESULTS:  Lab Results   Component Value Date    CHOL 115 03/04/2024    CHOL 102 10/01/2020    HDL 44 03/04/2024    HDL 46 10/01/2020    LDL 59 03/04/2024    LDL 45 10/01/2020    TRIG 62 03/04/2024    TRIG 57 10/01/2020    CHOLHDLRATIO 3.2 01/20/2015     LIVER ENZYME RESULTS:  Lab Results   Component Value Date    AST 31 05/21/2024    AST 23 10/01/2020    ALT 25 05/21/2024    ALT 24 10/01/2020     CBC RESULTS:  Lab Results   Component Value Date    WBC 9.9 05/21/2024    WBC 8.3 10/08/2012    RBC 5.20 05/21/2024    RBC 4.80 10/08/2012    HGB 16.0 05/21/2024    HGB 14.1 06/14/2018    HCT 49.0 05/21/2024    HCT 44.3 10/08/2012    MCV 94 05/21/2024    MCV 92 10/08/2012    MCH 30.8 05/21/2024    MCH 30.2 10/08/2012    MCHC 32.7 05/21/2024    MCHC 32.7 10/08/2012    RDW 14.1 05/21/2024    RDW 14.1 10/08/2012     05/21/2024     10/08/2012     BMP RESULTS:  Lab Results   Component Value Date     05/21/2024     10/01/2020    POTASSIUM 4.7 05/21/2024    POTASSIUM 3.9 10/01/2020    CHLORIDE 109 05/21/2024    CHLORIDE 108 10/01/2020    CO2 30 05/21/2024    CO2 28 10/01/2020    ANIONGAP 6 05/21/2024    ANIONGAP 4 10/01/2020     (H) 05/21/2024    GLC 70 10/01/2020    BUN 15 05/21/2024    BUN 14 10/01/2020    CR 1.20 05/21/2024    CR 1.26 (H) 10/01/2020    GFRESTIMATED 63 05/21/2024    GFRESTIMATED 56 (L) 10/01/2020    GFRESTBLACK 65 10/01/2020    MARY 9.8 05/21/2024    MARY 8.8 10/01/2020         CC  Lamont Dawson MD  [id number]  [email or web address]  [place] [address]    Please kindly note that this document was completed in part using Dragon voice recognition software. Although reviewed after completion, some word substitutions and typographical errors may occur. Please contact me if clarification is needed.       Thank you for allowing me to participate in the care of your patient.      Sincerely,     Sean Casanova NP     [facility]  cc:   No referring provider defined for this encounter.

## 2024-07-11 NOTE — PROGRESS NOTES
Cardiology Clinic Progress Note    Service Date: July 11, 2024  Primary Cardiology Team: Dr. Dawson    HISTORY OF PRESENT ILLNESS:  I had the pleasure of meeting Mr. Tejas Stanley in the clinic today. He is a very pleasant 78 year old male with a past medical history notable for:   1.  Hypertension  2.  Hyperlipidemia, managed with pravastatin 20 mg once daily  3.  Chronic atrial fibrillation on anticoagulation with rate control approach  4.  CVA  5.  Recent diagnosis bladder cancer  6.  Abnormal stress test in October 2022 in the setting of some atypical chest discomfort at that time, which has been medically managed with Imdur 30 mg daily.  7.  Likely coronary artery disease based on the stress test as noted above.  Not on aspirin due to anticoagulation with Eliquis for his atrial fibrillation.   8.  Bladder cancer  9.  Ongoing longstanding history of smoking, currently around 10 cigarettes/day.  Previously around 1 pack/day since around 1966.    The patient last met with Dr. Dawson in November 2022 and noted that if he has recurrent symptoms of chest pain while on Imdur he will let us know and we will consider angiography.  He was last seen in follow-up by my colleague, Jimena Ribera PA-C in October 2023 and was doing quite well at that time without any recurrent issues with chest discomfort.      More recently, the patient called into the clinic earlier this week reporting recurrent symptoms of chest discomfort with some features typical for angina, though some atypical features as well.  He was added onto my schedule today for further evaluation and management of the symptoms and for consideration of pursuing possible coronary angiography and possible PCI. The patient tells me that about a month ago he started having issues with mid substernal chest pain with symptom onset mostly occurring when exerting himself doing things like walking, yard work, or chores around the house. The patient notes that symptoms while  typically resolve after taking some time to rest, but he has had a few more prolonged episodes lasting up to a few hours.He notes that he will also feel some tenderness and soreness if he pushes on his sternum when he is having the symptoms.    His wife states that she feels that his symptoms are similar to what he described experiencing back in 2022 leading up to the stress test which was abnormal, but symptoms are now occurring more frequently up to around 2-3 times per week.  He was evaluated in urgent care around 3-4 weeks ago for the symptoms and was instructed to start on Protonix. An EKG was completed which the patient and his wife state looked okay, but I do not have access to these records at this time.  Symptoms have not improved at all following the addition of Protonix.    Given his history of an abnormal stress test and suspected coronary disease as well as risk factors of hypertension, hyperlipidemia, and longstanding smoking, reviewed that definitive evaluation with coronary angiography and possible PCI is recommended.  Risks and benefits of coronary angiogram discussed today including, bleeding, bruising, infection, allergic reaction, kidney damage (including need for dialysis), stroke, heart attack, vascular damage, emergency open heart surgery, up to and including death.  Patient indicates understanding and is agreeable to proceed.  He has no known history of an allergy to contrast dye.  We reviewed that he would need to hold Eliquis for 48 hours prior to the procedure.  He does not use any phosphodiesterase type 5 inhibitor.  We reviewed to plan a ride home for following the procedure and his wife will plan to pick him up.    An EKG was completed in the clinic today which I personally reviewed with the patient and his wife showing atrial fibrillation with rate controlled at 74 bpm and no acute ischemic changes noted.    ASSESSMENT:  1. Chest pain with history of presumed coronary disease on the  basis of an abnormal nuclear stress test in October 2022  2. Chronic atrial fibrillation with heart rates well-controlled primarily around the 70s.  Chronically anticoagulated on Eliquis 5 mg twice daily.  3. Dyslipidemia, managed with pravastatin 20 mg once daily with most recent LDL cholesterol 59 in March 2024.  4. Essential hypertension.  Blood pressure well-controlled on amlodipine 10 mg once daily.  5. Urothelial carcinoma the bladder status post TURBT with benign pathology.  Follows closely with urology.  Denies any recent issues with hematuria.  6. Longstanding tobacco dependence with ongoing smoking around 10 cigarettes/day.  Counseled on cessation.    PLAN:  - Proceed with the coronary angiogram and possible PCI given recent recurrence of chest pain symptoms increasing in frequency over the past month concerning for possible angina despite medical management on Imdur 30 mg once daily. Possible that his symptoms may be secondary to costochondritis or a musculoskeletal issue as there are some atypical features as well, but given his history of an abnormal stress test and significant risk factors as outlined above I think is reasonable to pursue definitive evaluation.  Reviewed symptoms concerning for unstable angina and when to call 911 or seek evaluation in the ER in the meantime should he have any worsening or severe symptoms.  - Recommend holding Eliquis for 48 hours prior to the angiogram.  - Continue current regimen with Imdur, amlodipine, and pravastatin for management of blood pressure and cholesterol.  - Counseled on the importance of smoking cessation.  -  Follow-up with me or another MILI in the clinic in about 2 to 4 weeks following the angiogram.    Thank you for the opportunity to participate in this pleasant patient's care. We would be happy to see him sooner if needed for any concerns in the meantime.    40 total minutes was spent today including chart review, precharting, history and exam,  post visit documentation, and reviewing studies as outlined above.     LAST Aleman, CNP   Nurse Practitioner  [facility]  Pager: [phone]  Text Page  (8am - 5pm, M-F)    Orders this Visit:  Orders Placed This Encounter   Procedures    Follow-Up with Cardiology    Follow-Up with Cardiology    EKG 12-lead complete w/read - Clinics (performed today)    Case Request Cath Lab: Coronary Angiogram, Percutaneous Coronary Intervention, Left Heart Catheterization     Orders Placed This Encounter   Medications    nitroGLYcerin (NITROSTAT) 0.4 MG sublingual tablet     Sig: For chest pain place 1 tablet under the tongue every 5 minutes for 3 doses. If symptoms persist 5 minutes after 1st dose call 911.     Dispense:  30 tablet     Refill:  0     There are no discontinued medications.  Encounter Diagnoses   Name Primary?    Coronary artery disease of native artery of native heart with stable angina pectoris (H24) Yes    Abnormal cardiovascular stress test     Chronic atrial fibrillation (H)     Dyslipidemia     Essential hypertension     Malignant neoplasm of urinary bladder, unspecified site (H)     Tobacco abuse        CURRENT MEDICATIONS:  Current Outpatient Medications   Medication Sig Dispense Refill    amLODIPine (NORVASC) 10 MG tablet Take 1 tablet (10 mg) by mouth daily (Patient taking differently: Take 10 mg by mouth every morning) 90 tablet 3    apixaban ANTICOAGULANT (ELIQUIS) 5 MG tablet Take 1 tablet (5 mg) by mouth 2 times daily 180 tablet 3    isosorbide mononitrate (IMDUR) 30 MG 24 hr tablet Take 1 tablet (30 mg) by mouth daily (Patient taking differently: Take 30 mg by mouth every morning) 90 tablet 3    Multiple Vitamins-Minerals (PRESERVISION AREDS 2) CAPS Take 1 capsule by mouth 2 times daily      nitroGLYcerin (NITROSTAT) 0.4 MG sublingual tablet For chest pain place 1 tablet under the tongue every 5 minutes for 3 doses. If symptoms persist 5 minutes after 1st dose call 911. 30  tablet 0    pantoprazole (PROTONIX) 40 MG EC tablet Take 1 tablet (40 mg) by mouth daily for 90 days 90 tablet 0    pravastatin (PRAVACHOL) 20 MG tablet Take 1 tablet (20 mg) by mouth daily (Patient taking differently: Take 20 mg by mouth every morning) 90 tablet 3    oxyBUTYnin ER (DITROPAN XL) 5 MG 24 hr tablet Take 1 tablet (5 mg) by mouth daily (Patient not taking: Reported on 7/11/2024) 12 tablet 0     ALLERGIES  No Known Allergies    PAST MEDICAL, SURGICAL, FAMILY HISTORY:  History was reviewed and updated as needed, see medical record.    SOCIAL HISTORY:  Social History     Socioeconomic History    Marital status:      Spouse name: Not on file    Number of children: Not on file    Years of education: Not on file    Highest education level: Not on file   Occupational History    Not on file   Tobacco Use    Smoking status: Every Day     Current packs/day: 0.50     Average packs/day: 0.5 packs/day for 59.5 years (29.8 ttl pk-yrs)     Types: Cigarettes     Start date: 1965    Smokeless tobacco: Never    Tobacco comments:     ppd since age 16 - now 1/2-1 ppd 04/05/21.  8-10 cigarettes daily   Vaping Use    Vaping status: Never Used   Substance and Sexual Activity    Alcohol use: Not Currently     [facility]    Drug use: Never    Sexual activity: Yes     Partners: Female   Other Topics Concern    Parent/sibling w/ CABG, MI or angioplasty before 65F 55M? Not Asked   Social History Narrative    ** Merged History Encounter **          Social Determinants of Health     Financial Resource Strain: Low Risk  (3/4/2024)    Financial Resource Strain     Within the past 12 months, have you or your family members you live with been unable to get utilities (heat, electricity) when it was really needed?: No   Food Insecurity: Low Risk  (3/4/2024)    Food Insecurity     Within the past 12 months, did you worry that your food would run out before you got money to buy more?: No     Within the  "past 12 months, did the food you bought just not last and you didn t have money to get more?: No   Transportation Needs: Low Risk  (3/4/2024)    Transportation Needs     Within the past 12 months, has lack of transportation kept you from medical appointments, getting your medicines, non-medical meetings or appointments, work, or from getting things that you need?: No   Physical Activity: Sufficiently Active (3/4/2024)    Exercise Vital Sign     Days of Exercise per Week: 5 days     Minutes of Exercise per Session: 30 min   Stress: No Stress Concern Present (3/4/2024)    [employer] - Occupational Stress Questionnaire     Feeling of Stress : Not at all   Social Connections: Not on file   Interpersonal Safety: Low Risk  (3/4/2024)    Interpersonal Safety     Do you feel physically and emotionally safe where you currently live?: Yes     Within the past 12 months, have you been hit, slapped, kicked or otherwise physically hurt by someone?: No     Within the past 12 months, have you been humiliated or emotionally abused in other ways by your partner or ex-partner?: No   Housing Stability: Low Risk  (3/4/2024)    Housing Stability     Do you have housing? : Yes     Are you worried about losing your housing?: No     Review of Systems:  Focused cardiovascular and respiratory review of systems is negative other than the symptoms noted above in the HPI.     Physical Exam:  Vitals: /76 (BP Location: Right arm, Patient Position: Sitting)   Pulse 77   Ht 1.702 m (5' 7\")   Wt 73.7 kg (162 lb 8 oz)   SpO2 97%   BMI 25.45 kg/m     Wt Readings from Last 4 Encounters:   07/11/24 73.7 kg (162 lb 8 oz)   06/07/24 76.8 kg (169 lb 6.4 oz)   05/28/24 77.6 kg (171 lb 1.2 oz)   05/16/24 74.8 kg (165 lb)     Constitutional: Alert and in no acute distress.  Neck: No JVD.  Cardiovascular:  Regular rate and rhythm, no murmur, rub or gallop.   Respiratory: Breathing non-labored. Lungs are clear to " auscultation with no wheezes or crackles bilaterally.  Gastrointestinal: Abdomen non-distended.  Extremities: Trace non-pitting ankle edema bilaterally.   Neuropsychiatric: No gross focal deficits. Affect appropriate. Mentation normal.     Recent Lab Results:  LIPID RESULTS:  Lab Results   Component Value Date    CHOL 115 03/04/2024    CHOL 102 10/01/2020    HDL 44 03/04/2024    HDL 46 10/01/2020    LDL 59 03/04/2024    LDL 45 10/01/2020    TRIG 62 03/04/2024    TRIG 57 10/01/2020    CHOLHDLRATIO 3.2 01/20/2015     LIVER ENZYME RESULTS:  Lab Results   Component Value Date    AST 31 05/21/2024    AST 23 10/01/2020    ALT 25 05/21/2024    ALT 24 10/01/2020     CBC RESULTS:  Lab Results   Component Value Date    WBC 9.9 05/21/2024    WBC 8.3 10/08/2012    RBC 5.20 05/21/2024    RBC 4.80 10/08/2012    HGB 16.0 05/21/2024    HGB 14.1 06/14/2018    HCT 49.0 05/21/2024    HCT 44.3 10/08/2012    MCV 94 05/21/2024    MCV 92 10/08/2012    MCH 30.8 05/21/2024    MCH 30.2 10/08/2012    MCHC 32.7 05/21/2024    MCHC 32.7 10/08/2012    RDW 14.1 05/21/2024    RDW 14.1 10/08/2012     05/21/2024     10/08/2012     BMP RESULTS:  Lab Results   Component Value Date     05/21/2024     10/01/2020    POTASSIUM 4.7 05/21/2024    POTASSIUM 3.9 10/01/2020    CHLORIDE 109 05/21/2024    CHLORIDE 108 10/01/2020    CO2 30 05/21/2024    CO2 28 10/01/2020    ANIONGAP 6 05/21/2024    ANIONGAP 4 10/01/2020     (H) 05/21/2024    GLC 70 10/01/2020    BUN 15 05/21/2024    BUN 14 10/01/2020    CR 1.20 05/21/2024    CR 1.26 (H) 10/01/2020    GFRESTIMATED 63 05/21/2024    GFRESTIMATED 56 (L) 10/01/2020    GFRESTBLACK 65 10/01/2020    MARY 9.8 05/21/2024    MARY 8.8 10/01/2020     CC  Lamont Dawson MD  [email or web address]  [place] [id number]    Please kindly note that this document was completed in part using Dragon voice recognition software. Although reviewed after completion, some word substitutions and typographical  errors may occur. Please contact me if clarification is needed.

## 2024-07-12 ENCOUNTER — TELEPHONE (OUTPATIENT)
Dept: CARDIOLOGY | Facility: CLINIC | Age: 79
End: 2024-07-12
Payer: MEDICARE

## 2024-07-12 DIAGNOSIS — R94.39 ABNORMAL CARDIOVASCULAR STRESS TEST: ICD-10-CM

## 2024-07-12 DIAGNOSIS — I25.118 CORONARY ARTERY DISEASE OF NATIVE ARTERY OF NATIVE HEART WITH STABLE ANGINA PECTORIS (H): Primary | ICD-10-CM

## 2024-07-12 RX ORDER — POTASSIUM CHLORIDE 1500 MG/1
20 TABLET, EXTENDED RELEASE ORAL
Status: CANCELLED | OUTPATIENT
Start: 2024-07-12

## 2024-07-12 RX ORDER — SODIUM CHLORIDE 9 MG/ML
INJECTION, SOLUTION INTRAVENOUS CONTINUOUS
Status: CANCELLED | OUTPATIENT
Start: 2024-07-12

## 2024-07-12 RX ORDER — ASPIRIN 81 MG/1
243 TABLET, CHEWABLE ORAL ONCE
Status: CANCELLED | OUTPATIENT
Start: 2024-07-12

## 2024-07-12 RX ORDER — LIDOCAINE 40 MG/G
CREAM TOPICAL
Status: CANCELLED | OUTPATIENT
Start: 2024-07-12

## 2024-07-12 RX ORDER — ASPIRIN 325 MG
325 TABLET ORAL ONCE
Status: CANCELLED | OUTPATIENT
Start: 2024-07-12 | End: 2024-07-12

## 2024-07-12 NOTE — TELEPHONE ENCOUNTER
Coronary angiogram/PCI/Right Heart Cath prep instructions.     Patient is scheduled for a Coronary Angiogram at [facility] - [address], [place] [address] - Main Entrance of the Hospital on 7/16/24.  Check in time is at 6:30am and procedure to follow.    Patient instructed to remain NPO for solid foods 8 hours prior to arrival and may have clear liquids up to 2 hours prior to arrival.    Patient does not require extra fluids prior to procedure.    Patient is not diabetic.    Patient is on Eliquis (Eliquis, Pradaxa, Xarelto) and has been advised to hold for 2 days prior to procedure starting 7/14/24.  Patient can resume after the procedure.    Patient is not currently taking ASA and has been advised to take one 325 mg tablet the day prior and morning of the procedure.    Pt is not on a SGLT2 inhibitor.    Pt is not on a GLP-1 Agonist    Patient advised to take their other daily medications the morning of the procedure with small sips of water.     Verified patient does not have a contrast allergy.    Verified patient has someone available to drive them home from the hospital and can stay with them for 24 hours after the procedure.     Patient advised to notify care team with any new COVID like symptoms prior to procedure. Day of procedure phone number: [place] at [phone]    Patient is aware of visitor policy.    Patient expresses understanding of above instructions and denies further questions at this time.    Marisela Walls RN  [facility]

## 2024-07-16 ENCOUNTER — HOSPITAL ENCOUNTER (OUTPATIENT)
Facility: CLINIC | Age: 79
Discharge: HOME | End: 2024-07-16
Attending: INTERNAL MEDICINE | Admitting: INTERNAL MEDICINE
Payer: MEDICARE

## 2024-07-16 VITALS
HEART RATE: 60 BPM | SYSTOLIC BLOOD PRESSURE: 105 MMHG | TEMPERATURE: 97.7 F | BODY MASS INDEX: 25.58 KG/M2 | OXYGEN SATURATION: 93 % | HEIGHT: 67 IN | WEIGHT: 163 LBS | DIASTOLIC BLOOD PRESSURE: 64 MMHG | RESPIRATION RATE: 16 BRPM

## 2024-07-16 DIAGNOSIS — R94.39 ABNORMAL CARDIOVASCULAR STRESS TEST: ICD-10-CM

## 2024-07-16 DIAGNOSIS — I25.118 CORONARY ARTERY DISEASE OF NATIVE ARTERY OF NATIVE HEART WITH STABLE ANGINA PECTORIS (H): ICD-10-CM

## 2024-07-16 PROBLEM — Z98.890 STATUS POST CORONARY ANGIOGRAM: Status: ACTIVE | Noted: 2024-07-16

## 2024-07-16 LAB
ANION GAP SERPL CALCULATED.3IONS-SCNC: 7 MMOL/L (ref 7–15)
APTT PPP: 29 SECONDS (ref 22–38)
ATRIAL RATE - MUSE: 89 BPM
BUN SERPL-MCNC: 20.3 MG/DL (ref 8–23)
CALCIUM SERPL-MCNC: 9 MG/DL (ref 8.8–10.2)
CHLORIDE SERPL-SCNC: 105 MMOL/L (ref 98–107)
CREAT SERPL-MCNC: 1.23 MG/DL (ref 0.67–1.17)
DIASTOLIC BLOOD PRESSURE - MUSE: NORMAL MMHG
EGFRCR SERPLBLD CKD-EPI 2021: 61 ML/MIN/1.73M2
ERYTHROCYTE [DISTWIDTH] IN BLOOD BY AUTOMATED COUNT: 14.6 % (ref 10–15)
GLUCOSE SERPL-MCNC: 105 MG/DL (ref 70–99)
HCO3 SERPL-SCNC: 28 MMOL/L (ref 22–29)
HCT VFR BLD AUTO: 43.5 % (ref 40–53)
HGB BLD-MCNC: 14.3 G/DL (ref 13.3–17.7)
INR PPP: 1.03 (ref 0.85–1.15)
INTERPRETATION ECG - MUSE: NORMAL
MCH RBC QN AUTO: 31.4 PG (ref 26.5–33)
MCHC RBC AUTO-ENTMCNC: 32.9 G/DL (ref 31.5–36.5)
MCV RBC AUTO: 95 FL (ref 78–100)
P AXIS - MUSE: NORMAL DEGREES
PLATELET # BLD AUTO: 288 10E3/UL (ref 150–450)
POTASSIUM SERPL-SCNC: 4.5 MMOL/L (ref 3.4–5.3)
PR INTERVAL - MUSE: NORMAL MS
QRS DURATION - MUSE: 96 MS
QT - MUSE: 406 MS
QTC - MUSE: 422 MS
R AXIS - MUSE: -63 DEGREES
RBC # BLD AUTO: 4.56 10E6/UL (ref 4.4–5.9)
SODIUM SERPL-SCNC: 140 MMOL/L (ref 135–145)
SYSTOLIC BLOOD PRESSURE - MUSE: NORMAL MMHG
T AXIS - MUSE: 53 DEGREES
VENTRICULAR RATE- MUSE: 65 BPM
WBC # BLD AUTO: 8.8 10E3/UL (ref 4–11)

## 2024-07-16 PROCEDURE — 250N000011 HC RX IP 250 OP 636: Performed by: INTERNAL MEDICINE

## 2024-07-16 PROCEDURE — C1769 GUIDE WIRE: HCPCS | Performed by: INTERNAL MEDICINE

## 2024-07-16 PROCEDURE — C1894 INTRO/SHEATH, NON-LASER: HCPCS | Performed by: INTERNAL MEDICINE

## 2024-07-16 PROCEDURE — C1887 CATHETER, GUIDING: HCPCS | Performed by: INTERNAL MEDICINE

## 2024-07-16 PROCEDURE — 36415 COLL VENOUS BLD VENIPUNCTURE: CPT | Performed by: INTERNAL MEDICINE

## 2024-07-16 PROCEDURE — 99152 MOD SED SAME PHYS/QHP 5/>YRS: CPT | Performed by: INTERNAL MEDICINE

## 2024-07-16 PROCEDURE — 93454 CORONARY ARTERY ANGIO S&I: CPT | Performed by: INTERNAL MEDICINE

## 2024-07-16 PROCEDURE — 999N000184 HC STATISTIC TELEMETRY

## 2024-07-16 PROCEDURE — 999N000054 HC STATISTIC EKG NON-CHARGEABLE

## 2024-07-16 PROCEDURE — 93005 ELECTROCARDIOGRAM TRACING: CPT

## 2024-07-16 PROCEDURE — 85730 THROMBOPLASTIN TIME PARTIAL: CPT | Performed by: INTERNAL MEDICINE

## 2024-07-16 PROCEDURE — 80048 BASIC METABOLIC PNL TOTAL CA: CPT | Performed by: INTERNAL MEDICINE

## 2024-07-16 PROCEDURE — 258N000003 HC RX IP 258 OP 636: Performed by: INTERNAL MEDICINE

## 2024-07-16 PROCEDURE — 85027 COMPLETE CBC AUTOMATED: CPT | Performed by: INTERNAL MEDICINE

## 2024-07-16 PROCEDURE — 272N000001 HC OR GENERAL SUPPLY STERILE: Performed by: INTERNAL MEDICINE

## 2024-07-16 PROCEDURE — 85610 PROTHROMBIN TIME: CPT | Performed by: INTERNAL MEDICINE

## 2024-07-16 PROCEDURE — 93454 CORONARY ARTERY ANGIO S&I: CPT | Mod: 26 | Performed by: INTERNAL MEDICINE

## 2024-07-16 PROCEDURE — 999N000071 HC STATISTIC HEART CATH LAB OR EP LAB

## 2024-07-16 PROCEDURE — 250N000009 HC RX 250: Performed by: INTERNAL MEDICINE

## 2024-07-16 RX ORDER — NALOXONE HYDROCHLORIDE 0.4 MG/ML
0.4 INJECTION, SOLUTION INTRAMUSCULAR; INTRAVENOUS; SUBCUTANEOUS
Status: DISCONTINUED | OUTPATIENT
Start: 2024-07-16 | End: 2024-07-16 | Stop reason: HOSPADM

## 2024-07-16 RX ORDER — NALOXONE HYDROCHLORIDE 0.4 MG/ML
0.2 INJECTION, SOLUTION INTRAMUSCULAR; INTRAVENOUS; SUBCUTANEOUS
Status: DISCONTINUED | OUTPATIENT
Start: 2024-07-16 | End: 2024-07-16 | Stop reason: HOSPADM

## 2024-07-16 RX ORDER — HEPARIN SODIUM 1000 [USP'U]/ML
INJECTION, SOLUTION INTRAVENOUS; SUBCUTANEOUS
Status: DISCONTINUED | OUTPATIENT
Start: 2024-07-16 | End: 2024-07-16 | Stop reason: HOSPADM

## 2024-07-16 RX ORDER — LIDOCAINE 40 MG/G
CREAM TOPICAL
Status: DISCONTINUED | OUTPATIENT
Start: 2024-07-16 | End: 2024-07-16 | Stop reason: HOSPADM

## 2024-07-16 RX ORDER — SODIUM CHLORIDE 9 MG/ML
INJECTION, SOLUTION INTRAVENOUS CONTINUOUS
Status: DISCONTINUED | OUTPATIENT
Start: 2024-07-16 | End: 2024-07-16 | Stop reason: HOSPADM

## 2024-07-16 RX ORDER — ASPIRIN 325 MG
325 TABLET ORAL ONCE
Status: DISCONTINUED | OUTPATIENT
Start: 2024-07-16 | End: 2024-07-16 | Stop reason: HOSPADM

## 2024-07-16 RX ORDER — FENTANYL CITRATE 50 UG/ML
INJECTION, SOLUTION INTRAMUSCULAR; INTRAVENOUS
Status: DISCONTINUED | OUTPATIENT
Start: 2024-07-16 | End: 2024-07-16 | Stop reason: HOSPADM

## 2024-07-16 RX ORDER — FLUMAZENIL 0.1 MG/ML
0.2 INJECTION, SOLUTION INTRAVENOUS
Status: DISCONTINUED | OUTPATIENT
Start: 2024-07-16 | End: 2024-07-16 | Stop reason: HOSPADM

## 2024-07-16 RX ORDER — OXYCODONE HYDROCHLORIDE 5 MG/1
10 TABLET ORAL EVERY 4 HOURS PRN
Status: DISCONTINUED | OUTPATIENT
Start: 2024-07-16 | End: 2024-07-16 | Stop reason: HOSPADM

## 2024-07-16 RX ORDER — POTASSIUM CHLORIDE 1500 MG/1
20 TABLET, EXTENDED RELEASE ORAL
Status: DISCONTINUED | OUTPATIENT
Start: 2024-07-16 | End: 2024-07-16 | Stop reason: HOSPADM

## 2024-07-16 RX ORDER — ACETAMINOPHEN 325 MG/1
650 TABLET ORAL EVERY 4 HOURS PRN
Status: DISCONTINUED | OUTPATIENT
Start: 2024-07-16 | End: 2024-07-16 | Stop reason: HOSPADM

## 2024-07-16 RX ORDER — IOPAMIDOL 755 MG/ML
INJECTION, SOLUTION INTRAVASCULAR
Status: DISCONTINUED | OUTPATIENT
Start: 2024-07-16 | End: 2024-07-16 | Stop reason: HOSPADM

## 2024-07-16 RX ORDER — VERAPAMIL HYDROCHLORIDE 2.5 MG/ML
INJECTION, SOLUTION INTRAVENOUS
Status: DISCONTINUED | OUTPATIENT
Start: 2024-07-16 | End: 2024-07-16 | Stop reason: HOSPADM

## 2024-07-16 RX ORDER — NITROGLYCERIN 5 MG/ML
VIAL (ML) INTRAVENOUS
Status: DISCONTINUED | OUTPATIENT
Start: 2024-07-16 | End: 2024-07-16 | Stop reason: HOSPADM

## 2024-07-16 RX ORDER — OXYCODONE HYDROCHLORIDE 5 MG/1
5 TABLET ORAL EVERY 4 HOURS PRN
Status: DISCONTINUED | OUTPATIENT
Start: 2024-07-16 | End: 2024-07-16 | Stop reason: HOSPADM

## 2024-07-16 RX ORDER — FENTANYL CITRATE 50 UG/ML
25 INJECTION, SOLUTION INTRAMUSCULAR; INTRAVENOUS
Status: DISCONTINUED | OUTPATIENT
Start: 2024-07-16 | End: 2024-07-16 | Stop reason: HOSPADM

## 2024-07-16 RX ORDER — ATROPINE SULFATE 0.1 MG/ML
0.5 INJECTION INTRAVENOUS
Status: DISCONTINUED | OUTPATIENT
Start: 2024-07-16 | End: 2024-07-16 | Stop reason: HOSPADM

## 2024-07-16 RX ORDER — ASPIRIN 81 MG/1
243 TABLET, CHEWABLE ORAL ONCE
Status: DISCONTINUED | OUTPATIENT
Start: 2024-07-16 | End: 2024-07-16 | Stop reason: HOSPADM

## 2024-07-16 RX ADMIN — SODIUM CHLORIDE: 9 INJECTION, SOLUTION INTRAVENOUS at 07:05

## 2024-07-16 ASSESSMENT — ACTIVITIES OF DAILY LIVING (ADL)
ADLS_ACUITY_SCORE: 36

## 2024-07-16 NOTE — DISCHARGE INSTRUCTIONS
Cardiac Angiogram Discharge Instructions - Radial    After you go home:    Have an adult stay with you until tomorrow.  Drink extra fluids for 2 days.  You may resume your normal diet.  No smoking       For 24 hours - due to the sedation you received:  Relax and take it easy.  Do NOT make any important or legal decisions.  Do NOT drive or operate machines at home or at work.  Do NOT drink alcohol.    Care of Wrist Puncture Site:    For the first 24 hrs - check the puncture site every 1-2 hours while awake.  It is normal to have soreness at the puncture site and mild tingling in your hand for up to 3 days.  Remove the bandaid after 24 hours. If there is minor oozing, apply another bandaid and remove it after 12 hours.  You may shower tomorrow.  Do NOT take a bath, or use a hot tub or pool for at least 3 days. Do NOT scrub the site. Do not use lotion or powder near the puncture site.           Activity:        For 2 days:   do not use your hand or arm to support your weight (such as rising from a chair)   do not bend your wrist (such as lifting a garage door).  do not lift more than 5 pounds or exercise your arm (such as tennis, golf or bowling).  Do NOT do any heavy activity such as exercise, lifting, or straining.     Bleeding:    If you start bleeding from the site in your wrist, sit down and press firmly on/above the site for 10 minutes.   Once bleeding stops, keep arm still for 2 hours.   Call [facility] as soon as you can.       Call 911 right away if you have heavy bleeding or bleeding that does not stop.      Medicines:    If you are taking an antiplatelet medication such as Plavix, Brilinta or Effient, do not stop taking it until you talk to your cardiologist.      If you are on Metformin (Glucophage), do not restart it until you have blood tests (within 2 to 3 days after discharge).  After you have your blood drawn, you may restart the Metformin.   Take your medications, including blood thinners, unless your  provider tells you not to.    If you take Coumadin (Warfarin), have your INR checked by your provider in  3-5 days. Call your clinic to schedule this.  If you have stopped any medicines, check with your provider about when to restart them.    Follow Up Appointments:    Follow up with [facility] Nurse Practitioner at [facility] of patient preference in 7-10 days.    Call the clinic if:    You have a large or growing hard lump around the site.  The site is red, swollen, hot or tender.  Blood or fluid is draining from the site.  You have chills or a fever greater than 101 F (38 C).  Your arm feels numb, cool or changes color.  You have hives, a rash or unusual itching.  Any questions or concerns.          [facility] [place]:    [phone] [facility] (7 days a week)    Or you may contact your provider via My Chart

## 2024-07-16 NOTE — PROGRESS NOTES
0900: Pt returned from Cath Lab. TR band intact to right wrist.  No oozing or hematoma noted. Area soft & flat. right arm elevated on pillows. Pt denies pain. Pt instructed on activity restrictions with right wrist. Verbal understanding received from pt.      
Care Suites Admission Nursing Note    Patient Information  Name: Alex Stanley  Age: 78 year old  Reason for admission:heart cath  Care Suites arrival time: 0630    Visitor Information  Name: jose d spouse and arlin son     Patient Admission/Assessment   Pre-procedure assessment complete: Yes  If abnormal assessment/labs, provider notified: N/A  NPO: Yes  Medications held per instructions/orders: Yes  Consent: obtained  If applicable, pregnancy test status: n/a  Patient oriented to room: Yes  Education/questions answered: Yes  Plan/other: proceed    Discharge Planning  Discharge name/phone number: jose d- spouse    Overnight post sedation caregiver spouse  Discharge location: [place]    Jaydon Little RN        
Care Suites Discharge Nursing Note    Patient Information  Name: Alex Stanley  Age: 78 year old    Discharge Education:  Discharge instructions reviewed: Yes- natalya CASTILLO RN  Additional education/resources provided: n/a  Patient/patient representative verbalizes understanding: Yes  Patient discharging on new medications: No  Medication education completed: N/A    Discharge Plans:   Discharge location: home  Discharge ride contacted: Yes  Approximate discharge time: 1150    Discharge Criteria:  Discharge criteria met and vital signs stable: Yes    Patient Belongs:  Patient belongings returned to patient: Yes    Chantel Sales RN      
Alert-The patient is alert, awake and responds to voice. The patient is oriented to time, place, and person. The triage nurse is able to obtain subjective information.

## 2024-07-16 NOTE — Clinical Note
Hemodynamic equipment used: 5 lead ECG, trend.lyK With 3 Leads, Machine BP Cuff and pulse oximeter probe.

## 2024-07-16 NOTE — PRE-PROCEDURE
GENERAL PRE-PROCEDURE:   Procedure:  Coronary angiogram possible percutaneous coronary intervention  Date/Time:  7/16/2024 8:31 AM    Verbal consent obtained?: Yes    Written consent obtained?: Yes    Risks and benefits: Risks, benefits and alternatives were discussed    DC Plan: Appropriate discharge home plan in place for patients who are going home after procedure   Consent given by:  Patient  Patient states understanding of procedure being performed: Yes    Patient's understanding of procedure matches consent: Yes    Procedure consent matches procedure scheduled: Yes    Expected level of sedation:  Moderate  Appropriately NPO:  Yes  ASA Class:  2  Mallampati  :  Grade 2- soft palate, base of uvula, tonsillar pillars, and portion of posterior pharyngeal wall visible  Lungs:  Lungs clear with good breath sounds bilaterally  Heart:  Normal heart sounds and rate and a-fib  History & Physical reviewed:  History and physical reviewed and no updates needed  Statement of review:  I have reviewed the lab findings, diagnostic data, medications, and the plan for sedation  I have examined the patient, reviewed the history, medications and pre procedural tests.He has a history of recurrent atypical chest pain ( both with and without exertion), old stroke ( no sequale), hypertension, atrial fibrillation ( on apixaban) and TTC Bladder who has a history of an abnormal stress test ( apical ischemia)  I have explained to the patient the risks of death, MI, stroke, hematoma, possible urgent bypass surgery for failed PCI, use of stents, thienopyridine agents, possible peripheral vascular complications, arrhythmia, the use of FFR in clinical decision-making and alternative of medical therapy alone in regards to left heart catheterization, left ventriculography, coronary angiography, and possible percutaneous coronary intervention. The patient voiced understanding and wishes to proceed. The patient has a good right radial pulse,  normal ulnar pulse and a normal Geovanni's sign.

## 2024-07-18 ENCOUNTER — TELEPHONE (OUTPATIENT)
Dept: CARDIOLOGY | Facility: CLINIC | Age: 79
End: 2024-07-18
Payer: MEDICARE

## 2024-07-18 NOTE — TELEPHONE ENCOUNTER
Patient was admitted to [facility]     7/16/24: Coronary angiogram via RT Radial Arterial   1.minimal atherosclerotic change NO significant CAD     Pt was started on NO CHANGES    Called patient to discuss any post hospital d/c questions HE may have, review medication changes, and confirm f/u appts. Patient denied any questions regarding new medications or changes with their PTA medications.    Patient denied any SOB, chest pain or lightheadedness.     Right radial cardiac cath site is without bleeding, swelling, redness or signs of infection.     Appointments in Next Year      Jul 24, 2024 8:00 AM  (Arrive by 7:50 AM)  Return Cardiology with Sean Casanova NP  [facility] ([facility] ) [phone]     Sep 06, 2024 8:45 AM  Cystoscopy with Sydney Weaver MD  [facility] ([facility] ) [phone]     Sep 26, 2024 8:30 AM  (Arrive by 8:20 AM)  Return Cardiology with Jimena Ribera PA-C  [facility] ([facility] ) [phone]     Jan 07, 2025 11:15 AM  (Arrive by 11:00 AM)  Return Dermatology with Rosa Toledo MD  [facility] ([facility] ) [phone]           Patient advised to call clinic with any cardiac related questions or concerns prior to this zenobia't. Patient verbalized understanding and agreed with plan. Mendel KAY

## 2024-07-24 ENCOUNTER — OFFICE VISIT (OUTPATIENT)
Dept: CARDIOLOGY | Facility: CLINIC | Age: 79
End: 2024-07-24
Attending: NURSE PRACTITIONER
Payer: MEDICARE

## 2024-07-24 VITALS
WEIGHT: 164.5 LBS | HEIGHT: 67 IN | HEART RATE: 70 BPM | SYSTOLIC BLOOD PRESSURE: 124 MMHG | BODY MASS INDEX: 25.82 KG/M2 | DIASTOLIC BLOOD PRESSURE: 67 MMHG | OXYGEN SATURATION: 96 %

## 2024-07-24 DIAGNOSIS — Z98.890 STATUS POST CORONARY ANGIOGRAM: Primary | ICD-10-CM

## 2024-07-24 DIAGNOSIS — E78.5 DYSLIPIDEMIA: ICD-10-CM

## 2024-07-24 DIAGNOSIS — I48.20 CHRONIC ATRIAL FIBRILLATION (H): ICD-10-CM

## 2024-07-24 DIAGNOSIS — I10 ESSENTIAL HYPERTENSION: ICD-10-CM

## 2024-07-24 DIAGNOSIS — R94.39 ABNORMAL CARDIOVASCULAR STRESS TEST: ICD-10-CM

## 2024-07-24 PROCEDURE — 99214 OFFICE O/P EST MOD 30 MIN: CPT | Performed by: NURSE PRACTITIONER

## 2024-07-24 NOTE — LETTER
7/24/2024    Estuardo Manning MD  [address] [place] [id number]    RE: Alex Stanley       Dear Colleague,     I had the pleasure of seeing Alex Stanley in the [facility].    Cardiology Clinic Progress Note    Service Date: July 24, 2024  Primary Cardiology Team: Dr. Dawson  Reason for Visit: Follow-up post coronary angiogram    HISTORY OF PRESENT ILLNESS:  I had the pleasure of meeting Mr. Tejas Stanley in the clinic today. He is a delightful 78 year old gentleman with a past medical history notable for:   1.  Hypertension  2.  Hyperlipidemia, managed with pravastatin 20 mg once daily  3.  Chronic atrial fibrillation on anticoagulation with rate control approach  4.  CVA  5.  Recent diagnosis bladder cancer  6.  Abnormal stress test in October 2022 in the setting of some atypical chest discomfort at that time, which has been medically managed with Imdur 30 mg daily.  7.  Suspected coronary artery disease based on the stress test as noted above.  Not on aspirin due to anticoagulation with Eliquis for his atrial fibrillation.   8.  Bladder cancer  9.  Ongoing longstanding history of smoking, currently around 10 cigarettes/day.  Previously around 1 pack/day since around 1966.    The patient last met with Dr. Dawson in November 2022 and noted that if he has recurrent symptoms of chest pain while on Imdur he will let us know and we will consider angiography.  He was last seen in follow-up by my colleague, Jimena Ribera PA-C in October 2023 and was doing quite well at that time without any recurrent issues with chest discomfort.      More recently, the patient called into the clinic earlier this week reporting recurrent symptoms of chest discomfort with some features typical for angina, though some atypical features as well.  He was added onto my schedule today for further evaluation and management of the symptoms and for consideration of pursuing possible coronary angiography and possible  PCI. The patient tells me that about a month ago he started having issues with mid substernal chest pain with symptom onset mostly occurring when exerting himself doing things like walking, yard work, or chores around the house. The patient notes that symptoms while typically resolve after taking some time to rest, but he has had a few more prolonged episodes lasting up to a few hours.He notes that he will also feel some tenderness and soreness if he pushes on his sternum when he is having the symptoms.    His wife states that she feels that his symptoms are similar to what he described experiencing back in 2022 leading up to the stress test which was abnormal, but symptoms are now occurring more frequently up to around 2-3 times per week.  He was evaluated in urgent care around 3-4 weeks ago for the symptoms and was instructed to start on Protonix. An EKG was completed which the patient and his wife state looked okay, but I do not have access to these records at this time.  Symptoms have not improved at all following the addition of Protonix.    Given his symptoms and history of the abnormal stress test and suspected coronary disease as well as risk factors of hypertension, hyperlipidemia, and longstanding smoking, definitive evaluation with coronary angiography and possible PCI was recommended. This was completed on 7/16/24 showing minimal atherosclerotic changes of the coronary arteries, but no significant coronary artery disease.    Today, Tejas presents to the clinic in follow-up of the recent angiogram.  He tells me is been feeling quite well since the procedure. He was very reassured by the angiogram findings and notes that he has not had any recurrent episodes of chest discomfort.  He otherwise denies concerns from a cardiac standpoint.  He has started back up on his Eliquis and has not had any significant issues with bleeding or any hematuria or dark/bloody stools. He has had some mild discomfort at the right  radial access site and a small hematoma, but feels that this has been gradually resolving and healing nicely.     ASSESSMENT:  1. Chronic atrial fibrillation with heart rates well-controlled primarily around the 70s.  Chronically anticoagulated on Eliquis 5 mg twice daily.  2. Dyslipidemia, managed with pravastatin 20 mg once daily with most recent LDL cholesterol 59 in March 2024.  3. Essential hypertension.  Blood pressure well-controlled on amlodipine 10 mg once daily.  4. Urothelial carcinoma the bladder status post TURBT with benign pathology.  Follows closely with urology.  Denies any recent issues with hematuria.  5. Longstanding tobacco dependence with ongoing smoking around 10 cigarettes/day.  Counseled on cessation.  6. History of chest pain with an abnormal nuclear stress test in October 2022 which was a false positive study with coronary angiography 7/16/2024 showing minimal atherosclerotic changes but no significant coronary artery disease.    PLAN:  - Continue current cardiac regimen with Eliquis 5 mg twice daily for anticoagulation for his atrial fibrillation, Imdur, amlodipine, and pravastatin for management of blood pressure and cholesterol. We did discuss that he could likely stop the Imdur which was started due to concern for angina in the setting of the abnormal stress test given that we know that he has minimal CAD now, but since he has been tolerating this well and blood pressure is well-controlled so he wanted to continue on with this for the time being.  - Counseled on the importance of smoking cessation.  - Scheduled to follow-up with Jimena Ribera PA-C in September. Reviewed that he could cancel his appointment if he would like and instead plan for an annual follow-up visit next summer with Dr. Dawson. He requested to keep the appointment with Jimena for now.      Thank you for the opportunity to participate in this pleasant patient's care. We would be happy to see him sooner if needed for any  concerns in the meantime.    30 total minutes was spent today including chart review, precharting, history and exam, post visit documentation, and reviewing studies as outlined above.     LAST Aleman, CNP   Nurse Practitioner  [facility]  Pager: [phone]  Text Page  (8am - 5pm, M-F)    Orders this Visit:  No orders of the defined types were placed in this encounter.    No orders of the defined types were placed in this encounter.    There are no discontinued medications.  Encounter Diagnoses   Name Primary?    Coronary artery disease of native artery of native heart with stable angina pectoris (H24)     Abnormal cardiovascular stress test        CURRENT MEDICATIONS:  Current Outpatient Medications   Medication Sig Dispense Refill    amLODIPine (NORVASC) 10 MG tablet Take 1 tablet (10 mg) by mouth daily (Patient taking differently: Take 10 mg by mouth every morning) 90 tablet 3    apixaban ANTICOAGULANT (ELIQUIS) 5 MG tablet Take 1 tablet (5 mg) by mouth 2 times daily 180 tablet 3    isosorbide mononitrate (IMDUR) 30 MG 24 hr tablet Take 1 tablet (30 mg) by mouth daily (Patient taking differently: Take 30 mg by mouth every morning) 90 tablet 3    Multiple Vitamins-Minerals (PRESERVISION AREDS 2) CAPS Take 1 capsule by mouth 2 times daily      pantoprazole (PROTONIX) 40 MG EC tablet Take 1 tablet (40 mg) by mouth daily for 90 days 90 tablet 0    pravastatin (PRAVACHOL) 20 MG tablet Take 1 tablet (20 mg) by mouth daily (Patient taking differently: Take 20 mg by mouth every morning) 90 tablet 3    nitroGLYcerin (NITROSTAT) 0.4 MG sublingual tablet For chest pain place 1 tablet under the tongue every 5 minutes for 3 doses. If symptoms persist 5 minutes after 1st dose call 911. (Patient not taking: Reported on 7/24/2024) 30 tablet 0    oxyBUTYnin ER (DITROPAN XL) 5 MG 24 hr tablet Take 1 tablet (5 mg) by mouth daily (Patient not taking: Reported on 7/24/2024) 12 tablet 0     ALLERGIES  No Known  Allergies    PAST MEDICAL, SURGICAL, FAMILY HISTORY:  History was reviewed and updated as needed, see medical record.    SOCIAL HISTORY:  Social History     Socioeconomic History    Marital status:      Spouse name: Not on file    Number of children: Not on file    Years of education: Not on file    Highest education level: Not on file   Occupational History    Not on file   Tobacco Use    Smoking status: Every Day     Current packs/day: 0.50     Average packs/day: 0.5 packs/day for 59.6 years (29.8 ttl pk-yrs)     Types: Cigarettes     Start date: 1965    Smokeless tobacco: Never    Tobacco comments:     ppd since age 16 - now 1/2-1 ppd 04/05/21.  8-10 cigarettes daily   Vaping Use    Vaping status: Never Used   Substance and Sexual Activity    Alcohol use: Not Currently     Comment: last drank at [facility] 8/2010.    Drug use: Never    Sexual activity: Yes     Partners: Female   Other Topics Concern    Parent/sibling w/ CABG, MI or angioplasty before 65F 55M? Not Asked   Social History Narrative    ** Merged History Encounter **          Social Determinants of Health     Financial Resource Strain: Low Risk  (3/4/2024)    Financial Resource Strain     Within the past 12 months, have you or your family members you live with been unable to get utilities (heat, electricity) when it was really needed?: No   Food Insecurity: Low Risk  (3/4/2024)    Food Insecurity     Within the past 12 months, did you worry that your food would run out before you got money to buy more?: No     Within the past 12 months, did the food you bought just not last and you didn t have money to get more?: No   Transportation Needs: Low Risk  (3/4/2024)    Transportation Needs     Within the past 12 months, has lack of transportation kept you from medical appointments, getting your medicines, non-medical meetings or appointments, work, or from getting things that you need?: No   Physical Activity: Sufficiently Active (3/4/2024)    Exercise  Vital Sign     Days of Exercise per Week: 5 days     Minutes of Exercise per Session: 30 min   Stress: No Stress Concern Present (3/4/2024)    [employer] - Occupational Stress Questionnaire     Feeling of Stress : Not at all   Social Connections: Not on file   Interpersonal Safety: Low Risk  (3/4/2024)    Interpersonal Safety     Do you feel physically and emotionally safe where you currently live?: Yes     Within the past 12 months, have you been hit, slapped, kicked or otherwise physically hurt by someone?: No     Within the past 12 months, have you been humiliated or emotionally abused in other ways by your partner or ex-partner?: No   Housing Stability: Low Risk  (3/4/2024)    Housing Stability     Do you have housing? : Yes     Are you worried about losing your housing?: No     Review of Systems:  Focused cardiovascular and respiratory review of systems is negative other than the symptoms noted above in the HPI.     Physical Exam:  Vitals: There were no vitals taken for this visit.   Wt Readings from Last 4 Encounters:   07/16/24 73.9 kg (163 lb)   07/11/24 73.7 kg (162 lb 8 oz)   06/07/24 76.8 kg (169 lb 6.4 oz)   05/28/24 77.6 kg (171 lb 1.2 oz)     Constitutional: Alert and in no acute distress.  Neck: No JVD.  Cardiovascular:  Regular rate and rhythm, no murmur, rub or gallop. Peripheral pulses full and equal bilaterally. Small hematoma and area of healing ecchymosis at the right radial access site and into the right forearm.  Respiratory: Breathing non-labored. Lungs are clear to auscultation with no wheezes or crackles bilaterally.  Gastrointestinal: Abdomen non-distended.  Extremities: Trace non-pitting ankle edema bilaterally.   Neuropsychiatric: No gross focal deficits. Affect appropriate. Mentation normal.     Recent Lab Results:  LIPID RESULTS:  Lab Results   Component Value Date    CHOL 115 03/04/2024    CHOL 102 10/01/2020    HDL 44 03/04/2024    HDL 46 10/01/2020     LDL 59 03/04/2024    LDL 45 10/01/2020    TRIG 62 03/04/2024    TRIG 57 10/01/2020    CHOLHDLRATIO 3.2 01/20/2015     LIVER ENZYME RESULTS:  Lab Results   Component Value Date    AST 31 05/21/2024    AST 23 10/01/2020    ALT 25 05/21/2024    ALT 24 10/01/2020     CBC RESULTS:  Lab Results   Component Value Date    WBC 8.8 07/16/2024    WBC 8.3 10/08/2012    RBC 4.56 07/16/2024    RBC 4.80 10/08/2012    HGB 14.3 07/16/2024    HGB 14.1 06/14/2018    HCT 43.5 07/16/2024    HCT 44.3 10/08/2012    MCV 95 07/16/2024    MCV 92 10/08/2012    MCH 31.4 07/16/2024    MCH 30.2 10/08/2012    MCHC 32.9 07/16/2024    MCHC 32.7 10/08/2012    RDW 14.6 07/16/2024    RDW 14.1 10/08/2012     07/16/2024     10/08/2012     BMP RESULTS:  Lab Results   Component Value Date     07/16/2024     10/01/2020    POTASSIUM 4.5 07/16/2024    POTASSIUM 4.7 05/21/2024    POTASSIUM 3.9 10/01/2020    CHLORIDE 105 07/16/2024    CHLORIDE 109 05/21/2024    CHLORIDE 108 10/01/2020    CO2 28 07/16/2024    CO2 30 05/21/2024    CO2 28 10/01/2020    ANIONGAP 7 07/16/2024    ANIONGAP 6 05/21/2024    ANIONGAP 4 10/01/2020     (H) 07/16/2024     (H) 05/21/2024    GLC 70 10/01/2020    BUN 20.3 07/16/2024    BUN 15 05/21/2024    BUN 14 10/01/2020    CR 1.23 (H) 07/16/2024    CR 1.26 (H) 10/01/2020    GFRESTIMATED 61 07/16/2024    GFRESTIMATED 56 (L) 10/01/2020    GFRESTBLACK 65 10/01/2020    MARY 9.0 07/16/2024    MARY 8.8 10/01/2020     CC  Lamont Dawson MD  [email or web address]  [place] [address]    Please kindly note that this document was completed in part using Dragon voice recognition software. Although reviewed after completion, some word substitutions and typographical errors may occur. Please contact me if clarification is needed.     Thank you for allowing me to participate in the care of your patient.      Sincerely,     Sean Casanova NP     [facility]  cc:   Sean Casanova, NP  [address] [place] [address]

## 2024-07-24 NOTE — PROGRESS NOTES
Cardiology Clinic Progress Note    Service Date: July 24, 2024  Primary Cardiology Team: Dr. Dawson  Reason for Visit: Follow-up post coronary angiogram    HISTORY OF PRESENT ILLNESS:  I had the pleasure of meeting Mr. Tejas Stanley in the clinic today. He is a delightful 78 year old gentleman with a past medical history notable for:   1.  Hypertension  2.  Hyperlipidemia, managed with pravastatin 20 mg once daily  3.  Chronic atrial fibrillation on anticoagulation with rate control approach  4.  CVA  5.  Recent diagnosis bladder cancer  6.  Abnormal stress test in October 2022 in the setting of some atypical chest discomfort at that time, which has been medically managed with Imdur 30 mg daily.  7.  Suspected coronary artery disease based on the stress test as noted above.  Not on aspirin due to anticoagulation with Eliquis for his atrial fibrillation.   8.  Bladder cancer  9.  Ongoing longstanding history of smoking, currently around 10 cigarettes/day.  Previously around 1 pack/day since around 1966.    The patient last met with Dr. Dawson in November 2022 and noted that if he has recurrent symptoms of chest pain while on Imdur he will let us know and we will consider angiography.  He was last seen in follow-up by my colleague, Jimena Ribera PA-C in October 2023 and was doing quite well at that time without any recurrent issues with chest discomfort.      More recently, the patient called into the clinic earlier this week reporting recurrent symptoms of chest discomfort with some features typical for angina, though some atypical features as well.  He was added onto my schedule today for further evaluation and management of the symptoms and for consideration of pursuing possible coronary angiography and possible PCI. The patient tells me that about a month ago he started having issues with mid substernal chest pain with symptom onset mostly occurring when exerting himself doing things like walking, yard work, or chores  around the house. The patient notes that symptoms while typically resolve after taking some time to rest, but he has had a few more prolonged episodes lasting up to a few hours.He notes that he will also feel some tenderness and soreness if he pushes on his sternum when he is having the symptoms.    His wife states that she feels that his symptoms are similar to what he described experiencing back in 2022 leading up to the stress test which was abnormal, but symptoms are now occurring more frequently up to around 2-3 times per week.  He was evaluated in urgent care around 3-4 weeks ago for the symptoms and was instructed to start on Protonix. An EKG was completed which the patient and his wife state looked okay, but I do not have access to these records at this time.  Symptoms have not improved at all following the addition of Protonix.    Given his symptoms and history of the abnormal stress test and suspected coronary disease as well as risk factors of hypertension, hyperlipidemia, and longstanding smoking, definitive evaluation with coronary angiography and possible PCI was recommended. This was completed on 7/16/24 showing minimal atherosclerotic changes of the coronary arteries, but no significant coronary artery disease.    Today, Tejas presents to the clinic in follow-up of the recent angiogram.  He tells me is been feeling quite well since the procedure. He was very reassured by the angiogram findings and notes that he has not had any recurrent episodes of chest discomfort.  He otherwise denies concerns from a cardiac standpoint.  He has started back up on his Eliquis and has not had any significant issues with bleeding or any hematuria or dark/bloody stools. He has had some mild discomfort at the right radial access site and a small hematoma, but feels that this has been gradually resolving and healing nicely.     ASSESSMENT:  1. Chronic atrial fibrillation with heart rates well-controlled primarily around  the 70s.  Chronically anticoagulated on Eliquis 5 mg twice daily.  2. Dyslipidemia, managed with pravastatin 20 mg once daily with most recent LDL cholesterol 59 in March 2024.  3. Essential hypertension.  Blood pressure well-controlled on amlodipine 10 mg once daily.  4. Urothelial carcinoma the bladder status post TURBT with benign pathology.  Follows closely with urology.  Denies any recent issues with hematuria.  5. Longstanding tobacco dependence with ongoing smoking around 10 cigarettes/day.  Counseled on cessation.  6. History of chest pain with an abnormal nuclear stress test in October 2022 which was a false positive study with coronary angiography 7/16/2024 showing minimal atherosclerotic changes but no significant coronary artery disease.    PLAN:  - Continue current cardiac regimen with Eliquis 5 mg twice daily for anticoagulation for his atrial fibrillation, Imdur, amlodipine, and pravastatin for management of blood pressure and cholesterol. We did discuss that he could likely stop the Imdur which was started due to concern for angina in the setting of the abnormal stress test given that we know that he has minimal CAD now, but since he has been tolerating this well and blood pressure is well-controlled so he wanted to continue on with this for the time being.  - Counseled on the importance of smoking cessation.  - Scheduled to follow-up with Jimena Ribera PA-C in September. Reviewed that he could cancel his appointment if he would like and instead plan for an annual follow-up visit next summer with Dr. Dawson. He requested to keep the appointment with Jimena for now.      Thank you for the opportunity to participate in this pleasant patient's care. We would be happy to see him sooner if needed for any concerns in the meantime.    30 total minutes was spent today including chart review, precharting, history and exam, post visit documentation, and reviewing studies as outlined above.     Obinna Casanova, APRN, CNP    Nurse Practitioner  [facility]  Pager: [phone]  Text Page  (8am - 5pm, M-F)    Orders this Visit:  No orders of the defined types were placed in this encounter.    No orders of the defined types were placed in this encounter.    There are no discontinued medications.  Encounter Diagnoses   Name Primary?    Coronary artery disease of native artery of native heart with stable angina pectoris (H24)     Abnormal cardiovascular stress test        CURRENT MEDICATIONS:  Current Outpatient Medications   Medication Sig Dispense Refill    amLODIPine (NORVASC) 10 MG tablet Take 1 tablet (10 mg) by mouth daily (Patient taking differently: Take 10 mg by mouth every morning) 90 tablet 3    apixaban ANTICOAGULANT (ELIQUIS) 5 MG tablet Take 1 tablet (5 mg) by mouth 2 times daily 180 tablet 3    isosorbide mononitrate (IMDUR) 30 MG 24 hr tablet Take 1 tablet (30 mg) by mouth daily (Patient taking differently: Take 30 mg by mouth every morning) 90 tablet 3    Multiple Vitamins-Minerals (PRESERVISION AREDS 2) CAPS Take 1 capsule by mouth 2 times daily      pantoprazole (PROTONIX) 40 MG EC tablet Take 1 tablet (40 mg) by mouth daily for 90 days 90 tablet 0    pravastatin (PRAVACHOL) 20 MG tablet Take 1 tablet (20 mg) by mouth daily (Patient taking differently: Take 20 mg by mouth every morning) 90 tablet 3    nitroGLYcerin (NITROSTAT) 0.4 MG sublingual tablet For chest pain place 1 tablet under the tongue every 5 minutes for 3 doses. If symptoms persist 5 minutes after 1st dose call 911. (Patient not taking: Reported on 7/24/2024) 30 tablet 0    oxyBUTYnin ER (DITROPAN XL) 5 MG 24 hr tablet Take 1 tablet (5 mg) by mouth daily (Patient not taking: Reported on 7/24/2024) 12 tablet 0     ALLERGIES  No Known Allergies    PAST MEDICAL, SURGICAL, FAMILY HISTORY:  History was reviewed and updated as needed, see medical record.    SOCIAL HISTORY:  Social History     Socioeconomic History    Marital status:       Spouse name: Not on file    Number of children: Not on file    Years of education: Not on file    Highest education level: Not on file   Occupational History    Not on file   Tobacco Use    Smoking status: Every Day     Current packs/day: 0.50     Average packs/day: 0.5 packs/day for 59.6 years (29.8 ttl pk-yrs)     Types: Cigarettes     Start date: 1965    Smokeless tobacco: Never    Tobacco comments:     ppd since age 16 - now 1/2-1 ppd 04/05/21.  8-10 cigarettes daily   Vaping Use    Vaping status: Never Used   Substance and Sexual Activity    Alcohol use: Not Currently     Comment: last drank at [facility] 8/2010.    Drug use: Never    Sexual activity: Yes     Partners: Female   Other Topics Concern    Parent/sibling w/ CABG, MI or angioplasty before 65F 55M? Not Asked   Social History Narrative    ** Merged History Encounter **          Social Determinants of Health     Financial Resource Strain: Low Risk  (3/4/2024)    Financial Resource Strain     Within the past 12 months, have you or your family members you live with been unable to get utilities (heat, electricity) when it was really needed?: No   Food Insecurity: Low Risk  (3/4/2024)    Food Insecurity     Within the past 12 months, did you worry that your food would run out before you got money to buy more?: No     Within the past 12 months, did the food you bought just not last and you didn t have money to get more?: No   Transportation Needs: Low Risk  (3/4/2024)    Transportation Needs     Within the past 12 months, has lack of transportation kept you from medical appointments, getting your medicines, non-medical meetings or appointments, work, or from getting things that you need?: No   Physical Activity: Sufficiently Active (3/4/2024)    Exercise Vital Sign     Days of Exercise per Week: 5 days     Minutes of Exercise per Session: 30 min   Stress: No Stress Concern Present (3/4/2024)    [employer] - Occupational Stress  Questionnaire     Feeling of Stress : Not at all   Social Connections: Not on file   Interpersonal Safety: Low Risk  (3/4/2024)    Interpersonal Safety     Do you feel physically and emotionally safe where you currently live?: Yes     Within the past 12 months, have you been hit, slapped, kicked or otherwise physically hurt by someone?: No     Within the past 12 months, have you been humiliated or emotionally abused in other ways by your partner or ex-partner?: No   Housing Stability: Low Risk  (3/4/2024)    Housing Stability     Do you have housing? : Yes     Are you worried about losing your housing?: No     Review of Systems:  Focused cardiovascular and respiratory review of systems is negative other than the symptoms noted above in the HPI.     Physical Exam:  Vitals: There were no vitals taken for this visit.   Wt Readings from Last 4 Encounters:   07/16/24 73.9 kg (163 lb)   07/11/24 73.7 kg (162 lb 8 oz)   06/07/24 76.8 kg (169 lb 6.4 oz)   05/28/24 77.6 kg (171 lb 1.2 oz)     Constitutional: Alert and in no acute distress.  Neck: No JVD.  Cardiovascular:  Regular rate and rhythm, no murmur, rub or gallop. Peripheral pulses full and equal bilaterally. Small hematoma and area of healing ecchymosis at the right radial access site and into the right forearm.  Respiratory: Breathing non-labored. Lungs are clear to auscultation with no wheezes or crackles bilaterally.  Gastrointestinal: Abdomen non-distended.  Extremities: Trace non-pitting ankle edema bilaterally.   Neuropsychiatric: No gross focal deficits. Affect appropriate. Mentation normal.     Recent Lab Results:  LIPID RESULTS:  Lab Results   Component Value Date    CHOL 115 03/04/2024    CHOL 102 10/01/2020    HDL 44 03/04/2024    HDL 46 10/01/2020    LDL 59 03/04/2024    LDL 45 10/01/2020    TRIG 62 03/04/2024    TRIG 57 10/01/2020    CHOLHDLRATIO 3.2 01/20/2015     LIVER ENZYME RESULTS:  Lab Results   Component Value Date    AST 31 05/21/2024    AST 23  10/01/2020    ALT 25 05/21/2024    ALT 24 10/01/2020     CBC RESULTS:  Lab Results   Component Value Date    WBC 8.8 07/16/2024    WBC 8.3 10/08/2012    RBC 4.56 07/16/2024    RBC 4.80 10/08/2012    HGB 14.3 07/16/2024    HGB 14.1 06/14/2018    HCT 43.5 07/16/2024    HCT 44.3 10/08/2012    MCV 95 07/16/2024    MCV 92 10/08/2012    MCH 31.4 07/16/2024    MCH 30.2 10/08/2012    MCHC 32.9 07/16/2024    MCHC 32.7 10/08/2012    RDW 14.6 07/16/2024    RDW 14.1 10/08/2012     07/16/2024     10/08/2012     BMP RESULTS:  Lab Results   Component Value Date     07/16/2024     10/01/2020    POTASSIUM 4.5 07/16/2024    POTASSIUM 4.7 05/21/2024    POTASSIUM 3.9 10/01/2020    CHLORIDE 105 07/16/2024    CHLORIDE 109 05/21/2024    CHLORIDE 108 10/01/2020    CO2 28 07/16/2024    CO2 30 05/21/2024    CO2 28 10/01/2020    ANIONGAP 7 07/16/2024    ANIONGAP 6 05/21/2024    ANIONGAP 4 10/01/2020     (H) 07/16/2024     (H) 05/21/2024    GLC 70 10/01/2020    BUN 20.3 07/16/2024    BUN 15 05/21/2024    BUN 14 10/01/2020    CR 1.23 (H) 07/16/2024    CR 1.26 (H) 10/01/2020    GFRESTIMATED 61 07/16/2024    GFRESTIMATED 56 (L) 10/01/2020    GFRESTBLACK 65 10/01/2020    MARY 9.0 07/16/2024    MARY 8.8 10/01/2020     CC  Lamont Dawson MD  [id number] [place], [email or web address]  [place] [id number]    Please kindly note that this document was completed in part using Dragon voice recognition software. Although reviewed after completion, some word substitutions and typographical errors may occur. Please contact me if clarification is needed.

## 2024-07-24 NOTE — PATIENT INSTRUCTIONS
Thank you for your visit with the [facility] today.    Medication changes and/or recommendations from today:   - No changes today.    Follow up plan:   - Follow-up as planned with Jimena Ribera PA-C in September. Since we got the excellent results of the coronary angiogram, you could cancel this visit if you would like and instead plan for an annual follow-up visit with Dr. Dawson next summer.    If you have questions or concerns in the meantime please call the nurse team at [phone] or send a [employer] message.     Scheduling phone number: [phone]    It was a pleasure seeing you today!     LAST Aleman, CNP  Nurse Practitioner  [facility]

## 2024-09-06 ENCOUNTER — OFFICE VISIT (OUTPATIENT)
Dept: ONCOLOGY | Facility: CLINIC | Age: 79
End: 2024-09-06
Attending: SURGERY
Payer: MEDICARE

## 2024-09-06 VITALS
WEIGHT: 163.2 LBS | BODY MASS INDEX: 25.56 KG/M2 | HEART RATE: 85 BPM | DIASTOLIC BLOOD PRESSURE: 76 MMHG | RESPIRATION RATE: 16 BRPM | SYSTOLIC BLOOD PRESSURE: 112 MMHG | TEMPERATURE: 97.4 F | OXYGEN SATURATION: 97 %

## 2024-09-06 DIAGNOSIS — C67.9 UROTHELIAL CARCINOMA OF BLADDER WITHOUT INVASION OF MUSCLE (H): Primary | ICD-10-CM

## 2024-09-06 DIAGNOSIS — R39.9 LOWER URINARY TRACT SYMPTOMS (LUTS): ICD-10-CM

## 2024-09-06 PROCEDURE — 99213 OFFICE O/P EST LOW 20 MIN: CPT | Mod: 25 | Performed by: UROLOGY

## 2024-09-06 PROCEDURE — 52000 CYSTOURETHROSCOPY: CPT | Performed by: UROLOGY

## 2024-09-06 RX ORDER — TOLTERODINE TARTRATE 2 MG/1
2 TABLET, EXTENDED RELEASE ORAL 2 TIMES DAILY
Qty: 30 TABLET | Refills: 3 | Status: SHIPPED | OUTPATIENT
Start: 2024-09-06

## 2024-09-06 ASSESSMENT — PAIN SCALES - GENERAL: PAINLEVEL: NO PAIN (0)

## 2024-09-06 NOTE — LETTER
9/6/2024      Alex Stanley  [address]  [place] [id number]      Dear Colleague,    Thank you for referring your patient, Alex Stanley, to the [facility]. Please see a copy of my visit note below.    Cystoscopy procedure     Pre procedural diagnosis history of nonmuscle invasive bladder cancer  Post procedural diagnosis Same     Indication 79 year old male with history of low-grade TA urothelial carcinoma bladder here for surveillance cystoscopy.  He reports some urge incontinence with some dysuria at the start of urination.        Procedure:      After sterile preparation and draping of the patient,  a 16-Belizean flexible cystoscope was introduced via the urethra.  It was passed without difficulty into the bladder.  The urethra was open without evidence of stricture.  The ureteral orifices were orthotopic.  The bladder mucosa was evaluated using both antegrade and retroflex views and this showed an erythematous patch on the right lateral bladder wall.  It is the same lesion that had been biopsied twice with no evidence of cancer on pathology.  No evidence of any other lesions or trabeculation. Scope was then removed and patient tolerated the procedure well.      Plan  Start tolterodine  Follow-up in 6 months for surveillance cystoscopy      Sydney Weaver MD   Department of Urology   [facility]          Again, thank you for allowing me to participate in the care of your patient.        Sincerely,        Sydney Weaver MD

## 2024-09-06 NOTE — NURSING NOTE
"Oncology Rooming Note    September 6, 2024 8:44 AM   Alex Stanley is a 79 year old male who presents for:    Chief Complaint   Patient presents with    Oncology Clinic Visit     Initial Vitals: /76   Pulse 85   Temp 97.4  F (36.3  C) (Tympanic)   Resp 16   Wt 74 kg (163 lb 3.2 oz)   SpO2 97%   BMI 25.56 kg/m   Estimated body mass index is 25.56 kg/m  as calculated from the following:    Height as of 7/24/24: 1.702 m (5' 7\").    Weight as of this encounter: 74 kg (163 lb 3.2 oz). Body surface area is 1.87 meters squared.  No Pain (0) Comment: Data Unavailable   No LMP for male patient.  Allergies reviewed: Yes  Medications reviewed: Yes    Medications: Medication refills not needed today.  Pharmacy name entered into [employer]:    [facility] [address]  [facility] - [address]  [facility] - [address]    Frailty Screening:   Is the patient here for a new oncology consult visit in cancer care? 2. No      Clinical concerns: F/U ANTONIO Jimenez CMA              "

## 2024-09-06 NOTE — PROGRESS NOTES
Nursing Note:  Alex Stanley presents today for cytoscopy.  Patient seen by provider today: Yes: .Anila.   present during visit today: Not Applicable.    Note: N/A.    Labs:  Not Applicable.     Procedure:  Urology Procedure: Cystoscopy.    Verified:  Time out included verbal and active participation of all team members: Yes.      Post Procedure:  Patient tolerated the procedure without incident..    Post Pain Assessment: 0 out of 10.    Discharge Plan:   Departure Mode: Ambulatory.    Roseline Jimenez, CMA

## 2024-09-06 NOTE — PROGRESS NOTES
Cystoscopy procedure     Pre procedural diagnosis history of nonmuscle invasive bladder cancer  Post procedural diagnosis Same     Indication 79 year old male with history of low-grade TA urothelial carcinoma bladder here for surveillance cystoscopy.  He reports some urge incontinence with some dysuria at the start of urination.        Procedure:      After sterile preparation and draping of the patient,  a 16-Latvian flexible cystoscope was introduced via the urethra.  It was passed without difficulty into the bladder.  The urethra was open without evidence of stricture.  The ureteral orifices were orthotopic.  The bladder mucosa was evaluated using both antegrade and retroflex views and this showed an erythematous patch on the right lateral bladder wall.  It is the same lesion that had been biopsied twice with no evidence of cancer on pathology.  No evidence of any other lesions or trabeculation. Scope was then removed and patient tolerated the procedure well.      Plan  Start tolterodine  Follow-up in 6 months for surveillance cystoscopy      Sydney Weaver MD   Department of Urology   [facility]

## 2024-09-26 ENCOUNTER — OFFICE VISIT (OUTPATIENT)
Dept: CARDIOLOGY | Facility: CLINIC | Age: 79
End: 2024-09-26
Payer: MEDICARE

## 2024-09-26 VITALS
WEIGHT: 165 LBS | SYSTOLIC BLOOD PRESSURE: 110 MMHG | HEART RATE: 62 BPM | OXYGEN SATURATION: 97 % | BODY MASS INDEX: 25.9 KG/M2 | DIASTOLIC BLOOD PRESSURE: 68 MMHG | HEIGHT: 67 IN

## 2024-09-26 DIAGNOSIS — R07.89 ATYPICAL CHEST PAIN: ICD-10-CM

## 2024-09-26 DIAGNOSIS — I48.20 CHRONIC ATRIAL FIBRILLATION (H): ICD-10-CM

## 2024-09-26 DIAGNOSIS — I10 ESSENTIAL HYPERTENSION: ICD-10-CM

## 2024-09-26 DIAGNOSIS — E78.5 HYPERLIPIDEMIA LDL GOAL <100: ICD-10-CM

## 2024-09-26 DIAGNOSIS — K21.9 GASTROESOPHAGEAL REFLUX DISEASE WITHOUT ESOPHAGITIS: ICD-10-CM

## 2024-09-26 DIAGNOSIS — Z72.0 TOBACCO ABUSE: Primary | ICD-10-CM

## 2024-09-26 PROCEDURE — 99214 OFFICE O/P EST MOD 30 MIN: CPT | Performed by: PHYSICIAN ASSISTANT

## 2024-09-26 RX ORDER — ISOSORBIDE MONONITRATE 30 MG/1
30 TABLET, EXTENDED RELEASE ORAL DAILY
Qty: 90 TABLET | Refills: 3 | Status: SHIPPED | OUTPATIENT
Start: 2024-09-26 | End: 2024-09-26

## 2024-09-26 RX ORDER — PANTOPRAZOLE SODIUM 40 MG/1
40 TABLET, DELAYED RELEASE ORAL DAILY
Qty: 90 TABLET | Refills: 3 | Status: SHIPPED | OUTPATIENT
Start: 2024-09-26

## 2024-09-26 RX ORDER — VARENICLINE TARTRATE 1 MG/1
1 TABLET, FILM COATED ORAL 2 TIMES DAILY
Qty: 180 TABLET | Refills: 1 | Status: SHIPPED | OUTPATIENT
Start: 2024-09-26

## 2024-09-26 RX ORDER — VARENICLINE TARTRATE 0.5 (11)-1
KIT ORAL
Qty: 53 TABLET | Refills: 0 | Status: SHIPPED | OUTPATIENT
Start: 2024-09-26

## 2024-09-26 RX ORDER — PANTOPRAZOLE SODIUM 40 MG/1
40 TABLET, DELAYED RELEASE ORAL DAILY
COMMUNITY
End: 2024-09-26

## 2024-09-26 RX ORDER — AMLODIPINE BESYLATE 10 MG/1
10 TABLET ORAL DAILY
Qty: 90 TABLET | Refills: 3 | Status: SHIPPED | OUTPATIENT
Start: 2024-09-26

## 2024-09-26 RX ORDER — PRAVASTATIN SODIUM 20 MG
20 TABLET ORAL DAILY
Qty: 90 TABLET | Refills: 3 | Status: SHIPPED | OUTPATIENT
Start: 2024-09-26

## 2024-09-26 RX ORDER — AMLODIPINE BESYLATE 10 MG/1
10 TABLET ORAL DAILY
Qty: 90 TABLET | Refills: 3 | Status: SHIPPED | OUTPATIENT
Start: 2024-09-26 | End: 2024-09-26

## 2024-09-26 NOTE — PROGRESS NOTES
"  Cardiology Clinic Progress Note    Service Date: 2024     Primary Cardiologist: Dr. Dawson      Reason for Visit: A-fib, HTN    HPI:   Alex Stanley is a delightful 79-year-old gentleman with past medical history significant for the followin.  Hypertension  2.  Hyperlipidemia  3.  Chronic A-fib on anticoagulation with rate control approach  4.  CVA  5.  Bladder ca with hx of resection, in remission  6.  False positive stress test, normal coronary arteries on angiogram   7.  Ongoing tobacco    Tejas comes in today for routine follow-up.  He is overall doing well.  He had had a normal stress test last year and was started on Imdur for medical management and eventually he ended up getting an angiogram this summer 7/24 and had completely normal coronary arteries which is quite remarkable given he is an ongoing smoker and has a history of agent orange exposure.    Since then he has felt well.  He occasionally has some chest pressure he relates to reflux that is improved with Protonix.  He otherwise denies orthopnea or PND.  Denies syncope or presyncope.  Home blood pressures are in the 110s and occasionally in the 90s if they are in the 90 systolic he sometimes feels a tiny bit lightheaded but not too badly.    He continues to smoke half a pack a day he is interested in quitting he is quit for different periods of time previously and its never really stopped started when he was 17 years old.    Physical Exam:  /68   Pulse 62   Ht 1.702 m (5' 7\")   Wt 74.8 kg (165 lb)   SpO2 97%   BMI 25.84 kg/m     Well-developed well-nourished gentleman in no acute distress.  Normocephalic atraumatic.  Heart is irregularly irregular without murmur rub or gallop.  Lungs are clear without wheezes rales or rhonchi.  Carotids are quiet.  Skin is warm and dry.      Assessment and Plan:  1.  A-fib, chronic with controlled ventricular rates on anticoagulation of Eliquis 5 mg twice daily.  His [id number] is 5 for age " hypertension and CVA.  Rates are controlled, asymptomatic.    2.  Hypertension well-controlled with occasional lows in the 90s.  He no longer requires Imdur that he has normal coronary arteries and this is discontinued today.    3.  Dyslipidemia with excellent control last LDL 59 HDL 44 total cholesterol 115, continue pravastatin.  This was refilled today.  In the context of CVA historically would like his cholesterol low even if he did have embolic CVA from A-fib.    4.  Tobacco use, ongoing at half pack a day.  The patient is interested in quitting and actually requests Chantix.  We discussed trying this 1 week before his quit date and continuing about 6 months after quitting.  Discussed possible adverse effects specifically vivid dreams and the possibility of suicidal ideation.  If he has suicidal ideation he should stop the medication, contact us immediately.  Hopefully the next time we see him he will have quit smoking.    5.  False positive stress test, normal coronary arteries making him lower risk for any cardiac event.    Thank you for allowing me to participate in this delightful patient's care.  He can follow-up with us in 1 year, sooner with concerns.  Meds were refilled.    Jimena Ribera PA-C  [facility]     This note was written using Dragon voice recognition system, please excuse any misspelled names, or nonsensical words and call with any questions.        Orders this visit:  Orders Placed This Encounter   Procedures    Follow-Up with Cardiology     Orders Placed This Encounter   Medications    DISCONTD: pantoprazole (PROTONIX) 40 MG EC tablet     Sig: Take 40 mg by mouth daily.    pantoprazole (PROTONIX) 40 MG EC tablet     Sig: Take 1 tablet (40 mg) by mouth daily.     Dispense:  90 tablet     Refill:  3    varenicline (CHANTIX STARTING MONTH PAK) 0.5 MG X 11 & 1 MG X 42 tablet     Sig: Take 0.5 mg tab daily for 3 days, THEN 0.5 mg tab twice daily for 4 days, THEN 1 mg twice daily.      Dispense:  53 tablet     Refill:  0    varenicline (CHANTIX) 1 MG tablet     Sig: Take 1 tablet (1 mg) by mouth 2 times daily.     Dispense:  180 tablet     Refill:  1    amLODIPine (NORVASC) 10 MG tablet     Sig: Take 1 tablet (10 mg) by mouth daily.     Dispense:  90 tablet     Refill:  3    apixaban ANTICOAGULANT (ELIQUIS) 5 MG tablet     Sig: Take 1 tablet (5 mg) by mouth 2 times daily.     Dispense:  180 tablet     Refill:  3    pravastatin (PRAVACHOL) 20 MG tablet     Sig: Take 1 tablet (20 mg) by mouth daily.     Dispense:  90 tablet     Refill:  3     Medications Discontinued During This Encounter   Medication Reason    pantoprazole (PROTONIX) 40 MG EC tablet Reorder (No AVS)    isosorbide mononitrate (IMDUR) 30 MG 24 hr tablet     nitroGLYcerin (NITROSTAT) 0.4 MG sublingual tablet     pravastatin (PRAVACHOL) 20 MG tablet Reorder (No AVS)    apixaban ANTICOAGULANT (ELIQUIS) 5 MG tablet Reorder (No AVS)    amLODIPine (NORVASC) 10 MG tablet Reorder (No AVS)     Encounter Diagnoses   Name Primary?    Tobacco abuse Yes    Gastroesophageal reflux disease without esophagitis     Essential hypertension     Chronic atrial fibrillation/Flutter     Hyperlipidemia LDL goal <100        Current Medications:  Current Outpatient Medications   Medication Sig Dispense Refill    amLODIPine (NORVASC) 10 MG tablet Take 1 tablet (10 mg) by mouth daily. 90 tablet 3    apixaban ANTICOAGULANT (ELIQUIS) 5 MG tablet Take 1 tablet (5 mg) by mouth 2 times daily. 180 tablet 3    Multiple Vitamins-Minerals (PRESERVISION AREDS 2) CAPS Take 1 capsule by mouth 2 times daily      pantoprazole (PROTONIX) 40 MG EC tablet Take 1 tablet (40 mg) by mouth daily. 90 tablet 3    pravastatin (PRAVACHOL) 20 MG tablet Take 1 tablet (20 mg) by mouth daily. 90 tablet 3    varenicline (CHANTIX STARTING MONTH WENDY) 0.5 MG X 11 & 1 MG X 42 tablet Take 0.5 mg tab daily for 3 days, THEN 0.5 mg tab twice daily for 4 days, THEN 1 mg twice daily. 53 tablet 0     varenicline (CHANTIX) 1 MG tablet Take 1 tablet (1 mg) by mouth 2 times daily. 180 tablet 1    tolterodine (DETROL) 2 MG tablet Take 1 tablet (2 mg) by mouth 2 times daily. (Patient not taking: Reported on 9/26/2024) 30 tablet 3       Allergies:  No Known Allergies    Past Medical, Surgical and Family History:  Past Medical History:   Diagnosis Date    Alcohol abuse     Alcoholism (H)     admission 11/09    Atrial fibrillation, paroxysmal 11/07, 6/09, 11/09    Basal cell carcinoma     Benign essential hypertension     Chronic atrial fibrillation (H)     Diverticulosis of colon 06/02/2011    Erosive gastritis 11/2009    Gastritis     Hyperlipidemia LDL goal <100     Hypertension, with LVH     Iron deficiency anemia 11/2009    Paroxysmal atrial fibrillation (H)     Stroke (H)     Tobacco abuse      Past Surgical History:   Procedure Laterality Date    COLONOSCOPY N/A 10/26/2020    Procedure: COLONOSCOPY, WITH POLYPECTOMY AND BIOPSY;  Surgeon: Shon Bond MD;  Location:  GI    CV CORONARY ANGIOGRAM N/A 7/16/2024    Procedure: Coronary Angiogram;  Surgeon: Jah Mcclellan MD;  Location:  HEART CARDIAC CATH LAB    CYSTOSCOPY, TRANSURETHRAL RESECTION (TUR) TUMOR BLADDER, COMBINED N/A 9/6/2023    Procedure: Blue light CYSTOSCOPY, WITH TRANSURETHRAL RESECTION BLADDER TUMOR, urethral dilation;  Surgeon: Sydney Weaver MD;  Location: [facility] OR    ENDOSCOPIC ULTRASOUND UPPER GASTROINTESTINAL TRACT (GI) N/A 1/24/2022    Procedure: ENDOSCOPIC ULTRASOUND, ESOPHAGOSCOPY / UPPER GASTROINTESTINAL TRACT (GI);  Surgeon: Shon Bond MD;  Location:  GI    LAPAROSCOPIC CHOLECYSTECTOMY N/A 2/8/2022    Procedure: LAPAROSCOPIC CHOLECYSTECTOMY;  Surgeon: Mazin Bains MD;  Location:  OR    SURGICAL HISTORY OF -   4/11    R Dupuytren's contracture release   Dr. Scruggs    TRANSURETHRAL RESECTION (TUR) TUMOR BLADDER INSTILL OPTICAL AGENT N/A 1/10/2024    Procedure: BLUE LIGHT, CYSTOSCOPY, WITH  TRANSURETHRAL RESECTION BLADDER TUMOR;  Surgeon: Sydney Weaver MD;  Location: [facility] OR    TRANSURETHRAL RESECTION (TUR) TUMOR BLADDER INSTILL OPTICAL AGENT N/A 5/28/2024    Procedure: Blue light CYSTOSCOPY, WITH TRANSURETHRAL RESECTION BLADDER TUMOR;  Surgeon: Sydney Weaver MD;  Location: [facility] OR    ZZC NONSPECIFIC PROCEDURE  2002    Rt foot reconstructive    ZZC NONSPECIFIC PROCEDURE  10/12    R hand procedure.  Dr. Deluna     Family History   Problem Relation Age of Onset    Lung Cancer Mother     Cancer Mother         small cell lung with mets to brain    Emphysema Father     Respiratory Father         emphysema    Diabetes Maternal Grandmother     Anesthesia Reaction No family hx of     Venous thrombosis No family hx of         Review of Systems:  Skin:        Eyes:       ENT:       Respiratory:       Cardiovascular:       Gastroenterology:      Genitourinary:       Musculoskeletal:       Neurologic:       Psychiatric:       Heme/Lymph/Imm:       Endocrine:            CC  Lamont Dawson MD  [address]  [place] [id number]

## 2024-09-26 NOTE — LETTER
"2024    Estuardo Manning MD  [address] [place] [id number]    RE: Alex Stanley       Dear Colleague,     I had the pleasure of seeing Alex Stanley in the [facility].    Cardiology Clinic Progress Note    Service Date: 2024     Primary Cardiologist: Dr. Dawson      Reason for Visit: A-fib, HTN    HPI:   Alex Stanley is a delightful 79-year-old gentleman with past medical history significant for the followin.  Hypertension  2.  Hyperlipidemia  3.  Chronic A-fib on anticoagulation with rate control approach  4.  CVA  5.  Bladder ca with hx of resection, in remission  6.  False positive stress test, normal coronary arteries on angiogram   7.  Ongoing tobacco    Tejas comes in today for routine follow-up.  He is overall doing well.  He had had a normal stress test last year and was started on Imdur for medical management and eventually he ended up getting an angiogram this summer 7/24 and had completely normal coronary arteries which is quite remarkable given he is an ongoing smoker and has a history of agent orange exposure.    Since then he has felt well.  He occasionally has some chest pressure he relates to reflux that is improved with Protonix.  He otherwise denies orthopnea or PND.  Denies syncope or presyncope.  Home blood pressures are in the 110s and occasionally in the 90s if they are in the 90 systolic he sometimes feels a tiny bit lightheaded but not too badly.    He continues to smoke half a pack a day he is interested in quitting he is quit for different periods of time previously and its never really stopped started when he was 17 years old.    Physical Exam:  /68   Pulse 62   Ht 1.702 m (5' 7\")   Wt 74.8 kg (165 lb)   SpO2 97%   BMI 25.84 kg/m     Well-developed well-nourished gentleman in no acute distress.  Normocephalic atraumatic.  Heart is irregularly irregular without murmur rub or gallop.  Lungs are clear without wheezes rales or " rhonchi.  Carotids are quiet.  Skin is warm and dry.      Assessment and Plan:  1.  A-fib, chronic with controlled ventricular rates on anticoagulation of Eliquis 5 mg twice daily.  His [id number] is 5 for age hypertension and CVA.  Rates are controlled, asymptomatic.    2.  Hypertension well-controlled with occasional lows in the 90s.  He no longer requires Imdur that he has normal coronary arteries and this is discontinued today.    3.  Dyslipidemia with excellent control last LDL 59 HDL 44 total cholesterol 115, continue pravastatin.  This was refilled today.  In the context of CVA historically would like his cholesterol low even if he did have embolic CVA from A-fib.    4.  Tobacco use, ongoing at half pack a day.  The patient is interested in quitting and actually requests Chantix.  We discussed trying this 1 week before his quit date and continuing about 6 months after quitting.  Discussed possible adverse effects specifically vivid dreams and the possibility of suicidal ideation.  If he has suicidal ideation he should stop the medication, contact us immediately.  Hopefully the next time we see him he will have quit smoking.    5.  False positive stress test, normal coronary arteries making him lower risk for any cardiac event.    Thank you for allowing me to participate in this delightful patient's care.  He can follow-up with us in 1 year, sooner with concerns.  Meds were refilled.    RONNI Peña [facility]     This note was written using Dragon voice recognition system, please excuse any misspelled names, or nonsensical words and call with any questions.        Orders this visit:  Orders Placed This Encounter   Procedures     Follow-Up with Cardiology     Orders Placed This Encounter   Medications     DISCONTD: pantoprazole (PROTONIX) 40 MG EC tablet     Sig: Take 40 mg by mouth daily.     pantoprazole (PROTONIX) 40 MG EC tablet     Sig: Take 1 tablet (40 mg) by mouth daily.      Dispense:  90 tablet     Refill:  3     varenicline (CHANTIX STARTING MONTH PAK) 0.5 MG X 11 & 1 MG X 42 tablet     Sig: Take 0.5 mg tab daily for 3 days, THEN 0.5 mg tab twice daily for 4 days, THEN 1 mg twice daily.     Dispense:  53 tablet     Refill:  0     varenicline (CHANTIX) 1 MG tablet     Sig: Take 1 tablet (1 mg) by mouth 2 times daily.     Dispense:  180 tablet     Refill:  1     amLODIPine (NORVASC) 10 MG tablet     Sig: Take 1 tablet (10 mg) by mouth daily.     Dispense:  90 tablet     Refill:  3     apixaban ANTICOAGULANT (ELIQUIS) 5 MG tablet     Sig: Take 1 tablet (5 mg) by mouth 2 times daily.     Dispense:  180 tablet     Refill:  3     pravastatin (PRAVACHOL) 20 MG tablet     Sig: Take 1 tablet (20 mg) by mouth daily.     Dispense:  90 tablet     Refill:  3     Medications Discontinued During This Encounter   Medication Reason     pantoprazole (PROTONIX) 40 MG EC tablet Reorder (No AVS)     isosorbide mononitrate (IMDUR) 30 MG 24 hr tablet      nitroGLYcerin (NITROSTAT) 0.4 MG sublingual tablet      pravastatin (PRAVACHOL) 20 MG tablet Reorder (No AVS)     apixaban ANTICOAGULANT (ELIQUIS) 5 MG tablet Reorder (No AVS)     amLODIPine (NORVASC) 10 MG tablet Reorder (No AVS)     Encounter Diagnoses   Name Primary?     Tobacco abuse Yes     Gastroesophageal reflux disease without esophagitis      Essential hypertension      Chronic atrial fibrillation/Flutter      Hyperlipidemia LDL goal <100        Current Medications:  Current Outpatient Medications   Medication Sig Dispense Refill     amLODIPine (NORVASC) 10 MG tablet Take 1 tablet (10 mg) by mouth daily. 90 tablet 3     apixaban ANTICOAGULANT (ELIQUIS) 5 MG tablet Take 1 tablet (5 mg) by mouth 2 times daily. 180 tablet 3     Multiple Vitamins-Minerals (PRESERVISION AREDS 2) CAPS Take 1 capsule by mouth 2 times daily       pantoprazole (PROTONIX) 40 MG EC tablet Take 1 tablet (40 mg) by mouth daily. 90 tablet 3     pravastatin (PRAVACHOL) 20 MG  tablet Take 1 tablet (20 mg) by mouth daily. 90 tablet 3     varenicline (CHANTIX STARTING MONTH WENDY) 0.5 MG X 11 & 1 MG X 42 tablet Take 0.5 mg tab daily for 3 days, THEN 0.5 mg tab twice daily for 4 days, THEN 1 mg twice daily. 53 tablet 0     varenicline (CHANTIX) 1 MG tablet Take 1 tablet (1 mg) by mouth 2 times daily. 180 tablet 1     tolterodine (DETROL) 2 MG tablet Take 1 tablet (2 mg) by mouth 2 times daily. (Patient not taking: Reported on 9/26/2024) 30 tablet 3       Allergies:  No Known Allergies    Past Medical, Surgical and Family History:  Past Medical History:   Diagnosis Date     Alcohol abuse      Alcoholism (H)     admission 11/09     Atrial fibrillation, paroxysmal 11/07, 6/09, 11/09     Basal cell carcinoma      Benign essential hypertension      Chronic atrial fibrillation (H)      Diverticulosis of colon 06/02/2011     Erosive gastritis 11/2009     Gastritis      Hyperlipidemia LDL goal <100      Hypertension, with LVH      Iron deficiency anemia 11/2009     Paroxysmal atrial fibrillation (H)      Stroke (H)      Tobacco abuse      Past Surgical History:   Procedure Laterality Date     COLONOSCOPY N/A 10/26/2020    Procedure: COLONOSCOPY, WITH POLYPECTOMY AND BIOPSY;  Surgeon: Shon Bond MD;  Location:  GI     CV CORONARY ANGIOGRAM N/A 7/16/2024    Procedure: Coronary Angiogram;  Surgeon: Jah Mcclellan MD;  Location:  HEART CARDIAC CATH LAB     CYSTOSCOPY, TRANSURETHRAL RESECTION (TUR) TUMOR BLADDER, COMBINED N/A 9/6/2023    Procedure: Blue light CYSTOSCOPY, WITH TRANSURETHRAL RESECTION BLADDER TUMOR, urethral dilation;  Surgeon: Sydney Weaver MD;  Location: [facility] OR     ENDOSCOPIC ULTRASOUND UPPER GASTROINTESTINAL TRACT (GI) N/A 1/24/2022    Procedure: ENDOSCOPIC ULTRASOUND, ESOPHAGOSCOPY / UPPER GASTROINTESTINAL TRACT (GI);  Surgeon: Shon Bond MD;  Location:  GI     LAPAROSCOPIC CHOLECYSTECTOMY N/A 2/8/2022    Procedure: LAPAROSCOPIC CHOLECYSTECTOMY;   Surgeon: Mazin Bains MD;  Location: [facility] OR     SURGICAL HISTORY OF -   4/11    R Dupuytren's contracture release   Dr. Scruggs     TRANSURETHRAL RESECTION (TUR) TUMOR BLADDER INSTILL OPTICAL AGENT N/A 1/10/2024    Procedure: BLUE LIGHT, CYSTOSCOPY, WITH TRANSURETHRAL RESECTION BLADDER TUMOR;  Surgeon: Sydney Weaver MD;  Location: [facility] OR     TRANSURETHRAL RESECTION (TUR) TUMOR BLADDER INSTILL OPTICAL AGENT N/A 5/28/2024    Procedure: Blue light CYSTOSCOPY, WITH TRANSURETHRAL RESECTION BLADDER TUMOR;  Surgeon: Sydney Weaver MD;  Location: [facility] OR     ZZC NONSPECIFIC PROCEDURE  2002    Rt foot reconstructive     ZZC NONSPECIFIC PROCEDURE  10/12    R hand procedure.  Dr. Deluna     Family History   Problem Relation Age of Onset     Lung Cancer Mother      Cancer Mother         small cell lung with mets to brain     Emphysema Father      Respiratory Father         emphysema     Diabetes Maternal Grandmother      Anesthesia Reaction No family hx of      Venous thrombosis No family hx of         Review of Systems:  Skin:        Eyes:       ENT:       Respiratory:       Cardiovascular:       Gastroenterology:      Genitourinary:       Musculoskeletal:       Neurologic:       Psychiatric:       Heme/Lymph/Imm:       Endocrine:            CC  Lamont Dawson MD  [address]  [place] [address]    Thank you for allowing me to participate in the care of your patient.      Sincerely,     Jimena Ribera PA-C     [facility]  cc:   Estuardo Manning MD  [address] [place] [id number]

## 2024-09-26 NOTE — PATIENT INSTRUCTIONS
Thank you for visiting with me today.    We discussed: please work on quitting smoking, you should start the Chantix 1 week before quitting.      Medication changes: start Chantix when you are ready.  Stop taking Imdur/ isosorbide mononitrate   You do not need to keep nitroglycerin on hand since you're arteries look so good.      Continue other medications.      Next Steps:   See Dr. Dawson in 1 year.      Please call my nurses, Davina Joe,  or Zohra at [phone] with questions.      *If you have concerns after hours, please call [phone], option 2 to speak with on call Cardiologist.

## 2025-01-09 ENCOUNTER — OFFICE VISIT (OUTPATIENT)
Dept: URGENT CARE | Facility: URGENT CARE | Age: 80
End: 2025-01-09
Payer: MEDICARE

## 2025-01-09 VITALS
DIASTOLIC BLOOD PRESSURE: 76 MMHG | SYSTOLIC BLOOD PRESSURE: 116 MMHG | WEIGHT: 165 LBS | OXYGEN SATURATION: 96 % | RESPIRATION RATE: 18 BRPM | TEMPERATURE: 96.3 F | BODY MASS INDEX: 25.84 KG/M2 | HEART RATE: 79 BPM

## 2025-01-09 DIAGNOSIS — J06.9 VIRAL UPPER RESPIRATORY INFECTION: ICD-10-CM

## 2025-01-09 DIAGNOSIS — R50.9 FEVER IN ADULT: ICD-10-CM

## 2025-01-09 DIAGNOSIS — R19.7 DIARRHEA, UNSPECIFIED TYPE: Primary | ICD-10-CM

## 2025-01-09 NOTE — PROGRESS NOTES
ICD-10-CM    1. Diarrhea, unspecified type  R19.7       2. Fever in adult  R50.9       3. Viral upper respiratory infection  J06.9       Rest.  Fluids.    Recheck in 10 days if symptoms have not improved, sooner if they worsen.  Delsym if needed for cough, Tylenol or ibuprofen as needed for fever or pain.    Red flag warning signs and when to go to the emergency room discussed.  Reviewed potential adverse reactions to medications.    SUBJECTIVE:   Alex Stanley is a 79 year old male presenting with a chief complaint of   Chief Complaint   Patient presents with    Urgent Care     Pt states starting Monday he has been feeling awful. Tuesday and Wednesday he had diarrhea no fever.   3 days ago started feeling awful, had fever of 100.9, but none since, then had diarrhea for 1 1/2 days, then it stopped, feels very weak, runny nose,     Trying to stay hydrated with gatorade and water, and Boost.    Review of systems is negative except for as noted in the HPI.    OBJECTIVE  /76   Pulse 79   Temp 96.3  F (35.7  C) (Tympanic)   Resp 18   Wt 74.8 kg (165 lb)   SpO2 96%   BMI 25.84 kg/m      GENERAL: Alert, mild distress  SKIN: skin is clear, no rash or abnormal pigmentation  HEAD: The head is normocephalic.   EYES: The eyes are normal. The conjunctivae and cornea normal.   NECK: The neck is supple and thyroid is normal, no masses; LYMPH NODES: No adenopathy  HENT: Bilateral tympanic membranes and canals appear normal, nasal passages are clear, pharynx appears normal  LUNGS: The lung fields are clear to auscultation, no rales, rhonchi, wheezing or retractions  CV: Rhythm is regular. S1 and S2 are normal. No murmurs.  Abdomen: Soft, nontender, normal bowel sounds  EXTREMITIES: Symmetric extremities no deformities    LAST Dias, CNP  [facility] Provider    The use of Dragon/[employer]ger[employer] dictation services may have been used to construct the content in this note; any grammatical or spelling errors  are non-intentional. Please contact the author of this note directly if you are in need of any clarification.

## 2025-02-03 ENCOUNTER — PATIENT OUTREACH (OUTPATIENT)
Dept: CARE COORDINATION | Facility: CLINIC | Age: 80
End: 2025-02-03
Payer: MEDICARE

## 2025-03-11 ENCOUNTER — OFFICE VISIT (OUTPATIENT)
Dept: URGENT CARE | Facility: URGENT CARE | Age: 80
End: 2025-03-11
Payer: MEDICARE

## 2025-03-11 VITALS
SYSTOLIC BLOOD PRESSURE: 108 MMHG | OXYGEN SATURATION: 97 % | HEART RATE: 92 BPM | DIASTOLIC BLOOD PRESSURE: 68 MMHG | TEMPERATURE: 97 F

## 2025-03-11 DIAGNOSIS — K21.00 GASTROESOPHAGEAL REFLUX DISEASE WITH ESOPHAGITIS WITHOUT HEMORRHAGE: Primary | ICD-10-CM

## 2025-03-11 DIAGNOSIS — R10.13 EPIGASTRIC PAIN: ICD-10-CM

## 2025-03-11 LAB
ALBUMIN SERPL-MCNC: 4 G/DL (ref 3.4–5)
ALP SERPL-CCNC: 78 U/L (ref 40–150)
ALT SERPL W P-5'-P-CCNC: 16 U/L (ref 0–70)
ANION GAP SERPL CALCULATED.3IONS-SCNC: 7 MMOL/L (ref 3–14)
AST SERPL W P-5'-P-CCNC: 34 U/L (ref 0–45)
BASOPHILS # BLD AUTO: 0 10E3/UL (ref 0–0.2)
BASOPHILS NFR BLD AUTO: 0 %
BILIRUB SERPL-MCNC: 0.9 MG/DL (ref 0.2–1.3)
BUN SERPL-MCNC: 22 MG/DL (ref 7–30)
CALCIUM SERPL-MCNC: 9.6 MG/DL (ref 8.5–10.1)
CHLORIDE BLD-SCNC: 108 MMOL/L (ref 94–109)
CO2 SERPL-SCNC: 29 MMOL/L (ref 20–32)
CREAT SERPL-MCNC: 1.2 MG/DL (ref 0.66–1.25)
EGFRCR SERPLBLD CKD-EPI 2021: 62 ML/MIN/1.73M2
EOSINOPHIL # BLD AUTO: 0.1 10E3/UL (ref 0–0.7)
EOSINOPHIL NFR BLD AUTO: 1 %
ERYTHROCYTE [DISTWIDTH] IN BLOOD BY AUTOMATED COUNT: 14 % (ref 10–15)
GLUCOSE BLD-MCNC: 104 MG/DL (ref 70–99)
HCT VFR BLD AUTO: 45.8 % (ref 40–53)
HGB BLD-MCNC: 15.4 G/DL (ref 13.3–17.7)
IMM GRANULOCYTES # BLD: 0 10E3/UL
IMM GRANULOCYTES NFR BLD: 0 %
LYMPHOCYTES # BLD AUTO: 1.2 10E3/UL (ref 0.8–5.3)
LYMPHOCYTES NFR BLD AUTO: 10 %
MCH RBC QN AUTO: 31.2 PG (ref 26.5–33)
MCHC RBC AUTO-ENTMCNC: 33.6 G/DL (ref 31.5–36.5)
MCV RBC AUTO: 93 FL (ref 78–100)
MONOCYTES # BLD AUTO: 1.2 10E3/UL (ref 0–1.3)
MONOCYTES NFR BLD AUTO: 11 %
NEUTROPHILS # BLD AUTO: 9.2 10E3/UL (ref 1.6–8.3)
NEUTROPHILS NFR BLD AUTO: 78 %
PLATELET # BLD AUTO: 264 10E3/UL (ref 150–450)
POTASSIUM BLD-SCNC: 5.5 MMOL/L (ref 3.4–5.3)
PROT SERPL-MCNC: 7.3 G/DL (ref 6.8–8.8)
RBC # BLD AUTO: 4.94 10E6/UL (ref 4.4–5.9)
SODIUM SERPL-SCNC: 144 MMOL/L (ref 135–145)
WBC # BLD AUTO: 11.8 10E3/UL (ref 4–11)

## 2025-03-11 PROCEDURE — 93000 ELECTROCARDIOGRAM COMPLETE: CPT | Performed by: NURSE PRACTITIONER

## 2025-03-11 PROCEDURE — 36415 COLL VENOUS BLD VENIPUNCTURE: CPT | Performed by: NURSE PRACTITIONER

## 2025-03-11 PROCEDURE — 3078F DIAST BP <80 MM HG: CPT | Performed by: NURSE PRACTITIONER

## 2025-03-11 PROCEDURE — 85025 COMPLETE CBC W/AUTO DIFF WBC: CPT | Performed by: NURSE PRACTITIONER

## 2025-03-11 PROCEDURE — 99214 OFFICE O/P EST MOD 30 MIN: CPT | Performed by: NURSE PRACTITIONER

## 2025-03-11 PROCEDURE — 80053 COMPREHEN METABOLIC PANEL: CPT | Performed by: NURSE PRACTITIONER

## 2025-03-11 PROCEDURE — 3074F SYST BP LT 130 MM HG: CPT | Performed by: NURSE PRACTITIONER

## 2025-03-11 RX ORDER — PANTOPRAZOLE SODIUM 40 MG/1
40 TABLET, DELAYED RELEASE ORAL 2 TIMES DAILY
Qty: 60 TABLET | Refills: 0 | Status: SHIPPED | OUTPATIENT
Start: 2025-03-11 | End: 2025-04-10

## 2025-03-11 NOTE — PROGRESS NOTES
Chief Complaint   Patient presents with    Urgent Care    Abdominal Pain     Has a hx of GERD and takes protonix. Per pt- Sunday he was having this burning sensation locate mid abdominal area         ICD-10-CM    1. Gastroesophageal reflux disease with esophagitis without hemorrhage  K21.00       2. Epigastric pain  R10.13 CBC with platelets and differential     Comprehensive metabolic panel (BMP + Alb, Alk Phos, ALT, AST, Total. Bili, TP)     CBC with platelets and differential     pantoprazole (PROTONIX) 40 MG EC tablet     Helicobacter pylori Antigen Stool     Helicobacter pylori Antigen Stool      Patient has been eating bananas every day.  Instructed him to avoid foods high in potassium for the next couple of weeks.  He will increase his Protonix to twice a day and return a sample for H. pylori.  Follow-up with primary care in 2 weeks for recheck of labs and symptoms.    Red flag warning signs and when to go to the emergency room discussed.  Reviewed potential adverse reactions to medications.    EKG - Reviewed and interpreted by me atrial fibrillation, rate controlled, unchanged from previous tracings    Results for orders placed or performed in visit on 03/11/25 (from the past 24 hours)   Comprehensive metabolic panel (BMP + Alb, Alk Phos, ALT, AST, Total. Bili, TP)   Result Value Ref Range    Sodium 144 135 - 145 mmol/L    Potassium 5.5 (H) 3.4 - 5.3 mmol/L    Chloride 108 94 - 109 mmol/L    Carbon Dioxide (CO2) 29 20 - 32 mmol/L    Anion Gap 7 3 - 14 mmol/L    Urea Nitrogen 22 7 - 30 mg/dL    Creatinine 1.20 0.66 - 1.25 mg/dL    GFR Estimate 62 >60 mL/min/1.73m2    Calcium 9.6 8.5 - 10.1 mg/dL    Glucose 104 (H) 70 - 99 mg/dL    Alkaline Phosphatase 78 40 - 150 U/L    AST 34 0 - 45 U/L    ALT 16 0 - 70 U/L    Protein Total 7.3 6.8 - 8.8 g/dL    Albumin 4.0 3.4 - 5.0 g/dL    Bilirubin Total 0.9 0.2 - 1.3 mg/dL   CBC with platelets and differential    Narrative    The following orders were created for panel  order CBC with platelets and differential.  Procedure                               Abnormality         Status                     ---------                               -----------         ------                     CBC with platelets and ...[[id number]]  Abnormal            Final result                 Please view results for these tests on the individual orders.   CBC with platelets and differential   Result Value Ref Range    WBC Count 11.8 (H) 4.0 - 11.0 10e3/uL    RBC Count 4.94 4.40 - 5.90 10e6/uL    Hemoglobin 15.4 13.3 - 17.7 g/dL    Hematocrit 45.8 40.0 - 53.0 %    MCV 93 78 - 100 fL    MCH 31.2 26.5 - 33.0 pg    MCHC 33.6 31.5 - 36.5 g/dL    RDW 14.0 10.0 - 15.0 %    Platelet Count 264 150 - 450 10e3/uL    % Neutrophils 78 %    % Lymphocytes 10 %    % Monocytes 11 %    % Eosinophils 1 %    % Basophils 0 %    % Immature Granulocytes 0 %    Absolute Neutrophils 9.2 (H) 1.6 - 8.3 10e3/uL    Absolute Lymphocytes 1.2 0.8 - 5.3 10e3/uL    Absolute Monocytes 1.2 0.0 - 1.3 10e3/uL    Absolute Eosinophils 0.1 0.0 - 0.7 10e3/uL    Absolute Basophils 0.0 0.0 - 0.2 10e3/uL    Absolute Immature Granulocytes 0.0 <=0.4 10e3/uL       Subjective     Alex Stanley is an 79 year old male who presents to clinic today for epigastric pain that has been radiating up under the sternum since 3 days ago.  He does have a history of GERD and has been taking it was Protonix.  He reports this does feel similar to his GERD symptoms but wanted to be assessed.      ROS: 10 point ROS neg other than the symptoms noted above in the HPI.       Objective    /68   Pulse 92   Temp 97  F (36.1  C) (Tympanic)   SpO2 97%   Nurses notes and VS have been reviewed.    Physical Exam       GENERAL APPEARANCE: healthy appearing, alert     EYES: PERRL, EOMI, sclera non-icteric     HENT: oral exam benign, mucus membranes intact, without ulcers or lesions     NECK: no adenopathy or asymmetry, thyroid normal to palpation     RESP: lungs clear  to auscultation - no rales, rhonchi or wheezes     CV: regular rates and rhythm, no murmurs, rubs, or gallop     ABDOMEN:  soft, nontender, no HSM or masses and bowel sounds normal     MS: extremities normal- no gross deformities noted; normal muscle tone.     SKIN: no suspicious lesions or rashes     NEURO: Normal strength and tone, mentation intact and speech normal     PSYCH: normal thought process; no significant mood disturbance      LAST Rutherford, CNP  [facility]    The use of Dragon/[employer] dictation services may have been used to construct the content in this note; any grammatical or spelling errors are non-intentional. Please contact the author of this note directly if you are in need of any clarification.

## 2025-03-11 NOTE — PATIENT INSTRUCTIONS
Make appointment to follow-up with primary care and have labs rechecked in 1 week.    Avoid bananas, oranges, and potatoes for the next week.    Return stool sample for H. pylori.

## 2025-03-12 PROCEDURE — 87338 HPYLORI STOOL AG IA: CPT | Performed by: NURSE PRACTITIONER

## 2025-03-13 LAB — H PYLORI AG STL QL IA: NEGATIVE

## 2025-04-03 SDOH — HEALTH STABILITY: PHYSICAL HEALTH: ON AVERAGE, HOW MANY MINUTES DO YOU ENGAGE IN EXERCISE AT THIS LEVEL?: 20 MIN

## 2025-04-03 SDOH — HEALTH STABILITY: PHYSICAL HEALTH: ON AVERAGE, HOW MANY DAYS PER WEEK DO YOU ENGAGE IN MODERATE TO STRENUOUS EXERCISE (LIKE A BRISK WALK)?: 3 DAYS

## 2025-04-03 ASSESSMENT — SOCIAL DETERMINANTS OF HEALTH (SDOH): HOW OFTEN DO YOU GET TOGETHER WITH FRIENDS OR RELATIVES?: THREE TIMES A WEEK

## 2025-04-04 PROCEDURE — 88112 CYTOPATH CELL ENHANCE TECH: CPT | Mod: TC | Performed by: UROLOGY

## 2025-04-07 ENCOUNTER — OFFICE VISIT (OUTPATIENT)
Dept: INTERNAL MEDICINE | Facility: CLINIC | Age: 80
End: 2025-04-07
Payer: MEDICARE

## 2025-04-07 VITALS
DIASTOLIC BLOOD PRESSURE: 70 MMHG | RESPIRATION RATE: 16 BRPM | SYSTOLIC BLOOD PRESSURE: 98 MMHG | HEIGHT: 67 IN | BODY MASS INDEX: 26.76 KG/M2 | OXYGEN SATURATION: 95 % | HEART RATE: 67 BPM | TEMPERATURE: 96.8 F | WEIGHT: 170.5 LBS

## 2025-04-07 DIAGNOSIS — E78.5 HYPERLIPIDEMIA LDL GOAL <100: ICD-10-CM

## 2025-04-07 DIAGNOSIS — I48.0 PAROXYSMAL ATRIAL FIBRILLATION (H): ICD-10-CM

## 2025-04-07 DIAGNOSIS — N18.30 STAGE 3 CHRONIC KIDNEY DISEASE, UNSPECIFIED WHETHER STAGE 3A OR 3B CKD (H): ICD-10-CM

## 2025-04-07 DIAGNOSIS — I25.118 CORONARY ARTERY DISEASE OF NATIVE ARTERY OF NATIVE HEART WITH STABLE ANGINA PECTORIS: ICD-10-CM

## 2025-04-07 DIAGNOSIS — K21.9 GASTROESOPHAGEAL REFLUX DISEASE WITHOUT ESOPHAGITIS: ICD-10-CM

## 2025-04-07 DIAGNOSIS — F10.21 ALCOHOL DEPENDENCE IN REMISSION (H): ICD-10-CM

## 2025-04-07 DIAGNOSIS — Z87.891 PERSONAL HISTORY OF TOBACCO USE: ICD-10-CM

## 2025-04-07 DIAGNOSIS — Z00.00 MEDICARE ANNUAL WELLNESS VISIT, SUBSEQUENT: Primary | ICD-10-CM

## 2025-04-07 LAB
ALBUMIN SERPL BCG-MCNC: 4.6 G/DL (ref 3.5–5.2)
ALP SERPL-CCNC: 76 U/L (ref 40–150)
ALT SERPL W P-5'-P-CCNC: 15 U/L (ref 0–70)
ANION GAP SERPL CALCULATED.3IONS-SCNC: 10 MMOL/L (ref 7–15)
AST SERPL W P-5'-P-CCNC: 28 U/L (ref 0–45)
BILIRUB SERPL-MCNC: 0.6 MG/DL
BUN SERPL-MCNC: 20.5 MG/DL (ref 8–23)
CALCIUM SERPL-MCNC: 9.3 MG/DL (ref 8.8–10.4)
CHLORIDE SERPL-SCNC: 105 MMOL/L (ref 98–107)
CHOLEST SERPL-MCNC: 115 MG/DL
CREAT SERPL-MCNC: 1.08 MG/DL (ref 0.67–1.17)
CREAT UR-MCNC: 71.9 MG/DL
EGFRCR SERPLBLD CKD-EPI 2021: 71 ML/MIN/1.73M2
FASTING STATUS PATIENT QL REPORTED: YES
FASTING STATUS PATIENT QL REPORTED: YES
GLUCOSE SERPL-MCNC: 88 MG/DL (ref 70–99)
HCO3 SERPL-SCNC: 26 MMOL/L (ref 22–29)
HDLC SERPL-MCNC: 50 MG/DL
LDLC SERPL CALC-MCNC: 52 MG/DL
MICROALBUMIN UR-MCNC: 42.3 MG/L
MICROALBUMIN/CREAT UR: 58.83 MG/G CR (ref 0–17)
NONHDLC SERPL-MCNC: 65 MG/DL
POTASSIUM SERPL-SCNC: 4.2 MMOL/L (ref 3.4–5.3)
PROT SERPL-MCNC: 7.2 G/DL (ref 6.4–8.3)
SODIUM SERPL-SCNC: 141 MMOL/L (ref 135–145)
TRIGL SERPL-MCNC: 65 MG/DL

## 2025-04-07 PROCEDURE — 3078F DIAST BP <80 MM HG: CPT | Performed by: INTERNAL MEDICINE

## 2025-04-07 PROCEDURE — G0296 VISIT TO DETERM LDCT ELIG: HCPCS | Performed by: INTERNAL MEDICINE

## 2025-04-07 PROCEDURE — 99214 OFFICE O/P EST MOD 30 MIN: CPT | Mod: 25 | Performed by: INTERNAL MEDICINE

## 2025-04-07 PROCEDURE — G2211 COMPLEX E/M VISIT ADD ON: HCPCS | Performed by: INTERNAL MEDICINE

## 2025-04-07 PROCEDURE — 82570 ASSAY OF URINE CREATININE: CPT | Performed by: INTERNAL MEDICINE

## 2025-04-07 PROCEDURE — 80061 LIPID PANEL: CPT | Performed by: INTERNAL MEDICINE

## 2025-04-07 PROCEDURE — 80053 COMPREHEN METABOLIC PANEL: CPT | Performed by: INTERNAL MEDICINE

## 2025-04-07 PROCEDURE — 82043 UR ALBUMIN QUANTITATIVE: CPT | Performed by: INTERNAL MEDICINE

## 2025-04-07 PROCEDURE — G0439 PPPS, SUBSEQ VISIT: HCPCS | Performed by: INTERNAL MEDICINE

## 2025-04-07 PROCEDURE — 36415 COLL VENOUS BLD VENIPUNCTURE: CPT | Performed by: INTERNAL MEDICINE

## 2025-04-07 PROCEDURE — 3074F SYST BP LT 130 MM HG: CPT | Performed by: INTERNAL MEDICINE

## 2025-04-07 RX ORDER — PANTOPRAZOLE SODIUM 40 MG/1
40 TABLET, DELAYED RELEASE ORAL 2 TIMES DAILY
Qty: 180 TABLET | Refills: 3 | Status: SHIPPED | OUTPATIENT
Start: 2025-04-07

## 2025-04-07 NOTE — PROGRESS NOTES
Preventive Care Visit  [facility]  Estuardo Manning MD, Internal Medicine  Apr 7, 2025      Assessment & Plan     Medicare annual wellness visit, subsequent      Coronary artery disease of native artery of native heart with stable angina pectoris      Stage 3 chronic kidney disease, unspecified whether stage 3a or 3b CKD (H)  Recheck labs as ordered  - Albumin Random Urine Quantitative with Creat Ratio; Future  - Albumin Random Urine Quantitative with Creat Ratio    Gastroesophageal reflux disease without esophagitis  Improved symptoms with twice daily dosing  - pantoprazole (PROTONIX) 40 MG EC tablet; Take 1 tablet (40 mg) by mouth 2 times daily.    Personal history of tobacco use    - Prof fee: Shared Decision Making for Lung Cancer Screening  - CT Chest Lung Cancer Scrn Low Dose wo; Future    Hyperlipidemia LDL goal <100  Recheck lipids today, has been historically well-controlled  - Lipid panel reflex to direct LDL Fasting; Future  - Comprehensive metabolic panel (BMP + Alb, Alk Phos, ALT, AST, Total. Bili, TP); Future  - Lipid panel reflex to direct LDL Fasting  - Comprehensive metabolic panel (BMP + Alb, Alk Phos, ALT, AST, Total. Bili, TP)    Paroxysmal atrial fibrillation (H)  Continue rate control and full anticoagulation, clinical course is stable    Alcohol dependence in remission (H)  Stable, has maintained sobriety since [id number]    Patient has been advised of split billing requirements and indicates understanding: Yes    The longitudinal plan of care for the diagnosis(es)/condition(s) as documented were addressed during this visit. Due to the added complexity in care, I will continue to support Tejas in the subsequent management and with ongoing continuity of care.            Nicotine/Tobacco Cessation  He reports that he has been smoking cigarettes. He started smoking about 60 years ago. He has a 30.1 pack-year smoking history. He has never used smokeless  "tobacco.  Nicotine/Tobacco Cessation Plan  Information offered: Patient not interested at this time      BMI  Estimated body mass index is 26.7 kg/m  as calculated from the following:    Height as of this encounter: 1.702 m (5' 7\").    Weight as of this encounter: 77.3 kg (170 lb 8 oz).       Counseling  Appropriate preventive services were addressed with this patient via screening, questionnaire, or discussion as appropriate for fall prevention, nutrition, physical activity, Tobacco-use cessation, social engagement, weight loss and cognition.  Checklist reviewing preventive services available has been given to the patient.  Reviewed patient's diet, addressing concerns and/or questions.   He is at risk for lack of exercise and has been provided with information to increase physical activity for the benefit of his well-being.           Kimberlee Lazaro is a 79 year old, presenting for the following:  Medicare Visit             Advance Care Planning  Patient has a Health Care Directive on file  Advance care planning document is on file and is current.      4/3/2025   General Health   How would you rate your overall physical health? Good   Feel stress (tense, anxious, or unable to sleep) Not at all         4/3/2025   Nutrition   Diet: Regular (no restrictions)         4/3/2025   Exercise   Days per week of moderate/strenous exercise 3 days   Average minutes spent exercising at this level 20 min         4/3/2025   Social Factors   Frequency of gathering with friends or relatives Three times a week   Worry food won't last until get money to buy more No   Food not last or not have enough money for food? No   Do you have housing? (Housing is defined as stable permanent housing and does not include staying ouside in a car, in a tent, in an abandoned building, in an overnight shelter, or couch-surfing.) Yes   Are you worried about losing your housing? No   Lack of transportation? No   Unable to get utilities " (heat,electricity)? No         4/3/2025   Fall Risk   Fallen 2 or more times in the past year? No   Trouble with walking or balance? No          4/3/2025   Activities of Daily Living- Home Safety   Needs help with the following daily activites None of the above   Safety concerns in the home None of the above         4/3/2025   Dental   Dentist two times every year? Yes         4/3/2025   Hearing Screening   Hearing concerns? None of the above         4/3/2025   Driving Risk Screening   Patient/family members have concerns about driving No         4/3/2025   General Alertness/Fatigue Screening   Have you been more tired than usual lately? No         4/3/2025   Urinary Incontinence Screening   Bothered by leaking urine in past 6 months No           3/4/2024   TB Screening   Were you born outside of the [place]? No           Today's PHQ-2 Score:       4/6/2025     4:02 PM   PHQ-2 ( 1999 [employer])   Q1: Little interest or pleasure in doing things 0   Q2: Feeling down, depressed or hopeless 0   PHQ-2 Score 0    Q1: Little interest or pleasure in doing things Not at all   Q2: Feeling down, depressed or hopeless Not at all   PHQ-2 Score 0       Patient-reported           4/3/2025   Substance Use   If I could quit smoking, I would Completely agree   I want to quit somking, worry about health affects Completely agree   Willing to make a plan to quit smoking Completely disagree   Willing to cut down before quitting Completely agree   Alcohol more than 3/day or more than 7/wk Not Applicable   Do you have a current opioid prescription? No   How severe/bad is pain from 1 to 10? 0/10 (No Pain)   Do you use any other substances recreationally? No     Social History     Tobacco Use    Smoking status: Every Day     Current packs/day: 0.50     Average packs/day: 0.5 packs/day for 60.3 years (30.1 ttl pk-yrs)     Types: Cigarettes     Start date: 1965    Smokeless tobacco: Never    Tobacco comments:     ppd since age 16 - now 1/2-1 ppd  04/05/21.  8-10 cigarettes daily   Vaping Use    Vaping status: Never Used   Substance Use Topics    Alcohol use: Not Currently     Comment: last drank at [facility] 8/2010.    Drug use: Never       ASCVD Risk   The ASCVD Risk score (Rigo RAMIREZ, et al., 2019) failed to calculate for the following reasons:    Risk score cannot be calculated because patient has a medical history suggesting prior/existing ASCVD            Reviewed and updated as needed this visit by Provider           Sexual Activity            Current providers sharing in care for this patient include:  Patient Care Team:  Estuardo Manning MD as PCP - General (Internal Medicine)  Estuardo Manning MD as Assigned PCP  Estuardo Manning MD (Internal Medicine)  Lamont Dawson MD as MD (Cardiovascular Disease)  Mely Whitmore NP as Nurse Practitioner (Cardiology)  Mounika, Jimena Evans PA-C as Physician Assistant (Cardiovascular Disease)  Trevon Fan MD as MD (Urology)  Sydney Weaver MD as MD (Urology)  Sydney Weaver MD as Assigned Surgical Provider  Mely Torres APRN CNP as Nurse Practitioner (Dermatology)  Saray Rasmussen PA-C as Physician Assistant (Dermatology)  Mounika, Jimena Evans PA-C as Assigned Heart and Vascular Provider  Jah Mendieta MD as Assigned Dermatology Provider    The following health maintenance items are reviewed in Epic and correct as of today:  Health Maintenance   Topic Date Due    NICOTINE/TOBACCO CESSATION COUNSELING Q 1 YR  03/04/2025    MEDICARE ANNUAL WELLNESS VISIT  03/04/2025    LIPID  03/04/2025    MICROALBUMIN  03/04/2025    LUNG CANCER SCREENING  03/12/2025    COVID-19 Vaccine (8 - 2024-25 season) 05/10/2025    COLORECTAL CANCER SCREENING  10/26/2025    BMP  03/11/2026    HEMOGLOBIN  03/11/2026    ANNUAL REVIEW OF HM ORDERS  04/07/2026    FALL RISK ASSESSMENT  04/07/2026    DIABETES SCREENING  03/11/2028    ADVANCE CARE PLANNING  04/07/2030     "DTAP/TDAP/TD IMMUNIZATION (3 - Td or Tdap) 09/15/2030    HEPATITIS C SCREENING  Completed    PHQ-2 (once per calendar year)  Completed    Pneumococcal Vaccine: 50+ Years  Completed    URINALYSIS  Completed    ZOSTER IMMUNIZATION  Completed    RSV VACCINE  Completed    HPV IMMUNIZATION  Aged Out    MENINGITIS IMMUNIZATION  Aged Out    DEXA  Discontinued    CYSTIC FIBROSIS ANNUAL STUDY  Discontinued    INFLUENZA VACCINE  Discontinued    CYSTIC FIBROSIS: COLONOSCOPY  Discontinued           Continues on medical therapy for hypertension and hyperlipidemia in context of coronary artery disease.  Due fo repeat lung cancer screening scan          Review of Systems  Constitutional, HEENT, cardiovascular, pulmonary, gi and gu systems are negative, except as otherwise noted.     Objective    Exam  BP 98/70 (BP Location: Left arm, Patient Position: Sitting, Cuff Size: Adult Large)   Pulse 67   Temp 96.8  F (36  C) (Temporal)   Resp 16   Ht 1.702 m (5' 7\")   Wt 77.3 kg (170 lb 8 oz)   SpO2 95%   BMI 26.70 kg/m     Estimated body mass index is 26.7 kg/m  as calculated from the following:    Height as of this encounter: 1.702 m (5' 7\").    Weight as of this encounter: 77.3 kg (170 lb 8 oz).    Physical Exam  GENERAL: alert and no distress  NECK: no adenopathy, no asymmetry, masses, or scars  RESP: lungs clear to auscultation - no rales, rhonchi or wheezes  CV: regular rate and rhythm, normal S1 S2, no S3 or S4, no murmur, click or rub, no peripheral edema  ABDOMEN: soft, nontender, no hepatosplenomegaly, no masses and bowel sounds normal  MS: no gross musculoskeletal defects noted, no edema         4/7/2025   Mini Cog   Clock Draw Score 2 Normal   3 Item Recall 3 objects recalled   Mini Cog Total Score 5              Signed Electronically by: Estuardo Manning MD    "

## 2025-04-07 NOTE — PROGRESS NOTES
Lung Cancer Screening Shared Decision Making Visit     Alex Stanley, a 79 year old male, is eligible for lung cancer screening    History   Smoking Status     Every Day     Types: Cigarettes   Smokeless Tobacco     Never       I have discussed with patient the risks and benefits of screening for lung cancer with low-dose CT.     The risks include:    radiation exposure: one low dose chest CT has as much ionizing radiation as about 15 chest x-rays, or 6 months of background radiation living in [place]      false positives: most findings/nodules are NOT cancer, but some might still require additional diagnostic evaluation, including biopsy    over-diagnosis: some slow growing cancers that might never have been clinically significant will be detected and treated unnecessarily     The benefit of early detection of lung cancer is contingent upon adherence to annual screening or more frequent follow up if indicated.     Furthermore, to benefit from screening, Alex must be willing and able to undergo diagnostic procedures, if indicated. Although no specific guide is available for determining severity of comorbidities, it is reasonable to withhold screening in patients who have greater mortality risk from other diseases.     We did discuss that the best way to prevent lung cancer is to not smoke.    Some patients may value a numeric estimation of lung cancer risk when evaluating if lung cancer screening is right for them, here is one calculator:    ShouldIScreen

## 2025-04-07 NOTE — PATIENT INSTRUCTIONS
Lung Cancer Screening   Frequently Asked Questions  If you are at high-risk for lung cancer, getting screened with low-dose computed tomography (LDCT) every year can help save your life. This handout offers answers to some of the most common questions about lung cancer screening. If you have other questions, please call [phone] ([phone]).     What is it?  Lung cancer screening uses special X-ray technology to create an image of your lung tissue. The exam is quick and easy and takes less than 10 seconds. We don t give you any medicine or use any needles. You can eat before and after the exam. You don t need to change your clothes as long as the clothing on your chest doesn t contain metal. But, you do need to be able to hold your breath for at least 6 seconds during the exam.    What is the goal of lung cancer screening?  The goal of lung cancer screening is to save lives. Many times, lung cancer is not found until a person starts having physical symptoms. Lung cancer screening can help detect lung cancer in the earliest stages when it may be easier to treat.    Who should be screened for lung cancer?  We suggest lung cancer screening for anyone who is at high-risk for lung cancer. You are in the high-risk group if you:      are between the ages of 55 and 79, and    have smoked at least 1 pack of cigarettes a day for 20 or more years, and    still smoke or have quit within the past 15 years.    However, if you have a new cough or shortness of breath, you should talk to your doctor before being screened.    Why does it matter if I have symptoms?  Certain symptoms can be a sign that you have a condition in your lungs that should be checked and treated by your doctor. These symptoms include fever, chest pain, a new or changing cough, shortness of breath that you have never felt before, coughing up blood or unexplained weight loss. Having any of these symptoms can greatly affect the results of lung  cancer screening.       Should all smokers get an LDCT lung cancer screening exam?  It depends. Lung cancer screening is for a very specific group of men and women who have a history of heavy smoking over a long period of time (see  Who should be screened for lung cancer  above).  I am in the high-risk group, but have been diagnosed with cancer in the past. Is LDCT lung cancer screening right for me?  In some cases, you should not have LDCT lung screening, such as when your doctor is already following your cancer with CT scan studies. Your doctor will help you decide if LDCT lung screening is right for you.  Do I need to have a screening exam every year?  Yes. If you are in the high-risk group described earlier, you should get an LDCT lung cancer screening exam every year until you are 79, or are no longer willing or able to undergo screening and possible procedures to diagnose and treat lung cancer.  How effective is LDCT at preventing death from lung cancer?  Studies have shown that LDCT lung cancer screening can lower the risk of death from lung cancer by 20 percent in people who are at high-risk.  What are the risks?  There are some risks and limitations of LDCT lung cancer screening. We want to make sure you understand the risks and benefits, so please let us know if you have any questions. Your doctor may want to talk with you more about these risks.    Radiation exposure: As with any exam that uses radiation, there is a very small increased risk of cancer. The amount of radiation in LDCT is small--about the same amount a person would get from a mammogram. Your doctor orders the exam when he or she feels the potential benefits outweigh the risks.    False negatives: No test is perfect, including LDCT. It is possible that you may have a medical condition, including lung cancer, that is not found during your exam. This is called a false negative result.    False positives and more testing: LDCT very often finds  something in the lung that could be cancer, but in fact is not. This is called a false positive result. False positive tests often cause anxiety. To make sure these findings are not cancer, you may need to have more tests. These tests will be done only if you give us permission. Sometimes patients need a treatment that can have side effects, such as a biopsy. For more information on false positives, see  What can I expect from the results?     Findings not related to lung cancer: Your LDCT exam also takes pictures of areas of your body next to your lungs. In a very small number of cases, the CT scan will show an abnormal finding in one of these areas, such as your kidneys, adrenal glands, liver or thyroid. This finding may not be serious, but you may need more tests. Your doctor can help you decide what other tests you may need, if any.  What can I expect from the results?  About 1 out of 4 LDCT exams will find something that may need more tests. Most of the time, these findings are lung nodules. Lung nodules are very small collections of tissue in the lung. These nodules are very common, and the vast majority--more than 97 percent--are not cancer (benign). Most are normal lymph nodes or small areas of scarring from past infections.  But, if a small lung nodule is found to be cancer, the cancer can be cured more than 90 percent of the time. To know if the nodule is cancer, we may need to get more images before your next yearly screening exam. If the nodule has suspicious features (for example, it is large, has an odd shape or grows over time), we will refer you to a specialist for further testing.  Will my doctor also get the results?  Yes. Your doctor will get a copy of your results.  Is it okay to keep smoking now that there s a cancer screening exam?  No. Tobacco is one of the strongest cancer-causing agents. It causes not only lung cancer, but other cancers and cardiovascular (heart) diseases as well. The damage  caused by smoking builds over time. This means that the longer you smoke, the higher your risk of disease. While it is never too late to quit, the sooner you quit, the better.  Where can I find help to quit smoking?  The best way to prevent lung cancer is to stop smoking. If you have already quit smoking, congratulations and keep it up! For help on quitting smoking, please call [employer] at 2-626-QUITNOW (1-544.737.4416) or the American [employer] Society at 1-271.262.5192 to find local resources near you.  One-on-one health coaching:  If you d prefer to work individually with a health care provider on tobacco cessation, we offer:      Medication Therapy Management:  Our specially trained pharmacists work closely with you and your doctor to help you quit smoking.  Call [phone] or [phone] (toll free).

## 2025-04-09 PROCEDURE — 88112 CYTOPATH CELL ENHANCE TECH: CPT | Mod: 26 | Performed by: PATHOLOGY

## 2025-04-10 ENCOUNTER — OFFICE VISIT (OUTPATIENT)
Dept: DERMATOLOGY | Facility: CLINIC | Age: 80
End: 2025-04-10
Payer: MEDICARE

## 2025-04-10 DIAGNOSIS — L57.0 ACTINIC KERATOSIS: ICD-10-CM

## 2025-04-10 DIAGNOSIS — L81.4 LENTIGO: ICD-10-CM

## 2025-04-10 DIAGNOSIS — D48.9 NEOPLASM OF UNCERTAIN BEHAVIOR: ICD-10-CM

## 2025-04-10 DIAGNOSIS — Z12.83 SKIN CANCER SCREENING: Primary | ICD-10-CM

## 2025-04-10 DIAGNOSIS — D18.01 CHERRY ANGIOMA: ICD-10-CM

## 2025-04-10 DIAGNOSIS — Z85.828 HISTORY OF NONMELANOMA SKIN CANCER: ICD-10-CM

## 2025-04-10 DIAGNOSIS — L82.1 SEBORRHEIC KERATOSIS: ICD-10-CM

## 2025-04-10 DIAGNOSIS — D22.9 MULTIPLE NEVI: ICD-10-CM

## 2025-04-10 ASSESSMENT — PAIN SCALES - GENERAL: PAINLEVEL_OUTOF10: NO PAIN (0)

## 2025-04-10 NOTE — LETTER
4/10/2025      Alex Stanley  [address]  [facility] [address]      Dear Colleague,    Thank you for referring your patient, Alex Stanley, to the [facility]. Please see a copy of my visit note below.    [facility] Dermatology Note  Encounter Date: Apr 10, 2025  Office Visit     Reviewed patients past medical history and pertinent chart review prior to patients visit today.     Dermatology Problem List:  Last skin check: 4/10/2025  # Neoplasm of uncertain behavior x4: Left temple, right upper back A, right upper back B, and mid chest, s/p shave biopsy 4/10/2025   # AK, s/p cryo  # History of NMSC  -BCC, right frontal scalp, s/p Mohs 2/01/2024    Family Hx: Negative for family history of skin cancer.  ____________________________________________    Assessment & Plan:     # Neoplasm of uncertain behavior:  left temple.  DDx includes SCC vs SCCIS.   # Neoplasm of uncertain behavior:  right upper back A  DDx includes SCCIS vs HAK.   # Neoplasm of uncertain behavior:  right upper back B  DDx includes SCCIS vs BCC vs HAK.   # Neoplasm of uncertain behavior:  mid chest  DDx includes BCC vs ISK.   Shave biopsy today.  Photograph obtained.  Procedure Note: Biopsy by shave technique  The risks and benefits of the procedure were described to the patient. These include but are not limited to bleeding, infection, scar, incomplete removal, and non-diagnostic biopsy. Verbal informed consent was obtained. The above site(s) was cleansed with an alcohol pad and injected with 1% lidocaine with epinephrine. Once anesthesia was obtained, a biopsy(ies) was performed with Gilette blade. The tissue(s) was placed in a labeled container(s) with formalin and sent to pathology. Hemostasis was achieved with aluminum chloride. Vaseline and a bandage were applied to the wound(s). The patient tolerated the procedure well and was given post biopsy care instructions.    # Actinic  keratoses, vertex scalp x2, left temple, left chest, right temple, forehead x6, and frontal scalp x2  Actinic keratoses are pre-cancerous skin growths caused by sun exposure. Treatment is recommended and medically indicated. Treated with cryotherapy as outlined below.     Procedures performed:   - Cryotherapy procedure note, location(s): See physical exam. After verbal consent and discussion of risks and benefits including, but not limited to, dyspigmentation/scar, blister, and pain, 13 lesion(s) was(were) treated with 1-2 mm freeze border for 1-2 cycles with liquid nitrogen. Post cryotherapy instructions were provided.     # Personal history of nonmelanoma skin cancer  - No signs of recurrence. Continued observation recommended.   - Advised to monitor for changing, non-healing, bleeding, painful, changing, or otherwise symptomatic lesions  - Sun protection: Counseled SPF 30+ sunscreen, UPF clothing, sun avoidance, tanning bed avoidance.    # Multiple nevi, trunk and extremities  # Solar lentigines  - Nevi demonstrate no concerning features on dermoscopy. We discussed the importance of self exams at home.   - ABCDEs: Counseled ABCDEs of melanoma: Asymmetry, Border (irregularity), Color (not uniform, changes in color), Diameter (greater than 6 mm which is about the size of a pencil eraser), and Evolving (any changes in preexisting moles).    # Cherry angiomas  # Seborrheic keratoses  - We discussed the benign nature of the skin lesions. No treatment required. Continued observation recommended. Follow up with any concerns.      Follow-up:  Annual for follow up full body skin exam, as needed for new or changing lesions or new concerns    All risks, benefits and alternatives were discussed with patient.  Patient is in agreement and understands the assessment and plan.  All questions were answered.  Lexii Agustin PA-C  [facility] Dermatology  ____________________________________________    CC: Skin Check ([facility] [place]/-  concerns with forehead, temple, and top of scalp/- rough patch on upper right shoulder)     HPI:  Mr. Alex Stanley is a(n) 79 year old male who presents today as a return patient for a full body skin cancer screening. The patient has a history of nonmelanoma skin cancer. The patient was last seen in dermatology on 2/1/2024 for Mohs micrographic surgery of a basal cell carcinoma involving the right frontal scalp.     Today, patient has concerns today about scaling lesions throughout his right upper back that has been present for 6 months. He also has red, scaly, and bumpy areas involving his temples, vertex scalp, and forehead. He has been applying lotion to this.    Patient is diligent with photoprotection. Patient is otherwise feeling well, without additional skin concerns.     Physical Exam:  Vitals: There were no vitals taken for this visit.   SKIN: Total skin excluding the genitalia areas was performed. The exam included the scalp, face, neck, bilateral arms, chest, back, abdomen, bilateral legs, digits, mons pubis, buttocks, and nails.   - Marin II.  -right upper back A, well demarcated erythematous plaque with adherent scale  -right upper back B, shiny pink scaly papule  -mid chest, shiny skin colored thin papule with leaf life brown structures on dermoscopy  -left temple, erythematous indurated plaque with adherent scale  -vertex scalp x2, left temple, left chest, right temple, forehead x6, and frontal scalp x2, pink macule(s) with overlying adherent scale consistent with an actinic keratosis   -right frontal scalp, well healed scar without evidence of recurrence, nodularity, or tenderness to palpation  -multiple tan/brown flat round macules and raised papules scattered throughout trunk, extremities, and face. No worrisome features for malignancy noted on examination.  -scattered tan, homogenous macules scattered on sun exposed areas of trunk, extremities, and face  -scattered waxy, stuck on  tan/brown papules and patches on the trunk and extremities  -several 1-2mm red dome shaped symmetric papules scattered on the trunk and extremities    - No other lesions of concern on areas examined.                   Medications:  Current Outpatient Medications   Medication Sig Dispense Refill     amLODIPine (NORVASC) 10 MG tablet Take 1 tablet (10 mg) by mouth daily. 90 tablet 3     apixaban ANTICOAGULANT (ELIQUIS) 5 MG tablet Take 1 tablet (5 mg) by mouth 2 times daily. 180 tablet 3     Multiple Vitamins-Minerals (PRESERVISION AREDS 2) CAPS Take 1 capsule by mouth 2 times daily       pantoprazole (PROTONIX) 40 MG EC tablet Take 1 tablet (40 mg) by mouth 2 times daily. 180 tablet 3     pravastatin (PRAVACHOL) 20 MG tablet Take 1 tablet (20 mg) by mouth daily. 90 tablet 3     No current facility-administered medications for this visit.      Past Medical History:   Patient Active Problem List   Diagnosis     Essential hypertension     Chronic atrial fibrillation/Flutter     Tobacco abuse     Iron deficiency anemia     History of alcohol dependence in remission sober since 2010 (H)     Erosive gastritis     Hyperlipidemia LDL goal <100     History of CVA (cerebrovascular accident)     Fatty liver, alcoholic     Diverticulosis of colon     CKD (chronic kidney disease) stage 3, GFR 30-59 ml/min (H)     Long-term (current) use of anticoagulants [Z79.01]     Macular degeneration (senile) of retina, left eye     Gallstone pancreatitis     Bladder tumor     Urothelial carcinoma of bladder without invasion of muscle (H)     Abnormal cardiovascular stress test     Status post coronary angiogram     Coronary artery disease of native artery of native heart with stable angina pectoris     Past Medical History:   Diagnosis Date     Alcohol abuse      Alcoholism (H)     admission 11/09     Atrial fibrillation, paroxysmal 11/07, 6/09, 11/09     Basal cell carcinoma      Benign essential hypertension      Chronic atrial  fibrillation (H)      Diverticulosis of colon 06/02/2011     Erosive gastritis 11/2009     Gastritis      Hyperlipidemia LDL goal <100      Hypertension, with LVH      Iron deficiency anemia 11/2009     Paroxysmal atrial fibrillation (H)      Stroke (H)      Tobacco abuse        CC Referred Self, MD  No address on file on close of this encounter.      Again, thank you for allowing me to participate in the care of your patient.        Sincerely,        Kandi Agustin PA-C    Electronically signed

## 2025-04-10 NOTE — PROGRESS NOTES
[facility] Dermatology Note  Encounter Date: Apr 10, 2025  Office Visit     Reviewed patients past medical history and pertinent chart review prior to patients visit today.     Dermatology Problem List:  Last skin check: 4/10/2025  # Neoplasm of uncertain behavior x4: Left temple, right upper back A, right upper back B, and mid chest, s/p shave biopsy 4/10/2025   # AK, s/p cryo  # History of NMSC  -BCC, right frontal scalp, s/p Mohs 2/01/2024    Family Hx: Negative for family history of skin cancer.  ____________________________________________    Assessment & Plan:     # Neoplasm of uncertain behavior:  left temple.  DDx includes SCC vs SCCIS.   # Neoplasm of uncertain behavior:  right upper back A  DDx includes SCCIS vs HAK.   # Neoplasm of uncertain behavior:  right upper back B  DDx includes SCCIS vs BCC vs HAK.   # Neoplasm of uncertain behavior:  mid chest  DDx includes BCC vs ISK.   Shave biopsy today.  Photograph obtained.  Procedure Note: Biopsy by shave technique  The risks and benefits of the procedure were described to the patient. These include but are not limited to bleeding, infection, scar, incomplete removal, and non-diagnostic biopsy. Verbal informed consent was obtained. The above site(s) was cleansed with an alcohol pad and injected with 1% lidocaine with epinephrine. Once anesthesia was obtained, a biopsy(ies) was performed with Gilette blade. The tissue(s) was placed in a labeled container(s) with formalin and sent to pathology. Hemostasis was achieved with aluminum chloride. Vaseline and a bandage were applied to the wound(s). The patient tolerated the procedure well and was given post biopsy care instructions.    # Actinic keratoses, vertex scalp x2, left temple, left chest, right temple, forehead x6, and frontal scalp x2  Actinic keratoses are pre-cancerous skin growths caused by sun exposure. Treatment is recommended and medically indicated. Treated with cryotherapy as  outlined below.     Procedures performed:   - Cryotherapy procedure note, location(s): See physical exam. After verbal consent and discussion of risks and benefits including, but not limited to, dyspigmentation/scar, blister, and pain, 13 lesion(s) was(were) treated with 1-2 mm freeze border for 1-2 cycles with liquid nitrogen. Post cryotherapy instructions were provided.     # Personal history of nonmelanoma skin cancer  - No signs of recurrence. Continued observation recommended.   - Advised to monitor for changing, non-healing, bleeding, painful, changing, or otherwise symptomatic lesions  - Sun protection: Counseled SPF 30+ sunscreen, UPF clothing, sun avoidance, tanning bed avoidance.    # Multiple nevi, trunk and extremities  # Solar lentigines  - Nevi demonstrate no concerning features on dermoscopy. We discussed the importance of self exams at home.   - ABCDEs: Counseled ABCDEs of melanoma: Asymmetry, Border (irregularity), Color (not uniform, changes in color), Diameter (greater than 6 mm which is about the size of a pencil eraser), and Evolving (any changes in preexisting moles).    # Cherry angiomas  # Seborrheic keratoses  - We discussed the benign nature of the skin lesions. No treatment required. Continued observation recommended. Follow up with any concerns.      Follow-up:  Annual for follow up full body skin exam, as needed for new or changing lesions or new concerns    All risks, benefits and alternatives were discussed with patient.  Patient is in agreement and understands the assessment and plan.  All questions were answered.  Lexii Agustin PA-C  [facility] Dermatology  ____________________________________________    CC: Skin Check ([facility]/- concerns with forehead, temple, and top of scalp/- rough patch on upper right shoulder)     HPI:  Mr. Alex Stanley is a(n) 79 year old male who presents today as a return patient for a full body skin cancer screening. The patient has a history of  nonmelanoma skin cancer. The patient was last seen in dermatology on 2/1/2024 for Mohs micrographic surgery of a basal cell carcinoma involving the right frontal scalp.     Today, patient has concerns today about scaling lesions throughout his right upper back that has been present for 6 months. He also has red, scaly, and bumpy areas involving his temples, vertex scalp, and forehead. He has been applying lotion to this.    Patient is diligent with photoprotection. Patient is otherwise feeling well, without additional skin concerns.     Physical Exam:  Vitals: There were no vitals taken for this visit.   SKIN: Total skin excluding the genitalia areas was performed. The exam included the scalp, face, neck, bilateral arms, chest, back, abdomen, bilateral legs, digits, mons pubis, buttocks, and nails.   - Marin II.  -right upper back A, well demarcated erythematous plaque with adherent scale  -right upper back B, shiny pink scaly papule  -mid chest, shiny skin colored thin papule with leaf life brown structures on dermoscopy  -left temple, erythematous indurated plaque with adherent scale  -vertex scalp x2, left temple, left chest, right temple, forehead x6, and frontal scalp x2, pink macule(s) with overlying adherent scale consistent with an actinic keratosis   -right frontal scalp, well healed scar without evidence of recurrence, nodularity, or tenderness to palpation  -multiple tan/brown flat round macules and raised papules scattered throughout trunk, extremities, and face. No worrisome features for malignancy noted on examination.  -scattered tan, homogenous macules scattered on sun exposed areas of trunk, extremities, and face  -scattered waxy, stuck on tan/brown papules and patches on the trunk and extremities  -several 1-2mm red dome shaped symmetric papules scattered on the trunk and extremities    - No other lesions of concern on areas examined.                   Medications:  Current Outpatient  Medications   Medication Sig Dispense Refill    amLODIPine (NORVASC) 10 MG tablet Take 1 tablet (10 mg) by mouth daily. 90 tablet 3    apixaban ANTICOAGULANT (ELIQUIS) 5 MG tablet Take 1 tablet (5 mg) by mouth 2 times daily. 180 tablet 3    Multiple Vitamins-Minerals (PRESERVISION AREDS 2) CAPS Take 1 capsule by mouth 2 times daily      pantoprazole (PROTONIX) 40 MG EC tablet Take 1 tablet (40 mg) by mouth 2 times daily. 180 tablet 3    pravastatin (PRAVACHOL) 20 MG tablet Take 1 tablet (20 mg) by mouth daily. 90 tablet 3     No current facility-administered medications for this visit.      Past Medical History:   Patient Active Problem List   Diagnosis    Essential hypertension    Chronic atrial fibrillation/Flutter    Tobacco abuse    Iron deficiency anemia    History of alcohol dependence in remission sober since 2010 (H)    Erosive gastritis    Hyperlipidemia LDL goal <100    History of CVA (cerebrovascular accident)    Fatty liver, alcoholic    Diverticulosis of colon    CKD (chronic kidney disease) stage 3, GFR 30-59 ml/min (H)    Long-term (current) use of anticoagulants [Z79.01]    Macular degeneration (senile) of retina, left eye    Gallstone pancreatitis    Bladder tumor    Urothelial carcinoma of bladder without invasion of muscle (H)    Abnormal cardiovascular stress test    Status post coronary angiogram    Coronary artery disease of native artery of native heart with stable angina pectoris     Past Medical History:   Diagnosis Date    Alcohol abuse     Alcoholism (H)     admission 11/09    Atrial fibrillation, paroxysmal 11/07, 6/09, 11/09    Basal cell carcinoma     Benign essential hypertension     Chronic atrial fibrillation (H)     Diverticulosis of colon 06/02/2011    Erosive gastritis 11/2009    Gastritis     Hyperlipidemia LDL goal <100     Hypertension, with LVH     Iron deficiency anemia 11/2009    Paroxysmal atrial fibrillation (H)     Stroke (H)     Tobacco abuse        CC Referred Self,  MD  No address on file on close of this encounter.

## 2025-04-10 NOTE — PATIENT INSTRUCTIONS
Wound Care After a Biopsy    What is a skin biopsy?  A skin biopsy allows the doctor to examine a very small piece of tissue under the microscope to determine the diagnosis and the best treatment for the skin condition. A local anesthetic (numbing medicine)  is injected with a very small needle into the skin area to be tested. A small piece of skin is taken from the area. Sometimes a suture (stitch) is used.     What are the risks of a skin biopsy?  I will experience scar, bleeding, swelling, pain, crusting and redness. I may experience incomplete removal or recurrence. Risks of this procedure are excessive bleeding, bruising, infection, nerve damage, numbness, thick (hypertrophic or keloidal) scar and non-diagnostic biopsy.    How should I care for my wound for the first 24 hours?  Keep the wound dry and covered for 24 hours  If it bleeds, hold direct pressure on the area for 15 minutes. If bleeding does not stop then go to the emergency room  Avoid strenuous exercise the first 1-2 days or as your doctor instructs you    How should I care for the wound after 24 hours?  After 24 hours, remove the bandage  You may bathe or shower as normal  If you had a scalp biopsy, you can shampoo as usual and can use shower water to clean the biopsy site daily  Clean the wound twice a day with gentle soap and water  Do not scrub, be gentle  Apply white petroleum/Vaseline after cleaning the wound with a cotton swab or a clean finger, and keep the site covered with a Bandaid /bandage. Bandages are not necessary with a scalp biopsy  If you are unable to cover the site with a Bandaid /bandage, re-apply ointment 2-3 times a day to keep the site moist. Moisture will help with healing  Avoid strenuous activity for first 1-2 days  Avoid lakes, rivers, pools, and oceans until the stitches are removed or the site is healed    How do I clean my wound?  Wash hands thoroughly with soap or use hand  before all wound care  Clean the  wound with gentle soap and water  Apply white petroleum/Vaseline  to wound after it is clean  Replace the Bandaid /bandage to keep the wound covered for the first few days or as instructed by your doctor  If you had a scalp biopsy, warm shower water to the area on a daily basis should suffice    What should I use to clean my wound?   Cotton-tipped applicators (Qtips )  White petroleum jelly (Vaseline ). Use a clean new container and use Q-tips to apply.  Bandaids   as needed  Gentle soap     How should I care for my wound long term?  Do not get your wound dirty  Keep up with wound care for one week or until the area is healed.  A small scab will form and fall off by itself when the area is completely healed. The area will be red and will become pink in color as it heals. Sun protection is very important for how your scar will turn out. Sunscreen with an SPF 30 or greater is recommended once the area is healed.  You should have some soreness but it should be mild and slowly go away over several days. Talk to your doctor about using tylenol for pain,    When should I call my doctor?  If you have increased:   Pain or swelling  Pus or drainage (clear or slightly yellow drainage is ok)  Temperature over 100F  Spreading redness or warmth around wound    When will I hear about my results?  The biopsy results can take 2 weeks to come back.  Your results will automatically release to [facility] before your provider has even reviewed them.  The clinic will call you with the results, send you a [facility] message, or have you schedule a follow-up clinic or phone time to discuss the results.  Contact our clinics if you do not hear from us in 2 weeks. If further treatment is needed for a skin cancer, this will be done at either our [place] or [place] office.    Who should I call with questions?  [facility]: [phone]  [facility]: [phone]  For urgent  needs outside of business hours call the [facility] at [phone] and ask for the dermatology resident on call       Cryotherapy    What is it?  Use of a very cold liquid, such as liquid nitrogen, to freeze and destroy abnormal skin cells that need to be removed    What should I expect?  Tenderness and redness  A small blister that might grow and fill with dark purple blood. There may be crusting.  More than one treatment may be needed if the lesions do not go away.    How do I care for the treated area?  Gently wash the area with your hands when bathing.  Use a thin layer of Vaseline to help with healing. You may use a Band-Aid.   The area should heal within 7-10 days and may leave behind a pink or lighter color.   Do not use an antibiotic or Neosporin ointment.   You may take acetaminophen (Tylenol) for pain.     Call your doctor if you have:  Severe pain  Signs of infection (warmth, redness, cloudy yellow drainage, and or a bad smell)  Questions or concerns    Who should I call with questions?      [facility]: [phone]      [facility]: [phone]      For urgent needs outside of business hours call the [facility] at [phone] and ask for the dermatology resident on call     Patient Education       Proper skin care from [facility]:    -Eliminate harsh soaps as they strip the natural oils from the skin, often resulting in dry itchy skin ( i.e. Dial, Zest, [place] Spring)  -Use mild soaps such as Cetaphil or Dove Sensitive Skin in the shower. You do not need to use soap on arms, legs, and trunk every time you shower unless visibly soiled.   -Avoid hot or cold showers.  -After showering, lightly dry off and apply moisturizing within 2-3 minutes. This will help trap moisture in the skin.   -Aggressive use of a moisturizer at least 1-2 times a day to the entire body (including -Vanicream, Cetaphil, Aquaphor or Cerave) and  moisturize hands after every washing.  -We recommend using moisturizers that come in a tub that needs to be scooped out, not a pump. This has more of an oil base. It will hold moisture in your skin much better than a water base moisturizer. The above recommended are non-pore clogging.      Wear a sunscreen with at least SPF 30 on your face, ears, neck and V of the chest daily. Wear sunscreen on other areas of the body if those areas are exposed to the sun throughout the day. Sunscreens can contain physical and/or chemical blockers. Physical blockers are less likely to clog pores, these include zinc oxide and titanium dioxide. Reapply every two hour and after swimming.     Sunscreen examples: https://www.ewg.org/sunscreen/    UV radiation  UVA radiation remains constant throughout the day and throughout the year. It is a longer wavelength than UVB and therefore penetrates deeper into the skin leading to immediate and delayed tanning, photoaging, and skin cancer. 70-80% of UVA and UVB radiation occurs between the hours of 10am-2pm.  UVB radiation  UVB radiation causes the most harmful effects and is more significant during the summer months. However, snow and ice can reflect UVB radiation leading to skin damage during the winter months as well. UVB radiation is responsible for tanning, burning, inflammation, delayed erythema (pinkness), pigmentation (brown spots), and skin cancer.     I recommend self monthly full body exams and yearly full body exams with a dermatology provider. If you develop a new or changing lesion please follow up for examination. Most skin cancers are pink and scaly or pink and pearly. However, we do see blue/brown/black skin cancers.  Consider the ABCDEs of melanoma when giving yourself your monthly full body exam ( don't forget the groin, buttocks, feet, toes, etc). A-asymmetry, B-borders, C-color, D-diameter, E-elevation or evolving. If you see any of these changes please follow up in clinic.  If you cannot see your back I recommend purchasing a hand held mirror to use with a larger wall mirror.       Checking for Skin Cancer  You can find cancer early by checking your skin each month. There are 3 kinds of skin cancer. They are melanoma, basal cell carcinoma, and squamous cell carcinoma. Doing monthly skin checks is the best way to find new marks or skin changes. Follow the instructions below for checking your skin.   The ABCDEs of checking moles for melanoma   Check your moles or growths for signs of melanoma using ABCDE:   Asymmetry: the sides of the mole or growth don t match  Border: the edges are ragged, notched, or blurred  Color: the color within the mole or growth varies  Diameter: the mole or growth is larger than 6 mm (size of a pencil eraser)  Evolving: the size, shape, or color of the mole or growth is changing (evolving is not shown in the images below)    Checking for other types of skin cancer  Basal cell carcinoma or squamous cell carcinoma have symptoms such as:     A spot or mole that looks different from all other marks on your skin  Changes in how an area feels, such as itching, tenderness, or pain  Changes in the skin's surface, such as oozing, bleeding, or scaliness  A sore that does not heal  New swelling or redness beyond the border of a mole    Who s at risk?  Anyone can get skin cancer. But you are at greater risk if you have:   Fair skin, light-colored hair, or light-colored eyes  Many moles or abnormal moles on your skin  A history of sunburns from sunlight or tanning beds  A family history of skin cancer  A history of exposure to radiation or chemicals  A weakened immune system  If you have had skin cancer in the past, you are at risk for recurring skin cancer.   How to check your skin  Do your monthly skin checkups in front of a full-length mirror. Check all parts of your body, including your:   Head (ears, face, neck, and scalp)  Torso (front, back, and sides)  Arms (tops,  undersides, upper, and lower armpits)  Hands (palms, backs, and fingers, including under the nails)  Buttocks and genitals  Legs (front, back, and sides)  Feet (tops, soles, toes, including under the nails, and between toes)  If you have a lot of moles, take digital photos of them each month. Make sure to take photos both up close and from a distance. These can help you see if any moles change over time.   Most skin changes are not cancer. But if you see any changes in your skin, call your doctor right away. Only he or she can diagnose a problem. If you have skin cancer, seeing your doctor can be the first step toward getting the treatment that could save your life.   [employer] last reviewed this educational content on 4/1/2019 2000-2020 [facility]. [address] [place] [address]. All rights reserved. This information is not intended as a substitute for professional medical care. Always follow your healthcare professional's instructions.       When should I call my doctor?  If you are worsening or not improving, please, contact us or seek urgent care as noted below.     Who should I call with questions (adults)?  [facility] (adult and pediatric): [phone]  [facility] (adult): [phone]  [facility] ([facility] and [place]) [phone]  For urgent needs outside of business hours call the [facility] at [phone] and ask for the dermatology resident on call to be paged  If this is a medical emergency and you are unable to reach an ER, Call [phone]      If you need a prescription refill, please contact your pharmacy. Refills are approved or denied by our Physicians during normal business hours, Monday through Fridays  Per office policy, refills will not be granted if you have not been seen within the past year (or sooner depending on your child's condition

## 2025-04-14 ENCOUNTER — TELEPHONE (OUTPATIENT)
Dept: DERMATOLOGY | Facility: CLINIC | Age: 80
End: 2025-04-14
Payer: MEDICARE

## 2025-04-14 DIAGNOSIS — D48.9 NEOPLASM OF UNCERTAIN BEHAVIOR: Primary | ICD-10-CM

## 2025-04-14 NOTE — TELEPHONE ENCOUNTER
----- Message from Kandi Agustin sent at 4/14/2025  2:07 PM CDT -----  A. Left temple, SCC, needs Mohs.   B. Right upper back A, SCC, needs Excision.  C. Right upper back B, superficial BCC, needs ED&C  D. Mid chest, superficial BCC, needs ED&C    Please place order and call patient to schedule. Thank you!

## 2025-04-15 NOTE — TELEPHONE ENCOUNTER
Patient Contact    Attempt # 1    Was call answered?  No    Left message on answering machine for patient to call back.    Edel PRICE RN  Dermatology   [phone]

## 2025-04-16 ENCOUNTER — HOSPITAL ENCOUNTER (OUTPATIENT)
Dept: CT IMAGING | Facility: CLINIC | Age: 80
Discharge: HOME | End: 2025-04-16
Attending: INTERNAL MEDICINE
Payer: MEDICARE

## 2025-04-16 DIAGNOSIS — Z87.891 PERSONAL HISTORY OF TOBACCO USE: ICD-10-CM

## 2025-04-16 PROCEDURE — 71271 CT THORAX LUNG CANCER SCR C-: CPT

## 2025-04-17 DIAGNOSIS — Z87.891 PERSONAL HISTORY OF TOBACCO USE, PRESENTING HAZARDS TO HEALTH: Primary | ICD-10-CM

## 2025-04-21 ENCOUNTER — OFFICE VISIT (OUTPATIENT)
Dept: DERMATOLOGY | Facility: CLINIC | Age: 80
End: 2025-04-21
Payer: MEDICARE

## 2025-04-21 ENCOUNTER — PRE VISIT (OUTPATIENT)
Dept: UROLOGY | Facility: CLINIC | Age: 80
End: 2025-04-21

## 2025-04-21 DIAGNOSIS — C44.91 SUPERFICIAL BASAL CELL CARCINOMA: Primary | ICD-10-CM

## 2025-04-21 PROCEDURE — 17262 DSTRJ MAL LES T/A/L 1.1-2.0: CPT | Performed by: STUDENT IN AN ORGANIZED HEALTH CARE EDUCATION/TRAINING PROGRAM

## 2025-04-21 NOTE — LETTER
4/21/2025      Alex Stanley  [address]  [place] [address]      Dear Colleague,    Thank you for referring your patient, Alex Stanley, to the [facility]. Please see a copy of my visit note below.    DERMATOLOGIC ELECTRODESICCATION AND CURETTAGE PROCEDURE NOTE   Dermatology Problem List:  Last skin check: 4/10/2025  # History of NMSC  - SCC, left temple, s/p bx 4/10/2025, scheduled for Mohs 6/26/2025  - SCC, right upper back A, s/p bx 4/10/2025, scheduled for excision 5/14/2025  - sBCC, right upper back B, s/p ED&C 4/21/2025  - sBCC, mid chest, s/p ED&C 4/21/2025  - nBCC, right frontal scalp, s/p Mohs 2/01/2024  # Benign bx:  - ISK, sternum, s/p bx 04/05/21  - Clonal type SK, Occipital scalp, s.p bx 04/05/21  - Clonal SK, left frontal scalp, s/p bx 2017  3. AK's, sideburns and forehead  - cryo, consider field therapy  -AK right temple, s/p bx 11/28/23, cryo 1/3/2024     Currently undergoing bladder cancer treatment     Family Hx: Negative for family history of skin cancer.    NAME OF PROCEDURE: Electrodesiccation and curettage of biopsy confirmed superficial Basal Cell Carcinoma  PRE-OPERATIVE DIAGNOSIS:  superficial Basal Cell Carcinoma  POST-OPERATIVE DIAGNOSIS: Same   FINAL SIZE: 1.5 cm, right upper back B  1.2 cm, mid chest    INDICATIONS: The patient presented with a 0.6 cm superficial Basal Cell Carcinoma of the mid chest and a 1.0 cm superficial Basal Cell Carcinoma of the right upper back B.  ED&C was indicated.   We discussed the principles of treatment and possible complications including bleeding, infection, scarring, alteration in skin color and sensation, or recurrence of the lesion or disease. We reviewed that on occasion, after healing, a secondary procedure or revision may be recommended in order to obtain the best cosmetic or functional result.     PROCEDURE: The mid chest and right upper back B was cleansed with an alcohol pad and injected with 1%  lidocaine with epinephrine. Once anesthesia was obtained, the lesion was curetted with a 3mm margin in 3 directions and this was followed by electrodessication.  This process was repeated three times.  Vaseline and a bandage were applied to the wound. The patient tolerated the procedure well and was given post care instructions.    All risks, benefits and alternatives were discussed with patient.  Patient is in agreement and understands the assessment and plan.  All questions were answered.  Lexii Agustin PA-C  [facility]    PATIENT EDUCATION    Ready to learn.  No apparent learning barriers were identified.  Learning preferences include listening.  Explained diagnosis and treatment plan; patient/guardian of patient expressed understanding of the content.       Again, thank you for allowing me to participate in the care of your patient.        Sincerely,        Kandi Agustin PA-C    Electronically signed

## 2025-04-21 NOTE — PATIENT INSTRUCTIONS
Wound Care:  Electrodesiccation and Curettage (ED&C)     After the procedure you may expect to experience scar, altered skin color, bleeding, swelling, pain, crusting, redness, or altered sensation. Risks are excessive bleeding, infection, muscle weakness, thick (hypertrophic or keloidal) scar, and recurrence. A second procedure may be recommended to obtain the best cosmetic or functional result.     What is electrodesiccation and curettage?  Scraping off tissue (curettage)  Destroy tissue using electric current or cautery (electrodessication)     How do I perform wound care?  Keep dressing in place for 24 hours.  Remove prior to showering  Wash gently with mild soap and water.  Pat dry  Put on a thick layer of Vaseline on the wound using a cotton-swab   Cover the wound with a bandage to protect from dust and tight clothing  Perform wound care once daily until the center of the wound has a pinked over appearance  During wound care, do not allow the area to dry out or form a scab  **If a build up of crust develops, soak a cotton swab in hydrogen peroxide to remove the crust     What do I need?  **Hydrogen peroxide    Cotton-swabs   Vaseline or petroleum jelly   Band-AidsTM or dressing supplies as needed      When should I call the doctor?  [facility]: [phone]  [facility]: [phone]  For urgent needs outside of business hours call the [facility] at [phone] and ask for the dermatology resident on call

## 2025-04-21 NOTE — PROGRESS NOTES
DERMATOLOGIC ELECTRODESICCATION AND CURETTAGE PROCEDURE NOTE   Dermatology Problem List:  Last skin check: 4/10/2025  # History of NMSC  - SCC, left temple, s/p bx 4/10/2025, scheduled for Mohs 6/26/2025  - SCC, right upper back A, s/p bx 4/10/2025, scheduled for excision 5/14/2025  - sBCC, right upper back B, s/p ED&C 4/21/2025  - sBCC, mid chest, s/p ED&C 4/21/2025  - nBCC, right frontal scalp, s/p Mohs 2/01/2024  # Benign bx:  - ISK, sternum, s/p bx 04/05/21  - Clonal type SK, Occipital scalp, s.p bx 04/05/21  - Clonal SK, left frontal scalp, s/p bx 2017  3. AK's, sideburns and forehead  - cryo, consider field therapy  -AK right temple, s/p bx 11/28/23, cryo 1/3/2024     Currently undergoing bladder cancer treatment     Family Hx: Negative for family history of skin cancer.    NAME OF PROCEDURE: Electrodesiccation and curettage of biopsy confirmed superficial Basal Cell Carcinoma  PRE-OPERATIVE DIAGNOSIS:  superficial Basal Cell Carcinoma  POST-OPERATIVE DIAGNOSIS: Same   FINAL SIZE: 1.5 cm, right upper back B  1.2 cm, mid chest    INDICATIONS: The patient presented with a 0.6 cm superficial Basal Cell Carcinoma of the mid chest and a 1.0 cm superficial Basal Cell Carcinoma of the right upper back B.  ED&C was indicated.   We discussed the principles of treatment and possible complications including bleeding, infection, scarring, alteration in skin color and sensation, or recurrence of the lesion or disease. We reviewed that on occasion, after healing, a secondary procedure or revision may be recommended in order to obtain the best cosmetic or functional result.     PROCEDURE: The mid chest and right upper back B was cleansed with an alcohol pad and injected with 1% lidocaine with epinephrine. Once anesthesia was obtained, the lesion was curetted with a 3mm margin in 3 directions and this was followed by electrodessication.  This process was repeated three times.  Vaseline and a bandage were applied to the wound.  The patient tolerated the procedure well and was given post care instructions.    All risks, benefits and alternatives were discussed with patient.  Patient is in agreement and understands the assessment and plan.  All questions were answered.  Lexii Agustin PA-C  [facility]    PATIENT EDUCATION    Ready to learn.  No apparent learning barriers were identified.  Learning preferences include listening.  Explained diagnosis and treatment plan; patient/guardian of patient expressed understanding of the content.

## 2025-04-21 NOTE — TELEPHONE ENCOUNTER
Reason for visit: surveillance cystoscopy with fulguration    Dx/Hx/Sx:   -low grade urothelial carcinoma of the baldder    Records/imaging/labs/orders: available in Epic    At rooming:   -use sterile H2O  -consent form  -set up fulguration machine    --  Rony Guardado on 4/21/2025 at 3:54 PM

## 2025-04-30 ENCOUNTER — OFFICE VISIT (OUTPATIENT)
Dept: UROLOGY | Facility: CLINIC | Age: 80
End: 2025-04-30
Payer: MEDICARE

## 2025-04-30 VITALS
HEART RATE: 61 BPM | HEIGHT: 67 IN | BODY MASS INDEX: 25.9 KG/M2 | DIASTOLIC BLOOD PRESSURE: 80 MMHG | SYSTOLIC BLOOD PRESSURE: 118 MMHG | WEIGHT: 165 LBS | OXYGEN SATURATION: 98 %

## 2025-04-30 DIAGNOSIS — C67.9 UROTHELIAL CARCINOMA OF BLADDER WITHOUT INVASION OF MUSCLE (H): Primary | ICD-10-CM

## 2025-04-30 PROCEDURE — 3079F DIAST BP 80-89 MM HG: CPT | Performed by: UROLOGY

## 2025-04-30 PROCEDURE — 1126F AMNT PAIN NOTED NONE PRSNT: CPT | Performed by: UROLOGY

## 2025-04-30 PROCEDURE — 52224 CYSTOSCOPY AND TREATMENT: CPT | Performed by: UROLOGY

## 2025-04-30 PROCEDURE — 3074F SYST BP LT 130 MM HG: CPT | Performed by: UROLOGY

## 2025-04-30 RX ORDER — LIDOCAINE HYDROCHLORIDE 20 MG/ML
JELLY TOPICAL ONCE
Status: COMPLETED | OUTPATIENT
Start: 2025-04-30 | End: 2025-04-30

## 2025-04-30 RX ADMIN — LIDOCAINE HYDROCHLORIDE: 20 JELLY TOPICAL at 10:12

## 2025-04-30 ASSESSMENT — PAIN SCALES - GENERAL: PAINLEVEL_OUTOF10: NO PAIN (0)

## 2025-04-30 NOTE — LETTER
4/30/2025       RE: Alex Stanley  [address]  [facility] [address]     Dear Colleague,    Thank you for referring your patient, Alex Stanley, to the [facility] [place] [facility]. Please see a copy of my visit note below.    Cystoscopy and fulguration     Pre procedural diagnosis NMIBC    Post procedural diagnosis Same     Indication 79 year old male with history of LGTa with small papillary lesions identified on his last surveillance cystoscopy here for cystoscopy fulguration        Procedure:      After sterile preparation and draping of the patient,  a 16-Estonian flexible cystoscope was introduced via the urethra.  It was passed without difficulty into the bladder.  The urethra was open without evidence of stricture.  The ureteral orifices were orthotopic.  The above-mentioned lesions were identified and were fulgurated using a Bugbee catheter. Scope was then removed and patient tolerated the procedure well.    Plan  Follow-up in 6 months for surveillance cystoscopy possible fulguration      Sydney Weaver MD   Department of Urology   [facility]        Again, thank you for allowing me to participate in the care of your patient.      Sincerely,    Sydney Weaver MD

## 2025-04-30 NOTE — NURSING NOTE
The following medication was given:     MEDICATION:  Lidocaine without epinephrine 2% jelly  ROUTE: urethral   SITE: urethral   DOSE: 10 mL  LOT #: [id number]  : [employer]  EXPIRATION DATE: 08-26  NDC#: [address]-9   Was there drug waste? No    Prior to med admin, verified patient identity using patient's name and date of birth.  Due to med administration, patient instructed to remain in clinic for 15 minutes  afterwards, and to report any adverse reaction to me immediately.    Drug Amount Wasted:  None.  Vial/Syringe: Syringe      Rony Guardado  4/30/2025  10:12 AM

## 2025-04-30 NOTE — PROGRESS NOTES
Cystoscopy and fulguration     Pre procedural diagnosis NMIBC    Post procedural diagnosis Same     Indication 79 year old male with history of LGTa with small papillary lesions identified on his last surveillance cystoscopy here for cystoscopy fulguration        Procedure:      After sterile preparation and draping of the patient,  a 16-Yemeni flexible cystoscope was introduced via the urethra.  It was passed without difficulty into the bladder.  The urethra was open without evidence of stricture.  The ureteral orifices were orthotopic.  The above-mentioned lesions were identified and were fulgurated using a Bugbee catheter. Scope was then removed and patient tolerated the procedure well.    Plan  Follow-up in 6 months for surveillance cystoscopy possible fulguration      Sydney Weaver MD   Department of Urology   [facility]

## 2025-04-30 NOTE — NURSING NOTE
"Chief Complaint   Patient presents with    Cystoscopy     Pt states here for fulguration       Blood pressure 118/80, pulse 61, height 1.702 m (5' 7\"), weight 74.8 kg (165 lb), SpO2 98%. Body mass index is 25.84 kg/m .    Patient Active Problem List   Diagnosis    Essential hypertension    Chronic atrial fibrillation/Flutter    Tobacco abuse    Iron deficiency anemia    History of alcohol dependence in remission sober since 2010 (H)    Erosive gastritis    Hyperlipidemia LDL goal <100    History of CVA (cerebrovascular accident)    Fatty liver, alcoholic    Diverticulosis of colon    CKD (chronic kidney disease) stage 3, GFR 30-59 ml/min (H)    Long-term (current) use of anticoagulants [Z79.01]    Macular degeneration (senile) of retina, left eye    Gallstone pancreatitis    Bladder tumor    Urothelial carcinoma of bladder without invasion of muscle (H)    Abnormal cardiovascular stress test    Status post coronary angiogram    Coronary artery disease of native artery of native heart with stable angina pectoris       No Known Allergies    Current Outpatient Medications   Medication Sig Dispense Refill    amLODIPine (NORVASC) 10 MG tablet Take 1 tablet (10 mg) by mouth daily. 90 tablet 3    apixaban ANTICOAGULANT (ELIQUIS) 5 MG tablet Take 1 tablet (5 mg) by mouth 2 times daily. 180 tablet 3    Multiple Vitamins-Minerals (PRESERVISION AREDS 2) CAPS Take 1 capsule by mouth 2 times daily      pantoprazole (PROTONIX) 40 MG EC tablet Take 1 tablet (40 mg) by mouth 2 times daily. 180 tablet 3    pravastatin (PRAVACHOL) 20 MG tablet Take 1 tablet (20 mg) by mouth daily. 90 tablet 3       Social History     Tobacco Use    Smoking status: Every Day     Current packs/day: 0.50     Average packs/day: 0.5 packs/day for 60.3 years (30.2 ttl pk-yrs)     Types: Cigarettes     Start date: 1965    Smokeless tobacco: Never    Tobacco comments:     ppd since age 16 - now 1/2-1 ppd 04/05/21.  8-10 cigarettes daily   Vaping Use    " Vaping status: Never Used   Substance Use Topics    Alcohol use: Not Currently     [facility]    Drug use: Never       Ray Beyer  4/30/2025  9:10 AM

## 2025-05-03 ENCOUNTER — HEALTH MAINTENANCE LETTER (OUTPATIENT)
Age: 80
End: 2025-05-03

## 2025-05-14 ENCOUNTER — OFFICE VISIT (OUTPATIENT)
Dept: DERMATOLOGY | Facility: CLINIC | Age: 80
End: 2025-05-14
Payer: MEDICARE

## 2025-05-14 DIAGNOSIS — C44.529 SQUAMOUS CELL CARCINOMA OF BACK: ICD-10-CM

## 2025-05-14 PROCEDURE — 13102 CMPLX RPR TRUNK ADDL 5CM/<: CPT | Performed by: STUDENT IN AN ORGANIZED HEALTH CARE EDUCATION/TRAINING PROGRAM

## 2025-05-14 PROCEDURE — 11603 EXC TR-EXT MAL+MARG 2.1-3 CM: CPT | Performed by: STUDENT IN AN ORGANIZED HEALTH CARE EDUCATION/TRAINING PROGRAM

## 2025-05-14 PROCEDURE — 13101 CMPLX RPR TRUNK 2.6-7.5 CM: CPT | Performed by: STUDENT IN AN ORGANIZED HEALTH CARE EDUCATION/TRAINING PROGRAM

## 2025-05-14 NOTE — PATIENT INSTRUCTIONS
Excision Wound Care Instructions  I will experience scar, altered skin color, bleeding, swelling, pain, crusting and redness. I may experience altered sensation. Risks are excessive bleeding, infection, muscle weakness, thick (hypertrophic or keloidal) scar, and recurrence. A second procedure may be recommended to obtain the best cosmetic or functional result.  Possible complications of any surgical procedure are bleeding, infection, scarring, alteration in skin color and sensation, muscle weakness in the area, wound dehiscence or seperation, or recurrence of the lesion or disease. On occasion, after healing, a secondary procedure or revision may be recommended in order to obtain the best cosmetic or functional result.   After your surgery, a pressure bandage will be placed over the area that has sutures. This will help prevent bleeding. Please follow these instructions as they will help you to prevent complications as your wound heals.    For the First 24 hours After Surgery:  Leave the pressure bandage on and keep it dry. If it should come loose, you may retape it, but do not take it off.  Relax and take it easy. Do not do any vigorous exercise, heavy lifting, or bending forward. This could cause the wound to bleed.  Post-operative pain is usually mild. You may alternate between 1000 mg of Tylenol (acetaminophen) and 400 mg of Ibuprofen every 4 hours.  Do not take more than 4,000mg of acetaminophen in a 24 hour period or 3200 mg of Ibuprofen in a 24 hr period.  Avoid alcohol and vitamin E as these may increase your tendency to bleed.  You may put an ice pack around the bandaged area for 20 minutes every 2-3 hours. This may help reduce swelling, bruising, and pain. Make sure the ice pack is waterproof so that the pressure bandage does not get wet.   You may see a small amount of drainage or blood on your pressure bandage. This is normal. However, if drainage or bleeding continues or saturates the bandage, you will  need to apply firm pressure over the bandage with a washcloth for 15 minutes. If bleeding continues after applying pressure for 15 minutes then go to the nearest emergency room.    After the first 24 hours from Surgery  Carefully remove the bandage. Since your wound was closed with sutures underneath the skin and sealed with Derma-bond, you do not have to do daily wound care or dressing changes. The blue Derma-bond polymer takes the place of a bandage. It naturally detaches within a week. At that point the skin is knitted together and daily wound washing along with dressing changes are not required.     After the first 2 weeks from Surgery  At 2 weeks post surgery you can begin gently massaging the scar using light pressure applied in a circular motion. Do this for 5 minutes 3 times a day. Doing this consistently and regularly will improve the appearance of your final scar. You should continue to do this for AT LEAST 2 weeks, but ideally 4 in order to give your scar the best possible outcome        Call Us If:  You have pain that is not controlled with Tylenol/Ibuprofen  You have signs or symptoms of an infection, such as: fever over 100 degrees F, redness, warmth, or foul-smelling or yellow drainage from the wound.  Who should I call with questions?  [facility]: [phone]   [facility]: [phone]  [facility]: [phone]  For urgent needs outside of business hours call the [facility] at [phone] and ask to speak with the dermatology resident on call

## 2025-05-14 NOTE — LETTER
5/14/2025      Alex Stanley  [address]  [place] [address]      Dear Colleague,    Thank you for referring your patient, Alex Stanley, to the [facility]. Please see a copy of my visit note below.    DERMATOLOGIC SURGERY REPORT    NAME OF PROCEDURE:  EXCISION AND CLOSURE    Surgeon:  Chi Moyer MD    PREOPERATIVE DIAGNOSIS: SCC  POSTOPERATIVE DIAGNOSIS: Same  Lesion Size: 19 mm lesion with 4 mm margins  Final excision size: 27mm  Repair type: Complex  FINAL REPAIR LENGTH:  76 mm  Location: R upper back  Prior Biopsy Accession #: [id number]     INDICATIONS:Excision was indicated for treatment. We discussed the principles of treatment and most likely complications including bleeding, infection, wound dehiscence, pain, nerve damage, and scarring. Informed consent was obtained and the patient underwent the procedure as follows.    PROCEDURE:  The patient was taken to the operative suite. The treatment area was anesthetized with 1% lidocaine with 1:[id number] epinephrine buffered with bicarbonate. The area was washed with hibiclens, rinsed with saline and draped with sterile towels. The lesion was delineated and excised down to subcutaneous fat. Hemostasis was obtained by electrocoagulation.     REPAIR:  In order to repair this defect while maintaining the normal anatomic relations and function, we elected to utilize a linear closure. Closure was oriented so that the wound was in the patient's natural skin tension lines. Two redundant cones were removed by triangulation. Due to tightness of the surrounding skin deeper layers of the subcutaneous tissue were undermined extensively to a distance  greater than width of the defect on both sides by dissection in the subcutaneous plane until adequate tissue mobility was obtained. Deep layering suturing was performed using 3-0 monocryl deep, intradermal and subcutaneous sutures.  Final cutaneous approximation was achieved with 3-0  monocryl running superficial sutures.      A total of 6 mL of anesthesia was administered for all surgical sites. Estimated blood loss was less than 6 mL for all surgical sites. Dermabond was applied. A sterile pressure dressing was applied and wound care instructions, with a written handout, were given. The patient was discharged alert and ambulatory.    Chi Moyer M.D.      Department of Dermatology      Again, thank you for allowing me to participate in the care of your patient.        Sincerely,        Chi Moyer MD    Electronically signed

## 2025-05-14 NOTE — PROGRESS NOTES
DERMATOLOGIC SURGERY REPORT    NAME OF PROCEDURE:  EXCISION AND CLOSURE    Surgeon:  Chi Moyer MD    PREOPERATIVE DIAGNOSIS: SCC  POSTOPERATIVE DIAGNOSIS: Same  Lesion Size: 19 mm lesion with 4 mm margins  Final excision size: 27mm  Repair type: Complex  FINAL REPAIR LENGTH:  76 mm  Location: R upper back  Prior Biopsy Accession #: [id number]     INDICATIONS:Excision was indicated for treatment. We discussed the principles of treatment and most likely complications including bleeding, infection, wound dehiscence, pain, nerve damage, and scarring. Informed consent was obtained and the patient underwent the procedure as follows.    PROCEDURE:  The patient was taken to the operative suite. The treatment area was anesthetized with 1% lidocaine with [id number] epinephrine buffered with bicarbonate. The area was washed with hibiclens, rinsed with saline and draped with sterile towels. The lesion was delineated and excised down to subcutaneous fat. Hemostasis was obtained by electrocoagulation.     REPAIR:  In order to repair this defect while maintaining the normal anatomic relations and function, we elected to utilize a linear closure. Closure was oriented so that the wound was in the patient's natural skin tension lines. Two redundant cones were removed by triangulation. Due to tightness of the surrounding skin deeper layers of the subcutaneous tissue were undermined extensively to a distance  greater than width of the defect on both sides by dissection in the subcutaneous plane until adequate tissue mobility was obtained. Deep layering suturing was performed using 3-0 monocryl deep, intradermal and subcutaneous sutures.  Final cutaneous approximation was achieved with 3-0 monocryl running superficial sutures.      A total of 6 mL of anesthesia was administered for all surgical sites. Estimated blood loss was less than 6 mL for all surgical sites. Dermabond was applied. A sterile pressure dressing was applied and  wound care instructions, with a written handout, were given. The patient was discharged alert and ambulatory.    Chi Moyer M.D.      Department of Dermatology

## 2025-05-19 ENCOUNTER — RESULTS FOLLOW-UP (OUTPATIENT)
Dept: DERMATOLOGY | Facility: CLINIC | Age: 80
End: 2025-05-19

## 2025-06-25 NOTE — PROGRESS NOTES
Surgical Office Location:  [facility]  [address] [place] [id number]

## 2025-06-26 ENCOUNTER — OFFICE VISIT (OUTPATIENT)
Dept: DERMATOLOGY | Facility: CLINIC | Age: 80
End: 2025-06-26
Payer: MEDICARE

## 2025-06-26 DIAGNOSIS — C44.329 SQUAMOUS CELL CANCER OF SKIN OF LEFT TEMPLE: Primary | ICD-10-CM

## 2025-06-26 ASSESSMENT — PAIN SCALES - GENERAL: PAINLEVEL_OUTOF10: NO PAIN (0)

## 2025-06-26 NOTE — PATIENT INSTRUCTIONS
Open Wound Care     for ______________        No strenuous activity for 48 hours    Take Tylenol as needed for discomfort.                                                .         Do not drink alcoholic beverages for 48 hours.    Keep the pressure bandage in place for 24 hours. If the bandage becomes blood tinged or loose, reinforce it with gauze and tape.        (Refer to the reverse side of this page for management of bleeding).    Remove bandage in 24 hours and begin wound care as follows:     Clean area with tap water using a Q tip or gauze pad, (shower / bathe normally)  Dry wound with Q tip or gauze pad  Apply Aquaphor, Vaseline, Polysporin or Bacitracin Ointment with a Q tip  Do NOT use Neosporin Ointment *  Cover the wound with a band-aid or nonstick gauze pad and paper tape.  Repeat wound care once a day until wound is completely healed.    It is an old wives tale that a wound heals better when it is exposed to air and allowed to dry out. The wound will heal faster with a better cosmetic result if it is kept moist with ointment and covered with a bandage.  Do not let the wound dry out.      Supplies Needed:                Qtips or gauze pads                Polysporin or Bacitracin Ointment                Bandaids or nonstick gauze pads and paper tape    Wound care kits and brown paper tape are available for purchase at   the pharmacy.    BLEEDING:    Use tightly rolled up gauze or cloth to apply direct pressure over the bandage for 20   minutes.  Reapply pressure for an additional 20 minutes if necessary  Call the office or go to the nearest emergency room if pressure fails to stop the bleeding.  Use additional gauze and tape to maintain pressure once the bleeding has stopped.  Begin wound care 24 hours after surgery as directed.                  WOUND HEALING    One week after surgery a pink / red halo will form around the outside of the wound.   This is new skin.  The center of the wound will appear  yellowish white and produce some drainage.  The pink halo will slowly migrate in toward the center of the wound until the wound is covered with new shiny pink skin.  There will be no more drainage when the wound is completely healed.  It will take six months to one year for the redness to fade.  The scar may be itchy, tight and sensitive to extreme temperatures for a year after the surgery.  Massaging the area several times a day for several minutes after the wound is completely healed will help the scar soften and normalize faster. Begin massage only after healing is complete.      In case of emergency call: Dr Mendieta: [phone]    [facility]: [phone]    [facility]:[phone]

## 2025-06-26 NOTE — LETTER
6/26/2025      Alex Stanley  [address]  [place] [address]      Dear Colleague,    Thank you for referring your patient, Alex Stanley, to the [facility]. Please see a copy of my visit note below.    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  Patient has no other skin complaints today.  Remainder of the HPI, Meds, PMH, Allergies, FH, and SH was reviewed in chart.      Past Medical History:   Diagnosis Date     Alcohol abuse      Alcoholism (H)     admission 11/09     Atrial fibrillation, paroxysmal 11/07, 6/09, 11/09     Basal cell carcinoma      Benign essential hypertension      Chronic atrial fibrillation (H)      Diverticulosis of colon 06/02/2011     Erosive gastritis 11/2009     Gastritis      Hyperlipidemia LDL goal <100      Hypertension, with LVH      Iron deficiency anemia 11/2009     Paroxysmal atrial fibrillation (H)      Squamous cell carcinoma of skin, unspecified      Stroke (H)      Tobacco abuse        Past Surgical History:   Procedure Laterality Date     COLONOSCOPY N/A 10/26/2020    Procedure: COLONOSCOPY, WITH POLYPECTOMY AND BIOPSY;  Surgeon: Shon Bond MD;  Location:  GI     CV CORONARY ANGIOGRAM N/A 7/16/2024    Procedure: Coronary Angiogram;  Surgeon: Jah Mcclellan MD;  Location:  HEART CARDIAC CATH LAB     CYSTOSCOPY, TRANSURETHRAL RESECTION (TUR) TUMOR BLADDER, COMBINED N/A 9/6/2023    Procedure: Blue light CYSTOSCOPY, WITH TRANSURETHRAL RESECTION BLADDER TUMOR, urethral dilation;  Surgeon: Sydney Weaver MD;  [place] [facility] [place]     ENDOSCOPIC ULTRASOUND UPPER GASTROINTESTINAL TRACT (GI) N/A 1/24/2022    Procedure: ENDOSCOPIC ULTRASOUND, ESOPHAGOSCOPY / UPPER GASTROINTESTINAL TRACT (GI);  Surgeon: Ronda  Shon Perez MD;  Location: [facility] GI     LAPAROSCOPIC CHOLECYSTECTOMY N/A 2/8/2022    Procedure: LAPAROSCOPIC CHOLECYSTECTOMY;  Surgeon: Mazin Bains MD;  Location: [facility] OR     SURGICAL HISTORY OF -   4/11    R Dupuytren's contracture release   Dr. Scruggs     TRANSURETHRAL RESECTION (TUR) TUMOR BLADDER INSTILL OPTICAL AGENT N/A 1/10/2024    Procedure: BLUE LIGHT, CYSTOSCOPY, WITH TRANSURETHRAL RESECTION BLADDER TUMOR;  Surgeon: Sydney Weaver MD;  Location: [facility] OR     TRANSURETHRAL RESECTION (TUR) TUMOR BLADDER INSTILL OPTICAL AGENT N/A 5/28/2024    Procedure: Blue light CYSTOSCOPY, WITH TRANSURETHRAL RESECTION BLADDER TUMOR;  Surgeon: Sydney Weaver MD;  Location: [facility] OR     ZZC NONSPECIFIC PROCEDURE  2002    Rt foot reconstructive     ZZC NONSPECIFIC PROCEDURE  10/12    R hand procedure.  Dr. Deluna        Family History   Problem Relation Age of Onset     Lung Cancer Mother      Cancer Mother         small cell lung with mets to brain     Emphysema Father      Respiratory Father         emphysema     Diabetes Maternal Grandmother      Anesthesia Reaction No family hx of      Venous thrombosis No family hx of        Social History     Socioeconomic History     Marital status:      Spouse name: Not on file     Number of children: Not on file     Years of education: Not on file     Highest education level: Not on file   Occupational History     Not on file   Tobacco Use     Smoking status: Every Day     Current packs/day: 0.50     Average packs/day: 0.5 packs/day for 60.5 years (30.2 ttl pk-yrs)     Types: Cigarettes     Start date: 1965     Smokeless tobacco: Never     Tobacco comments:     ppd since age 16 - now 1/2-1 ppd 04/05/21.  8-10 cigarettes daily   Vaping Use     Vaping status: Never Used   Substance and Sexual Activity     Alcohol use: Not Currently     [facility]     Drug use: Never     Sexual activity: Yes     Partners: Female   Other Topics Concern     Parent/sibling  w/ CABG, MI or angioplasty before 65F 55M? Not Asked   Social History Narrative    ** Merged History Encounter **          Social Drivers of Health     Financial Resource Strain: Low Risk  (4/3/2025)    Financial Resource Strain      Within the past 12 months, have you or your family members you live with been unable to get utilities (heat, electricity) when it was really needed?: No   Food Insecurity: Low Risk  (4/3/2025)    Food Insecurity      Within the past 12 months, did you worry that your food would run out before you got money to buy more?: No      Within the past 12 months, did the food you bought just not last and you didn t have money to get more?: No   Transportation Needs: Low Risk  (4/3/2025)    Transportation Needs      Within the past 12 months, has lack of transportation kept you from medical appointments, getting your medicines, non-medical meetings or appointments, work, or from getting things that you need?: No   Physical Activity: Insufficiently Active (4/3/2025)    Exercise Vital Sign      Days of Exercise per Week: 3 days      Minutes of Exercise per Session: 20 min   Stress: No Stress Concern Present (4/3/2025)    [employer] [place] [employer] - Occupational Stress Questionnaire      Feeling of Stress : Not at all   Social Connections: Unknown (4/3/2025)    Social Connection and Isolation Panel [NHANES]      Frequency of Communication with Friends and Family: Not on file      Frequency of Social Gatherings with Friends and Family: Three times a week      Attends Rastafari Services: Not on file      Active Member of Clubs or Organizations: Not on file      Attends Club or Organization Meetings: Not on file      Marital Status: Not on file   Interpersonal Safety: Low Risk  (4/7/2025)    Interpersonal Safety      Do you feel physically and emotionally safe where you currently live?: Yes      Within the past 12 months, have you been hit, slapped, kicked or otherwise physically hurt by  someone?: No      Within the past 12 months, have you been humiliated or emotionally abused in other ways by your partner or ex-partner?: No   Housing Stability: Low Risk  (4/3/2025)    Housing Stability      Do you have housing? : Yes      Are you worried about losing your housing?: No       Outpatient Encounter Medications as of 6/26/2025   Medication Sig Dispense Refill     amLODIPine (NORVASC) 10 MG tablet Take 1 tablet (10 mg) by mouth daily. 90 tablet 3     apixaban ANTICOAGULANT (ELIQUIS) 5 MG tablet Take 1 tablet (5 mg) by mouth 2 times daily. 180 tablet 3     Multiple Vitamins-Minerals (PRESERVISION AREDS 2) CAPS Take 1 capsule by mouth 2 times daily       pantoprazole (PROTONIX) 40 MG EC tablet Take 1 tablet (40 mg) by mouth 2 times daily. 180 tablet 3     pravastatin (PRAVACHOL) 20 MG tablet Take 1 tablet (20 mg) by mouth daily. 90 tablet 3     No facility-administered encounter medications on file as of 6/26/2025.             O:   NAD, WDWN, Alert & Oriented, Mood & Affect wnl, Vitals stable   General appearance normal   Vitals stable   Alert, oriented and in no acute distress     L temple 2.5cm red plaque       Eyes: Conjunctivae/lids:Normal     ENT: Lips, mucosa: normal    MSK:Normal    Cardiovascular: peripheral edema none    Pulm: Breathing Normal    Lymph Nodes: No Head and Neck Lymphadenopathy     Neuro/Psych: Orientation:Alert and Orientedx3 ; Mood/Affect:normal       A/P:  L temple squamous cell carcinoma   MOHS:   Location    The rationale for Mohs surgery was discussed with the patient and consent was obtained.  The risks and benefits as well as alternatives to therapy were discussed, in detail.  Specifically, the risks of infection, scarring, bleeding, prolonged wound healing, incomplete removal, allergy to anesthesia, nerve injury and recurrence were addressed.  Indication for Mohs was Location. Prior to the procedure, the treatment site was clearly identified and, if available, confirmed  with previous photos and confirmed by the patient   All components of the Universal Protocol/PAUSE rule were completed.  The Mohs surgeon operated in two distinct and integrated capacities as the surgeon and pathologist.      The area was prepped with Betasept.  A rim of normal appearing skin was marked circumferentially around the lesion.  The area was infiltrated with local anesthesia.  The tumor was first debulked to remove all clinically apparent tumor.  An incision following the standard Mohs approach was done and the specimen was oriented,mapped and placed in 2 block(s).  Each specimen was then chromacoded and processed in the Mohs laboratory using standard Mohs technique and submitted for frozen section histology.  Frozen section analysis showed  residual tumor but CLEAR MARGINS.    1st stage:There is hyperkeratosis & parakeratosis of the epidermis, with full thickness epidermal involvement by atypical keratinocytes with rare pale vacuolated cells.  Unremarkable dermis.      The tumor was excised using standard Mohs technique in 2 stages(s).  CLEAR MARGINS OBTAINED and Final defect size was 3.3 x 2.9 cm.     We discussed the options for wound management in full with the patient including risks/benefits/ possible outcomes.      REPAIR SECOND INTENT: We discussed the options for wound management in full with the patient including risks/benefits/possible outcomes. Decision made to allow the wound to heal by second intention. Cautery was used for for hemostasis. EBL minimal; complications none; wound care routine.  The patient was discharged in good condition and will return in one month or prn for wound evaluation.   It was a pleasure speaking to Alex Stanley today.  Previous clinic notes and pertinent laboratory tests were reviewed prior to Alex Stanley's visit.  Signs and Symptoms of skin cancer discussed with patient.  Patient encouraged to perform monthly skin exams.  UV precautions reviewed with  patient.  Risks of non-melanoma skin cancer discussed with patient   Return to clinic 1 months        Again, thank you for allowing me to participate in the care of your patient.        Sincerely,        Jah Mendieta MD    Electronically signed

## 2025-06-26 NOTE — PROGRESS NOTES
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  Patient has no other skin complaints today.  Remainder of the HPI, Meds, PMH, Allergies, FH, and SH was reviewed in chart.      Past Medical History:   Diagnosis Date    Alcohol abuse     Alcoholism (H)     admission 11/09    Atrial fibrillation, paroxysmal 11/07, 6/09, 11/09    Basal cell carcinoma     Benign essential hypertension     Chronic atrial fibrillation (H)     Diverticulosis of colon 06/02/2011    Erosive gastritis 11/2009    Gastritis     Hyperlipidemia LDL goal <100     Hypertension, with LVH     Iron deficiency anemia 11/2009    Paroxysmal atrial fibrillation (H)     Squamous cell carcinoma of skin, unspecified     Stroke (H)     Tobacco abuse        Past Surgical History:   Procedure Laterality Date    COLONOSCOPY N/A 10/26/2020    Procedure: COLONOSCOPY, WITH POLYPECTOMY AND BIOPSY;  Surgeon: Shon Bond MD;  Location:  GI    CV CORONARY ANGIOGRAM N/A 7/16/2024    Procedure: Coronary Angiogram;  Surgeon: Jah Mcclellan MD;  Location:  [facility]    CYSTOSCOPY, TRANSURETHRAL RESECTION (TUR) TUMOR BLADDER, COMBINED N/A 9/6/2023    Procedure: Blue light CYSTOSCOPY, WITH TRANSURETHRAL RESECTION BLADDER TUMOR, urethral dilation;  Surgeon: Sydney Weaver MD;  Location: [facility] OR    ENDOSCOPIC ULTRASOUND UPPER GASTROINTESTINAL TRACT (GI) N/A 1/24/2022    Procedure: ENDOSCOPIC ULTRASOUND, ESOPHAGOSCOPY / UPPER GASTROINTESTINAL TRACT (GI);  Surgeon: Shon Bond MD;  Location:  GI    LAPAROSCOPIC CHOLECYSTECTOMY N/A 2/8/2022    Procedure: LAPAROSCOPIC CHOLECYSTECTOMY;  Surgeon: Mazin Bains MD;  Location:  OR    SURGICAL HISTORY OF -   4/11    R Dupuytren's contracture release   Dr. Scruggs    TRANSURETHRAL RESECTION (TUR) TUMOR BLADDER INSTILL OPTICAL AGENT N/A 1/10/2024    Procedure: BLUE LIGHT,  CYSTOSCOPY, WITH TRANSURETHRAL RESECTION BLADDER TUMOR;  Surgeon: Sydney Weaver MD;  Location: [facility] OR    TRANSURETHRAL RESECTION (TUR) TUMOR BLADDER INSTILL OPTICAL AGENT N/A 5/28/2024    Procedure: Blue light CYSTOSCOPY, WITH TRANSURETHRAL RESECTION BLADDER TUMOR;  Surgeon: Sydney Weaver MD;  Location: [facility] OR    ZZC NONSPECIFIC PROCEDURE  2002    Rt foot reconstructive    ZZC NONSPECIFIC PROCEDURE  10/12    R hand procedure.  Dr. Deluna        Family History   Problem Relation Age of Onset    Lung Cancer Mother     Cancer Mother         small cell lung with mets to brain    Emphysema Father     Respiratory Father         emphysema    Diabetes Maternal Grandmother     Anesthesia Reaction No family hx of     Venous thrombosis No family hx of        Social History     Socioeconomic History    Marital status:      Spouse name: Not on file    Number of children: Not on file    Years of education: Not on file    Highest education level: Not on file   Occupational History    Not on file   Tobacco Use    Smoking status: Every Day     Current packs/day: 0.50     Average packs/day: 0.5 packs/day for 60.5 years (30.2 ttl pk-yrs)     Types: Cigarettes     Start date: 1965    Smokeless tobacco: Never    Tobacco comments:     ppd since age 16 - now 1/2-1 ppd 04/05/21.  8-10 cigarettes daily   Vaping Use    Vaping status: Never Used   Substance and Sexual Activity    Alcohol use: Not Currently     Comment: last drank at [facility] 8/2010.    Drug use: Never    Sexual activity: Yes     Partners: Female   Other Topics Concern    Parent/sibling w/ CABG, MI or angioplasty before 65F 55M? Not Asked   Social History Narrative    ** Merged History Encounter **          Social Drivers of Health     Financial Resource Strain: Low Risk  (4/3/2025)    Financial Resource Strain     Within the past 12 months, have you or your family members you live with been unable to get utilities (heat, electricity) when it was really needed?: No    Food Insecurity: Low Risk  (4/3/2025)    Food Insecurity     Within the past 12 months, did you worry that your food would run out before you got money to buy more?: No     Within the past 12 months, did the food you bought just not last and you didn t have money to get more?: No   Transportation Needs: Low Risk  (4/3/2025)    Transportation Needs     Within the past 12 months, has lack of transportation kept you from medical appointments, getting your medicines, non-medical meetings or appointments, work, or from getting things that you need?: No   Physical Activity: Insufficiently Active (4/3/2025)    Exercise Vital Sign     Days of Exercise per Week: 3 days     Minutes of Exercise per Session: 20 min   Stress: No Stress Concern Present (4/3/2025)    [employer] [place] [employer] - Occupational Stress Questionnaire     Feeling of Stress : Not at all   Social Connections: Unknown (4/3/2025)    Social Connection and Isolation Panel [NHANES]     Frequency of Communication with Friends and Family: Not on file     Frequency of Social Gatherings with Friends and Family: Three times a week     Attends Tenriism Services: Not on file     Active Member of Clubs or Organizations: Not on file     Attends Club or Organization Meetings: Not on file     Marital Status: Not on file   Interpersonal Safety: Low Risk  (4/7/2025)    Interpersonal Safety     Do you feel physically and emotionally safe where you currently live?: Yes     Within the past 12 months, have you been hit, slapped, kicked or otherwise physically hurt by someone?: No     Within the past 12 months, have you been humiliated or emotionally abused in other ways by your partner or ex-partner?: No   Housing Stability: Low Risk  (4/3/2025)    Housing Stability     Do you have housing? : Yes     Are you worried about losing your housing?: No       Outpatient Encounter Medications as of 6/26/2025   Medication Sig Dispense Refill    amLODIPine (NORVASC) 10  MG tablet Take 1 tablet (10 mg) by mouth daily. 90 tablet 3    apixaban ANTICOAGULANT (ELIQUIS) 5 MG tablet Take 1 tablet (5 mg) by mouth 2 times daily. 180 tablet 3    Multiple Vitamins-Minerals (PRESERVISION AREDS 2) CAPS Take 1 capsule by mouth 2 times daily      pantoprazole (PROTONIX) 40 MG EC tablet Take 1 tablet (40 mg) by mouth 2 times daily. 180 tablet 3    pravastatin (PRAVACHOL) 20 MG tablet Take 1 tablet (20 mg) by mouth daily. 90 tablet 3     No facility-administered encounter medications on file as of 6/26/2025.             O:   NAD, WDWN, Alert & Oriented, Mood & Affect wnl, Vitals stable   General appearance normal   Vitals stable   Alert, oriented and in no acute distress     L temple 2.5cm red plaque       Eyes: Conjunctivae/lids:Normal     ENT: Lips, mucosa: normal    MSK:Normal    Cardiovascular: peripheral edema none    Pulm: Breathing Normal    Lymph Nodes: No Head and Neck Lymphadenopathy     Neuro/Psych: Orientation:Alert and Orientedx3 ; Mood/Affect:normal       A/P:  L temple squamous cell carcinoma   MOHS:   Location    The rationale for Mohs surgery was discussed with the patient and consent was obtained.  The risks and benefits as well as alternatives to therapy were discussed, in detail.  Specifically, the risks of infection, scarring, bleeding, prolonged wound healing, incomplete removal, allergy to anesthesia, nerve injury and recurrence were addressed.  Indication for Mohs was Location. Prior to the procedure, the treatment site was clearly identified and, if available, confirmed with previous photos and confirmed by the patient   All components of the Universal Protocol/PAUSE rule were completed.  The Mohs surgeon operated in two distinct and integrated capacities as the surgeon and pathologist.      The area was prepped with Betasept.  A rim of normal appearing skin was marked circumferentially around the lesion.  The area was infiltrated with local anesthesia.  The tumor was first  debulked to remove all clinically apparent tumor.  An incision following the standard Mohs approach was done and the specimen was oriented,mapped and placed in 2 block(s).  Each specimen was then chromacoded and processed in the Mohs laboratory using standard Mohs technique and submitted for frozen section histology.  Frozen section analysis showed  residual tumor but CLEAR MARGINS.    1st stage:There is hyperkeratosis & parakeratosis of the epidermis, with full thickness epidermal involvement by atypical keratinocytes with rare pale vacuolated cells.  Unremarkable dermis.      The tumor was excised using standard Mohs technique in 2 stages(s).  CLEAR MARGINS OBTAINED and Final defect size was 3.3 x 2.9 cm.     We discussed the options for wound management in full with the patient including risks/benefits/ possible outcomes.      REPAIR SECOND INTENT: We discussed the options for wound management in full with the patient including risks/benefits/possible outcomes. Decision made to allow the wound to heal by second intention. Cautery was used for for hemostasis. EBL minimal; complications none; wound care routine.  The patient was discharged in good condition and will return in one month or prn for wound evaluation.   It was a pleasure speaking to Alex Stanley today.  Previous clinic notes and pertinent laboratory tests were reviewed prior to Alex Stanley's visit.  Signs and Symptoms of skin cancer discussed with patient.  Patient encouraged to perform monthly skin exams.  UV precautions reviewed with patient.  Risks of non-melanoma skin cancer discussed with patient   Return to clinic 1 months

## 2025-07-16 ENCOUNTER — TELEPHONE (OUTPATIENT)
Dept: GASTROENTEROLOGY | Facility: CLINIC | Age: 80
End: 2025-07-16
Payer: MEDICARE

## 2025-07-16 NOTE — TELEPHONE ENCOUNTER
"Endoscopy Scheduling Screen    Caller: patient    Have you had any respiratory illness or flu-like symptoms in the last 10 days?  No    Patient is ACTIVE on [employer].  Inform patient that all appointment instructions will be sent via [employer].    Review patient's insurance for any non participating payor.    Ordering/Referring Provider: Alycia Loredo PA-C   (If ordering provider performs procedure, schedule with ordering provider unless otherwise instructed. )    BMI: Estimated body mass index is 25.76 kg/m  as calculated from the following:    Height as of 7/11/25: 1.702 m (5' 7\").    Weight as of 7/11/25: 74.6 kg (164 lb 8 oz).     Sedation Ordered  moderate sedation.   If patient BMI > 50 do not schedule in [facility].    If patient [facility] > 45 [facility]    Are you taking methadone or Suboxone?  NO, No RN review required.    Have you been diagnosed and are being treated for severe PTSD or severe anxiety?  NO, No RN review required.    Are you taking any prescription medications for pain 3 or more times per week?   NO, No RN review required.    Do you have a history of malignant hyperthermia?  No    (Females) Are you currently pregnant?   No     Have you been diagnosed or told you have pulmonary hypertension?   No    Do you have an LVAD?  No    Have you been told you have moderate to severe sleep apnea?  No.    Have you been told you have COPD, asthma, or any other lung disease?  No    Has your doctor ordered any cardiac tests like echo, angiogram, stress test, ablation, or EKG, that you have not completed yet?  No    Do you  have a history of any heart conditions?  No     Have you ever had or are you waiting for an organ transplant?  No. Continue scheduling, no site restrictions.    Have you had a stroke or transient ischemic attack (TIA aka \"mini stroke\") in the last 2 years?   No.    Have you been diagnosed with or been told you have cirrhosis of the liver?   No.    Are you currently on " "dialysis?   No    Do you need assistance transferring?   No    BMI: Estimated body mass index is 25.76 kg/m  as calculated from the following:    Height as of 7/11/25: 1.702 m (5' 7\").    Weight as of 7/11/25: 74.6 kg (164 lb 8 oz).     Is patients BMI > 40 and scheduling location UP?  No    Do you take an injectable or oral medication for weight loss or diabetes (excluding insulin)?  No    Do you take the medication Naltrexone?  No    Do you take blood thinners?  Yes, you must contact your prescribing provider for directions on holding or bridging with a different medication.       Prep   Are you currently on dialysis or do you have chronic kidney disease?  Yes ([facility] Prep)    Do you have a diagnosis of diabetes?  No    Do you have a diagnosis of cystic fibrosis (CF)?  No    On a regular basis do you go 3 -5 days between bowel movements?  No    BMI > 40?  No    Preferred Pharmacy:    [address] [address]  Phone: [phone] Fax: [phone] Alternate Fax: [phone], [phone]    Final Scheduling Details     Procedure scheduled  Upper endoscopy (EGD)    Surgeon:  ANGELY     Date of procedure:  8/12     Pre-OP / PAC:   No - Not required for this site.    Location  [facility] - Patient preference.    Sedation   Moderate Sedation - Per order.      Patient Reminders:   You will receive a call from a Nurse to review instructions and health history.  This assessment must be completed prior to your procedure.  Failure to complete the Nurse assessment may result in the procedure being cancelled.      On the day of your procedure, please designate an adult(s) who can drive you home stay with you for the next 24 hours. The medicines used in the exam will make you sleepy. You will not be able to drive.      You cannot take public transportation, ride share services, or non-medical taxi service without a responsible caregiver.  Medical " transport services are allowed with the requirement that a responsible caregiver will receive you at your destination.  We require that drivers and caregivers are confirmed prior to your procedure.

## 2025-08-05 ENCOUNTER — OFFICE VISIT (OUTPATIENT)
Dept: URGENT CARE | Facility: URGENT CARE | Age: 80
End: 2025-08-05
Payer: MEDICARE

## 2025-08-05 VITALS
HEART RATE: 83 BPM | OXYGEN SATURATION: 100 % | RESPIRATION RATE: 16 BRPM | WEIGHT: 163.6 LBS | TEMPERATURE: 96.5 F | SYSTOLIC BLOOD PRESSURE: 130 MMHG | BODY MASS INDEX: 25.62 KG/M2 | DIASTOLIC BLOOD PRESSURE: 70 MMHG
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
## 2025-08-05 PROCEDURE — 3078F DIAST BP <80 MM HG: CPT | Performed by: FAMILY MEDICINE

## 2025-08-05 PROCEDURE — 99213 OFFICE O/P EST LOW 20 MIN: CPT | Performed by: FAMILY MEDICINE

## 2025-08-05 PROCEDURE — 3075F SYST BP GE 130 - 139MM HG: CPT | Performed by: FAMILY MEDICINE

## 2025-08-12 ENCOUNTER — HOSPITAL ENCOUNTER (OUTPATIENT)
Facility: CLINIC | Age: 80
Discharge: HOME | End: 2025-08-12
Attending: INTERNAL MEDICINE | Admitting: INTERNAL MEDICINE
Payer: MEDICARE

## 2025-08-12 VITALS
RESPIRATION RATE: 16 BRPM | HEIGHT: 67 IN | SYSTOLIC BLOOD PRESSURE: 119 MMHG | DIASTOLIC BLOOD PRESSURE: 86 MMHG | HEART RATE: 66 BPM | WEIGHT: 163 LBS | BODY MASS INDEX: 25.58 KG/M2 | OXYGEN SATURATION: 95 %

## 2025-08-12 LAB — UPPER GI ENDOSCOPY: NORMAL

## 2025-08-12 PROCEDURE — 999N000099 HC STATISTIC MODERATE SEDATION < 10 MIN: Performed by: INTERNAL MEDICINE

## 2025-08-12 PROCEDURE — 250N000011 HC RX IP 250 OP 636: Performed by: INTERNAL MEDICINE

## 2025-08-12 PROCEDURE — 43239 EGD BIOPSY SINGLE/MULTIPLE: CPT | Performed by: INTERNAL MEDICINE

## 2025-08-12 PROCEDURE — 88341 IMHCHEM/IMCYTCHM EA ADD ANTB: CPT | Mod: TC | Performed by: INTERNAL MEDICINE

## 2025-08-12 RX ORDER — FENTANYL CITRATE 50 UG/ML
INJECTION, SOLUTION INTRAMUSCULAR; INTRAVENOUS PRN
Status: DISCONTINUED | OUTPATIENT
Start: 2025-08-12 | End: 2025-08-12 | Stop reason: HOSPADM

## 2025-08-12 ASSESSMENT — ACTIVITIES OF DAILY LIVING (ADL)
ADLS_ACUITY_SCORE: 57

## 2025-08-18 LAB
PATH REPORT.COMMENTS IMP SPEC: NORMAL
PATH REPORT.FINAL DX SPEC: NORMAL
PATH REPORT.GROSS SPEC: NORMAL
PATH REPORT.MICROSCOPIC SPEC OTHER STN: NORMAL
PATH REPORT.MICROSCOPIC SPEC OTHER STN: NORMAL
PATH REPORT.RELEVANT HX SPEC: NORMAL
PHOTO IMAGE: NORMAL

## 2025-08-18 PROCEDURE — 88342 IMHCHEM/IMCYTCHM 1ST ANTB: CPT | Mod: 26 | Performed by: PATHOLOGY

## 2025-08-18 PROCEDURE — 88305 TISSUE EXAM BY PATHOLOGIST: CPT | Mod: 26 | Performed by: PATHOLOGY

## 2025-08-18 PROCEDURE — 88341 IMHCHEM/IMCYTCHM EA ADD ANTB: CPT | Mod: 26 | Performed by: PATHOLOGY

## 2025-09-04 ENCOUNTER — OFFICE VISIT (OUTPATIENT)
Dept: URGENT CARE | Facility: URGENT CARE | Age: 80
End: 2025-09-04
Payer: MEDICARE

## 2025-09-04 VITALS
SYSTOLIC BLOOD PRESSURE: 130 MMHG | DIASTOLIC BLOOD PRESSURE: 70 MMHG | RESPIRATION RATE: 20 BRPM | HEIGHT: 68 IN | TEMPERATURE: 97.9 F | WEIGHT: 161 LBS | BODY MASS INDEX: 24.4 KG/M2 | OXYGEN SATURATION: 97 % | HEART RATE: 75 BPM

## 2025-09-04 DIAGNOSIS — R53.83 OTHER FATIGUE: ICD-10-CM

## 2025-09-04 DIAGNOSIS — R94.4 DECREASED GFR: ICD-10-CM

## 2025-09-04 DIAGNOSIS — R79.89 ELEVATED SERUM CREATININE: ICD-10-CM

## 2025-09-04 DIAGNOSIS — N30.01 ACUTE CYSTITIS WITH HEMATURIA: Primary | ICD-10-CM

## 2025-09-04 LAB
ALBUMIN UR-MCNC: 30 MG/DL
ANION GAP SERPL CALCULATED.3IONS-SCNC: 6 MMOL/L (ref 3–14)
APPEARANCE UR: ABNORMAL
BACTERIA #/AREA URNS HPF: ABNORMAL /HPF
BASOPHILS # BLD AUTO: 0.05 10E3/UL (ref 0–0.2)
BASOPHILS NFR BLD AUTO: 0.5 %
BILIRUB UR QL STRIP: NEGATIVE
BUN SERPL-MCNC: 15 MG/DL (ref 7–30)
CALCIUM SERPL-MCNC: 9.4 MG/DL (ref 8.5–10.1)
CHLORIDE BLD-SCNC: 108 MMOL/L (ref 94–109)
CO2 SERPL-SCNC: 28 MMOL/L (ref 20–32)
COLOR UR AUTO: YELLOW
CREAT SERPL-MCNC: 1.5 MG/DL (ref 0.66–1.25)
EGFRCR SERPLBLD CKD-EPI 2021: 47 ML/MIN/1.73M2
EOSINOPHIL # BLD AUTO: 0.11 10E3/UL (ref 0–0.7)
EOSINOPHIL NFR BLD AUTO: 1.1 %
ERYTHROCYTE [DISTWIDTH] IN BLOOD BY AUTOMATED COUNT: 13.7 % (ref 10–15)
GLUCOSE BLD-MCNC: 97 MG/DL (ref 70–99)
GLUCOSE UR STRIP-MCNC: NEGATIVE MG/DL
HCT VFR BLD AUTO: 46.3 % (ref 40–53)
HGB BLD-MCNC: 15.9 G/DL (ref 13.3–17.7)
HGB UR QL STRIP: ABNORMAL
IMM GRANULOCYTES # BLD: <0.04 10E3/UL
IMM GRANULOCYTES NFR BLD: 0.2 %
KETONES UR STRIP-MCNC: ABNORMAL MG/DL
LEUKOCYTE ESTERASE UR QL STRIP: ABNORMAL
LYMPHOCYTES # BLD AUTO: 1.45 10E3/UL (ref 0.8–5.3)
LYMPHOCYTES NFR BLD AUTO: 13.9 %
MCH RBC QN AUTO: 31.4 PG (ref 26.5–33)
MCHC RBC AUTO-ENTMCNC: 34.3 G/DL (ref 31.5–36.5)
MCV RBC AUTO: 91.5 FL (ref 78–100)
MONOCYTES # BLD AUTO: 0.94 10E3/UL (ref 0–1.3)
MONOCYTES NFR BLD AUTO: 9 %
NEUTROPHILS # BLD AUTO: 7.84 10E3/UL (ref 1.6–8.3)
NEUTROPHILS NFR BLD AUTO: 75.3 %
NITRATE UR QL: POSITIVE
PH UR STRIP: 6 [PH] (ref 5–7)
PLATELET # BLD AUTO: 296 10E3/UL (ref 150–450)
POTASSIUM BLD-SCNC: 4.3 MMOL/L (ref 3.4–5.3)
RBC # BLD AUTO: 5.06 10E6/UL (ref 4.4–5.9)
RBC #/AREA URNS AUTO: ABNORMAL /HPF
SODIUM SERPL-SCNC: 142 MMOL/L (ref 135–145)
SP GR UR STRIP: <=1.005 (ref 1–1.03)
UROBILINOGEN UR STRIP-ACNC: 0.2 E.U./DL
WBC # BLD AUTO: 10.41 10E3/UL (ref 4–11)
WBC #/AREA URNS AUTO: >100 /HPF

## 2025-09-04 RX ORDER — CIPROFLOXACIN 500 MG/1
500 TABLET, FILM COATED ORAL 2 TIMES DAILY
Qty: 10 TABLET | Refills: 0 | Status: SHIPPED | OUTPATIENT
Start: 2025-09-04 | End: 2025-09-09

## (undated) DEVICE — ESU GROUND PAD ADULT W/CORD E7507

## (undated) DEVICE — CATH URETERAL OPEN END 05FR 70CM 020015

## (undated) DEVICE — SOL NACL 0.9% IRRIG 500ML BOTTLE 2F7123

## (undated) DEVICE — SPECIMEN CONTAINER 5OZ STERILE 2600SA

## (undated) DEVICE — PACK CYSTO UMMC CUSTOM

## (undated) DEVICE — PAD CHUX UNDERPAD 30X36" P3036C

## (undated) DEVICE — SOL NACL 0.9% IRRIG 3000ML BAG 2B7477

## (undated) DEVICE — ESU ELEC BIPOLAR CUTTING LOOP EXT LENGTH 24/26FR 27040GP130-

## (undated) DEVICE — GUIDEWIRE BENTSON NON-HEP TSFB35260

## (undated) DEVICE — SUCTION MANIFOLD NEPTUNE 2 SYS 1 PORT 702-025-000

## (undated) DEVICE — DEFIB PRO-PADZ LVP LQD GEL ADULT 8900-2105-01

## (undated) DEVICE — INTRO GLIDESHEATH SLENDER 6FR 10X45CM 60-1060

## (undated) DEVICE — TOTE ANGIO CORP PC15AT SAN32CC83O

## (undated) DEVICE — GLOVE GAMMEX NEOPRENE ULTRA SZ 7.5 LF 8515

## (undated) DEVICE — SUCTION IRR STRYKERFLOW II W/TIP 250-070-520

## (undated) DEVICE — LINEN TOWEL PACK X5 5464

## (undated) DEVICE — SUCTION MANIFOLD NEPTUNE 2 SYS 4 PORT 0702-020-000

## (undated) DEVICE — PREP CHLORAPREP 26ML TINTED ORANGE  260815

## (undated) DEVICE — LINEN GOWN XLG 5407

## (undated) DEVICE — EVAC SYSTEM CLEAR FLOW SC082500

## (undated) DEVICE — DRAPE SHEET REV FOLD 3/4 9349

## (undated) DEVICE — PREP POVIDONE IODINE SOLUTION 10% 4OZ BOTTLE 29906-004

## (undated) DEVICE — DRAPE LEGGINGS CLEAR 8430

## (undated) DEVICE — GOWN IMPERVIOUS SPECIALTY XLG/XLONG 32474

## (undated) DEVICE — GLOVE BIOGEL PI MICRO SZ 7.0 48570

## (undated) DEVICE — CATH DIAGNOSTIC RADIAL 5FR TIG 4.0

## (undated) DEVICE — PACK LAP CHOLE SLC15LCFSD

## (undated) DEVICE — ESU GROUND PAD UNIVERSAL W/O CORD

## (undated) DEVICE — ENDO TROCAR FIRST ENTRY KII FIOS Z-THRD 12X100MM CTF73

## (undated) DEVICE — SYR PISTON URETHRAL 60ML 68000

## (undated) DEVICE — PREP POVIDONE-IODINE 7.5% SCRUB 4OZ BOTTLE MDS093945

## (undated) DEVICE — DRSG TELFA 3X8" 1238

## (undated) DEVICE — ENDO TROCAR SLEEVE KII Z-THREADED 05X100MM CTS02

## (undated) DEVICE — ADAPTER CATH CHECK-FLO 9FR FLL 050885 G15476

## (undated) DEVICE — SOL WATER IRRIG 1000ML BOTTLE 2F7114

## (undated) DEVICE — POUCH TISSUE RETRIEVAL LAP 10MM 2.21" INTRO TRS100SB2

## (undated) DEVICE — PACK GOWN 3/PK DISP XL SBA32GPFCB

## (undated) DEVICE — MANIFOLD KIT ANGIO AUTOMATED 014613

## (undated) DEVICE — ESU HOLDER LAP INST DISP PURPLE LONG 330MM H-PRO-330

## (undated) DEVICE — SU VICRYL 0 UR-6 27" J603H

## (undated) DEVICE — SU MONOCRYL 4-0 PS-2 18" UND Y496G

## (undated) DEVICE — SU VICRYL 3-0 SH 27" J316H

## (undated) DEVICE — GLOVE PROTEXIS W/NEU-THERA 8.0  2D73TE80

## (undated) DEVICE — CLIP APPLIER ENDO 5MM M/L LIGAMAX EL5ML

## (undated) DEVICE — NDL INSUFFLATION 13GA 120MM C2201

## (undated) DEVICE — PAD CHUX UNDERPAD 23X24" 7136

## (undated) DEVICE — ENDO TROCAR FIRST ENTRY KII FIOS Z-THRD 05X100MM CTF03

## (undated) DEVICE — PACK CYSTO CUSTOM ASC

## (undated) DEVICE — SLEEVE TR BAND RADIAL COMPRESSION DEVICE 24CM TRB24-REG

## (undated) DEVICE — ENDO SCOPE WARMER LF TM500

## (undated) DEVICE — KIT HAND CONTROL ANGIOTOUCH ACIST 65CM AT-P65

## (undated) DEVICE — TEASPOON METAL STERILE 17506/24

## (undated) DEVICE — SOL NACL 0.9% INJ 1000ML BAG 2B1324X

## (undated) RX ORDER — ONDANSETRON 2 MG/ML
INJECTION INTRAMUSCULAR; INTRAVENOUS
Status: DISPENSED
Start: 2024-01-10

## (undated) RX ORDER — FENTANYL CITRATE 50 UG/ML
INJECTION, SOLUTION INTRAMUSCULAR; INTRAVENOUS
Status: DISPENSED
Start: 2024-01-10

## (undated) RX ORDER — ONDANSETRON 2 MG/ML
INJECTION INTRAMUSCULAR; INTRAVENOUS
Status: DISPENSED
Start: 2022-02-08

## (undated) RX ORDER — PROPOFOL 10 MG/ML
INJECTION, EMULSION INTRAVENOUS
Status: DISPENSED
Start: 2024-01-10

## (undated) RX ORDER — ALBUTEROL SULFATE 90 UG/1
AEROSOL, METERED RESPIRATORY (INHALATION)
Status: DISPENSED
Start: 2022-02-08

## (undated) RX ORDER — HYDROMORPHONE HYDROCHLORIDE 1 MG/ML
INJECTION, SOLUTION INTRAMUSCULAR; INTRAVENOUS; SUBCUTANEOUS
Status: DISPENSED
Start: 2022-02-08

## (undated) RX ORDER — VERAPAMIL HYDROCHLORIDE 2.5 MG/ML
INJECTION, SOLUTION INTRAVENOUS
Status: DISPENSED
Start: 2024-07-16

## (undated) RX ORDER — FENTANYL CITRATE 50 UG/ML
INJECTION, SOLUTION INTRAMUSCULAR; INTRAVENOUS
Status: DISPENSED
Start: 2022-01-24

## (undated) RX ORDER — FENTANYL CITRATE 50 UG/ML
INJECTION, SOLUTION INTRAMUSCULAR; INTRAVENOUS
Status: DISPENSED
Start: 2023-09-06

## (undated) RX ORDER — BUPIVACAINE HYDROCHLORIDE AND EPINEPHRINE 2.5; 5 MG/ML; UG/ML
INJECTION, SOLUTION EPIDURAL; INFILTRATION; INTRACAUDAL; PERINEURAL
Status: DISPENSED
Start: 2022-02-08

## (undated) RX ORDER — CEFAZOLIN SODIUM/WATER 2 G/20 ML
SYRINGE (ML) INTRAVENOUS
Status: DISPENSED
Start: 2024-05-28

## (undated) RX ORDER — DEXAMETHASONE SODIUM PHOSPHATE 4 MG/ML
INJECTION, SOLUTION INTRA-ARTICULAR; INTRALESIONAL; INTRAMUSCULAR; INTRAVENOUS; SOFT TISSUE
Status: DISPENSED
Start: 2023-09-06

## (undated) RX ORDER — LIDOCAINE HYDROCHLORIDE 20 MG/ML
JELLY TOPICAL
Status: DISPENSED
Start: 2024-01-10

## (undated) RX ORDER — LIDOCAINE HYDROCHLORIDE 20 MG/ML
JELLY TOPICAL
Status: DISPENSED
Start: 2024-05-28

## (undated) RX ORDER — LIDOCAINE HYDROCHLORIDE 20 MG/ML
JELLY TOPICAL
Status: DISPENSED
Start: 2023-09-06

## (undated) RX ORDER — LIDOCAINE HYDROCHLORIDE 20 MG/ML
INJECTION, SOLUTION EPIDURAL; INFILTRATION; INTRACAUDAL; PERINEURAL
Status: DISPENSED
Start: 2022-02-08

## (undated) RX ORDER — NEOSTIGMINE METHYLSULFATE 1 MG/ML
VIAL (ML) INJECTION
Status: DISPENSED
Start: 2022-02-08

## (undated) RX ORDER — ACETAMINOPHEN 325 MG/1
TABLET ORAL
Status: DISPENSED
Start: 2023-09-06

## (undated) RX ORDER — FENTANYL CITRATE 50 UG/ML
INJECTION, SOLUTION INTRAMUSCULAR; INTRAVENOUS
Status: DISPENSED
Start: 2025-08-12

## (undated) RX ORDER — PROPOFOL 10 MG/ML
INJECTION, EMULSION INTRAVENOUS
Status: DISPENSED
Start: 2023-09-06

## (undated) RX ORDER — ACETAMINOPHEN 325 MG/1
TABLET ORAL
Status: DISPENSED
Start: 2022-02-08

## (undated) RX ORDER — FENTANYL CITRATE 50 UG/ML
INJECTION, SOLUTION INTRAMUSCULAR; INTRAVENOUS
Status: DISPENSED
Start: 2022-02-08

## (undated) RX ORDER — DEXAMETHASONE SODIUM PHOSPHATE 4 MG/ML
INJECTION, SOLUTION INTRA-ARTICULAR; INTRALESIONAL; INTRAMUSCULAR; INTRAVENOUS; SOFT TISSUE
Status: DISPENSED
Start: 2022-02-08

## (undated) RX ORDER — LIDOCAINE HYDROCHLORIDE 10 MG/ML
INJECTION, SOLUTION EPIDURAL; INFILTRATION; INTRACAUDAL; PERINEURAL
Status: DISPENSED
Start: 2024-07-16

## (undated) RX ORDER — LIDOCAINE HYDROCHLORIDE 20 MG/ML
JELLY TOPICAL
Status: DISPENSED
Start: 2025-04-30

## (undated) RX ORDER — FENTANYL CITRATE 0.05 MG/ML
INJECTION, SOLUTION INTRAMUSCULAR; INTRAVENOUS
Status: DISPENSED
Start: 2022-02-08

## (undated) RX ORDER — OXYCODONE HYDROCHLORIDE 5 MG/1
TABLET ORAL
Status: DISPENSED
Start: 2023-09-06

## (undated) RX ORDER — FENTANYL CITRATE-0.9 % NACL/PF 10 MCG/ML
PLASTIC BAG, INJECTION (ML) INTRAVENOUS
Status: DISPENSED
Start: 2024-01-10

## (undated) RX ORDER — EPHEDRINE SULFATE 50 MG/ML
INJECTION, SOLUTION INTRAMUSCULAR; INTRAVENOUS; SUBCUTANEOUS
Status: DISPENSED
Start: 2023-09-06

## (undated) RX ORDER — GLYCOPYRROLATE 0.2 MG/ML
INJECTION, SOLUTION INTRAMUSCULAR; INTRAVENOUS
Status: DISPENSED
Start: 2024-01-10

## (undated) RX ORDER — OXYCODONE HYDROCHLORIDE 5 MG/1
TABLET ORAL
Status: DISPENSED
Start: 2022-02-08

## (undated) RX ORDER — EPHEDRINE SULFATE 50 MG/ML
INJECTION, SOLUTION INTRAMUSCULAR; INTRAVENOUS; SUBCUTANEOUS
Status: DISPENSED
Start: 2024-01-10

## (undated) RX ORDER — CELECOXIB 200 MG/1
CAPSULE ORAL
Status: DISPENSED
Start: 2022-02-08

## (undated) RX ORDER — GLYCOPYRROLATE 0.2 MG/ML
INJECTION, SOLUTION INTRAMUSCULAR; INTRAVENOUS
Status: DISPENSED
Start: 2022-02-08

## (undated) RX ORDER — FENTANYL CITRATE 50 UG/ML
INJECTION, SOLUTION INTRAMUSCULAR; INTRAVENOUS
Status: DISPENSED
Start: 2024-05-28

## (undated) RX ORDER — FENTANYL CITRATE 50 UG/ML
INJECTION, SOLUTION INTRAMUSCULAR; INTRAVENOUS
Status: DISPENSED
Start: 2020-10-26

## (undated) RX ORDER — CEFAZOLIN SODIUM 2 G/50ML
SOLUTION INTRAVENOUS
Status: DISPENSED
Start: 2023-09-06

## (undated) RX ORDER — GLYCOPYRROLATE 0.2 MG/ML
INJECTION INTRAMUSCULAR; INTRAVENOUS
Status: DISPENSED
Start: 2023-09-06

## (undated) RX ORDER — ACETAMINOPHEN 325 MG/1
TABLET ORAL
Status: DISPENSED
Start: 2024-01-10

## (undated) RX ORDER — PROPOFOL 10 MG/ML
INJECTION, EMULSION INTRAVENOUS
Status: DISPENSED
Start: 2022-02-08

## (undated) RX ORDER — DEXAMETHASONE SODIUM PHOSPHATE 4 MG/ML
INJECTION, SOLUTION INTRA-ARTICULAR; INTRALESIONAL; INTRAMUSCULAR; INTRAVENOUS; SOFT TISSUE
Status: DISPENSED
Start: 2024-01-10

## (undated) RX ORDER — REGADENOSON 0.08 MG/ML
INJECTION, SOLUTION INTRAVENOUS
Status: DISPENSED
Start: 2022-10-20

## (undated) RX ORDER — ONDANSETRON 2 MG/ML
INJECTION INTRAMUSCULAR; INTRAVENOUS
Status: DISPENSED
Start: 2023-09-06

## (undated) RX ORDER — HEPARIN SODIUM 1000 [USP'U]/ML
INJECTION, SOLUTION INTRAVENOUS; SUBCUTANEOUS
Status: DISPENSED
Start: 2024-07-16

## (undated) RX ORDER — CEFAZOLIN SODIUM/WATER 2 G/20 ML
SYRINGE (ML) INTRAVENOUS
Status: DISPENSED
Start: 2022-02-08

## (undated) RX ORDER — ACETAMINOPHEN 325 MG/1
TABLET ORAL
Status: DISPENSED
Start: 2024-05-28

## (undated) RX ORDER — CEFAZOLIN SODIUM 2 G/50ML
SOLUTION INTRAVENOUS
Status: DISPENSED
Start: 2024-01-10

## (undated) RX ORDER — HEPARIN SODIUM 200 [USP'U]/100ML
INJECTION, SOLUTION INTRAVENOUS
Status: DISPENSED
Start: 2024-07-16